# Patient Record
Sex: MALE | Race: WHITE | NOT HISPANIC OR LATINO | Employment: OTHER | URBAN - METROPOLITAN AREA
[De-identification: names, ages, dates, MRNs, and addresses within clinical notes are randomized per-mention and may not be internally consistent; named-entity substitution may affect disease eponyms.]

---

## 2019-10-24 ENCOUNTER — TELEPHONE (OUTPATIENT)
Dept: OPHTHALMOLOGY | Facility: CLINIC | Age: 74
End: 2019-10-24

## 2019-10-24 NOTE — TELEPHONE ENCOUNTER
Note to facilitator to assist in referral request  Gustavo Leiva, RN RN 12:57 PM 10/24/19        [facility]    Phone Message    May a detailed message be left on voicemail: yes    Reason for Call: Other: Pt is [facility] by the [facility] to Dr Charisma Rodriguez for choroidal degeration and nevus within 1 week, per [facility] auth. Referral and recs faxed to clinic for review, thanks!     Action Taken: Message routed to:  [facility] [place]     LM for pt appt on 10-18 at 145 at the [facility]. Left address and my number if they have questions  Sarika Person, BELÉN COT 2:13 PM October 24, 2019

## 2019-10-28 ENCOUNTER — OFFICE VISIT (OUTPATIENT)
Dept: OPHTHALMOLOGY | Facility: CLINIC | Age: 74
End: 2019-10-28
Attending: OPHTHALMOLOGY
Payer: COMMERCIAL

## 2019-10-28 DIAGNOSIS — C69.32 CHOROIDAL MALIGNANT MELANOMA, LEFT (H): ICD-10-CM

## 2019-10-28 DIAGNOSIS — H31.8 CHOROIDAL LESION: Primary | ICD-10-CM

## 2019-10-28 PROCEDURE — 92250 FUNDUS PHOTOGRAPHY W/I&R: CPT | Mod: ZF | Performed by: OPHTHALMOLOGY

## 2019-10-28 PROCEDURE — G0463 HOSPITAL OUTPT CLINIC VISIT: HCPCS | Mod: ZF

## 2019-10-28 PROCEDURE — 92134 CPTRZ OPH DX IMG PST SGM RTA: CPT | Mod: ZF | Performed by: OPHTHALMOLOGY

## 2019-10-28 PROCEDURE — 76513 OPH US DX ANT SGM US UNI/BI: CPT | Mod: ZF | Performed by: OPHTHALMOLOGY

## 2019-10-28 PROCEDURE — 76510 OPH US DX B-SCAN&QUAN A-SCAN: CPT | Mod: ZF | Performed by: OPHTHALMOLOGY

## 2019-10-28 RX ORDER — CHLORTHALIDONE 25 MG/1
25 TABLET ORAL EVERY MORNING
Status: ON HOLD | COMMUNITY
Start: 2019-01-22 | End: 2022-02-22

## 2019-10-28 RX ORDER — CARVEDILOL 12.5 MG/1
12.5 TABLET ORAL 2 TIMES DAILY WITH MEALS
COMMUNITY
Start: 2019-04-09

## 2019-10-28 RX ORDER — AMLODIPINE BESYLATE 10 MG/1
10 TABLET ORAL EVERY MORNING
COMMUNITY
Start: 2019-01-21

## 2019-10-28 RX ORDER — ATORVASTATIN CALCIUM 10 MG/1
10 TABLET, FILM COATED ORAL DAILY
Refills: 3 | COMMUNITY
Start: 2019-10-06 | End: 2021-08-19 | Stop reason: DRUGHIGH

## 2019-10-28 RX ORDER — LISINOPRIL 20 MG/1
20 TABLET ORAL 2 TIMES DAILY
Refills: 3 | COMMUNITY
Start: 2019-10-16

## 2019-10-28 ASSESSMENT — VISUAL ACUITY
OS_CC: J1
OD_CC+: -2
METHOD: SNELLEN - LINEAR
OS_CC: 20/25
CORRECTION_TYPE: GLASSES
OD_CC: 20/25
OD_CC: J1

## 2019-10-28 ASSESSMENT — CONF VISUAL FIELD
METHOD: COUNTING FINGERS
OD_NORMAL: 1
OS_NORMAL: 1

## 2019-10-28 ASSESSMENT — REFRACTION_WEARINGRX
OS_AXIS: 127
OS_ADD: +1.25
OD_CYLINDER: +0.75
OS_SPHERE: +3.00
OS_CYLINDER: +0.75
OD_SPHERE: +3.25
OD_AXIS: 139
OD_ADD: +1.25
SPECS_TYPE: PAL

## 2019-10-28 ASSESSMENT — CUP TO DISC RATIO
OD_RATIO: 0.25
OS_RATIO: 0.25

## 2019-10-28 ASSESSMENT — TONOMETRY
OD_IOP_MMHG: 12
OS_IOP_MMHG: 10
IOP_METHOD: ICARE

## 2019-10-28 ASSESSMENT — EXTERNAL EXAM - RIGHT EYE: OD_EXAM: NORMAL

## 2019-10-28 ASSESSMENT — SLIT LAMP EXAM - LIDS
COMMENTS: NORMAL
COMMENTS: NORMAL

## 2019-10-28 ASSESSMENT — EXTERNAL EXAM - LEFT EYE: OS_EXAM: NORMAL

## 2019-10-28 NOTE — PROGRESS NOTES
CC -   CMM OS    INTERVAL HISTORY - Initial visit    HPI -   Jairo Austin is a  73 year old year-old patient referred by the [facility] for evaluation and treat of a choroidal lesion left eye.  diagnosis 10/2019, been getting annual eye exams no prior notice per patient.  DM II since ~ 2010, good control, + HTn.      PAST OCULAR SURGERY  None    RETINAL IMAGING:  OCT  10-28-19  OD -  Macula - serous PEDs, PHF attached  OS -  Macula - retina normal, PHF attached   Lesion - elevated poor image quality         UBM  OS - far IT lesion in CB size 3.02mm x 10.39T x 12.48L    U/S OS  A-scan - lesion low reflective, size 3.29 mm  B-scan - peripheral lesion elevated        ASSESSMENT & PLAN    1. CMM OS   - most likely diagnosis based on exam and imaging   - advise treatment enucleation vs I-125   - d/w patient options, wishes to proceed with Tx     - will plan I-125 plaque   - d/w patient r/b/a: treatment failure, risk of metastasis, vision loss, resident/fellow      2. Syneresis OU      3. NS OU   - mild          return to clinic: for treatment    ATTESTATION     Attending Attestation:     Complete documentation of historical and exam elements from today's encounter can be found in the full encounter summary report (not reduplicated in this progress note).  I personally obtained the chief complaint(s) and history of present illness.  I confirmed and edited as necessary the review of systems, past medical/surgical history, family history, social history, and examination findings as documented by others; and I examined the patient myself.  I personally reviewed the relevant tests, images, and reports as documented above.  I formulated and edited as necessary the assessment and plan and discussed the findings and management plan with the patient and family    Charisma Rodriguez MD, PhD  , Vitreoretinal Surgery  Department of Ophthalmology  [facility]

## 2019-10-28 NOTE — NURSING NOTE
Chief Complaints and History of Present Illnesses   Patient presents with     Consult For     Choroidal lesion LE     Chief Complaint(s) and History of Present Illness(es)     Consult For     Associated symptoms: headache and floaters.  Negative for eye pain, flashes and photophobia    Comments: Choroidal lesion LE              Comments     Pt states vision is good.  Started getting headaches on left side of head 2-3 months ago.  [facility] noticed a choroidal lesion in LE.  Pt noticed floaters in LE possibly 6 months ago.  Pt has no pain or other concerns at this time.    DM 2.  Pt does not check BS levels.  No results found for: A1C.  Pt reports last A1C was around 6.5 about a month ago.    BELÉN Mendosa October 28, 2019 1:45 PM

## 2019-10-28 NOTE — LETTER
10/28/2019    RE: Jairo Austin  [address]  [facility] [address]     CC -   CMM OS    INTERVAL HISTORY - Initial visit    HPI - Jairo Austin is a  73 year old year-old patient referred by the [facility] for evaluation and treat of a choroidal lesion left eye.  diagnosis 10/2019, been getting annual eye exams no prior notice per patient.  DM II since ~ 2010, good control, + HTn.    PAST OCULAR SURGERY  None    RETINAL IMAGING:  OCT  10-28-19  OD -  Macula - serous PEDs, PHF attached  OS -  Macula - retina normal, PHF attached   Lesion - elevated poor image quality     UBM  OS - far IT lesion in CB size 3.02mm x 10.39T x 12.48L    U/S OS  A-scan - lesion low reflective, size 3.29 mm  B-scan - peripheral lesion elevated    ASSESSMENT & PLAN    1. CMM OS   - most likely diagnosis based on exam and imaging   - advise treatment enucleation vs I-125   - d/w patient options, wishes to proceed with Tx     - will plan I-125 plaque   - d/w patient r/b/a: treatment failure, risk of metastasis, vision loss, resident/fellow    2. Syneresis OU    3. NS OU   - mild    return to clinic: for treatment    ATTESTATION     Attending Attestation:Complete documentation of historical and exam elements from today's encounter can be found in the full encounter summary report (not reduplicated in this progress note).  I personally obtained the chief complaint(s) and history of present illness.  I confirmed and edited as necessary the review of systems, past medical/surgical history, family history, social history, and examination findings as documented by others; and I examined the patient myself.  I personally reviewed the relevant tests, images, and reports as documented above.  I formulated and edited as necessary the assessment and plan and discussed the findings and management plan with the patient and family- Charisma Rodriguez MD, PhD      Charisma Rodriguez MD, PhD  , Vitreoretinal Surgery  Department of  Ophthalmology  [facility]

## 2019-10-30 DIAGNOSIS — C69.30 CHOROIDAL MALIGNANT MELANOMA (H): Primary | ICD-10-CM

## 2019-10-31 NOTE — TELEPHONE ENCOUNTER
ONCOLOGY INTAKE: Records Information      APPT INFORMATION: 11/4/19 - Manoj  Referring provider:  LON Cohen  Referring provider s clinic:  [facility]   Reason for visit/diagnosis:  Choroidal malignant melanoma (H)   Has patient been notified of appointment date and time?: Yes - confirmed with wife    RECORDS INFORMATION:  Were the records received with the referral (via [employer])? Internal referral    Has patient been seen for any external appt for this diagnosis? No    If yes, where? NA    Has patient had any imaging or procedures outside of [place] for this condition? No      If Yes, where? NA    ADDITIONAL INFORMATION:  Scheduled via call to pt (per Aminata GARCIAS request) - confirmed with pt's wife    11/4/19 Appts Confirmed:  12:20pm CT CAP @ [facility]  2:30pm   Labs @ [facility]  3:00pm   Dr Aranda @ [facility]

## 2019-11-04 ENCOUNTER — APPOINTMENT (OUTPATIENT)
Dept: LAB | Facility: CLINIC | Age: 74
End: 2019-11-04
Attending: INTERNAL MEDICINE
Payer: COMMERCIAL

## 2019-11-04 ENCOUNTER — HOSPITAL ENCOUNTER (OUTPATIENT)
Dept: CT IMAGING | Facility: CLINIC | Age: 74
Discharge: HOME | End: 2019-11-04
Attending: INTERNAL MEDICINE | Admitting: INTERNAL MEDICINE
Payer: COMMERCIAL

## 2019-11-04 ENCOUNTER — PRE VISIT (OUTPATIENT)
Dept: ONCOLOGY | Facility: CLINIC | Age: 74
End: 2019-11-04

## 2019-11-04 ENCOUNTER — TELEPHONE (OUTPATIENT)
Dept: RADIATION ONCOLOGY | Facility: CLINIC | Age: 74
End: 2019-11-04

## 2019-11-04 ENCOUNTER — ONCOLOGY VISIT (OUTPATIENT)
Dept: ONCOLOGY | Facility: CLINIC | Age: 74
End: 2019-11-04
Attending: INTERNAL MEDICINE
Payer: COMMERCIAL

## 2019-11-04 VITALS
WEIGHT: 220 LBS | HEART RATE: 75 BPM | DIASTOLIC BLOOD PRESSURE: 93 MMHG | TEMPERATURE: 97.5 F | SYSTOLIC BLOOD PRESSURE: 160 MMHG | OXYGEN SATURATION: 98 % | RESPIRATION RATE: 18 BRPM

## 2019-11-04 DIAGNOSIS — C69.32 CHOROIDAL MALIGNANT MELANOMA, LEFT (H): Primary | ICD-10-CM

## 2019-11-04 DIAGNOSIS — C69.30 CHOROIDAL MALIGNANT MELANOMA (H): Primary | ICD-10-CM

## 2019-11-04 DIAGNOSIS — K76.9 LIVER LESION: ICD-10-CM

## 2019-11-04 DIAGNOSIS — C69.30 CHOROIDAL MALIGNANT MELANOMA (H): ICD-10-CM

## 2019-11-04 LAB
ALBUMIN SERPL-MCNC: 4.1 G/DL (ref 3.4–5)
ALP SERPL-CCNC: 121 U/L (ref 40–150)
ALT SERPL W P-5'-P-CCNC: 31 U/L (ref 0–70)
ANION GAP SERPL CALCULATED.3IONS-SCNC: 5 MMOL/L (ref 3–14)
AST SERPL W P-5'-P-CCNC: 20 U/L (ref 0–45)
BASOPHILS # BLD AUTO: 0 10E9/L (ref 0–0.2)
BASOPHILS NFR BLD AUTO: 0.8 %
BILIRUB SERPL-MCNC: 0.5 MG/DL (ref 0.2–1.3)
BUN SERPL-MCNC: 13 MG/DL (ref 7–30)
CALCIUM SERPL-MCNC: 9.3 MG/DL (ref 8.5–10.1)
CHLORIDE SERPL-SCNC: 104 MMOL/L (ref 94–109)
CO2 SERPL-SCNC: 27 MMOL/L (ref 20–32)
CREAT SERPL-MCNC: 0.81 MG/DL (ref 0.66–1.25)
DIFFERENTIAL METHOD BLD: NORMAL
EOSINOPHIL # BLD AUTO: 0.3 10E9/L (ref 0–0.7)
EOSINOPHIL NFR BLD AUTO: 5.9 %
ERYTHROCYTE [DISTWIDTH] IN BLOOD BY AUTOMATED COUNT: 11.8 % (ref 10–15)
GFR SERPL CREATININE-BSD FRML MDRD: 89 ML/MIN/{1.73_M2}
GLUCOSE SERPL-MCNC: 117 MG/DL (ref 70–99)
HCT VFR BLD AUTO: 44 % (ref 40–53)
HGB BLD-MCNC: 14.9 G/DL (ref 13.3–17.7)
IMM GRANULOCYTES # BLD: 0 10E9/L (ref 0–0.4)
IMM GRANULOCYTES NFR BLD: 0.4 %
LYMPHOCYTES # BLD AUTO: 1.2 10E9/L (ref 0.8–5.3)
LYMPHOCYTES NFR BLD AUTO: 22.5 %
MCH RBC QN AUTO: 32.7 PG (ref 26.5–33)
MCHC RBC AUTO-ENTMCNC: 33.9 G/DL (ref 31.5–36.5)
MCV RBC AUTO: 97 FL (ref 78–100)
MONOCYTES # BLD AUTO: 0.6 10E9/L (ref 0–1.3)
MONOCYTES NFR BLD AUTO: 12.3 %
NEUTROPHILS # BLD AUTO: 3 10E9/L (ref 1.6–8.3)
NEUTROPHILS NFR BLD AUTO: 58.1 %
NRBC # BLD AUTO: 0 10*3/UL
NRBC BLD AUTO-RTO: 0 /100
PLATELET # BLD AUTO: 190 10E9/L (ref 150–450)
POTASSIUM SERPL-SCNC: 3.7 MMOL/L (ref 3.4–5.3)
PROT SERPL-MCNC: 8.1 G/DL (ref 6.8–8.8)
RBC # BLD AUTO: 4.56 10E12/L (ref 4.4–5.9)
SODIUM SERPL-SCNC: 137 MMOL/L (ref 133–144)
WBC # BLD AUTO: 5.1 10E9/L (ref 4–11)

## 2019-11-04 PROCEDURE — 36415 COLL VENOUS BLD VENIPUNCTURE: CPT

## 2019-11-04 PROCEDURE — 40000141 ZZH STATISTIC PERIPHERAL IV START W/O US GUIDANCE

## 2019-11-04 PROCEDURE — 25000128 H RX IP 250 OP 636: Performed by: STUDENT IN AN ORGANIZED HEALTH CARE EDUCATION/TRAINING PROGRAM

## 2019-11-04 PROCEDURE — 85025 COMPLETE CBC W/AUTO DIFF WBC: CPT | Performed by: INTERNAL MEDICINE

## 2019-11-04 PROCEDURE — G0463 HOSPITAL OUTPT CLINIC VISIT: HCPCS | Mod: ZF

## 2019-11-04 PROCEDURE — 71260 CT THORAX DX C+: CPT

## 2019-11-04 PROCEDURE — 74177 CT ABD & PELVIS W/CONTRAST: CPT

## 2019-11-04 PROCEDURE — 80053 COMPREHEN METABOLIC PANEL: CPT | Performed by: INTERNAL MEDICINE

## 2019-11-04 PROCEDURE — G0463 HOSPITAL OUTPT CLINIC VISIT: HCPCS

## 2019-11-04 PROCEDURE — 99204 OFFICE O/P NEW MOD 45 MIN: CPT | Mod: ZP | Performed by: INTERNAL MEDICINE

## 2019-11-04 RX ORDER — IOPAMIDOL 755 MG/ML
132 INJECTION, SOLUTION INTRAVASCULAR ONCE
Status: COMPLETED | OUTPATIENT
Start: 2019-11-04 | End: 2019-11-04

## 2019-11-04 RX ORDER — ASPIRIN 81 MG/1
81 TABLET ORAL EVERY MORNING
COMMUNITY
Start: 2013-10-25

## 2019-11-04 RX ADMIN — IOPAMIDOL 132 ML: 755 INJECTION, SOLUTION INTRAVENOUS at 10:31

## 2019-11-04 ASSESSMENT — PAIN SCALES - GENERAL: PAINLEVEL: NO PAIN (0)

## 2019-11-04 NOTE — NURSING NOTE
Chief Complaint   Patient presents with     Blood Draw     Labs drawn via  by RN in lab. Line flushed with saline and heparin. VS taken.      Staci Gonzalez RN

## 2019-11-04 NOTE — PROGRESS NOTES
[facility]  MEDICAL ONCOLOGY CONSULT  Nov 4, 2019    CHIEF COMPLAINT: ***    HISTORY OF PRESENT ILLNESS  Jairo Austin is a 73 year old male with ***.         Results for orders placed or performed in visit on 11/04/19   Comprehensive metabolic panel     Status: Abnormal   Result Value Ref Range    Sodium 137 133 - 144 mmol/L    Potassium 3.7 3.4 - 5.3 mmol/L    Chloride 104 94 - 109 mmol/L    Carbon Dioxide 27 20 - 32 mmol/L    Anion Gap 5 3 - 14 mmol/L    Glucose 117 (H) 70 - 99 mg/dL    Urea Nitrogen 13 7 - 30 mg/dL    Creatinine 0.81 0.66 - 1.25 mg/dL    GFR Estimate 89 >60 mL/min/[1.73_m2]    GFR Estimate If Black >90 >60 mL/min/[1.73_m2]    Calcium 9.3 8.5 - 10.1 mg/dL    Bilirubin Total 0.5 0.2 - 1.3 mg/dL    Albumin 4.1 3.4 - 5.0 g/dL    Protein Total 8.1 6.8 - 8.8 g/dL    Alkaline Phosphatase 121 40 - 150 U/L    ALT 31 0 - 70 U/L    AST 20 0 - 45 U/L   *CBC with platelets differential     Status: None   Result Value Ref Range    WBC 5.1 4.0 - 11.0 10e9/L    RBC Count 4.56 4.4 - 5.9 10e12/L    Hemoglobin 14.9 13.3 - 17.7 g/dL    Hematocrit 44.0 40.0 - 53.0 %    MCV 97 78 - 100 fl    MCH 32.7 26.5 - 33.0 pg    MCHC 33.9 31.5 - 36.5 g/dL    RDW 11.8 10.0 - 15.0 %    Platelet Count 190 150 - 450 10e9/L    Diff Method Automated Method     % Neutrophils 58.1 %    % Lymphocytes 22.5 %    % Monocytes 12.3 %    % Eosinophils 5.9 %    % Basophils 0.8 %    % Immature Granulocytes 0.4 %    Nucleated RBCs 0 0 /100    Absolute Neutrophil 3.0 1.6 - 8.3 10e9/L    Absolute Lymphocytes 1.2 0.8 - 5.3 10e9/L    Absolute Monocytes 0.6 0.0 - 1.3 10e9/L    Absolute Eosinophils 0.3 0.0 - 0.7 10e9/L    Absolute Basophils 0.0 0.0 - 0.2 10e9/L    Abs Immature Granulocytes 0.0 0 - 0.4 10e9/L    Absolute Nucleated RBC 0.0          REVIEW OF SYSTEMS  A 12-point ROS negative except as in HPI    Current Outpatient Medications   Medication Sig Dispense Refill     amLODIPine (NORVASC) 10 MG tablet Take 10 mg by mouth        aspirin 81 MG EC tablet Take 81 mg by mouth       atorvastatin (LIPITOR) 10 MG tablet Take 10 mg by mouth daily  3     carvedilol (COREG) 12.5 MG tablet Take 12.5 mg by mouth       chlorthalidone (HYGROTON) 25 MG tablet Take 25 mg by mouth       lisinopril (PRINIVIL/ZESTRIL) 20 MG tablet Take 2 tablets BY MOUTH in the morning, and 1 tablet at night.  3       Allergies   Allergen Reactions     No Clinical Screening - See Comments Itching and Rash     Nitroimidazoles       There is no immunization history on file for this patient.    Past Medical History:   Diagnosis Date     Diabetes (H)      Hypertension        PAST SURGICAL HISTORY  1. Bilateral hip replacement, 2011    SOCIAL HISTORY   with 2 children and 4 grandchildren. Retired and now working as a . Quit smoking 20 years ago. Smoked 2 packs per day x 10 years.  History   Smoking Status     Former Smoker     Packs/day: 0.00   Smokeless Tobacco     Never Used    Social History    Substance and Sexual Activity      Alcohol use: Yes        Comment: Weekends/socially     History   Drug Use Not on file       FAMILY HISTORY  Paternal uncle: Throat cancer  Family History   Problem Relation Age of Onset     Glaucoma No family hx of      Macular Degeneration No family hx of        PHYSICAL EXAMINATION  BP (!) 160/93 (BP Location: Right arm, Patient Position: Sitting, Cuff Size: Adult Large)   Pulse 75   Temp 97.5  F (36.4  C) (Oral)   Resp 18   Wt 99.8 kg (220 lb)   SpO2 98%   Wt Readings from Last 2 Encounters:   11/04/19 99.8 kg (220 lb)     Physical Exam    ASSESSMENT AND PLAN    #1 ***      Darrell Aranda M.D.   of Medicine  Hematology, Oncology and Transplantation      Choroidal malignant melanoma (H)  ***  - Comprehensive metabolic panel  - *CBC with platelets differential

## 2019-11-04 NOTE — LETTER
11/4/2019       RE: Jairo Austin  [address]  [facility] [address]     Dear Colleague,    Thank you for referring your patient, Jairo Austin, to the [facility]. Please see a copy of my visit note below.    [facility]  MEDICAL ONCOLOGY CONSULT  Nov 4, 2019    CHIEF COMPLAINT: Choroidal melanoma    HISTORY OF PRESENT ILLNESS  Jairo Austin is a 73 year old male with a new diagnosis of choroidal melanoma of the left eye. He is referred by Dr. Rodriguez.     He was initially seen on 10/28/19, and the left eye lesion measured 10.39T x 12.48L with a height of 3.02mm. The plan is for plaque brachytherapy. Recent CT-scan negative for obvious liver metastasis. There were small enhancing lesion seen in segment 8 and 4a, likely small hemangiomas.    Patient feels well and has hypertension and diet controlled diabetes. He is on multiple antihypertensive agents including carvedilol and aspirin. Prior 20 pack-year smoker, quit 20 years ago.     Results for orders placed or performed in visit on 11/04/19   Comprehensive metabolic panel     Status: Abnormal   Result Value Ref Range    Sodium 137 133 - 144 mmol/L    Potassium 3.7 3.4 - 5.3 mmol/L    Chloride 104 94 - 109 mmol/L    Carbon Dioxide 27 20 - 32 mmol/L    Anion Gap 5 3 - 14 mmol/L    Glucose 117 (H) 70 - 99 mg/dL    Urea Nitrogen 13 7 - 30 mg/dL    Creatinine 0.81 0.66 - 1.25 mg/dL    GFR Estimate 89 >60 mL/min/[1.73_m2]    GFR Estimate If Black >90 >60 mL/min/[1.73_m2]    Calcium 9.3 8.5 - 10.1 mg/dL    Bilirubin Total 0.5 0.2 - 1.3 mg/dL    Albumin 4.1 3.4 - 5.0 g/dL    Protein Total 8.1 6.8 - 8.8 g/dL    Alkaline Phosphatase 121 40 - 150 U/L    ALT 31 0 - 70 U/L    AST 20 0 - 45 U/L   *CBC with platelets differential     Status: None   Result Value Ref Range    WBC 5.1 4.0 - 11.0 10e9/L    RBC Count 4.56 4.4 - 5.9 10e12/L    Hemoglobin 14.9 13.3 - 17.7 g/dL    Hematocrit 44.0 40.0 - 53.0 %    MCV 97  78 - 100 fl    MCH 32.7 26.5 - 33.0 pg    MCHC 33.9 31.5 - 36.5 g/dL    RDW 11.8 10.0 - 15.0 %    Platelet Count 190 150 - 450 10e9/L    Diff Method Automated Method     % Neutrophils 58.1 %    % Lymphocytes 22.5 %    % Monocytes 12.3 %    % Eosinophils 5.9 %    % Basophils 0.8 %    % Immature Granulocytes 0.4 %    Nucleated RBCs 0 0 /100    Absolute Neutrophil 3.0 1.6 - 8.3 10e9/L    Absolute Lymphocytes 1.2 0.8 - 5.3 10e9/L    Absolute Monocytes 0.6 0.0 - 1.3 10e9/L    Absolute Eosinophils 0.3 0.0 - 0.7 10e9/L    Absolute Basophils 0.0 0.0 - 0.2 10e9/L    Abs Immature Granulocytes 0.0 0 - 0.4 10e9/L    Absolute Nucleated RBC 0.0          REVIEW OF SYSTEMS  A 12-point ROS negative except as in HPI    Current Outpatient Medications   Medication Sig Dispense Refill     amLODIPine (NORVASC) 10 MG tablet Take 10 mg by mouth       aspirin 81 MG EC tablet Take 81 mg by mouth       atorvastatin (LIPITOR) 10 MG tablet Take 10 mg by mouth daily  3     carvedilol (COREG) 12.5 MG tablet Take 12.5 mg by mouth       chlorthalidone (HYGROTON) 25 MG tablet Take 25 mg by mouth       lisinopril (PRINIVIL/ZESTRIL) 20 MG tablet Take 2 tablets BY MOUTH in the morning, and 1 tablet at night.  3       Allergies   Allergen Reactions     No Clinical Screening - See Comments Itching and Rash     Nitroimidazoles       There is no immunization history on file for this patient.    Past Medical History:   Diagnosis Date     Diabetes (H)      Hypertension      Melanoma (H)        PAST SURGICAL HISTORY  1. Bilateral hip replacement, 2011    SOCIAL HISTORY   with 2 children and 4 grandchildren. Retired and now working as a . Quit smoking 20 years ago. Smoked 2 packs per day x 10 years.  History   Smoking Status     Former Smoker     Packs/day: 0.00   Smokeless Tobacco     Never Used    Social History    Substance and Sexual Activity      Alcohol use: Yes        Comment: Weekends/socially     History   Drug Use Unknown        FAMILY HISTORY  Paternal uncle: Throat cancer  Family History   Problem Relation Age of Onset     Glaucoma No family hx of      Macular Degeneration No family hx of        PHYSICAL EXAMINATION  BP (!) 160/93 (BP Location: Right arm, Patient Position: Sitting, Cuff Size: Adult Large)   Pulse 75   Temp 97.5  F (36.4  C) (Oral)   Resp 18   Wt 99.8 kg (220 lb)   SpO2 98%   Wt Readings from Last 2 Encounters:   11/07/19 99 kg (218 lb 4.8 oz)   11/04/19 99.8 kg (220 lb)     Physical Exam    ASSESSMENT AND PLAN    #1 Uveal melanoma, 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 Austin. He is a 73 year old man with uveal melanoma. The tumor involves the ciliary body and plan is for plaque brachytherapy. CT-CAP shows small lesions, likely hemangiomas. We will obtain MRI liver to confirm hemangiomas and rule out melanoma    I will plan to see him back in 3 months with repeat MRI on observation.    Darrell Aranda M.D.   of Medicine  Hematology, Oncology and Transplantation      Choroidal malignant melanoma, left (H)  Liver lesion  - MR Abdomen w/o & w Contrast    Choroidal malignant melanoma (H)  - Comprehensive metabolic panel  - *CBC with platelets differential        Again, thank you for allowing me to participate in the care of your patient.      Sincerely,    Darrell De Souza MD

## 2019-11-04 NOTE — LETTER
Date:November 19, 2019      Patient was self referred, no letter generated. Do not send.        [facility] Health Information

## 2019-11-04 NOTE — NURSING NOTE
Oncology Rooming Note    November 4, 2019 12:48 PM   Jairo Austin is a 73 year old male who presents for:    Chief Complaint   Patient presents with     Blood Draw     Labs drawn via  by RN in lab. Line flushed with saline and heparin. VS taken.      New Patient     New patient.  Choroidal malignant melanoma.      Initial Vitals: BP (!) 160/93 (BP Location: Right arm, Patient Position: Sitting, Cuff Size: Adult Large)   Pulse 75   Temp 97.5  F (36.4  C) (Oral)   Resp 18   Wt 99.8 kg (220 lb)   SpO2 98%  There is no height or weight on file to calculate BMI. There is no height or weight on file to calculate BSA.  No Pain (0) Comment: Data Unavailable   No LMP for male patient.  Allergies reviewed: Yes  Medications reviewed: No    Medications: Medication refills not needed today.  Pharmacy name entered into [employer]: [id number] - [place], [place] - [address]    Clinical concerns: No additional concerns.  Dr. De Souza was notified.      Tanisha Pascal, CMA

## 2019-11-07 ENCOUNTER — OFFICE VISIT (OUTPATIENT)
Dept: RADIATION ONCOLOGY | Facility: CLINIC | Age: 74
End: 2019-11-07
Attending: RADIOLOGY
Payer: COMMERCIAL

## 2019-11-07 VITALS — WEIGHT: 218.3 LBS | SYSTOLIC BLOOD PRESSURE: 130 MMHG | DIASTOLIC BLOOD PRESSURE: 77 MMHG | HEART RATE: 59 BPM

## 2019-11-07 DIAGNOSIS — C69.32 CHOROIDAL MALIGNANT MELANOMA, LEFT (H): Primary | ICD-10-CM

## 2019-11-07 PROCEDURE — 77470 SPECIAL RADIATION TREATMENT: CPT | Performed by: RADIOLOGY

## 2019-11-07 PROCEDURE — G0463 HOSPITAL OUTPT CLINIC VISIT: HCPCS | Performed by: RADIOLOGY

## 2019-11-07 ASSESSMENT — ENCOUNTER SYMPTOMS
HEARTBURN: 0
NAUSEA: 0
EYE PAIN: 0
NECK PAIN: 0
VOMITING: 0
WEIGHT LOSS: 0
PHOTOPHOBIA: 0
DIZZINESS: 0
WHEEZING: 0
FREQUENCY: 0
FALLS: 0
DOUBLE VISION: 0
NERVOUS/ANXIOUS: 0
DIARRHEA: 0
FEVER: 0
CHILLS: 0
DEPRESSION: 0
CONSTIPATION: 0
BLURRED VISION: 0
BACK PAIN: 0
COUGH: 0
SORE THROAT: 0
ABDOMINAL PAIN: 0
HEADACHES: 1
SHORTNESS OF BREATH: 0

## 2019-11-07 NOTE — PROGRESS NOTES
HPI    INITIAL PATIENT ASSESSMENT    Diagnosis: melanoma    Prior radiation therapy: None    Prior chemotherapy: None    Prior hormonal therapy:No    Pain Eval:  Denies    Psychosocial  Living arrangements: wife  Fall Risk: independent   referral needs: Not needed    Advanced Directive: Yes - Location: home  Implantable Cardiac Device? No    Onset of menarche:   LMP: No LMP for male patient.  Onset of menopause:   Abnormal vaginal bleeding/discharge:   Are you pregnant?   Reproductive note: grown children    Nurse face-to-face time: Level 3:  10 min face to face time  Review of Systems   Constitutional: Negative for chills, fever, malaise/fatigue and weight loss.   HENT: Positive for hearing loss and tinnitus. Negative for congestion and sore throat.    Eyes: Negative for blurred vision, double vision, photophobia and pain.   Respiratory: Negative for cough, shortness of breath and wheezing.    Cardiovascular: Negative for chest pain and leg swelling.   Gastrointestinal: Negative for abdominal pain, constipation, diarrhea, heartburn, nausea and vomiting.   Genitourinary: Negative for frequency and urgency.   Musculoskeletal: Negative for back pain, falls and neck pain.   Skin: Negative for itching and rash.   Neurological: Positive for headaches. Negative for dizziness.   Endo/Heme/Allergies: Negative for environmental allergies.   Psychiatric/Behavioral: Negative for depression. The patient is not nervous/anxious.

## 2019-11-07 NOTE — LETTER
11/7/2019       RE: Jairo Austin  [address]  [facility] [address]     Dear Colleague,    Thank you for referring your patient, Jairo Austin, to the [facility] ON[facility]. Please see a copy of my visit note below.      HPI    INITIAL PATIENT ASSESSMENT    Diagnosis: melanoma    Prior radiation therapy: None    Prior chemotherapy: None    Prior hormonal therapy:No    Pain Eval:  Denies    Psychosocial  Living arrangements: wife  Fall Risk: independent   referral needs: Not needed    Advanced Directive: Yes - Location: home  Implantable Cardiac Device? No    Onset of menarche:   LMP: No LMP for male patient.  Onset of menopause:   Abnormal vaginal bleeding/discharge:   Are you pregnant?   Reproductive note: grown children    Nurse face-to-face time: Level 3:  10 min face to face time  Review of Systems   Constitutional: Negative for chills, fever, malaise/fatigue and weight loss.   HENT: Positive for hearing loss and tinnitus. Negative for congestion and sore throat.    Eyes: Negative for blurred vision, double vision, photophobia and pain.   Respiratory: Negative for cough, shortness of breath and wheezing.    Cardiovascular: Negative for chest pain and leg swelling.   Gastrointestinal: Negative for abdominal pain, constipation, diarrhea, heartburn, nausea and vomiting.   Genitourinary: Negative for frequency and urgency.   Musculoskeletal: Negative for back pain, falls and neck pain.   Skin: Negative for itching and rash.   Neurological: Positive for headaches. Negative for dizziness.   Endo/Heme/Allergies: Negative for environmental allergies.   Psychiatric/Behavioral: Negative for depression. The patient is not nervous/anxious.                  Radiation Oncology Consultation:  Date on this visit: 11/7/2019    Jairo Austin  is referred by Dr.Dara Renan Rodriguez for a radiation oncology consultation. He requires evaluation for diagnosis of choroidal   melanoma involvig the LEFT eye    History Of Present Illness:  Mr. Austin is a 73 year old male who presents with a diagnosis of choroidal melanoma.  At [facility] and sent him to DR Rodriguez who saw him 10/28/19    Exam at that mike revealed a raised lesion in the left eye measuring  10.39T x 12.48L  with a height of 3.02mm     He is referred now for consideration of  I 125 radioacitve plaque.       His history is significant for diet controlled Diabetes and Hypertension.      He met with Annie in medical oncology on Monday. Note is pending .  CT of the CAP was negative except for a  small enhancing lesions in the right lobe of the liver which were  nonspecific, possibly hemangomas.  Follow up recommended.      He has good vision.  He works as a  now that he is 'retired'   He Quit smoking 20 years ago. Smoked 2 packs per day x 10 years    Past Medical/Surgical History:  Past Medical History:   Diagnosis Date     Diabetes (H)      Hypertension      Melanoma (H)      Past Surgical History:   Procedure Laterality Date     AS PARTIAL HIP REPLACEMENT         Past Radiation History:  Past Chemotherapy History:  Implanted Cardiac device:   none  Advanced directive  :      Review of Systems:  Reviewed.  See details in nursing note    Allergies:  Allergies as of 11/07/2019 - Reviewed 11/07/2019   Allergen Reaction Noted     No clinical screening - see comments Itching and Rash 11/04/2019       Current Medications:   current medication list reviewed    Family History:  Family History   Problem Relation Age of Onset     Glaucoma No family hx of      Macular Degeneration No family hx of        Social History:  Social History     Socioeconomic History     Marital status:      Spouse name: Not on file     Number of children: Not on file     Years of education: Not on file     Highest education level: Not on file   Occupational  History     Not on file   Social Needs     Financial resource strain: Not on file     Food insecurity:     Worry: Not on file     Inability: Not on file     Transportation needs:     Medical: Not on file     Non-medical: Not on file   Tobacco Use     Smoking status: Former Smoker     Packs/day: 0.00     Smokeless tobacco: Never Used   Substance and Sexual Activity     Alcohol use: Yes     Comment: Weekends/socially     Drug use: Never     Sexual activity: Not on file   Lifestyle     Physical activity:     Days per week: Not on file     Minutes per session: Not on file     Stress: Not on file   Relationships     Social connections:     Talks on phone: Not on file     Gets together: Not on file     Attends Nondenominational service: Not on file     Active member of club or organization: Not on file     Attends meetings of clubs or organizations: Not on file     Relationship status: Not on file     Intimate partner violence:     Fear of current or ex partner: Not on file     Emotionally abused: Not on file     Physically abused: Not on file     Forced sexual activity: Not on file   Other Topics Concern     Parent/sibling w/ CABG, MI or angioplasty before 65F 55M? Not Asked   Social History Narrative     Not on file       Physical Exam:  /77   Pulse 59   Wt 99 kg (218 lb 4.8 oz)     GENERAL APPEARANCE: healthy, alert and no distress  HENT: Mouth without ulcers or lesions   PEERLA  EOMI  NECK: no adenopathy, no asymmetry or masses   LYMPHATICS: No cervical, supraclavicular, axillary or inguinal lymphadenopathy    MUSCULOSKELETAL: extremities normal- no gross deformities noted, no evidence of inflammation in joints, FROM in all extremities. No edema b/l LE.  SKIN: no suspicious lesions or rashes   PSYCHIATRIC: mentation appears normal and affect normal    Pathology:na  Labs reviewed    ASSESSMENT:    Choroidal melanoma of the LEFT eye.      RECOMMENDATION:    He is an excellent candidate for consideration of a   I 125 eye  plaque.   I explained that the chance of local control is very good but there is a risk of deterioration of his vision on that eye due to either retinopathy from the radiotherapy or catarct formation.    The risks and benefits of radiation therapy were discussed with the patient.  Time was given to ask and answer questions.  All questions were answered.  The patient wishes to proceed with the proposed course of treatment.  An informed consent form was reviewed with the patient and signed by the patient.       He believes that the procedure will be scheduled in December.      Thank you for allowing me to participate in the care of this patient.  Please do not hesitate to call if you have questions.       Nica Melgoza MD  [phone]   Pager   [phone]  [facility]   [phone] Julita Mathew  [phone] [facility]  Primary Physician: No Ref-Primary, Physician   Patient Care Team:  No Ref-Primary, Physician as PCP - General  PADILLA GUERRERO                Again, thank you for allowing me to participate in the care of your patient.      Sincerely,    Nica Melgoza MD

## 2019-11-07 NOTE — PROGRESS NOTES
Radiation Oncology Consultation:  Date on this visit: 11/7/2019    Jairo Austin  is referred by Dr.Dara Renan Rodriguez for a radiation oncology consultation. He requires evaluation for diagnosis of choroidal  melanoma involvig the LEFT eye    History Of Present Illness:  Mr. Austin is a 73 year old male who presents with a diagnosis of choroidal melanoma.  At [facility] and sent him to DR Rodriguez who saw him 10/28/19    Exam at that mike revealed a raised lesion in the left eye measuring  10.39T x 12.48L  with a height of 3.02mm     He is referred now for consideration of  I 125 radioacitve plaque.       His history is significant for diet controlled Diabetes and Hypertension.      He met with Annie in medical oncology on Monday. Note is pending .  CT of the CAP was negative except for a  small enhancing lesions in the right lobe of the liver which were  nonspecific, possibly hemangomas.  Follow up recommended.      He has good vision.  He works as a  now that he is 'retired'   He Quit smoking 20 years ago. Smoked 2 packs per day x 10 years    Past Medical/Surgical History:  Past Medical History:   Diagnosis Date     Diabetes (H)      Hypertension      Melanoma (H)      Past Surgical History:   Procedure Laterality Date     AS PARTIAL HIP REPLACEMENT         Past Radiation History:  Past Chemotherapy History:  Implanted Cardiac device:   none  Advanced directive  :      Review of Systems:  Reviewed.  See details in nursing note    Allergies:  Allergies as of 11/07/2019 - Reviewed 11/07/2019   Allergen Reaction Noted     No clinical screening - see comments Itching and Rash 11/04/2019       Current Medications:   current medication list reviewed    Family History:  Family History   Problem Relation Age of Onset     Glaucoma No family hx of      Macular Degeneration No family hx of        Social History:  Social  History     Socioeconomic History     Marital status:      Spouse name: Not on file     Number of children: Not on file     Years of education: Not on file     Highest education level: Not on file   Occupational History     Not on file   Social Needs     Financial resource strain: Not on file     Food insecurity:     Worry: Not on file     Inability: Not on file     Transportation needs:     Medical: Not on file     Non-medical: Not on file   Tobacco Use     Smoking status: Former Smoker     Packs/day: 0.00     Smokeless tobacco: Never Used   Substance and Sexual Activity     Alcohol use: Yes     Comment: Weekends/socially     Drug use: Never     Sexual activity: Not on file   Lifestyle     Physical activity:     Days per week: Not on file     Minutes per session: Not on file     Stress: Not on file   Relationships     Social connections:     Talks on phone: Not on file     Gets together: Not on file     Attends Hindu service: Not on file     Active member of club or organization: Not on file     Attends meetings of clubs or organizations: Not on file     Relationship status: Not on file     Intimate partner violence:     Fear of current or ex partner: Not on file     Emotionally abused: Not on file     Physically abused: Not on file     Forced sexual activity: Not on file   Other Topics Concern     Parent/sibling w/ CABG, MI or angioplasty before 65F 55M? Not Asked   Social History Narrative     Not on file       Physical Exam:  /77   Pulse 59   Wt 99 kg (218 lb 4.8 oz)     GENERAL APPEARANCE: healthy, alert and no distress  HENT: Mouth without ulcers or lesions   PEERLA  EOMI  NECK: no adenopathy, no asymmetry or masses   LYMPHATICS: No cervical, supraclavicular, axillary or inguinal lymphadenopathy    MUSCULOSKELETAL: extremities normal- no gross deformities noted, no evidence of inflammation in joints, FROM in all extremities. No edema b/l LE.  SKIN: no suspicious lesions or rashes    PSYCHIATRIC: mentation appears normal and affect normal    Pathology:na  Labs reviewed    ASSESSMENT:    Choroidal melanoma of the LEFT eye.      RECOMMENDATION:    He is an excellent candidate for consideration of a   I 125 eye plaque.   I explained that the chance of local control is very good but there is a risk of deterioration of his vision on that eye due to either retinopathy from the radiotherapy or catarct formation.    The risks and benefits of radiation therapy were discussed with the patient.  Time was given to ask and answer questions.  All questions were answered.  The patient wishes to proceed with the proposed course of treatment.  An informed consent form was reviewed with the patient and signed by the patient.       He believes that the procedure will be scheduled in December.      Thank you for allowing me to participate in the care of this patient.  Please do not hesitate to call if you have questions.       Nica Melgoza MD  [phone]   Pager   [phone]  [facility]   [phone] [place]  [phone] [facility]  Primary Physician: No Ref-Primary, Physician   Patient Care Team:  No Ref-Primary, Physician as PCP - General  PADILLA GUERRERO

## 2019-11-11 ENCOUNTER — ANCILLARY PROCEDURE (OUTPATIENT)
Dept: MRI IMAGING | Facility: CLINIC | Age: 74
End: 2019-11-11
Attending: INTERNAL MEDICINE
Payer: COMMERCIAL

## 2019-11-11 DIAGNOSIS — K76.9 LIVER LESION: ICD-10-CM

## 2019-11-11 DIAGNOSIS — C69.32 CHOROIDAL MALIGNANT MELANOMA, LEFT (H): ICD-10-CM

## 2019-11-12 NOTE — DISCHARGE INSTRUCTIONS
MRI Contrast Discharge Instructions    The IV contrast you received today will pass out of your body in your  urine. This will happen in the next 24 hours. You will not feel this process.  Your urine will not change color.    Drink at least 4 extra glasses of water or juice today (unless your doctor  has restricted your fluids). This reduces the stress on your kidneys.  You may take your regular medicines.    If you are on dialysis: It is best to have dialysis today.    If you have a reaction: Most reactions happen right away. If you have  any new symptoms after leaving the hospital (such as hives or swelling),  call your hospital at the correct number below. Or call your family doctor.  If you have breathing distress or wheezing, call 911.    Special instructions: ***    I have read and understand the above information.    Signature:______________________________________ Date:___________    Staff:__________________________________________ Date:___________     Time:__________    [place] [facility] Departments:    ___[place]: [phone]  ___[facility]: [phone]  ___[place]: [phone] ___[facility]: [phone]  ___[facility]: [phone]  ___[facility]: [phone]  ___[phone]  ___[facility]: [phone]  ___[phone]

## 2019-11-15 NOTE — PROGRESS NOTES
[facility]  MEDICAL ONCOLOGY CONSULT  Nov 4, 2019    CHIEF COMPLAINT: Choroidal melanoma    HISTORY OF PRESENT ILLNESS  Jairo Austin is a 73 year old male with a new diagnosis of choroidal melanoma of the left eye. He is referred by Dr. Rodriguez.     He was initially seen on 10/28/19, and the left eye lesion measured 10.39T x 12.48L with a height of 3.02mm. The plan is for plaque brachytherapy. Recent CT-scan negative for obvious liver metastasis. There were small enhancing lesion seen in segment 8 and 4a, likely small hemangiomas.    Patient feels well and has hypertension and diet controlled diabetes. He is on multiple antihypertensive agents including carvedilol and aspirin. Prior 20 pack-year smoker, quit 20 years ago.     Results for orders placed or performed in visit on 11/04/19   Comprehensive metabolic panel     Status: Abnormal   Result Value Ref Range    Sodium 137 133 - 144 mmol/L    Potassium 3.7 3.4 - 5.3 mmol/L    Chloride 104 94 - 109 mmol/L    Carbon Dioxide 27 20 - 32 mmol/L    Anion Gap 5 3 - 14 mmol/L    Glucose 117 (H) 70 - 99 mg/dL    Urea Nitrogen 13 7 - 30 mg/dL    Creatinine 0.81 0.66 - 1.25 mg/dL    GFR Estimate 89 >60 mL/min/[1.73_m2]    GFR Estimate If Black >90 >60 mL/min/[1.73_m2]    Calcium 9.3 8.5 - 10.1 mg/dL    Bilirubin Total 0.5 0.2 - 1.3 mg/dL    Albumin 4.1 3.4 - 5.0 g/dL    Protein Total 8.1 6.8 - 8.8 g/dL    Alkaline Phosphatase 121 40 - 150 U/L    ALT 31 0 - 70 U/L    AST 20 0 - 45 U/L   *CBC with platelets differential     Status: None   Result Value Ref Range    WBC 5.1 4.0 - 11.0 10e9/L    RBC Count 4.56 4.4 - 5.9 10e12/L    Hemoglobin 14.9 13.3 - 17.7 g/dL    Hematocrit 44.0 40.0 - 53.0 %    MCV 97 78 - 100 fl    MCH 32.7 26.5 - 33.0 pg    MCHC 33.9 31.5 - 36.5 g/dL    RDW 11.8 10.0 - 15.0 %    Platelet Count 190 150 - 450 10e9/L    Diff Method Automated Method     % Neutrophils 58.1 %    % Lymphocytes 22.5 %    % Monocytes 12.3 %    % Eosinophils  5.9 %    % Basophils 0.8 %    % Immature Granulocytes 0.4 %    Nucleated RBCs 0 0 /100    Absolute Neutrophil 3.0 1.6 - 8.3 10e9/L    Absolute Lymphocytes 1.2 0.8 - 5.3 10e9/L    Absolute Monocytes 0.6 0.0 - 1.3 10e9/L    Absolute Eosinophils 0.3 0.0 - 0.7 10e9/L    Absolute Basophils 0.0 0.0 - 0.2 10e9/L    Abs Immature Granulocytes 0.0 0 - 0.4 10e9/L    Absolute Nucleated RBC 0.0          REVIEW OF SYSTEMS  A 12-point ROS negative except as in HPI    Current Outpatient Medications   Medication Sig Dispense Refill     amLODIPine (NORVASC) 10 MG tablet Take 10 mg by mouth       aspirin 81 MG EC tablet Take 81 mg by mouth       atorvastatin (LIPITOR) 10 MG tablet Take 10 mg by mouth daily  3     carvedilol (COREG) 12.5 MG tablet Take 12.5 mg by mouth       chlorthalidone (HYGROTON) 25 MG tablet Take 25 mg by mouth       lisinopril (PRINIVIL/ZESTRIL) 20 MG tablet Take 2 tablets BY MOUTH in the morning, and 1 tablet at night.  3       Allergies   Allergen Reactions     No Clinical Screening - See Comments Itching and Rash     Nitroimidazoles       There is no immunization history on file for this patient.    Past Medical History:   Diagnosis Date     Diabetes (H)      Hypertension      Melanoma (H)        PAST SURGICAL HISTORY  1. Bilateral hip replacement, 2011    SOCIAL HISTORY   with 2 children and 4 grandchildren. Retired and now working as a . Quit smoking 20 years ago. Smoked 2 packs per day x 10 years.  History   Smoking Status     Former Smoker     Packs/day: 0.00   Smokeless Tobacco     Never Used    Social History    Substance and Sexual Activity      Alcohol use: Yes        Comment: Weekends/socially     History   Drug Use Unknown       FAMILY HISTORY  Paternal uncle: Throat cancer  Family History   Problem Relation Age of Onset     Glaucoma No family hx of      Macular Degeneration No family hx of        PHYSICAL EXAMINATION  BP (!) 160/93 (BP Location: Right arm, Patient Position:  Sitting, Cuff Size: Adult Large)   Pulse 75   Temp 97.5  F (36.4  C) (Oral)   Resp 18   Wt 99.8 kg (220 lb)   SpO2 98%   Wt Readings from Last 2 Encounters:   11/07/19 99 kg (218 lb 4.8 oz)   11/04/19 99.8 kg (220 lb)     Physical Exam    ASSESSMENT AND PLAN    #1 Uveal melanoma, 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 Austin. He is a 73 year old man with uveal melanoma. The tumor involves the ciliary body and plan is for plaque brachytherapy. CT-CAP shows small lesions, likely hemangiomas. We will obtain MRI liver to confirm hemangiomas and rule out melanoma    I will plan to see him back in 3 months with repeat MRI on observation.    Darrell Aranda M.D.   of Medicine  Hematology, Oncology and Transplantation      Choroidal malignant melanoma, left (H)  Liver lesion  - MR Abdomen w/o & w Contrast    Choroidal malignant melanoma (H)  - Comprehensive metabolic panel  - *CBC with platelets differential

## 2019-11-19 DIAGNOSIS — C69.32 MALIGNANT MELANOMA OF CHOROID OF LEFT EYE (H): Primary | ICD-10-CM

## 2019-12-15 ENCOUNTER — ANESTHESIA EVENT (OUTPATIENT)
Dept: SURGERY | Facility: CLINIC | Age: 74
End: 2019-12-15
Payer: COMMERCIAL

## 2019-12-15 NOTE — ANESTHESIA PREPROCEDURE EVALUATION
Anesthesia Pre-Procedure Evaluation    Patient: Jairo Austin   MRN:     [id number] Gender:   male   Age:    74 year old :      1945        Preoperative Diagnosis: Malignant melanoma of choroid of left eye (H) [C69.32]   Procedure(s):  left eye, radiation plaque placement     Past Medical History:   Diagnosis Date     Diabetes (H)      Hypertension      Melanoma (H)       Past Surgical History:   Procedure Laterality Date     AS PARTIAL HIP REPLACEMENT       cyst removal back       ORTHOPEDIC SURGERY Bilateral     hip replacements          Anesthesia Evaluation    [facility] AN PHYSICAL EXAM      LABS:  CBC:   Lab Results   Component Value Date    WBC 5.1 2019    HGB 14.9 2019    HCT 44.0 2019     2019     BMP:   Lab Results   Component Value Date     2019    POTASSIUM 3.7 2019    CHLORIDE 104 2019    CO2 27 2019    BUN 13 2019    CR 0.81 2019     (H) 2019     COAGS: No results found for: PTT, INR, FIBR  POC: No results found for: BGM, HCG, HCGS  OTHER:   Lab Results   Component Value Date    PAMELA 9.3 2019    ALBUMIN 4.1 2019    PROTTOTAL 8.1 2019    ALT 31 2019    AST 20 2019    ALKPHOS 121 2019    BILITOTAL 0.5 2019        Preop Vitals    BP Readings from Last 3 Encounters:   19 130/77   19 (!) 160/93    Pulse Readings from Last 3 Encounters:   19 59   19 75      Resp Readings from Last 3 Encounters:   19 18    SpO2 Readings from Last 3 Encounters:   19 98%      Temp Readings from Last 1 Encounters:   19 36.4  C (97.5  F) (Oral)    Ht Readings from Last 1 Encounters:   No data found for Ht      Wt Readings from Last 1 Encounters:   19 99 kg (218 lb 4.8 oz)    There is no height or weight on file to calculate BMI.     LDA:  Peripheral IV 19 Right;Anterior Upper forearm (Active)   Number of days: 41        Assessment:   ASA  SCORE: 2            Plan:   Anes. Type:  General   Pre-Medication: None   Induction:  IV (Standard)   Airway: ETT; Oral   Access/Monitoring: PIV   Maintenance: Balanced     Postop Plan:   Postop Pain: Opioids  Postop Sedation/Airway: Not planned     PONV Management:   Adult Risk Factors:, Postop Opioids           Yesenia Candelario MD

## 2019-12-16 ENCOUNTER — HOSPITAL ENCOUNTER (OUTPATIENT)
Facility: CLINIC | Age: 74
Discharge: HOME | End: 2019-12-16
Attending: OPHTHALMOLOGY | Admitting: OPHTHALMOLOGY
Payer: COMMERCIAL

## 2019-12-16 ENCOUNTER — APPOINTMENT (OUTPATIENT)
Dept: RADIATION ONCOLOGY | Facility: CLINIC | Age: 74
End: 2019-12-16
Attending: RADIOLOGY
Payer: COMMERCIAL

## 2019-12-16 ENCOUNTER — ANESTHESIA (OUTPATIENT)
Dept: SURGERY | Facility: CLINIC | Age: 74
End: 2019-12-16
Payer: COMMERCIAL

## 2019-12-16 VITALS
DIASTOLIC BLOOD PRESSURE: 92 MMHG | HEART RATE: 66 BPM | SYSTOLIC BLOOD PRESSURE: 150 MMHG | HEIGHT: 72 IN | OXYGEN SATURATION: 95 % | BODY MASS INDEX: 29.53 KG/M2 | RESPIRATION RATE: 15 BRPM | WEIGHT: 218.03 LBS | TEMPERATURE: 98.4 F

## 2019-12-16 DIAGNOSIS — C69.32 MALIGNANT MELANOMA OF CHOROID OF LEFT EYE (H): ICD-10-CM

## 2019-12-16 LAB — GLUCOSE BLDC GLUCOMTR-MCNC: 118 MG/DL (ref 70–99)

## 2019-12-16 PROCEDURE — 25000128 H RX IP 250 OP 636: Performed by: NURSE ANESTHETIST, CERTIFIED REGISTERED

## 2019-12-16 PROCEDURE — 40000170 ZZH STATISTIC PRE-PROCEDURE ASSESSMENT II: Performed by: OPHTHALMOLOGY

## 2019-12-16 PROCEDURE — 71000027 ZZH RECOVERY PHASE 2 EACH 15 MINS: Performed by: OPHTHALMOLOGY

## 2019-12-16 PROCEDURE — 27210794 ZZH OR GENERAL SUPPLY STERILE: Performed by: OPHTHALMOLOGY

## 2019-12-16 PROCEDURE — 25000125 ZZHC RX 250: Performed by: NURSE ANESTHETIST, CERTIFIED REGISTERED

## 2019-12-16 PROCEDURE — 37000008 ZZH ANESTHESIA TECHNICAL FEE, 1ST 30 MIN: Performed by: OPHTHALMOLOGY

## 2019-12-16 PROCEDURE — 25000125 ZZHC RX 250: Performed by: OPHTHALMOLOGY

## 2019-12-16 PROCEDURE — 71000014 ZZH RECOVERY PHASE 1 LEVEL 2 FIRST HR: Performed by: OPHTHALMOLOGY

## 2019-12-16 PROCEDURE — C2639 BRACHYTX, NON-STRANDED,I-125: HCPCS | Performed by: RADIOLOGY

## 2019-12-16 PROCEDURE — 36000064 ZZH SURGERY LEVEL 4 EA 15 ADDTL MIN - UMMC: Performed by: OPHTHALMOLOGY

## 2019-12-16 PROCEDURE — 77778 APPLY INTERSTIT RADIAT COMPL: CPT | Performed by: RADIOLOGY

## 2019-12-16 PROCEDURE — 25800030 ZZH RX IP 258 OP 636: Performed by: NURSE ANESTHETIST, CERTIFIED REGISTERED

## 2019-12-16 PROCEDURE — 77336 RADIATION PHYSICS CONSULT: CPT | Mod: 59 | Performed by: RADIOLOGY

## 2019-12-16 PROCEDURE — 36000062 ZZH SURGERY LEVEL 4 1ST 30 MIN - UMMC: Performed by: OPHTHALMOLOGY

## 2019-12-16 PROCEDURE — 25000128 H RX IP 250 OP 636: Performed by: OPHTHALMOLOGY

## 2019-12-16 PROCEDURE — 37000009 ZZH ANESTHESIA TECHNICAL FEE, EACH ADDTL 15 MIN: Performed by: OPHTHALMOLOGY

## 2019-12-16 PROCEDURE — 25000566 ZZH SEVOFLURANE, EA 15 MIN: Performed by: OPHTHALMOLOGY

## 2019-12-16 PROCEDURE — 25000132 ZZH RX MED GY IP 250 OP 250 PS 637: Performed by: ANESTHESIOLOGY

## 2019-12-16 PROCEDURE — 82962 GLUCOSE BLOOD TEST: CPT

## 2019-12-16 RX ORDER — GLYCOPYRROLATE 0.2 MG/ML
INJECTION, SOLUTION INTRAMUSCULAR; INTRAVENOUS PRN
Status: DISCONTINUED | OUTPATIENT
Start: 2019-12-16 | End: 2019-12-16

## 2019-12-16 RX ORDER — FENTANYL CITRATE 50 UG/ML
INJECTION, SOLUTION INTRAMUSCULAR; INTRAVENOUS PRN
Status: DISCONTINUED | OUTPATIENT
Start: 2019-12-16 | End: 2019-12-16

## 2019-12-16 RX ORDER — BALANCED SALT SOLUTION 6.4; .75; .48; .3; 3.9; 1.7 MG/ML; MG/ML; MG/ML; MG/ML; MG/ML; MG/ML
SOLUTION OPHTHALMIC PRN
Status: DISCONTINUED | OUTPATIENT
Start: 2019-12-16 | End: 2019-12-16 | Stop reason: HOSPADM

## 2019-12-16 RX ORDER — ACETAMINOPHEN 325 MG/1
975 TABLET ORAL ONCE
Status: COMPLETED | OUTPATIENT
Start: 2019-12-16 | End: 2019-12-16

## 2019-12-16 RX ORDER — MEPERIDINE HYDROCHLORIDE 25 MG/ML
12.5 INJECTION INTRAMUSCULAR; INTRAVENOUS; SUBCUTANEOUS
Status: DISCONTINUED | OUTPATIENT
Start: 2019-12-16 | End: 2019-12-16 | Stop reason: HOSPADM

## 2019-12-16 RX ORDER — SODIUM CHLORIDE, SODIUM LACTATE, POTASSIUM CHLORIDE, CALCIUM CHLORIDE 600; 310; 30; 20 MG/100ML; MG/100ML; MG/100ML; MG/100ML
INJECTION, SOLUTION INTRAVENOUS CONTINUOUS PRN
Status: DISCONTINUED | OUTPATIENT
Start: 2019-12-16 | End: 2019-12-16

## 2019-12-16 RX ORDER — ONDANSETRON 2 MG/ML
INJECTION INTRAMUSCULAR; INTRAVENOUS PRN
Status: DISCONTINUED | OUTPATIENT
Start: 2019-12-16 | End: 2019-12-16

## 2019-12-16 RX ORDER — TRIAMCINOLONE ACETONIDE 40 MG/ML
INJECTION, SUSPENSION INTRA-ARTICULAR; INTRAMUSCULAR PRN
Status: DISCONTINUED | OUTPATIENT
Start: 2019-12-16 | End: 2019-12-16 | Stop reason: HOSPADM

## 2019-12-16 RX ORDER — FENTANYL CITRATE 50 UG/ML
25-50 INJECTION, SOLUTION INTRAMUSCULAR; INTRAVENOUS
Status: DISCONTINUED | OUTPATIENT
Start: 2019-12-16 | End: 2019-12-16 | Stop reason: HOSPADM

## 2019-12-16 RX ORDER — ONDANSETRON 4 MG/1
4 TABLET, ORALLY DISINTEGRATING ORAL EVERY 30 MIN PRN
Status: DISCONTINUED | OUTPATIENT
Start: 2019-12-16 | End: 2019-12-16 | Stop reason: HOSPADM

## 2019-12-16 RX ORDER — NALOXONE HYDROCHLORIDE 0.4 MG/ML
.1-.4 INJECTION, SOLUTION INTRAMUSCULAR; INTRAVENOUS; SUBCUTANEOUS
Status: DISCONTINUED | OUTPATIENT
Start: 2019-12-16 | End: 2019-12-16 | Stop reason: HOSPADM

## 2019-12-16 RX ORDER — EPHEDRINE SULFATE 50 MG/ML
INJECTION, SOLUTION INTRAMUSCULAR; INTRAVENOUS; SUBCUTANEOUS PRN
Status: DISCONTINUED | OUTPATIENT
Start: 2019-12-16 | End: 2019-12-16

## 2019-12-16 RX ORDER — CYCLOPENTOLAT/TROPIC/PHENYLEPH 1%-1%-2.5%
1 DROPS (EA) OPHTHALMIC (EYE) ONCE
Status: COMPLETED | OUTPATIENT
Start: 2019-12-16 | End: 2019-12-16

## 2019-12-16 RX ORDER — PROPOFOL 10 MG/ML
INJECTION, EMULSION INTRAVENOUS PRN
Status: DISCONTINUED | OUTPATIENT
Start: 2019-12-16 | End: 2019-12-16

## 2019-12-16 RX ORDER — CYCLOPENTOLAT/TROPIC/PHENYLEPH 1%-1%-2.5%
1 DROPS (EA) OPHTHALMIC (EYE)
Status: DISCONTINUED | OUTPATIENT
Start: 2019-12-16 | End: 2019-12-16 | Stop reason: HOSPADM

## 2019-12-16 RX ORDER — OXYCODONE HYDROCHLORIDE 5 MG/1
5 TABLET ORAL EVERY 4 HOURS PRN
Status: DISCONTINUED | OUTPATIENT
Start: 2019-12-16 | End: 2019-12-16 | Stop reason: HOSPADM

## 2019-12-16 RX ORDER — SODIUM CHLORIDE, SODIUM LACTATE, POTASSIUM CHLORIDE, CALCIUM CHLORIDE 600; 310; 30; 20 MG/100ML; MG/100ML; MG/100ML; MG/100ML
INJECTION, SOLUTION INTRAVENOUS CONTINUOUS
Status: DISCONTINUED | OUTPATIENT
Start: 2019-12-16 | End: 2019-12-16 | Stop reason: HOSPADM

## 2019-12-16 RX ORDER — HYDROMORPHONE HYDROCHLORIDE 1 MG/ML
.3-.5 INJECTION, SOLUTION INTRAMUSCULAR; INTRAVENOUS; SUBCUTANEOUS EVERY 10 MIN PRN
Status: DISCONTINUED | OUTPATIENT
Start: 2019-12-16 | End: 2019-12-16 | Stop reason: HOSPADM

## 2019-12-16 RX ORDER — NEOMYCIN POLYMYXIN B SULFATES AND DEXAMETHASONE 3.5; 10000; 1 MG/ML; [USP'U]/ML; MG/ML
1 SUSPENSION/ DROPS OPHTHALMIC 4 TIMES DAILY
Qty: 5 ML | Refills: 0 | Status: ON HOLD | OUTPATIENT
Start: 2019-12-16 | End: 2019-12-20

## 2019-12-16 RX ORDER — LIDOCAINE HYDROCHLORIDE 20 MG/ML
INJECTION, SOLUTION INFILTRATION; PERINEURAL PRN
Status: DISCONTINUED | OUTPATIENT
Start: 2019-12-16 | End: 2019-12-16

## 2019-12-16 RX ORDER — ATROPINE SULFATE 10 MG/ML
SOLUTION/ DROPS OPHTHALMIC PRN
Status: DISCONTINUED | OUTPATIENT
Start: 2019-12-16 | End: 2019-12-16 | Stop reason: HOSPADM

## 2019-12-16 RX ORDER — ONDANSETRON 2 MG/ML
4 INJECTION INTRAMUSCULAR; INTRAVENOUS EVERY 30 MIN PRN
Status: DISCONTINUED | OUTPATIENT
Start: 2019-12-16 | End: 2019-12-16 | Stop reason: HOSPADM

## 2019-12-16 RX ADMIN — SODIUM CHLORIDE, POTASSIUM CHLORIDE, SODIUM LACTATE AND CALCIUM CHLORIDE: 600; 310; 30; 20 INJECTION, SOLUTION INTRAVENOUS at 08:20

## 2019-12-16 RX ADMIN — ROCURONIUM BROMIDE 50 MG: 10 INJECTION INTRAVENOUS at 07:37

## 2019-12-16 RX ADMIN — Medication 10 MG: at 08:33

## 2019-12-16 RX ADMIN — Medication 1 DROP: at 07:19

## 2019-12-16 RX ADMIN — PHENYLEPHRINE HYDROCHLORIDE 50 MCG: 10 INJECTION INTRAVENOUS at 09:08

## 2019-12-16 RX ADMIN — Medication 1 DROP: at 07:28

## 2019-12-16 RX ADMIN — PROPOFOL 50 MG: 10 INJECTION, EMULSION INTRAVENOUS at 07:40

## 2019-12-16 RX ADMIN — ONDANSETRON 4 MG: 2 INJECTION INTRAMUSCULAR; INTRAVENOUS at 10:12

## 2019-12-16 RX ADMIN — SODIUM CHLORIDE, POTASSIUM CHLORIDE, SODIUM LACTATE AND CALCIUM CHLORIDE: 600; 310; 30; 20 INJECTION, SOLUTION INTRAVENOUS at 07:33

## 2019-12-16 RX ADMIN — FENTANYL CITRATE 50 MCG: 50 INJECTION, SOLUTION INTRAMUSCULAR; INTRAVENOUS at 08:11

## 2019-12-16 RX ADMIN — ACETAMINOPHEN 975 MG: 325 TABLET, FILM COATED ORAL at 11:56

## 2019-12-16 RX ADMIN — GLYCOPYRROLATE 0.2 MG: 0.2 INJECTION, SOLUTION INTRAMUSCULAR; INTRAVENOUS at 08:22

## 2019-12-16 RX ADMIN — PHENYLEPHRINE HYDROCHLORIDE 50 MCG: 10 INJECTION INTRAVENOUS at 08:48

## 2019-12-16 RX ADMIN — PHENYLEPHRINE HYDROCHLORIDE 50 MCG: 10 INJECTION INTRAVENOUS at 09:16

## 2019-12-16 RX ADMIN — OXYCODONE HYDROCHLORIDE 5 MG: 5 TABLET ORAL at 11:56

## 2019-12-16 RX ADMIN — SUGAMMADEX 200 MG: 100 INJECTION, SOLUTION INTRAVENOUS at 10:18

## 2019-12-16 RX ADMIN — LIDOCAINE HYDROCHLORIDE 100 MG: 20 INJECTION, SOLUTION INFILTRATION; PERINEURAL at 07:36

## 2019-12-16 RX ADMIN — FENTANYL CITRATE 100 MCG: 50 INJECTION, SOLUTION INTRAMUSCULAR; INTRAVENOUS at 07:36

## 2019-12-16 RX ADMIN — PROPOFOL 150 MG: 10 INJECTION, EMULSION INTRAVENOUS at 07:36

## 2019-12-16 RX ADMIN — HYDROMORPHONE HYDROCHLORIDE 0.25 MG: 1 INJECTION, SOLUTION INTRAMUSCULAR; INTRAVENOUS; SUBCUTANEOUS at 09:35

## 2019-12-16 RX ADMIN — PHENYLEPHRINE HYDROCHLORIDE 50 MCG: 10 INJECTION INTRAVENOUS at 09:01

## 2019-12-16 RX ADMIN — Medication 10 MG: at 08:15

## 2019-12-16 RX ADMIN — Medication 1 DROP: at 07:24

## 2019-12-16 ASSESSMENT — MIFFLIN-ST. JEOR: SCORE: 1769.06

## 2019-12-16 NOTE — ANESTHESIA PREPROCEDURE EVALUATION
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 Pt has had prior anesthetic. Type: General    No history of anesthetic complications          ROS/MED HX    ENT/Pulmonary:     (+)sleep apnea, uses CPAP , . .    Neurologic:  - neg neurologic ROS     Cardiovascular:     (+) hypertension----. : . . . :. . Previous cardiac testing Ech[place]:results:date: results: date: results: date: results:          METS/Exercise Tolerance:  4 - Raking leaves, gardening   Hematologic:         Musculoskeletal:  - neg musculoskeletal ROS       GI/Hepatic:  - neg GI/hepatic ROS       Renal/Genitourinary:  - ROS Renal section negative       Endo:     (+) type II DM .      Psychiatric:  - neg psychiatric ROS       Infectious Disease:  - neg infectious disease ROS       Malignancy:         Other:                         PHYSICAL EXAM:   Mental Status/Neuro:    Airway: Facies: Thick Neck  Mallampati: III  Mouth/Opening: Full  TM distance: > 6 cm  Neck ROM: Full   Respiratory: Auscultation: CTAB      CV: Rhythm: Regular   Comments:                      LABS:  CBC:   Lab Results   Component Value Date    WBC 5.1 2019    HGB 14.9 2019    HCT 44.0 2019     2019     BMP:   Lab Results   Component Value Date     2019    POTASSIUM 3.7 2019    CHLORIDE 104 2019    CO2 27 2019    BUN 13 2019    CR 0.81 2019     (H) 2019     COAGS: No  "results found for: PTT, INR, FIBR  POC:   Lab Results   Component Value Date     (H) 12/16/2019     OTHER:   Lab Results   Component Value Date    PAMELA 9.3 11/04/2019    ALBUMIN 4.1 11/04/2019    PROTTOTAL 8.1 11/04/2019    ALT 31 11/04/2019    AST 20 11/04/2019    ALKPHOS 121 11/04/2019    BILITOTAL 0.5 11/04/2019        Preop Vitals    BP Readings from Last 3 Encounters:   12/16/19 (!) 153/88   11/07/19 130/77   11/04/19 (!) 160/93    Pulse Readings from Last 3 Encounters:   11/07/19 59   11/04/19 75      Resp Readings from Last 3 Encounters:   12/16/19 14   11/04/19 18    SpO2 Readings from Last 3 Encounters:   12/16/19 98%   11/04/19 98%      Temp Readings from Last 1 Encounters:   12/16/19 36.5  C (97.7  F) (Oral)    Ht Readings from Last 1 Encounters:   12/16/19 1.816 m (5' 11.5\")      Wt Readings from Last 1 Encounters:   12/16/19 98.9 kg (218 lb 0.6 oz)    Estimated body mass index is 29.99 kg/m  as calculated from the following:    Height as of this encounter: 1.816 m (5' 11.5\").    Weight as of this encounter: 98.9 kg (218 lb 0.6 oz).     LDA:  Peripheral IV 12/16/19 Anterior;Right Hand (Active)   Site Assessment WDL 12/16/2019  6:42 AM   Line Status Infusing 12/16/2019  6:42 AM   Phlebitis Scale 0-->no symptoms 12/16/2019  6:42 AM   Number of days: 0        Assessment:   ASA SCORE: 2    H&P: History and physical reviewed and following examination; no interval change.   Smoking Status:  Non-Smoker/Unknown   NPO Status: NPO Appropriate     Plan:   Anes. Type:  General   Pre-Medication: None   Induction:  IV (Standard)   Airway: ETT; Oral   Access/Monitoring: PIV   Maintenance: Balanced     Postop Plan:   Postop Pain: Opioids  Postop Sedation/Airway: Not planned     PONV Management:   Adult Risk Factors:, Non-Smoker, Postop Opioids     CONSENT: Direct conversation   Plan and risks discussed with: Patient   Blood Products: Consent Deferred (Minimal Blood Loss)                   Yesenia Candelario MD  "

## 2019-12-16 NOTE — OP NOTE
PRE-OP Dx:    1) CMM left eye    Post-OP Dx:     same    Attending:   JOSE ARMANDO Rodriguez MD   Resident:  AMRIT Silva MD    Procedure:     1) I-125 plaque placement  16 mm LEFT eye    2) intraoperative ultrasound    3) disinsertion of lateral rectus muscle    Anesthesia:  General  EBL:   scant  Complications: None  Specimens:  None    Findings:     1) CMM OS      Procedure Description:    Jairo Austin  is a 74 year old year old year old patient with a history of CMM OS . After informed consent was obtained, he was brought into the OR where general anesthesia  was administered.  The eye was then prepped and draped in the usual fashion for ophthalmic surgery.    A  conjunctival peritomy was created and the quadrants cleared with the carreon scissors.  The muscles were isolated and looped with 2-0 silk sutures.      Next, the trans- was used to visualize the location of the tumor.     Marks were made with the marking pen to denote the tumor boundaries.    The lateral rectus muscle was noted to interfere with plaque placement.  The muscle was imbricated on a 6-0 vicryl double-armed suture.  After the muscle insertion was cauterized, the muscle was disinserted with Anna scissors.  The vicryl suture to reattach the muscle  was placed at the site of the original insertion in a crossed swords fashion, and the muscle left on a long hangback.    The dummy plaque was placed on the eye to cover the marked tumor location.  It was secured using temporary knots with two 5-0 nylon sutures.  The plaque position was confirmed with intraoperative ultrasound.  The sutures were adjusted as needed to obtain the proper plaque position.  Once a good position was achieved, the temporary knots were opened, leaving the sutures in place.      The dummy plaque was switched with the I-125 plaque.  It was secured using the same sutures, with care being taken to ensure the same corresponding eyelets were used for the sutures, sot  that the I-125 plaque position would be identical to the dummy plaque.  The I-125 plaque position was again confirmed with intraoperative ultrasound.    Next, the 2-0 silk sutures were removed.  The conjunctiva was closed with 6-0 plain suture.  A subconjunctival injection of Ancef and Decadron were placed, along with kenalog 15 mg.  A drop of atropine was placed in the eye with erythromycin ointment.  A pad and lindo shield were taped over the eye.    The patient left the OR with no complications.      I was present for the entire surgery.  Charisma Rodriguez MD

## 2019-12-16 NOTE — ANESTHESIA POSTPROCEDURE EVALUATION
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       Anesthesia Type:  Not documented  General    Reportable Event: NO     PAIN: Uncomplicated   Sign Out status: Comfortable, Well controlled pain     PONV: No PONV   Sign Out status:  No Nausea or Vomiting     Neuro/Psych: Uneventful perioperative course   Sign Out Status: Preoperative baseline; Age appropriate mentation     Airway/Resp.: Uneventful perioperative course   Sign Out Status: Non labored breathing, age appropriate RR; Resp. Status within EXPECTED Parameters     CV: Uneventful perioperative course   Sign Out status: Appropriate BP and perfusion indices; Appropriate HR/Rhythm     Disposition:   Sign Out in:  PACU  Disposition:  Phase II; Home  Recovery Course: Uneventful  Follow-Up: Not required     Comments/Narrative:  Pt denies: N/V, recall.            Last Anesthesia Record Vitals:  CRNA VITALS  2019 0955 - 2019 1055      2019             Pulse:  69    SpO2:  98 %    Resp Rate (observed):  16          Last PACU Vitals:  Vitals Value Taken Time   /94 2019 11:15 AM   Temp 37  C (98.6  F) 2019 11:00 AM   Pulse 68 2019 11:15 AM   Resp 15 2019 11:15 AM   SpO2 94 % 2019 11:15 AM   Temp src     NIBP     Pulse     SpO2     Resp     Temp     Ht Rate     Temp 2           Electronically Signed By: Sarah Wachter, MD, 2019, 12:39 PM

## 2019-12-16 NOTE — OP NOTE
Procedure: COMS eye plaque placement  Preop Diagnosis: ocular melanoma   LEFT eye   Postop diagnosis: ocular melanoma  LEFT eye    Please see Dr. Medina 's operative note for the other details of this procedure.    We were present for the application and supervised the handling of the radioactive plaque.    A 16 mm diameter COMS eye  plaque containing 13 Iodine-125 radioactive seeds was placed in the left eye at 9 am, 12/16/19.     We ascertained correct placement wth ultrasound guidance.     We will plan to plaque removal on Friday, 12/20/19.    My attending, Dr. Melgoza, was present for the placement.    Mitch Peoples MD  Resident, PGY-2  Department of Radiation Oncology  [facility]    I was present for the radiation portion of this case .  Nica Melgoza MD

## 2019-12-16 NOTE — ANESTHESIA CARE TRANSFER NOTE
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    Anesthesia Type:   General     Note:  Airway :Face Mask  Patient transferred to:PACU  Handoff Report: Identifed the Patient, Identified the Reponsible Provider, Reviewed the pertinent medical history, Discussed the surgical course, Reviewed Intra-OP anesthesia mangement and issues during anesthesia, Set expectations for post-procedure period and Allowed opportunity for questions and acknowledgement of understanding      Vitals: (Last set prior to Anesthesia Care Transfer)    CRNA VITALS  12/16/2019 0955 - 12/16/2019 1033      12/16/2019             Pulse:  69    SpO2:  98 %    Resp Rate (observed):  16                Electronically Signed By: SASHA Shearer CRNA  December 16, 2019  10:33 AM

## 2019-12-16 NOTE — OR NURSING
16mm Radioactive Plaque with 13 seeds (I-125) Sterilized via Autoclave for 10 minutes and 1 minute dry time.

## 2019-12-16 NOTE — DISCHARGE INSTRUCTIONS
Same-Day Surgery   Adult Discharge Orders & Instructions     For 24 hours after surgery:  1. Get plenty of rest.  A responsible adult must stay with you for at least 24 hours after you leave the hospital.   2. Pain medication can slow your reflexes. Do not drive or use heavy equipment.  If you have weakness or tingling, don't drive or use heavy equipment until this feeling goes away.  3. Mixing alcohol and pain medication can cause dizziness and slow your breathing. It can even be fatal. Do not drink alcohol while taking pain medication.  4. Avoid strenuous or risky activities.  Ask for help when climbing stairs.   5. You may feel lightheaded.  If so, sit for a few minutes before standing.  Have someone help you get up.   6. If you have nausea (feel sick to your stomach), drink only clear liquids such as apple juice, ginger ale, broth or 7-Up.  Rest may also help.  Be sure to drink enough fluids.  Move to a regular diet as you feel able. Take pain medications with a small amount of solid food, such as toast or crackers, to avoid nausea.   7. A slight fever is normal. Call the doctor if your fever is over 100 F (37.7 C) (taken under the tongue) or lasts longer than 24 hours.  8. You may have a dry mouth, muscle aches, trouble sleeping or a sore throat.  These symptoms should go away after 24 hours.  9. Do not make important or legal decisions.   Pain Management:      1. Take pain medication (if prescribed) for pain as directed by your physician.        2. WARNING: If the pain medication you have been prescribed contains Tylenol  (acetaminophen), DO NOT take additional doses of Tylenol (acetaminophen).     Call your doctor for any of the followin.  Signs of infection (fever, growing tenderness at the surgery site, severe pain, a large amount of drainage or bleeding, foul-smelling drainage, redness, swelling).    2.  It has been over 8 to 10 hours since surgery and you are still not able to urinate (pee).    3.   Headache for over 24 hours.    4.  Numbness, tingling or weakness the day after surgery (if you had spinal anesthesia).  To contact a doctor, call _____Dr. Rodriguez_______ or:      [phone] and ask for the Resident On Call for:          ____________Opthamology______________________________ (answered 24 hours a day)      Emergency Department:  [place] [facility]: [phone]  [place] [facility]: [phone]               Rev. 10/2014

## 2019-12-17 ENCOUNTER — TELEPHONE (OUTPATIENT)
Dept: OPHTHALMOLOGY | Facility: CLINIC | Age: 74
End: 2019-12-17

## 2019-12-17 NOTE — TELEPHONE ENCOUNTER
Called patient to see how he is doing after surgery yesterday. States had an achy sensation yesterday afternoon but that has resolved. Denies any eye pain currently. Not bothered by double vision - states he is keeping the left eye covered except when placing eye drops. No questions or concerns at this time. Instructed patient to call with any changes or concerns.    Fredy Silva MD  Ophthalmology Resident, PGY-3

## 2019-12-19 ENCOUNTER — ANESTHESIA EVENT (OUTPATIENT)
Dept: SURGERY | Facility: CLINIC | Age: 74
End: 2019-12-19
Payer: COMMERCIAL

## 2019-12-20 ENCOUNTER — HOSPITAL ENCOUNTER (OUTPATIENT)
Facility: CLINIC | Age: 74
Discharge: HOME | End: 2019-12-20
Attending: OPHTHALMOLOGY | Admitting: OPHTHALMOLOGY
Payer: COMMERCIAL

## 2019-12-20 ENCOUNTER — ANESTHESIA (OUTPATIENT)
Dept: SURGERY | Facility: CLINIC | Age: 74
End: 2019-12-20
Payer: COMMERCIAL

## 2019-12-20 ENCOUNTER — ONCOLOGY VISIT (OUTPATIENT)
Dept: RADIATION ONCOLOGY | Facility: CLINIC | Age: 74
End: 2019-12-20

## 2019-12-20 VITALS
SYSTOLIC BLOOD PRESSURE: 152 MMHG | HEIGHT: 71 IN | TEMPERATURE: 98.1 F | HEART RATE: 53 BPM | OXYGEN SATURATION: 95 % | WEIGHT: 212.3 LBS | BODY MASS INDEX: 29.72 KG/M2 | DIASTOLIC BLOOD PRESSURE: 91 MMHG | RESPIRATION RATE: 16 BRPM

## 2019-12-20 DIAGNOSIS — C69.32 MALIGNANT MELANOMA OF CHOROID OF LEFT EYE (H): ICD-10-CM

## 2019-12-20 DIAGNOSIS — C69.32 MALIGNANT MELANOMA OF CHOROID OF LEFT EYE (H): Primary | ICD-10-CM

## 2019-12-20 LAB
CREAT SERPL-MCNC: 0.81 MG/DL (ref 0.66–1.25)
GFR SERPL CREATININE-BSD FRML MDRD: 89 ML/MIN/{1.73_M2}
GLUCOSE BLDC GLUCOMTR-MCNC: 93 MG/DL (ref 70–99)
GLUCOSE SERPL-MCNC: 116 MG/DL (ref 70–99)
HGB BLD-MCNC: 15.6 G/DL (ref 13.3–17.7)
POTASSIUM SERPL-SCNC: 5 MMOL/L (ref 3.4–5.3)

## 2019-12-20 PROCEDURE — 27210794 ZZH OR GENERAL SUPPLY STERILE: Performed by: OPHTHALMOLOGY

## 2019-12-20 PROCEDURE — 84132 ASSAY OF SERUM POTASSIUM: CPT | Performed by: ANESTHESIOLOGY

## 2019-12-20 PROCEDURE — 93005 ELECTROCARDIOGRAM TRACING: CPT

## 2019-12-20 PROCEDURE — 71000014 ZZH RECOVERY PHASE 1 LEVEL 2 FIRST HR: Performed by: OPHTHALMOLOGY

## 2019-12-20 PROCEDURE — 37000009 ZZH ANESTHESIA TECHNICAL FEE, EACH ADDTL 15 MIN: Performed by: OPHTHALMOLOGY

## 2019-12-20 PROCEDURE — 37000008 ZZH ANESTHESIA TECHNICAL FEE, 1ST 30 MIN: Performed by: OPHTHALMOLOGY

## 2019-12-20 PROCEDURE — 82947 ASSAY GLUCOSE BLOOD QUANT: CPT | Performed by: ANESTHESIOLOGY

## 2019-12-20 PROCEDURE — 82565 ASSAY OF CREATININE: CPT | Performed by: ANESTHESIOLOGY

## 2019-12-20 PROCEDURE — 25000566 ZZH SEVOFLURANE, EA 15 MIN: Performed by: OPHTHALMOLOGY

## 2019-12-20 PROCEDURE — 25000128 H RX IP 250 OP 636: Performed by: NURSE ANESTHETIST, CERTIFIED REGISTERED

## 2019-12-20 PROCEDURE — 25800030 ZZH RX IP 258 OP 636: Performed by: ANESTHESIOLOGY

## 2019-12-20 PROCEDURE — 85018 HEMOGLOBIN: CPT | Performed by: ANESTHESIOLOGY

## 2019-12-20 PROCEDURE — 82962 GLUCOSE BLOOD TEST: CPT | Mod: 91

## 2019-12-20 PROCEDURE — 25000125 ZZHC RX 250: Performed by: OPHTHALMOLOGY

## 2019-12-20 PROCEDURE — 36000057 ZZH SURGERY LEVEL 3 1ST 30 MIN - UMMC: Performed by: OPHTHALMOLOGY

## 2019-12-20 PROCEDURE — 71000027 ZZH RECOVERY PHASE 2 EACH 15 MINS: Performed by: OPHTHALMOLOGY

## 2019-12-20 PROCEDURE — 93010 ELECTROCARDIOGRAM REPORT: CPT | Mod: 59 | Performed by: INTERNAL MEDICINE

## 2019-12-20 PROCEDURE — 40000170 ZZH STATISTIC PRE-PROCEDURE ASSESSMENT II: Performed by: OPHTHALMOLOGY

## 2019-12-20 PROCEDURE — 36415 COLL VENOUS BLD VENIPUNCTURE: CPT | Performed by: ANESTHESIOLOGY

## 2019-12-20 PROCEDURE — 36000059 ZZH SURGERY LEVEL 3 EA 15 ADDTL MIN UMMC: Performed by: OPHTHALMOLOGY

## 2019-12-20 PROCEDURE — 25000128 H RX IP 250 OP 636: Performed by: OPHTHALMOLOGY

## 2019-12-20 PROCEDURE — 25000125 ZZHC RX 250: Performed by: NURSE ANESTHETIST, CERTIFIED REGISTERED

## 2019-12-20 RX ORDER — NEOMYCIN POLYMYXIN B SULFATES AND DEXAMETHASONE 3.5; 10000; 1 MG/ML; [USP'U]/ML; MG/ML
SUSPENSION/ DROPS OPHTHALMIC
Qty: 5 ML | Refills: 0 | Status: SHIPPED | OUTPATIENT
Start: 2019-12-20 | End: 2020-03-23

## 2019-12-20 RX ORDER — PROPOFOL 10 MG/ML
INJECTION, EMULSION INTRAVENOUS PRN
Status: DISCONTINUED | OUTPATIENT
Start: 2019-12-20 | End: 2019-12-20

## 2019-12-20 RX ORDER — ALBUTEROL SULFATE 0.83 MG/ML
2.5 SOLUTION RESPIRATORY (INHALATION) EVERY 4 HOURS PRN
Status: DISCONTINUED | OUTPATIENT
Start: 2019-12-20 | End: 2019-12-20 | Stop reason: HOSPADM

## 2019-12-20 RX ORDER — MEPERIDINE HYDROCHLORIDE 25 MG/ML
12.5 INJECTION INTRAMUSCULAR; INTRAVENOUS; SUBCUTANEOUS
Status: DISCONTINUED | OUTPATIENT
Start: 2019-12-20 | End: 2019-12-20 | Stop reason: HOSPADM

## 2019-12-20 RX ORDER — POLYMYXIN B SULFATE AND TRIMETHOPRIM 1; 10000 MG/ML; [USP'U]/ML
1 SOLUTION OPHTHALMIC 4 TIMES DAILY
Qty: 10 ML | Refills: 0 | Status: CANCELLED | OUTPATIENT
Start: 2019-12-20

## 2019-12-20 RX ORDER — PHENYLEPHRINE HYDROCHLORIDE 25 MG/ML
1 SOLUTION/ DROPS OPHTHALMIC
Status: COMPLETED | OUTPATIENT
Start: 2019-12-20 | End: 2019-12-20

## 2019-12-20 RX ORDER — OXYCODONE HYDROCHLORIDE 5 MG/1
10 TABLET ORAL EVERY 4 HOURS PRN
Status: DISCONTINUED | OUTPATIENT
Start: 2019-12-20 | End: 2019-12-20 | Stop reason: HOSPADM

## 2019-12-20 RX ORDER — FENTANYL CITRATE 50 UG/ML
25-50 INJECTION, SOLUTION INTRAMUSCULAR; INTRAVENOUS
Status: DISCONTINUED | OUTPATIENT
Start: 2019-12-20 | End: 2019-12-20 | Stop reason: HOSPADM

## 2019-12-20 RX ORDER — ONDANSETRON 2 MG/ML
INJECTION INTRAMUSCULAR; INTRAVENOUS PRN
Status: DISCONTINUED | OUTPATIENT
Start: 2019-12-20 | End: 2019-12-20

## 2019-12-20 RX ORDER — PHYSOSTIGMINE SALICYLATE 1 MG/ML
1.2 INJECTION INTRAVENOUS
Status: DISCONTINUED | OUTPATIENT
Start: 2019-12-20 | End: 2019-12-20 | Stop reason: HOSPADM

## 2019-12-20 RX ORDER — HYDRALAZINE HYDROCHLORIDE 20 MG/ML
2.5-5 INJECTION INTRAMUSCULAR; INTRAVENOUS EVERY 10 MIN PRN
Status: DISCONTINUED | OUTPATIENT
Start: 2019-12-20 | End: 2019-12-20 | Stop reason: HOSPADM

## 2019-12-20 RX ORDER — ERYTHROMYCIN 5 MG/G
OINTMENT OPHTHALMIC PRN
Status: DISCONTINUED | OUTPATIENT
Start: 2019-12-20 | End: 2019-12-20 | Stop reason: HOSPADM

## 2019-12-20 RX ORDER — LIDOCAINE 40 MG/G
CREAM TOPICAL
Status: DISCONTINUED | OUTPATIENT
Start: 2019-12-20 | End: 2019-12-20 | Stop reason: HOSPADM

## 2019-12-20 RX ORDER — METOPROLOL TARTRATE 1 MG/ML
1-2 INJECTION, SOLUTION INTRAVENOUS EVERY 5 MIN PRN
Status: DISCONTINUED | OUTPATIENT
Start: 2019-12-20 | End: 2019-12-20 | Stop reason: HOSPADM

## 2019-12-20 RX ORDER — LIDOCAINE HYDROCHLORIDE 20 MG/ML
INJECTION, SOLUTION INFILTRATION; PERINEURAL PRN
Status: DISCONTINUED | OUTPATIENT
Start: 2019-12-20 | End: 2019-12-20

## 2019-12-20 RX ORDER — CYCLOPENTOLATE HYDROCHLORIDE 10 MG/ML
1 SOLUTION/ DROPS OPHTHALMIC
Status: COMPLETED | OUTPATIENT
Start: 2019-12-20 | End: 2019-12-20

## 2019-12-20 RX ORDER — BUPIVACAINE HYDROCHLORIDE 7.5 MG/ML
INJECTION, SOLUTION EPIDURAL; RETROBULBAR PRN
Status: DISCONTINUED | OUTPATIENT
Start: 2019-12-20 | End: 2019-12-20 | Stop reason: HOSPADM

## 2019-12-20 RX ORDER — NEOMYCIN POLYMYXIN B SULFATES AND DEXAMETHASONE 3.5; 10000; 1 MG/ML; [USP'U]/ML; MG/ML
SUSPENSION/ DROPS OPHTHALMIC
Qty: 5 ML | Refills: 0 | Status: SHIPPED | OUTPATIENT
Start: 2019-12-20 | End: 2019-12-20

## 2019-12-20 RX ORDER — SODIUM CHLORIDE, SODIUM LACTATE, POTASSIUM CHLORIDE, CALCIUM CHLORIDE 600; 310; 30; 20 MG/100ML; MG/100ML; MG/100ML; MG/100ML
INJECTION, SOLUTION INTRAVENOUS CONTINUOUS
Status: DISCONTINUED | OUTPATIENT
Start: 2019-12-20 | End: 2019-12-20 | Stop reason: HOSPADM

## 2019-12-20 RX ORDER — BALANCED SALT SOLUTION 6.4; .75; .48; .3; 3.9; 1.7 MG/ML; MG/ML; MG/ML; MG/ML; MG/ML; MG/ML
SOLUTION OPHTHALMIC PRN
Status: DISCONTINUED | OUTPATIENT
Start: 2019-12-20 | End: 2019-12-20 | Stop reason: HOSPADM

## 2019-12-20 RX ORDER — HYDROMORPHONE HYDROCHLORIDE 1 MG/ML
.3-.5 INJECTION, SOLUTION INTRAMUSCULAR; INTRAVENOUS; SUBCUTANEOUS EVERY 10 MIN PRN
Status: DISCONTINUED | OUTPATIENT
Start: 2019-12-20 | End: 2019-12-20 | Stop reason: HOSPADM

## 2019-12-20 RX ORDER — PROPARACAINE HYDROCHLORIDE 5 MG/ML
1 SOLUTION/ DROPS OPHTHALMIC ONCE
Status: COMPLETED | OUTPATIENT
Start: 2019-12-20 | End: 2019-12-20

## 2019-12-20 RX ORDER — NALOXONE HYDROCHLORIDE 0.4 MG/ML
.1-.4 INJECTION, SOLUTION INTRAMUSCULAR; INTRAVENOUS; SUBCUTANEOUS
Status: DISCONTINUED | OUTPATIENT
Start: 2019-12-20 | End: 2019-12-20 | Stop reason: HOSPADM

## 2019-12-20 RX ORDER — PREDNISOLONE ACETATE 10 MG/ML
1 SUSPENSION/ DROPS OPHTHALMIC 4 TIMES DAILY
Qty: 5 ML | Refills: 0 | Status: CANCELLED | OUTPATIENT
Start: 2019-12-20

## 2019-12-20 RX ORDER — ONDANSETRON 2 MG/ML
4 INJECTION INTRAMUSCULAR; INTRAVENOUS EVERY 30 MIN PRN
Status: DISCONTINUED | OUTPATIENT
Start: 2019-12-20 | End: 2019-12-20 | Stop reason: HOSPADM

## 2019-12-20 RX ORDER — FENTANYL CITRATE 50 UG/ML
INJECTION, SOLUTION INTRAMUSCULAR; INTRAVENOUS PRN
Status: DISCONTINUED | OUTPATIENT
Start: 2019-12-20 | End: 2019-12-20

## 2019-12-20 RX ORDER — DEXAMETHASONE SODIUM PHOSPHATE 4 MG/ML
4 INJECTION, SOLUTION INTRA-ARTICULAR; INTRALESIONAL; INTRAMUSCULAR; INTRAVENOUS; SOFT TISSUE EVERY 10 MIN PRN
Status: DISCONTINUED | OUTPATIENT
Start: 2019-12-20 | End: 2019-12-20 | Stop reason: HOSPADM

## 2019-12-20 RX ORDER — ONDANSETRON 4 MG/1
4 TABLET, ORALLY DISINTEGRATING ORAL EVERY 30 MIN PRN
Status: DISCONTINUED | OUTPATIENT
Start: 2019-12-20 | End: 2019-12-20 | Stop reason: HOSPADM

## 2019-12-20 RX ADMIN — CYCLOPENTOLATE HYDROCHLORIDE 1 DROP: 10 SOLUTION OPHTHALMIC at 12:32

## 2019-12-20 RX ADMIN — PHENYLEPHRINE HYDROCHLORIDE 1 DROP: 2.5 SOLUTION/ DROPS OPHTHALMIC at 12:38

## 2019-12-20 RX ADMIN — FENTANYL CITRATE 25 MCG: 50 INJECTION, SOLUTION INTRAMUSCULAR; INTRAVENOUS at 14:36

## 2019-12-20 RX ADMIN — CYCLOPENTOLATE HYDROCHLORIDE 1 DROP: 10 SOLUTION OPHTHALMIC at 12:38

## 2019-12-20 RX ADMIN — CYCLOPENTOLATE HYDROCHLORIDE 1 DROP: 10 SOLUTION OPHTHALMIC at 12:47

## 2019-12-20 RX ADMIN — LIDOCAINE HYDROCHLORIDE 100 MG: 20 INJECTION, SOLUTION INFILTRATION; PERINEURAL at 14:17

## 2019-12-20 RX ADMIN — PROPARACAINE HYDROCHLORIDE 1 DROP: 5 SOLUTION/ DROPS OPHTHALMIC at 12:31

## 2019-12-20 RX ADMIN — FENTANYL CITRATE 25 MCG: 50 INJECTION, SOLUTION INTRAMUSCULAR; INTRAVENOUS at 14:30

## 2019-12-20 RX ADMIN — PROPOFOL 50 MG: 10 INJECTION, EMULSION INTRAVENOUS at 14:19

## 2019-12-20 RX ADMIN — FENTANYL CITRATE 50 MCG: 50 INJECTION, SOLUTION INTRAMUSCULAR; INTRAVENOUS at 14:17

## 2019-12-20 RX ADMIN — FENTANYL CITRATE 25 MCG: 50 INJECTION, SOLUTION INTRAMUSCULAR; INTRAVENOUS at 14:56

## 2019-12-20 RX ADMIN — PHENYLEPHRINE HYDROCHLORIDE 1 DROP: 2.5 SOLUTION/ DROPS OPHTHALMIC at 12:33

## 2019-12-20 RX ADMIN — FENTANYL CITRATE 25 MCG: 50 INJECTION, SOLUTION INTRAMUSCULAR; INTRAVENOUS at 14:26

## 2019-12-20 RX ADMIN — PROPOFOL 150 MG: 10 INJECTION, EMULSION INTRAVENOUS at 14:17

## 2019-12-20 RX ADMIN — ONDANSETRON 4 MG: 2 INJECTION INTRAMUSCULAR; INTRAVENOUS at 14:25

## 2019-12-20 RX ADMIN — SODIUM CHLORIDE, POTASSIUM CHLORIDE, SODIUM LACTATE AND CALCIUM CHLORIDE: 600; 310; 30; 20 INJECTION, SOLUTION INTRAVENOUS at 14:10

## 2019-12-20 RX ADMIN — PHENYLEPHRINE HYDROCHLORIDE 1 DROP: 2.5 SOLUTION/ DROPS OPHTHALMIC at 12:47

## 2019-12-20 ASSESSMENT — MIFFLIN-ST. JEOR: SCORE: 1735.13

## 2019-12-20 ASSESSMENT — LIFESTYLE VARIABLES: TOBACCO_USE: 1

## 2019-12-20 NOTE — ANESTHESIA POSTPROCEDURE EVALUATION
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       Anesthesia Type:  Not documented  General    Reportable Event: NO     PAIN: Uncomplicated   Sign Out status: Comfortable, Well controlled pain     PONV: No PONV   Sign Out status:  No Nausea or Vomiting     Neuro/Psych: Uneventful perioperative course   Sign Out Status: Preoperative baseline; Age appropriate mentation     Airway/Resp.: Uneventful perioperative course   Sign Out Status: Non labored breathing, age appropriate RR; Resp. Status within EXPECTED Parameters     CV: Uneventful perioperative course   Sign Out status: Appropriate BP and perfusion indices; Appropriate HR/Rhythm     Disposition:   Sign Out in:  PACU  Disposition:  Phase II; Home  Recovery Course: Uneventful  Follow-Up: Not required           Last Anesthesia Record Vitals:  CRNA VITALS  2019 1430 - 2019 1530      2019             NIBP:  152/89    Ht Rate:  (!) 48          Last PACU Vitals:  Vitals Value Taken Time   /94 2019  3:45 PM   Temp 36.4  C (97.6  F) 2019  3:30 PM   Pulse 55 2019  3:45 PM   Resp 12 2019  3:54 PM   SpO2 95 % 2019  3:54 PM   Temp src     NIBP     Pulse     SpO2     Resp     Temp     Ht Rate     Temp 2     Vitals shown include unvalidated device data.      Electronically Signed By: Renan James MD, 2019, 3:54 PM

## 2019-12-20 NOTE — ANESTHESIA PREPROCEDURE EVALUATION
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 Pt has had prior anesthetic. Type: General    No history of anesthetic complications          ROS/MED HX    ENT/Pulmonary: Comment: Quit smoking 10 years ago - neg pulmonary ROS   (+)tobacco use, Past use , . .    Neurologic:  - neg neurologic ROS     Cardiovascular:     (+) hypertension----. : . . . :. .       METS/Exercise Tolerance:     Hematologic:  - neg hematologic  ROS       Musculoskeletal:  - neg musculoskeletal ROS       GI/Hepatic:  - neg GI/hepatic ROS       Renal/Genitourinary:  - ROS Renal section negative       Endo:     (+) type II DM Not using insulin .      Psychiatric:  - neg psychiatric ROS       Infectious Disease:  - neg infectious disease ROS       Malignancy:   (+) Malignancy History of Other  Other CA Melanoma of eye Active status post Radiation      - no malignancy   Other:    - neg other ROS                     PHYSICAL EXAM:   Mental Status/Neuro: A/A/O   Airway: Facies: Feasible ([place])  Mallampati: I  Mouth/Opening: Full  TM distance: > 6 cm  Neck ROM: Full   Respiratory: Auscultation: CTAB     Resp. Rate: Normal     Resp. Effort: Normal      CV: Rhythm: Regular  Rate: Age  "appropriate  Heart: Normal Sounds  Edema: None   Comments:      Dental: Normal Dentition                LABS:  CBC:   Lab Results   Component Value Date    WBC 5.1 11/04/2019    HGB 15.6 12/20/2019    HGB 14.9 11/04/2019    HCT 44.0 11/04/2019     11/04/2019     BMP:   Lab Results   Component Value Date     11/04/2019    POTASSIUM 5.0 12/20/2019    POTASSIUM 3.7 11/04/2019    CHLORIDE 104 11/04/2019    CO2 27 11/04/2019    BUN 13 11/04/2019    CR 0.81 12/20/2019    CR 0.81 11/04/2019     (H) 12/20/2019     (H) 11/04/2019     COAGS: No results found for: PTT, INR, FIBR  POC:   Lab Results   Component Value Date     (H) 12/16/2019     OTHER:   Lab Results   Component Value Date    PAMELA 9.3 11/04/2019    ALBUMIN 4.1 11/04/2019    PROTTOTAL 8.1 11/04/2019    ALT 31 11/04/2019    AST 20 11/04/2019    ALKPHOS 121 11/04/2019    BILITOTAL 0.5 11/04/2019        Preop Vitals    BP Readings from Last 3 Encounters:   12/20/19 (!) 144/94   12/16/19 (!) 150/92   11/07/19 130/77    Pulse Readings from Last 3 Encounters:   12/16/19 66   11/07/19 59   11/04/19 75      Resp Readings from Last 3 Encounters:   12/20/19 20   12/16/19 15   11/04/19 18    SpO2 Readings from Last 3 Encounters:   12/20/19 97%   12/16/19 95%   11/04/19 98%      Temp Readings from Last 1 Encounters:   12/20/19 36.6  C (97.8  F) (Oral)    Ht Readings from Last 1 Encounters:   12/20/19 1.803 m (5' 11\")      Wt Readings from Last 1 Encounters:   12/20/19 96.3 kg (212 lb 4.9 oz)    Estimated body mass index is 29.61 kg/m  as calculated from the following:    Height as of this encounter: 1.803 m (5' 11\").    Weight as of this encounter: 96.3 kg (212 lb 4.9 oz).     LDA:        Assessment:   ASA SCORE: 3    H&P: History and physical reviewed and following examination; no interval change.   Smoking Status:  Non-Smoker/Unknown   NPO Status: NPO Appropriate     Plan:   Anes. Type:  General   Pre-Medication: None   Induction:  IV " (Standard)   Airway: LMA   Access/Monitoring: PIV   Maintenance: Balanced     Postop Plan:   Postop Pain: Opioids  Postop Sedation/Airway: Not planned  Disposition: Outpatient     PONV Management:   Adult Risk Factors:, Non-Smoker, Postop Opioids   Prevention: Ondansetron, Dexamethasone     CONSENT: Direct conversation   Plan and risks discussed with: Patient   Blood Products: Consent Deferred (Minimal Blood Loss)                   Renan James MD

## 2019-12-20 NOTE — OP NOTE
PRE-OP Dx:    1) CMM left eye s/p I-125 plaque placement    Post-OP Dx:     1) same    Attending:   JOSE ARMANDO Rodriguez MD  Fellow:  BRYANT Rousseau      Procedure:     1) I-125 plaque removal     2) reinsertion lateral rectus muscle    Anesthesia:  General  Findings:  Plaque in place    EBL:   scant  Complications: None  Specimens:  None      Procedure Description:    Jairo Austin  is a 74 year old year old year old patient with a history of CMM, who recently had I-125 plaque placement, and is here for plaque removal . After informed consent was obtained, he was brought into the OR where general anesthesia  was administered.  The eye was then prepped and draped in the usual fashion for ophthalmic surgery.    The prior conjunctival peritomy was re-opened in the quadrants of the tumor and the plaque exposed.    The muscles were isolated and looped with 2-0 silk sutures.      Next, the 5-0 nylon sutures holding the plaque were located and cut.  The plaque was removed from the eye.  The patient was examined and found to no-longer be radioactive. The previously placed lateral rectus muscle hang back sutures were tightened so the muscle reattached to its anatomic origin.  An additional 6-0 vicryl was used to reinforce the prior hangback as a precaution as the fixation to the superior pole had loosened.      Next, the 2-0 silk sutures were removed.      The conjunctiva was closed with 6-0 plain suture.  Retrobulbar block of bupivacaine was injected. A subconjunctival injection of Ancef and Decadron were placed.  A drop of atropine was placed in the eye with erythromycin ointment.  A pad and lindo shield were taped over the eye.    The patient left the OR with no complications.      I was present for the entire surgery.  Charisma Rodriguez MD

## 2019-12-20 NOTE — OR NURSING
Oral airway removed on arrival to Peds PACU.  Patient responding to questions and waking up.  Will continue to monitor.

## 2019-12-20 NOTE — OR NURSING
Patient noted to be in sinus bradycardia with occasional to frequent pvcs.  Per CRNA, patient did have pvcs in OR.  Patient awake and alert, will continue to monitor.

## 2019-12-20 NOTE — DISCHARGE INSTRUCTIONS
Same-Day Surgery   Adult Discharge Orders & Instructions     For 24 hours after surgery:  1. Get plenty of rest.  A responsible adult must stay with you for at least 24 hours after you leave the hospital.   2. Pain medication can slow your reflexes. Do not drive or use heavy equipment.  If you have weakness or tingling, don't drive or use heavy equipment until this feeling goes away.  3. Mixing alcohol and pain medication can cause dizziness and slow your breathing. It can even be fatal. Do not drink alcohol while taking pain medication.  4. Avoid strenuous or risky activities.  Ask for help when climbing stairs.   5. You may feel lightheaded.  If so, sit for a few minutes before standing.  Have someone help you get up.   6. If you have nausea (feel sick to your stomach), drink only clear liquids such as apple juice, ginger ale, broth or 7-Up.  Rest may also help.  Be sure to drink enough fluids.  Move to a regular diet as you feel able. Take pain medications with a small amount of solid food, such as toast or crackers, to avoid nausea.   7. A slight fever is normal. Call the doctor if your fever is over 100 F (37.7 C) (taken under the tongue) or lasts longer than 24 hours.  8. You may have a dry mouth, muscle aches, trouble sleeping or a sore throat.  These symptoms should go away after 24 hours.  9. Do not make important or legal decisions.   Pain Management:      1. Take pain medication (if prescribed) for pain as directed by your physician.        2. WARNING: If the pain medication you have been prescribed contains Tylenol  (acetaminophen), DO NOT take additional doses of Tylenol (acetaminophen).     Call your doctor for any of the followin.  Signs of infection (fever, growing tenderness at the surgery site, severe pain, a large amount of drainage or bleeding, foul-smelling drainage, redness, swelling).    2.  It has been over 8 to 10 hours since surgery and you are still not able to urinate (pee).    3.   Headache for over 24 hours.    4.  Numbness, tingling or weakness the day after surgery (if you had spinal anesthesia).  To contact a doctor, call _____________________________________ or:      [phone] and ask for the Resident On Call for:          __Ophthalmology___________ (answered 24 hours a day)      Emergency Department:  [place] [facility]: [phone]  [place] [facility]: [phone]               Rev. 10/2014

## 2019-12-22 LAB — INTERPRETATION ECG - MUSE: NORMAL

## 2019-12-24 DIAGNOSIS — C69.32 MALIGNANT MELANOMA OF CHOROID OF LEFT EYE (H): Primary | ICD-10-CM

## 2019-12-24 RX ORDER — PREDNISOLONE ACETATE 10 MG/ML
SUSPENSION/ DROPS OPHTHALMIC
Qty: 1 BOTTLE | Refills: 1 | Status: SHIPPED | OUTPATIENT
Start: 2019-12-24 | End: 2020-03-23

## 2019-12-26 ENCOUNTER — DOCUMENTATION ONLY (OUTPATIENT)
Dept: OTHER | Facility: CLINIC | Age: 74
End: 2019-12-26

## 2020-01-09 NOTE — PROCEDURES
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     Treatment Summary:  Radiation Oncology - Course: 1 Protocol:   Treatment Site  Current Dose  Modality  From  To  Elapsed   Days  Fx.  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  The patient tolerated the treatment well and will be followed by   Dr. Rodriguez.           Staff Physician  Nica Melgoza M.D.     Physicist   Caroline Moreno, PhD     CC:   Charisma Rodriguez MD

## 2020-01-16 DIAGNOSIS — C69.32 MALIGNANT MELANOMA OF CHOROID OF LEFT EYE (H): Primary | ICD-10-CM

## 2020-01-21 ENCOUNTER — OFFICE VISIT (OUTPATIENT)
Dept: OPHTHALMOLOGY | Facility: CLINIC | Age: 75
End: 2020-01-21
Attending: OPHTHALMOLOGY
Payer: COMMERCIAL

## 2020-01-21 DIAGNOSIS — C69.32 MALIGNANT MELANOMA OF CHOROID OF LEFT EYE (H): ICD-10-CM

## 2020-01-21 DIAGNOSIS — H35.711 CSR (CENTRAL SEROUS RETINOPATHY), RIGHT: Primary | ICD-10-CM

## 2020-01-21 PROCEDURE — 92235 FLUORESCEIN ANGRPH MLTIFRAME: CPT | Mod: ZF | Performed by: OPHTHALMOLOGY

## 2020-01-21 PROCEDURE — G0463 HOSPITAL OUTPT CLINIC VISIT: HCPCS | Mod: 25

## 2020-01-21 PROCEDURE — 92240 ICG ANGIOGRAPHY I&R UNI/BI: CPT | Mod: ZF | Performed by: OPHTHALMOLOGY

## 2020-01-21 PROCEDURE — 92134 CPTRZ OPH DX IMG PST SGM RTA: CPT | Mod: ZF | Performed by: OPHTHALMOLOGY

## 2020-01-21 PROCEDURE — 92250 FUNDUS PHOTOGRAPHY W/I&R: CPT | Mod: ZF | Performed by: OPHTHALMOLOGY

## 2020-01-21 ASSESSMENT — VISUAL ACUITY
OS_CC: 20/30
OS_CC+: -2
OD_CC+: -2
OD_CC: 20/25
METHOD: SNELLEN - LINEAR

## 2020-01-21 ASSESSMENT — REFRACTION_WEARINGRX
OS_SPHERE: +3.00
OD_ADD: +1.25
OS_CYLINDER: +0.75
OS_AXIS: 127
SPECS_TYPE: PAL
OS_ADD: +1.25
OD_AXIS: 139
OD_CYLINDER: +0.75
OD_SPHERE: +3.25

## 2020-01-21 ASSESSMENT — CONF VISUAL FIELD
OD_NORMAL: 1
METHOD: COUNTING FINGERS
OS_NORMAL: 1

## 2020-01-21 ASSESSMENT — CUP TO DISC RATIO
OS_RATIO: 0.25
OD_RATIO: 0.25

## 2020-01-21 ASSESSMENT — TONOMETRY
OD_IOP_MMHG: 17
OS_IOP_MMHG: 14
IOP_METHOD: TONOPEN

## 2020-01-21 ASSESSMENT — EXTERNAL EXAM - RIGHT EYE: OD_EXAM: NORMAL

## 2020-01-21 ASSESSMENT — EXTERNAL EXAM - LEFT EYE: OS_EXAM: NORMAL

## 2020-01-21 ASSESSMENT — SLIT LAMP EXAM - LIDS
COMMENTS: NORMAL
COMMENTS: NORMAL

## 2020-01-21 NOTE — NURSING NOTE
Chief Complaints and History of Present Illnesses   Patient presents with     Follow Up     Chief Complaint(s) and History of Present Illness(es)     Follow Up     Associated symptoms: Negative for floaters and flashes              Comments     Follow up for CMM left eye and  125 plaque.  The patient notes intermittent discomfort in the left eye.  He used the last Prednisolone eye drop on Saturday.  Minna Murcia COA, COA 12:42 PM 01/21/2020

## 2020-01-21 NOTE — PROGRESS NOTES
CC -   CMM left eye post radiation plaque placement and removal 12-16-20 and 12-20-20    INTERVAL HISTORY - No diplopia. No pain.  No gtts    HPI -   Jairo Austin is a  74 year old year-old patient referred by the [facility] for evaluation and treat of a choroidal lesion left eye.  diagnosis 10/2019, been getting annual eye exams no prior notice per patient.  DM II since ~ 2010, good control, + HTn.  No steroid use      PAST OCULAR SURGERY  I-125 with LR reposition 12/16/19 & 12/20/19      RETINAL IMAGING:  OCT  1-21-20  OD -  Macula - serous PEDs, new SRF PHF attached, choroid ?thick  OS -  Macula - retina normal, PHF attached, choroid ?thick   Lesion - elevated poor image quality      OCT-A 1-21-19  OD - no CNVM    FA 1-21-20  OD - (transit) mulitple hyperF spots, leakage SN macula, no likely CNVM  OS - multiple hyperF spots    ICG 1-21-20  OD (transit) mulitple hyperF spots no CNVM likely  OS - multiple hyperF spots       UBM (10/28/19)  OS - far IT lesion in CB size 3.02mm x 10.39T x 12.48L    U/S OS (10/28/19)  A-scan - lesion low reflective, size 3.29 mm  B-scan - peripheral lesion elevated            ASSESSMENT & PLAN    0. POM #1 OS   - s/p I-125 & LR repositioning   - no diplopia, EOM intact   - no infection      1. CMM OS   - s/p I-125 12/16/19 & 12/20/19   - U/S planned ~4/2020      2. ? OD   - doubt CNVM   - no likely CNVM on FA/OCT/OCT-A on 1/21/20   - had STS 20mg 12/20/19     - observe closely   - recheck 1 month          3. Syneresis OU      4. NS OU   - mild          return to clinic: 1 month, OCT OU with DREW    ATTESTATION     Attending Attestation:     Complete documentation of historical and exam elements from today's encounter can be found in the full encounter summary report (not reduplicated in this progress note).  I personally obtained the chief complaint(s) and history of present illness.  I confirmed and edited as necessary the review of systems, past medical/surgical history, family  history, social history, and examination findings as documented by others; and I examined the patient myself.  I personally reviewed the relevant tests, images, and reports as documented above.  I formulated and edited as necessary the assessment and plan and discussed the findings and management plan with the patient and family    Charisma Rodriguez MD, PhD  , Vitreoretinal Surgery  Department of Ophthalmology  [facility]

## 2020-01-21 NOTE — LETTER
1/21/2020      RE: Jairo Austin  [address]  [facility] [address]       CC -   CMM left eye post radiation plaque placement and removal 12-16-20 and 12-20-20    INTERVAL HISTORY - No diplopia. No pain.  No gtts    HPI - Jairo Austin is a  74 year old year-old patient referred by the [facility] for evaluation and treat of a choroidal lesion left eye.  diagnosis 10/2019, been getting annual eye exams no prior notice per patient.  DM II since ~ 2010, good control, + HTn.  No steroid use    PAST OCULAR SURGERY  I-125 with LR reposition 12/16/19 & 12/20/19    RETINAL IMAGING:  OCT  1-21-20  OD -  Macula - serous PEDs, new SRF PHF attached, choroid ?thick  OS -  Macula - retina normal, PHF attached, choroid ?thick   Lesion - elevated poor image quality    OCT-A 1-21-19  OD - no CNVM    FA 1-21-20  OD - (transit) mulitple hyperF spots, leakage SN macula, no likely CNVM  OS - multiple hyperF spots    ICG 1-21-20  OD (transit) mulitple hyperF spots no CNVM likely  OS - multiple hyperF spots       UBM (10/28/19)  OS - far IT lesion in CB size 3.02mm x 10.39T x 12.48L    U/S OS (10/28/19)  A-scan - lesion low reflective, size 3.29 mm  B-scan - peripheral lesion elevated    ASSESSMENT & PLAN    0. POM #1 OS   - s/p I-125 & LR repositioning   - no diplopia, EOM intact   - no infection    1. CMM OS   - s/p I-125 12/16/19 & 12/20/19   - U/S planned ~4/2020    2. ? OD   - doubt CNVM   - no likely CNVM on FA/OCT/OCT-A on 1/21/20   - had STS 20mg 12/20/19     - observe closely   - recheck 1 month    3. Syneresis OU    4. NS OU   - mild    return to clinic: 1 month, OCT OU with DREW    ATTESTATION     Attending Attestation: Complete documentation of historical and exam elements from today's encounter can be found in the full encounter summary report (not reduplicated in this progress note).  I personally obtained the chief complaint(s) and history of present illness.  I confirmed and edited as necessary the  review of systems, past medical/surgical history, family history, social history, and examination findings as documented by others; and I examined the patient myself.  I personally reviewed the relevant tests, images, and reports as documented above.  I formulated and edited as necessary the assessment and plan and discussed the findings and management plan with the patient and family- Charisma Rodriguez MD, PhD      Charisma Rodriguez MD, PhD  , Vitreoretinal Surgery  Department of Ophthalmology  [facility]

## 2020-02-06 ENCOUNTER — ONCOLOGY VISIT (OUTPATIENT)
Dept: ONCOLOGY | Facility: CLINIC | Age: 75
End: 2020-02-06
Attending: INTERNAL MEDICINE
Payer: COMMERCIAL

## 2020-02-06 ENCOUNTER — ANCILLARY PROCEDURE (OUTPATIENT)
Dept: CT IMAGING | Facility: CLINIC | Age: 75
End: 2020-02-06
Attending: INTERNAL MEDICINE
Payer: COMMERCIAL

## 2020-02-06 VITALS
SYSTOLIC BLOOD PRESSURE: 132 MMHG | HEART RATE: 68 BPM | BODY MASS INDEX: 29.47 KG/M2 | HEIGHT: 72 IN | TEMPERATURE: 98.5 F | DIASTOLIC BLOOD PRESSURE: 76 MMHG | WEIGHT: 217.6 LBS | OXYGEN SATURATION: 95 %

## 2020-02-06 DIAGNOSIS — C69.32 MALIGNANT MELANOMA OF CHOROID OF LEFT EYE (H): Primary | ICD-10-CM

## 2020-02-06 DIAGNOSIS — C69.30 CHOROIDAL MALIGNANT MELANOMA (H): ICD-10-CM

## 2020-02-06 LAB
CREAT BLD-MCNC: 1.2 MG/DL (ref 0.66–1.25)
GFR SERPL CREATININE-BSD FRML MDRD: 60 ML/MIN/{1.73_M2}

## 2020-02-06 PROCEDURE — G0463 HOSPITAL OUTPT CLINIC VISIT: HCPCS | Mod: ZF

## 2020-02-06 PROCEDURE — 99214 OFFICE O/P EST MOD 30 MIN: CPT | Mod: ZP | Performed by: INTERNAL MEDICINE

## 2020-02-06 RX ORDER — IOPAMIDOL 755 MG/ML
130 INJECTION, SOLUTION INTRAVASCULAR ONCE
Status: COMPLETED | OUTPATIENT
Start: 2020-02-06 | End: 2020-02-06

## 2020-02-06 RX ADMIN — IOPAMIDOL 130 ML: 755 INJECTION, SOLUTION INTRAVASCULAR at 07:36

## 2020-02-06 ASSESSMENT — MIFFLIN-ST. JEOR: SCORE: 1767.09

## 2020-02-06 ASSESSMENT — PAIN SCALES - GENERAL: PAINLEVEL: NO PAIN (0)

## 2020-02-06 NOTE — LETTER
RE: Jairo Austin  [address]  [facility] [address]     Dear Colleague,    Thank you for referring your patient, Jairo Austin, to the [facility]. Please see a copy of my visit note below.    [facility]  MEDICAL ONCOLOGY CONSULT  Feb 6, 2020    CHIEF COMPLAINT: Choroidal melanoma    HISTORY OF PRESENT ILLNESS  Jairo Austin is a 74 year old male with choroidal melanoma of the left eye. He is referred by Dr. Rodriguez.     He was initially seen on 10/28/19, and the left eye lesion measured 10.39T x 12.48L with a height of 3.02mm.  CT-scan on 11/4/19 negative for obvious liver metastasis. MRI liver on 11/11/2019 showed small enhancing lesions in segment 8 and 4a, likely small hemangiomas. He then proceeded with I-125 plaque placement for delivery of 8,500 cGy total dose to the left eye lesion on 12/16/19.    CT-CAP today shows decreased appearance of the arterially enhancing liver lesion. No obvious liver metastasis or suspicious lesions.    Patient feels well and vision is stable. He continues to be treated for hypertension and has diet controlled diabetes. He is on multiple antihypertensive agents including carvedilol and aspirin. Prior 20 pack-year smoker, quit 20 years ago.     Results for orders placed or performed in visit on 02/06/20   Creatinine POCT     Status: Abnormal   Result Value Ref Range    Creatinine 1.2 0.66 - 1.25 mg/dL    GFR Estimate 60 (L) >60 mL/min/[1.73_m2]    GFR Estimate If Black 72 >60 mL/min/[1.73_m2]         REVIEW OF SYSTEMS  A 12-point ROS negative except as in HPI    Current Outpatient Medications   Medication Sig Dispense Refill     amLODIPine (NORVASC) 10 MG tablet Take 10 mg by mouth daily        aspirin 81 MG EC tablet Take 81 mg by mouth       atorvastatin (LIPITOR) 10 MG tablet Take 10 mg by mouth daily  3     carvedilol (COREG) 12.5 MG tablet Take 12.5 mg by mouth 2 times daily (with meals)         "chlorthalidone (HYGROTON) 25 MG tablet Take 25 mg by mouth       lisinopril (PRINIVIL/ZESTRIL) 20 MG tablet Take 2 tablets BY MOUTH in the morning, and 1 tablet at night.  3     neomycin-polymixin-dexamethasone (MAXITROL) 0.1 % ophthalmic suspension 4 times a day for 1 week then 3 times a day for 1 week and then 2 times a day for 1 week and then once a day for one week and then stop. (Patient not taking: Reported on 2/6/2020) 5 mL 0     prednisoLONE acetate (PRED FORTE) 1 % ophthalmic suspension 4 times a day for 1 week Then 3 times a day for 1 week Then 2 times a day for 1 week Then once a day for 1 week and then stop. (Patient not taking: Reported on 2/6/2020) 1 Bottle 1       Allergies   Allergen Reactions     No Clinical Screening - See Comments Itching and Rash     Nitroimidazoles       There is no immunization history on file for this patient.    Past Medical History:   Diagnosis Date     Diabetes (H)      Hypertension      Melanoma (H)        PAST SURGICAL HISTORY  1. Bilateral hip replacement, 2011    SOCIAL HISTORY   with 2 children and 4 grandchildren. Retired and now working as a . Quit smoking 20 years ago. Smoked 2 packs per day x 10 years.  History   Smoking Status     Former Smoker     Packs/day: 0.00     Quit date: 2009   Smokeless Tobacco     Never Used    Social History    Substance and Sexual Activity      Alcohol use: Yes        Comment: Weekends/socially     History   Drug Use Unknown       FAMILY HISTORY  Paternal uncle: Throat cancer  Family History   Problem Relation Age of Onset     Glaucoma No family hx of      Macular Degeneration No family hx of        PHYSICAL EXAMINATION  /76   Pulse 68   Temp 98.5  F (36.9  C) (Oral)   Ht 1.816 m (5' 11.5\")   Wt 98.7 kg (217 lb 9.6 oz)   SpO2 95%   BMI 29.93 kg/m     Wt Readings from Last 2 Encounters:   02/06/20 98.7 kg (217 lb 9.6 oz)   12/20/19 96.3 kg (212 lb 4.9 oz)     General: Well appearing male in no " distress  Head: Normocephalic  Eyes: PERRLA, EOMI  ENT: Oropharynx clear no lesions  Heart: Regular, no murmurs  Lungs: Clear to ausculation  Abdomen: No hepatosplenomegaly, no masses  Extremities: No peripheral edema  Neuro: Non-focal  Skin: No rashes      ASSESSMENT AND PLAN    #1 Uveal melanoma, 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 Austin. He is a 73 year old man s/p black brachytherapy for uveal melanoma involving ciliary body. He is doing well post-procedure. CT-CAP for restaging shows hemangiomas, and no new evidence of liver metastasis.    #3 Droopy eye lid, left eye  Possibly post-procedure. Close to crossing line of pupil at rest. Question of oculplastics referral.    I will plan to see him back in 3 months with repeat CT-CAP.    Darrell Aranda M.D.   of Medicine  Hematology, Oncology and Transplantation

## 2020-02-06 NOTE — PROGRESS NOTES
[facility]  MEDICAL ONCOLOGY CONSULT  Feb 6, 2020    CHIEF COMPLAINT: Choroidal melanoma    HISTORY OF PRESENT ILLNESS  Jairo Austin is a 74 year old male with choroidal melanoma of the left eye. He is referred by Dr. Rodriguez.     He was initially seen on 10/28/19, and the left eye lesion measured 10.39T x 12.48L with a height of 3.02mm.  CT-scan on 11/4/19 negative for obvious liver metastasis. MRI liver on 11/11/2019 showed small enhancing lesions in segment 8 and 4a, likely small hemangiomas. He then proceeded with I-125 plaque placement for delivery of 8,500 cGy total dose to the left eye lesion on 12/16/19.    CT-CAP today shows decreased appearance of the arterially enhancing liver lesion. No obvious liver metastasis or suspicious lesions.    Patient feels well and vision is stable. He continues to be treated for hypertension and has diet controlled diabetes. He is on multiple antihypertensive agents including carvedilol and aspirin. Prior 20 pack-year smoker, quit 20 years ago.     Results for orders placed or performed in visit on 02/06/20   Creatinine POCT     Status: Abnormal   Result Value Ref Range    Creatinine 1.2 0.66 - 1.25 mg/dL    GFR Estimate 60 (L) >60 mL/min/[1.73_m2]    GFR Estimate If Black 72 >60 mL/min/[1.73_m2]         REVIEW OF SYSTEMS  A 12-point ROS negative except as in HPI    Current Outpatient Medications   Medication Sig Dispense Refill     amLODIPine (NORVASC) 10 MG tablet Take 10 mg by mouth daily        aspirin 81 MG EC tablet Take 81 mg by mouth       atorvastatin (LIPITOR) 10 MG tablet Take 10 mg by mouth daily  3     carvedilol (COREG) 12.5 MG tablet Take 12.5 mg by mouth 2 times daily (with meals)        chlorthalidone (HYGROTON) 25 MG tablet Take 25 mg by mouth       lisinopril (PRINIVIL/ZESTRIL) 20 MG tablet Take 2 tablets BY MOUTH in the morning, and 1 tablet at night.  3     neomycin-polymixin-dexamethasone (MAXITROL) 0.1 % ophthalmic suspension 4  "times a day for 1 week then 3 times a day for 1 week and then 2 times a day for 1 week and then once a day for one week and then stop. (Patient not taking: Reported on 2/6/2020) 5 mL 0     prednisoLONE acetate (PRED FORTE) 1 % ophthalmic suspension 4 times a day for 1 week Then 3 times a day for 1 week Then 2 times a day for 1 week Then once a day for 1 week and then stop. (Patient not taking: Reported on 2/6/2020) 1 Bottle 1       Allergies   Allergen Reactions     No Clinical Screening - See Comments Itching and Rash     Nitroimidazoles       There is no immunization history on file for this patient.    Past Medical History:   Diagnosis Date     Diabetes (H)      Hypertension      Melanoma (H)        PAST SURGICAL HISTORY  1. Bilateral hip replacement, 2011    SOCIAL HISTORY   with 2 children and 4 grandchildren. Retired and now working as a . Quit smoking 20 years ago. Smoked 2 packs per day x 10 years.  History   Smoking Status     Former Smoker     Packs/day: 0.00     Quit date: 2009   Smokeless Tobacco     Never Used    Social History    Substance and Sexual Activity      Alcohol use: Yes        Comment: Weekends/socially     History   Drug Use Unknown       FAMILY HISTORY  Paternal uncle: Throat cancer  Family History   Problem Relation Age of Onset     Glaucoma No family hx of      Macular Degeneration No family hx of        PHYSICAL EXAMINATION  /76   Pulse 68   Temp 98.5  F (36.9  C) (Oral)   Ht 1.816 m (5' 11.5\")   Wt 98.7 kg (217 lb 9.6 oz)   SpO2 95%   BMI 29.93 kg/m    Wt Readings from Last 2 Encounters:   02/06/20 98.7 kg (217 lb 9.6 oz)   12/20/19 96.3 kg (212 lb 4.9 oz)     General: Well appearing male in no distress  Head: Normocephalic  Eyes: PERRLA, EOMI  ENT: Oropharynx clear no lesions  Heart: Regular, no murmurs  Lungs: Clear to ausculation  Abdomen: No hepatosplenomegaly, no masses  Extremities: No peripheral edema  Neuro: Non-focal  Skin: No " eloise      ASSESSMENT AND PLAN    #1 Uveal melanoma, 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 Austin. He is a 73 year old man s/p black brachytherapy for uveal melanoma involving ciliary body. He is doing well post-procedure. CT-CAP for restaging shows hemangiomas, and no new evidence of liver metastasis.    #3 Droopy eye lid, left eye  Possibly post-procedure. Close to crossing line of pupil at rest. Question of oculplastics referral.    I will plan to see him back in 3 months with repeat CT-CAP.    Darrell Aranda M.D.   of Medicine  Hematology, Oncology and Transplantation

## 2020-02-06 NOTE — NURSING NOTE
"Oncology Rooming Note    February 6, 2020 10:01 AM   Jairo Austin is a 74 year old male who presents for:    Chief Complaint   Patient presents with     Oncology Clinic Visit     [facility] RETURN; MALIGNANT MELANOMA OF CHODOID OF LEFT EYE     Initial Vitals: /76   Pulse 68   Temp 98.5  F (36.9  C) (Oral)   Ht 1.816 m (5' 11.5\")   Wt 98.7 kg (217 lb 9.6 oz)   SpO2 95%   BMI 29.93 kg/m   Estimated body mass index is 29.93 kg/m  as calculated from the following:    Height as of this encounter: 1.816 m (5' 11.5\").    Weight as of this encounter: 98.7 kg (217 lb 9.6 oz). Body surface area is 2.23 meters squared.  No Pain (0) Comment: Data Unavailable   No LMP for male patient.  Allergies reviewed: Yes  Medications reviewed: Yes    Medications: Medication refills not needed today.  Pharmacy name entered into [facility]: [id number] - [place], [place] - [address]    Clinical concerns: No new concerns.  Dr. De Souza was notified.      Tanisha Pascal, [facility]              "

## 2020-02-10 ENCOUNTER — HEALTH MAINTENANCE LETTER (OUTPATIENT)
Age: 75
End: 2020-02-10

## 2020-02-11 DIAGNOSIS — H35.711 CSR (CENTRAL SEROUS RETINOPATHY), RIGHT: Primary | ICD-10-CM

## 2020-02-14 ENCOUNTER — OFFICE VISIT (OUTPATIENT)
Dept: OPHTHALMOLOGY | Facility: CLINIC | Age: 75
End: 2020-02-14
Attending: OPHTHALMOLOGY
Payer: COMMERCIAL

## 2020-02-14 DIAGNOSIS — C69.32 MALIGNANT MELANOMA OF CHOROID OF LEFT EYE (H): Primary | ICD-10-CM

## 2020-02-14 DIAGNOSIS — H35.711 CSR (CENTRAL SEROUS RETINOPATHY), RIGHT: ICD-10-CM

## 2020-02-14 PROCEDURE — G0463 HOSPITAL OUTPT CLINIC VISIT: HCPCS | Mod: ZF

## 2020-02-14 PROCEDURE — 92134 CPTRZ OPH DX IMG PST SGM RTA: CPT | Mod: ZF | Performed by: OPHTHALMOLOGY

## 2020-02-14 ASSESSMENT — REFRACTION_WEARINGRX
SPECS_TYPE: PAL
OS_CYLINDER: +0.75
OD_AXIS: 139
OD_SPHERE: +3.25
OD_CYLINDER: +0.75
OS_AXIS: 127
OD_ADD: +1.25
OS_SPHERE: +3.00
OS_ADD: +1.25

## 2020-02-14 ASSESSMENT — CONF VISUAL FIELD
OS_NORMAL: 1
OD_NORMAL: 1
METHOD: COUNTING FINGERS

## 2020-02-14 ASSESSMENT — TONOMETRY
OD_IOP_MMHG: 10
OS_IOP_MMHG: 13
IOP_METHOD: APPLANATION

## 2020-02-14 ASSESSMENT — SLIT LAMP EXAM - LIDS
COMMENTS: NORMAL
COMMENTS: NORMAL

## 2020-02-14 ASSESSMENT — VISUAL ACUITY
METHOD: SNELLEN - LINEAR
OD_CC: 20/25
OD_CC+: -1
OS_CC: 20/20
CORRECTION_TYPE: GLASSES

## 2020-02-14 ASSESSMENT — CUP TO DISC RATIO
OS_RATIO: 0.25
OD_RATIO: 0.25

## 2020-02-14 ASSESSMENT — EXTERNAL EXAM - LEFT EYE: OS_EXAM: NORMAL

## 2020-02-14 ASSESSMENT — EXTERNAL EXAM - RIGHT EYE: OD_EXAM: NORMAL

## 2020-02-14 NOTE — PROGRESS NOTES
CC -   CMM left eye post radiation plaque placement and removal 12-16-20 and 12-20-20    INTERVAL HISTORY - No diplopia. No pain.  No gtts.  VA OU stable    HPI -   Jairo Austin is a  74 year old year-old patient referred by the [facility] for evaluation and treat of a choroidal lesion left eye.  diagnosis 10/2019, been getting annual eye exams no prior notice per patient.  DM II since ~ 2010, good control, + HTn.  No steroid use      PAST OCULAR SURGERY  I-125 with LR reposition 12/16/19 & 12/20/19      RETINAL IMAGING:  OCT  1-21-20  OD -  Macula - serous PEDs, SRF superior macula improving, , choroid ?thick  OS -  Macula - retina normal, PHF attached, choroid ?thick   Lesion - elevated poor image quality      OCT-A 1-21-19  OD - no CNVM    FA 1-21-20  OD - (transit) mulitple hyperF spots, leakage SN macula, no likely CNVM  OS - multiple hyperF spots    ICG 1-21-20  OD (transit) mulitple hyperF spots no CNVM likely  OS - multiple hyperF spots       UBM (10/28/19)  OS - far IT lesion in CB size 3.02mm x 10.39T x 12.48L    U/S OS (10/28/19)  A-scan - lesion low reflective, size 3.29 mm  B-scan - peripheral lesion elevated            ASSESSMENT & PLAN    0. POM #2 OS   - s/p I-125 & LR repositioning 12/2019   - no diplopia, EOM intact   - no infection      1. CMM OS   - s/p I-125 12/16/19 & 12/20/19   - U/S planned ~4/2020      2.  OD   - doubt CNVM   - no likely CNVM on FA/OCT/OCT-A on 1/21/20   - had STS 20mg 12/20/19, new activity noted 1/21/20     - OCT today 2/14/20 improving     - observe closely   - steroid avoidance d/w patient          3. Syneresis OU      4. NS OU   - mild          return to clinic: 1 month, OCT OU with DREW, U/S OS, optos OS    ATTESTATION     Attending Attestation:     Complete documentation of historical and exam elements from today's encounter can be found in the full encounter summary report (not reduplicated in this progress note).  I personally obtained the chief complaint(s) and  history of present illness.  I confirmed and edited as necessary the review of systems, past medical/surgical history, family history, social history, and examination findings as documented by others; and I examined the patient myself.  I personally reviewed the relevant tests, images, and reports as documented above.  I formulated and edited as necessary the assessment and plan and discussed the findings and management plan with the patient and family    Charisma Rodriguez MD, PhD  , Vitreoretinal Surgery  Department of Ophthalmology  [facility]

## 2020-02-14 NOTE — NURSING NOTE
Chief Complaints and History of Present Illnesses   Patient presents with     Follow Up      (central serous retinopathy), right      Chief Complaint(s) and History of Present Illness(es)     Follow Up     Associated symptoms: redness (RE.).  Negative for eye pain, dryness, tearing, flashes and floaters    Comments:  (central serous retinopathy), right               Comments     Pt VA LE is better, RE worse.  RE VA is foggier.  Pt has no pain or other concerns at this time.    DM 2.  BS were 142 about 3-4 days ago.  No results found for: A1C  Pt unsure of last A1C score.  Thinks it was under 7.    BELÉN Mendosa February 14, 2020 1:02 PM

## 2020-03-23 ENCOUNTER — OFFICE VISIT (OUTPATIENT)
Dept: OPHTHALMOLOGY | Facility: CLINIC | Age: 75
End: 2020-03-23
Attending: OPHTHALMOLOGY
Payer: COMMERCIAL

## 2020-03-23 DIAGNOSIS — C69.32 MALIGNANT MELANOMA OF CHOROID OF LEFT EYE (H): ICD-10-CM

## 2020-03-23 DIAGNOSIS — H35.711 CSR (CENTRAL SEROUS RETINOPATHY), RIGHT: ICD-10-CM

## 2020-03-23 PROCEDURE — 92250 FUNDUS PHOTOGRAPHY W/I&R: CPT | Mod: 59 | Performed by: OPHTHALMOLOGY

## 2020-03-23 PROCEDURE — 76513 OPH US DX ANT SGM US UNI/BI: CPT | Mod: ZF | Performed by: OPHTHALMOLOGY

## 2020-03-23 PROCEDURE — G0463 HOSPITAL OUTPT CLINIC VISIT: HCPCS | Mod: ZF

## 2020-03-23 PROCEDURE — 76510 OPH US DX B-SCAN&QUAN A-SCAN: CPT | Mod: ZF | Performed by: OPHTHALMOLOGY

## 2020-03-23 PROCEDURE — 92134 CPTRZ OPH DX IMG PST SGM RTA: CPT | Mod: ZF | Performed by: OPHTHALMOLOGY

## 2020-03-23 ASSESSMENT — CONF VISUAL FIELD
OD_NORMAL: 1
METHOD: COUNTING FINGERS
OS_NORMAL: 1

## 2020-03-23 ASSESSMENT — CUP TO DISC RATIO
OS_RATIO: 0.25
OD_RATIO: 0.25

## 2020-03-23 ASSESSMENT — EXTERNAL EXAM - LEFT EYE: OS_EXAM: NORMAL

## 2020-03-23 ASSESSMENT — SLIT LAMP EXAM - LIDS
COMMENTS: NORMAL
COMMENTS: NORMAL

## 2020-03-23 ASSESSMENT — REFRACTION_WEARINGRX
OS_AXIS: 127
SPECS_TYPE: PAL
OD_ADD: +1.25
OS_CYLINDER: +0.75
OD_AXIS: 139
OS_ADD: +1.25
OS_SPHERE: +3.00
OD_SPHERE: +3.25
OD_CYLINDER: +0.75

## 2020-03-23 ASSESSMENT — VISUAL ACUITY
CORRECTION_TYPE: GLASSES
OS_CC: 20/25
METHOD: SNELLEN - LINEAR
OD_CC: 20/25

## 2020-03-23 ASSESSMENT — EXTERNAL EXAM - RIGHT EYE: OD_EXAM: NORMAL

## 2020-03-23 ASSESSMENT — TONOMETRY
OS_IOP_MMHG: 13
IOP_METHOD: ICARE
OD_IOP_MMHG: 10

## 2020-03-23 NOTE — PROGRESS NOTES
CC -   CMM left eye post radiation plaque placement and removal 12-16-20 and 12-20-20    INTERVAL HISTORY - No diplopia. No pain.  No gtts.  VA OU stable    HPI -   Jairo Austin is a  74 year old year-old patient referred by the [facility] for evaluation and treat of a choroidal lesion left eye.  diagnosis 10/2019, been getting annual eye exams no prior notice per patient.  DM II since ~ 2010, good control, + HTn.  No steroid use      PAST OCULAR SURGERY  I-125 with LR reposition 12/16/19 & 12/20/19      RETINAL IMAGING:  OCT  3-23-20  OD -  Macula - serous PEDs superior no fluid, choroid ?thick  OS -  Macula - retina normal, PHF attached, choroid ?thick   Lesion - (prior) elevated poor image quality      OCT-A 1-21-19  OD - no CNVM    FA 1-21-20  OD - (transit) mulitple hyperF spots, leakage SN macula, no likely CNVM  OS - multiple hyperF spots    ICG 1-21-20  OD (transit) mulitple hyperF spots no CNVM likely  OS - multiple hyperF spots       UBM 3-23-20  OS - far IT lesion in CB size 3.02mm x 10.39T x 12.48L (10/2019) ->  2.59mm x 11.47T x 12.01L  (3/2020)    U/S OS 3-23-20  A-scan - lesion low reflective, size 3.29 mm (unreliable today)  B-scan - peripheral lesion elevated            ASSESSMENT & PLAN    1. CMM OS   - s/p I-125 12/16/19 & 12/20/19   - U/S (requires UBM d/t anterior) shows reduction   - plan next U/S in 4 months (~ 8/2020)      2.  OD   - doubt CNVM   - no likely CNVM on FA/OCT/OCT-A on 1/21/20   - had STS 20mg 12/20/19, new activity noted 1/21/20 with SRF   - fluid resolved 3/23/20      - observe   - steroid avoidance d/w patient          3. Syneresis OU      4. NS OU   - mild          return to clinic: 4 month, OCT OU with DREW, U/S OS, optos OS    ATTESTATION     Attending Attestation:     Complete documentation of historical and exam elements from today's encounter can be found in the full encounter summary report (not reduplicated in this progress note).  I personally obtained the chief  complaint(s) and history of present illness.  I confirmed and edited as necessary the review of systems, past medical/surgical history, family history, social history, and examination findings as documented by others; and I examined the patient myself.  I personally reviewed the relevant tests, images, and reports as documented above.  I formulated and edited as necessary the assessment and plan and discussed the findings and management plan with the patient and family    Charisma Rodriguez MD, PhD  , Vitreoretinal Surgery  Department of Ophthalmology  [facility]

## 2020-03-23 NOTE — NURSING NOTE
Chief Complaints and History of Present Illnesses   Patient presents with     Follow Up     1 month follow up CMM OS     Chief Complaint(s) and History of Present Illness(es)     Follow Up     Comments: 1 month follow up CMM OS              Comments     Pt states vision is the same as last visit. No eye pain today. No flashes or floaters.  No redness or dryness.  DM2 BS: Pt doesn't check sugars daily.  A1C: good per pt, taken 11/2019  No results found for: A1C    PETRA Castillo March 23, 2020 9:21 AM

## 2020-03-23 NOTE — LETTER
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 No pain.  No gtts.  VA OU stable    HPI -   Jairo Austin is a  74 year old year-old patient referred by the [facility] for evaluation and treat of a choroidal lesion left eye.  diagnosis 10/2019, been getting annual eye exams no prior notice per patient.  DM II since ~ 2010, good control, + HTn.  No steroid use      PAST OCULAR SURGERY  I-125 with LR reposition 12/16/19 & 12/20/19      RETINAL IMAGING:  OCT  3-23-20  OD -  Macula - serous PEDs superior no fluid, choroid ?thick  OS -  Macula - retina normal, PHF attached, choroid ?thick   Lesion - (prior) elevated poor image quality      OCT-A 1-21-19  OD - no CNVM    FA 1-21-20  OD - (transit) mulitple hyperF spots, leakage SN macula, no likely CNVM  OS - multiple hyperF spots    ICG 1-21-20  OD (transit) mulitple hyperF spots no CNVM likely  OS - multiple hyperF spots       UBM 3-23-20  OS - far IT lesion in CB size 3.02mm x 10.39T x 12.48L (10/2019) ->  2.59mm x 11.47T x 12.01L  (3/2020)    U/S OS 3-23-20  A-scan - lesion low reflective, size 3.29 mm (unreliable today)  B-scan - peripheral lesion elevated            ASSESSMENT & PLAN    1. CMM OS   - s/p I-125 12/16/19 & 12/20/19   - U/S (requires UBM d/t anterior) shows reduction   - plan next U/S in 4 months (~ 8/2020)      2.  OD   - doubt CNVM   - no likely CNVM on FA/OCT/OCT-A on 1/21/20   - had STS 20mg 12/20/19, new activity noted 1/21/20 with SRF   - fluid resolved 3/23/20      - observe   - steroid avoidance d/w patient          3. Syneresis OU      4. NS OU   - mild          return to clinic: 4 month, OCT OU with DREW, U/S OS, optos OS    ATTESTATION     Attending Attestation:     Complete documentation of historical and exam elements from today's encounter can be found in the full encounter  summary report (not reduplicated in this progress note).  I personally obtained the chief complaint(s) and history of present illness.  I confirmed and edited as necessary the review of systems, past medical/surgical history, family history, social history, and examination findings as documented by others; and I examined the patient myself.  I personally reviewed the relevant tests, images, and reports as documented above.  I formulated and edited as necessary the assessment and plan and discussed the findings and management plan with the patient and family    Charisma Rodriguez MD, PhD  , Vitreoretinal Surgery  Department of Ophthalmology  [facility]    Charisma Rodriguez MD

## 2020-06-05 ENCOUNTER — VIRTUAL VISIT (OUTPATIENT)
Dept: ONCOLOGY | Facility: CLINIC | Age: 75
End: 2020-06-05
Attending: INTERNAL MEDICINE
Payer: COMMERCIAL

## 2020-06-05 ENCOUNTER — ANCILLARY PROCEDURE (OUTPATIENT)
Dept: CT IMAGING | Facility: CLINIC | Age: 75
End: 2020-06-05
Attending: INTERNAL MEDICINE
Payer: COMMERCIAL

## 2020-06-05 DIAGNOSIS — C69.32 MALIGNANT MELANOMA OF CHOROID OF LEFT EYE (H): ICD-10-CM

## 2020-06-05 DIAGNOSIS — C69.32 MALIGNANT MELANOMA OF CHOROID OF LEFT EYE (H): Primary | ICD-10-CM

## 2020-06-05 LAB
CREAT BLD-MCNC: 1 MG/DL (ref 0.66–1.25)
GFR SERPL CREATININE-BSD FRML MDRD: 73 ML/MIN/{1.73_M2}

## 2020-06-05 PROCEDURE — 99213 OFFICE O/P EST LOW 20 MIN: CPT | Mod: 95 | Performed by: INTERNAL MEDICINE

## 2020-06-05 PROCEDURE — 40001009 ZZH VIDEO/TELEPHONE VISIT; NO CHARGE

## 2020-06-05 RX ORDER — IOPAMIDOL 755 MG/ML
125 INJECTION, SOLUTION INTRAVASCULAR ONCE
Status: COMPLETED | OUTPATIENT
Start: 2020-06-05 | End: 2020-06-05

## 2020-06-05 RX ADMIN — IOPAMIDOL 125 ML: 755 INJECTION, SOLUTION INTRAVASCULAR at 12:04

## 2020-06-05 NOTE — LETTER
"    6/5/2020         RE: Jairo Austin  [address]  [facility] [address]        Dear Colleague,    Thank you for referring your patient, Jairo Austin, to the [facility]. Please see a copy of my visit note below.    Jario Austin is a 74 year old male who is being evaluated via a billable telephone visit.      The patient has been notified of following:     \"This telephone visit will be conducted via a call between you and your physician/provider. We have found that certain health care needs can be provided without the need for a physical exam.  This service lets us provide the care you need with a short phone conversation.  If a prescription is necessary we can send it directly to your pharmacy.  If lab work is needed we can place an order for that and you can then stop by our lab to have the test done at a later time.    Telephone visits are billed at different rates depending on your insurance coverage. During this emergency period, for some insurers they may be billed the same as an in-person visit.  Please reach out to your insurance provider with any questions.    If during the course of the call the physician/provider feels a telephone visit is not appropriate, you will not be charged for this service.\"    Patient has given verbal consent for Telephone visit?  Yes    What phone number would you like to be contacted at? [phone]    How would you like to obtain your AVS? Mail a copy'    I have reviewed and updated the patient's allergies and medication list.    Concerns: Patient has no new concerns.    Refills: None     SUMEET [place]    Phone call duration: 21 minutes    [facility]  MEDICAL ONCOLOGY PROGRESS NOTE  Jun 5, 2020    CHIEF COMPLAINT: Choroidal melanoma    Melanoma History:  1. 10/28/2019, he was initially seen and the left eye lesion measured 10.39T x 12.48L with a height of 3.02mm.  2. 11/4/2019, CT-scan negative for obvious " liver metastasis  3. 11/11/2019, MRI livershowed small enhancing lesions in segment 8 and 4a, likely small hemangiomas. 4. 12/16/2019, he has I-125 plaque placement and delivery of 8,500 cGy total dose to the left eye lesion.  5. 2/6/2020, CT-CAP shows decreased appearance of the arterially enhancing liver lesion. No obvious liver metastasis or suspicious lesions.      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     Patient follows with Dr. Rodriguez and notes vision is stable. 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 He is looking forward to the new school year.    He continues treatment for hypertension and has diet controlled diabetes. He is on multiple antihypertensive agents including carvedilol and aspirin. Prior 20 pack-year smoker, quit 20 years ago. He is doing well and he has no new complaints.    CT-CAP was obtained for restaging today, and this shows no evidence of metastatic disease.    ECOG performance status 0.    Results for orders placed or performed in visit on 06/05/20   Creatinine POCT     Status: None   Result Value Ref Range    Creatinine 1.0 0.66 - 1.25 mg/dL    GFR Estimate 73 >60 mL/min/[1.73_m2]    GFR Estimate If Black 89 >60 mL/min/[1.73_m2]         REVIEW OF SYSTEMS  A 12-point ROS negative except as in HPI    Current Outpatient Medications   Medication Sig Dispense Refill     amLODIPine (NORVASC) 10 MG tablet Take 10 mg by mouth daily        aspirin 81 MG EC tablet Take 81 mg by mouth       atorvastatin (LIPITOR) 10 MG tablet Take 10 mg by mouth daily  3     carvedilol (COREG) 12.5 MG tablet Take 12.5 mg by mouth 2 times daily (with meals)        chlorthalidone (HYGROTON) 25 MG tablet Take 25 mg by mouth       lisinopril (PRINIVIL/ZESTRIL) 20 MG tablet Take 2 tablets BY MOUTH in the morning, and 1 tablet at night.  3       Allergies   Allergen  Reactions     No Clinical Screening - See Comments Itching and Rash     Nitroimidazoles       There is no immunization history on file for this patient.    Past Medical History:   Diagnosis Date     Diabetes (H)      Hypertension      Melanoma (H)        PAST SURGICAL HISTORY  1. Bilateral hip replacement, 2011    SOCIAL HISTORY   with 2 children and 4 grandchildren. Retired and now working as a . Quit smoking 20 years ago. Smoked 2 packs per day x 10 years.  History   Smoking Status     Former Smoker     Packs/day: 0.00     Quit date: 2009   Smokeless Tobacco     Never Used    Social History    Substance and Sexual Activity      Alcohol use: Yes        Comment: Weekends/socially     History   Drug Use Unknown       FAMILY HISTORY  Paternal uncle: Throat cancer  Family History   Problem Relation Age of Onset     Glaucoma No family hx of      Macular Degeneration No family hx of        PHYSICAL EXAMINATION  There were no vitals taken for this visit.  No exam as this was telephone visit.    IMAGING  6/5/2020, CT-CAP  IMPRESSION:   1.  No evidence of new metastatic disease.  2.  Lesions within the liver consistent with benign hemangiomas, cysts  and vascular shunts on MRI.  3.  Stable prominent mediastinal lymph nodes.       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 Austin. He is a 74 year old man s/p black brachytherapy for uveal melanoma involving ciliary body. He is doing well. CT-CAP for restaging shows no new evidence of liver or other metastasis.    I will plan to see him back in 3 months with repeat CT-CAP.    Darrell Aranda M.D.   of Medicine  Hematology, Oncology and Transplantation

## 2020-06-05 NOTE — PROGRESS NOTES
"Jairo Austin is a 74 year old male who is being evaluated via a billable telephone visit.      The patient has been notified of following:     \"This telephone visit will be conducted via a call between you and your physician/provider. We have found that certain health care needs can be provided without the need for a physical exam.  This service lets us provide the care you need with a short phone conversation.  If a prescription is necessary we can send it directly to your pharmacy.  If lab work is needed we can place an order for that and you can then stop by our lab to have the test done at a later time.    Telephone visits are billed at different rates depending on your insurance coverage. During this emergency period, for some insurers they may be billed the same as an in-person visit.  Please reach out to your insurance provider with any questions.    If during the course of the call the physician/provider feels a telephone visit is not appropriate, you will not be charged for this service.\"    Patient has given verbal consent for Telephone visit?  Yes    What phone number would you like to be contacted at? [phone]    How would you like to obtain your AVS? Mail a copy'    I have reviewed and updated the patient's allergies and medication list.    Concerns: Patient has no new concerns.    Refills: None     SUMEET [place]    Phone call duration: 21 minutes    [facility]  MEDICAL ONCOLOGY PROGRESS NOTE  Jun 5, 2020    CHIEF COMPLAINT: Choroidal melanoma    Melanoma History:  1. 10/28/2019, he was initially seen and the left eye lesion measured 10.39T x 12.48L with a height of 3.02mm.  2. 11/4/2019, CT-scan negative for obvious liver metastasis  3. 11/11/2019, MRI livershowed small enhancing lesions in segment 8 and 4a, likely small hemangiomas. 4. 12/16/2019, he has I-125 plaque placement and delivery of 8,500 cGy total dose to the left eye lesion.  5. 2/6/2020, CT-CAP shows " decreased appearance of the arterially enhancing liver lesion. No obvious liver metastasis or suspicious lesions.      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     Patient follows with Dr. Rodriguez and notes vision is stable. 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 He is looking forward to the new school year.    He continues treatment for hypertension and has diet controlled diabetes. He is on multiple antihypertensive agents including carvedilol and aspirin. Prior 20 pack-year smoker, quit 20 years ago. He is doing well and he has no new complaints.    CT-CAP was obtained for restaging today, and this shows no evidence of metastatic disease.    ECOG performance status 0.    Results for orders placed or performed in visit on 06/05/20   Creatinine POCT     Status: None   Result Value Ref Range    Creatinine 1.0 0.66 - 1.25 mg/dL    GFR Estimate 73 >60 mL/min/[1.73_m2]    GFR Estimate If Black 89 >60 mL/min/[1.73_m2]         REVIEW OF SYSTEMS  A 12-point ROS negative except as in HPI    Current Outpatient Medications   Medication Sig Dispense Refill     amLODIPine (NORVASC) 10 MG tablet Take 10 mg by mouth daily        aspirin 81 MG EC tablet Take 81 mg by mouth       atorvastatin (LIPITOR) 10 MG tablet Take 10 mg by mouth daily  3     carvedilol (COREG) 12.5 MG tablet Take 12.5 mg by mouth 2 times daily (with meals)        chlorthalidone (HYGROTON) 25 MG tablet Take 25 mg by mouth       lisinopril (PRINIVIL/ZESTRIL) 20 MG tablet Take 2 tablets BY MOUTH in the morning, and 1 tablet at night.  3       Allergies   Allergen Reactions     No Clinical Screening - See Comments Itching and Rash     Nitroimidazoles       There is no immunization history on file for this patient.    Past Medical History:   Diagnosis Date     Diabetes (H)      Hypertension      Melanoma (H)         PAST SURGICAL HISTORY  1. Bilateral hip replacement, 2011    SOCIAL HISTORY   with 2 children and 4 grandchildren. Retired and now working as a . Quit smoking 20 years ago. Smoked 2 packs per day x 10 years.  History   Smoking Status     Former Smoker     Packs/day: 0.00     Quit date: 2009   Smokeless Tobacco     Never Used    Social History    Substance and Sexual Activity      Alcohol use: Yes        Comment: Weekends/socially     History   Drug Use Unknown       FAMILY HISTORY  Paternal uncle: Throat cancer  Family History   Problem Relation Age of Onset     Glaucoma No family hx of      Macular Degeneration No family hx of        PHYSICAL EXAMINATION  There were no vitals taken for this visit.  No exam as this was telephone visit.    IMAGING  6/5/2020, CT-CAP  IMPRESSION:   1.  No evidence of new metastatic disease.  2.  Lesions within the liver consistent with benign hemangiomas, cysts  and vascular shunts on MRI.  3.  Stable prominent mediastinal lymph nodes.       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 Austin. He is a 74 year old man s/p black brachytherapy for uveal melanoma involving ciliary body. He is doing well. CT-CAP for restaging shows no new evidence of liver or other metastasis.    I will plan to see him back in 3 months with repeat CT-CAP.    Darrell Aranda M.D.   of Medicine  Hematology, Oncology and Transplantation

## 2020-06-15 ENCOUNTER — TELEPHONE (OUTPATIENT)
Dept: ONCOLOGY | Facility: CLINIC | Age: 75
End: 2020-06-15

## 2020-06-15 NOTE — TELEPHONE ENCOUNTER
Called Brent asking to schedule a 3 month follow up in the beginning of September with Dr. De Souza, with updated labs and a CT scan done prior.    Left patient the [facility] Call Back

## 2020-07-21 ENCOUNTER — OFFICE VISIT (OUTPATIENT)
Dept: OPHTHALMOLOGY | Facility: CLINIC | Age: 75
End: 2020-07-21
Attending: OPHTHALMOLOGY
Payer: COMMERCIAL

## 2020-07-21 DIAGNOSIS — H35.711 CSR (CENTRAL SEROUS RETINOPATHY), RIGHT: Primary | ICD-10-CM

## 2020-07-21 DIAGNOSIS — C69.32 MALIGNANT MELANOMA OF CHOROID OF LEFT EYE (H): ICD-10-CM

## 2020-07-21 PROCEDURE — 76513 OPH US DX ANT SGM US UNI/BI: CPT | Mod: ZF | Performed by: OPHTHALMOLOGY

## 2020-07-21 PROCEDURE — 92134 CPTRZ OPH DX IMG PST SGM RTA: CPT | Mod: ZF | Performed by: OPHTHALMOLOGY

## 2020-07-21 PROCEDURE — G0463 HOSPITAL OUTPT CLINIC VISIT: HCPCS | Mod: ZF

## 2020-07-21 PROCEDURE — 92250 FUNDUS PHOTOGRAPHY W/I&R: CPT | Mod: ZF | Performed by: OPHTHALMOLOGY

## 2020-07-21 PROCEDURE — 76512 OPH US DX B-SCAN: CPT | Mod: ZF | Performed by: OPHTHALMOLOGY

## 2020-07-21 ASSESSMENT — EXTERNAL EXAM - LEFT EYE: OS_EXAM: NORMAL

## 2020-07-21 ASSESSMENT — TONOMETRY
IOP_METHOD: TONOPEN
OD_IOP_MMHG: 13
OS_IOP_MMHG: 11

## 2020-07-21 ASSESSMENT — CONF VISUAL FIELD
OS_NORMAL: 1
METHOD: COUNTING FINGERS
OD_NORMAL: 1

## 2020-07-21 ASSESSMENT — VISUAL ACUITY
METHOD: SNELLEN - LINEAR
OS_CC+: -1
OS_CC: 20/25
OD_CC: 20/25
CORRECTION_TYPE: GLASSES

## 2020-07-21 ASSESSMENT — REFRACTION_WEARINGRX
OD_SPHERE: +3.25
OS_ADD: +1.25
OS_SPHERE: +3.00
OD_AXIS: 139
OS_CYLINDER: +0.75
OD_CYLINDER: +0.75
SPECS_TYPE: PAL
OD_ADD: +1.25
OS_AXIS: 127

## 2020-07-21 ASSESSMENT — SLIT LAMP EXAM - LIDS
COMMENTS: NORMAL
COMMENTS: NORMAL

## 2020-07-21 ASSESSMENT — EXTERNAL EXAM - RIGHT EYE: OD_EXAM: NORMAL

## 2020-07-21 ASSESSMENT — CUP TO DISC RATIO
OS_RATIO: 0.25
OD_RATIO: 0.25

## 2020-07-21 NOTE — NURSING NOTE
Chief Complaints and History of Present Illnesses   Patient presents with     Follow Up     Chief Complaint(s) and History of Present Illness(es)     Follow Up     Laterality: both eyes    Associated symptoms: foreign body sensation.  Negative for floaters and flashes    Pain scale: 0/10              Comments     Follow up for CMM left eye.  The patient notes he has minor irritation in the left eye.  Minna Murcia, COA, COA 7:30 AM 07/21/2020

## 2020-07-21 NOTE — LETTER
7/21/2020      RE: Jairo Austin  [address]  [facility] [address]       CC -   CMM left eye post radiation plaque placement and removal 12-16-20 and 12-20-20    INTERVAL HISTORY - VA stable, no diplopia    HPI - Jairo Austin is a  74 year old year-old patient referred by the [facility] for evaluation and treat of a choroidal lesion left eye.  diagnosis 10/2019, been getting annual eye exams no prior notice per patient.  DM II since ~ 2010, good control, + HTn.  No steroid use    PAST OCULAR SURGERY  I-125 with LR reposition 12/16/19 & 12/20/19    RETINAL IMAGING:  OCT  7-21-20  OD -  Macula - serous PEDs superior tr fluid, choroid ?thick  - ?worse  OS -  Macula - retina normal, PHF attached, choroid ?thick   Lesion - (prior) elevated poor image quality    OCT-A 1-21-19  OD - no CNVM    FA 1-21-20  OD - (transit) mulitple hyperF spots, leakage SN macula, no likely CNVM  OS - multiple hyperF spots    ICG 1-21-20  OD (transit) mulitple hyperF spots no CNVM likely  OS - multiple hyperF spots       UBM 3-23-20  OS - far IT lesion in CB size 3.02mm x 10.39T x 12.48L (10/2019) ->  2.59mm x 11.47T x 12.01L  (3/2020) ->  1.82mm x 11.5T x 10.47L (8/2020)    U/S OS 3-23-20  A-scan - (prior)  lesion low reflective  B-scan - peripheral lesion elevated    ASSESSMENT & PLAN    1. CMM OS   - s/p I-125 12/16/19 & 12/20/19   - U/S (requires UBM d/t anterior) shows reduction   - plan next U/S in 4 months (~12/2020)      2.  OD   - doubt CNVM   - no likely CNVM on FA/OCT/OCT-A on 1/21/20   - had STS 20mg 12/20/19, new activity noted 1/21/20 with SRF   - fluid resolved 3/23/20      - observe   - steroid avoidance d/w patient    3. Syneresis OU      4. NS OU   - mild      return to clinic: 4 month, OCT OU with DREW, U/S OS, optos OS    ATTESTATION   Attending Attestation: Complete documentation of historical and exam elements from today's encounter can be found in the full encounter summary report (not reduplicated  in this progress note).  I personally obtained the chief complaint(s) and history of present illness.  I confirmed and edited as necessary the review of systems, past medical/surgical history, family history, social history, and examination findings as documented by others; and I examined the patient myself.  I personally reviewed the relevant tests, images, and reports as documented above.  I formulated and edited as necessary the assessment and plan and discussed the findings and management plan with the patient and family Charisma Rodriguez MD, PhD    Charisma Rodriguez MD, PhD  , Vitreoretinal Surgery  Department of Ophthalmology & Visual Neurosciences  [facility]

## 2020-07-21 NOTE — PROGRESS NOTES
CC -   CMM left eye post radiation plaque placement and removal 12-16-20 and 12-20-20    INTERVAL HISTORY - VA stable, no diplopia    HPI -   Jairo NAQVI Norman is a  74 year old year-old patient referred by the [facility] for evaluation and treat of a choroidal lesion left eye.  diagnosis 10/2019, been getting annual eye exams no prior notice per patient.  DM II since ~ 2010, good control, + HTn.  No steroid use      PAST OCULAR SURGERY  I-125 with LR reposition 12/16/19 & 12/20/19      RETINAL IMAGING:  OCT  7-21-20  OD -  Macula - serous PEDs superior tr fluid, choroid ?thick  - ?worse  OS -  Macula - retina normal, PHF attached, choroid ?thick   Lesion - (prior) elevated poor image quality      OCT-A 1-21-19  OD - no CNVM        FA 1-21-20  OD - (transit) mulitple hyperF spots, leakage SN macula, no likely CNVM  OS - multiple hyperF spots    ICG 1-21-20  OD (transit) mulitple hyperF spots no CNVM likely  OS - multiple hyperF spots       UBM 3-23-20  OS - far IT lesion in CB size 3.02mm x 10.39T x 12.48L (10/2019) ->  2.59mm x 11.47T x 12.01L  (3/2020) ->  1.82mm x 11.5T x 10.47L (8/2020)    U/S OS 3-23-20  A-scan - (prior)  lesion low reflective  B-scan - peripheral lesion elevated            ASSESSMENT & PLAN    1. CMM OS   - s/p I-125 12/16/19 & 12/20/19   - U/S (requires UBM d/t anterior) shows reduction   - plan next U/S in 4 months (~12/2020)      2.  OD   - doubt CNVM   - no likely CNVM on FA/OCT/OCT-A on 1/21/20   - had STS 20mg 12/20/19, new activity noted 1/21/20 with SRF   - fluid resolved 3/23/20      - observe   - steroid avoidance d/w patient          3. Syneresis OU      4. NS OU   - mild          return to clinic: 4 month, OCT OU with DREW, U/S OS, optos OS    ATTESTATION     Attending Attestation:     Complete documentation of historical and exam elements from today's encounter can be found in the full encounter summary report (not reduplicated in this progress note).  I personally obtained the  chief complaint(s) and history of present illness.  I confirmed and edited as necessary the review of systems, past medical/surgical history, family history, social history, and examination findings as documented by others; and I examined the patient myself.  I personally reviewed the relevant tests, images, and reports as documented above.  I formulated and edited as necessary the assessment and plan and discussed the findings and management plan with the patient and family    Charisma Rodriguez MD, PhD  , Vitreoretinal Surgery  Department of Ophthalmology  [facility]

## 2020-10-02 ENCOUNTER — VIRTUAL VISIT (OUTPATIENT)
Dept: ONCOLOGY | Facility: CLINIC | Age: 75
End: 2020-10-02
Attending: INTERNAL MEDICINE
Payer: COMMERCIAL

## 2020-10-02 ENCOUNTER — ANCILLARY PROCEDURE (OUTPATIENT)
Dept: CT IMAGING | Facility: CLINIC | Age: 75
End: 2020-10-02
Attending: INTERNAL MEDICINE
Payer: COMMERCIAL

## 2020-10-02 DIAGNOSIS — C69.30 MALIGNANT MELANOMA OF CHOROID, UNSPECIFIED LATERALITY (H): Primary | ICD-10-CM

## 2020-10-02 DIAGNOSIS — C69.32 MALIGNANT MELANOMA OF CHOROID OF LEFT EYE (H): ICD-10-CM

## 2020-10-02 LAB
ALBUMIN SERPL-MCNC: 4 G/DL (ref 3.4–5)
ALP SERPL-CCNC: 90 U/L (ref 40–150)
ALT SERPL W P-5'-P-CCNC: 34 U/L (ref 0–70)
ANION GAP SERPL CALCULATED.3IONS-SCNC: 6 MMOL/L (ref 3–14)
AST SERPL W P-5'-P-CCNC: 18 U/L (ref 0–45)
BASOPHILS # BLD AUTO: 0 10E9/L (ref 0–0.2)
BASOPHILS NFR BLD AUTO: 0.6 %
BILIRUB SERPL-MCNC: 0.8 MG/DL (ref 0.2–1.3)
BUN SERPL-MCNC: 18 MG/DL (ref 7–30)
CALCIUM SERPL-MCNC: 9.1 MG/DL (ref 8.5–10.1)
CHLORIDE SERPL-SCNC: 98 MMOL/L (ref 94–109)
CO2 SERPL-SCNC: 28 MMOL/L (ref 20–32)
CREAT BLD-MCNC: 0.9 MG/DL (ref 0.66–1.25)
CREAT SERPL-MCNC: 0.82 MG/DL (ref 0.66–1.25)
DIFFERENTIAL METHOD BLD: NORMAL
EOSINOPHIL # BLD AUTO: 0.2 10E9/L (ref 0–0.7)
EOSINOPHIL NFR BLD AUTO: 3 %
ERYTHROCYTE [DISTWIDTH] IN BLOOD BY AUTOMATED COUNT: 11.5 % (ref 10–15)
GFR SERPL CREATININE-BSD FRML MDRD: 83 ML/MIN/{1.73_M2}
GFR SERPL CREATININE-BSD FRML MDRD: 88 ML/MIN/{1.73_M2}
GLUCOSE SERPL-MCNC: 116 MG/DL (ref 70–99)
HCT VFR BLD AUTO: 48 % (ref 40–53)
HGB BLD-MCNC: 15.9 G/DL (ref 13.3–17.7)
IMM GRANULOCYTES # BLD: 0 10E9/L (ref 0–0.4)
IMM GRANULOCYTES NFR BLD: 0 %
LYMPHOCYTES # BLD AUTO: 1.3 10E9/L (ref 0.8–5.3)
LYMPHOCYTES NFR BLD AUTO: 24.3 %
MCH RBC QN AUTO: 32.9 PG (ref 26.5–33)
MCHC RBC AUTO-ENTMCNC: 33.1 G/DL (ref 31.5–36.5)
MCV RBC AUTO: 99 FL (ref 78–100)
MONOCYTES # BLD AUTO: 0.6 10E9/L (ref 0–1.3)
MONOCYTES NFR BLD AUTO: 11 %
NEUTROPHILS # BLD AUTO: 3.3 10E9/L (ref 1.6–8.3)
NEUTROPHILS NFR BLD AUTO: 61.1 %
NRBC # BLD AUTO: 0 10*3/UL
NRBC BLD AUTO-RTO: 0 /100
PLATELET # BLD AUTO: 202 10E9/L (ref 150–450)
POTASSIUM SERPL-SCNC: 3.5 MMOL/L (ref 3.4–5.3)
PROT SERPL-MCNC: 8.1 G/DL (ref 6.8–8.8)
RBC # BLD AUTO: 4.84 10E12/L (ref 4.4–5.9)
SODIUM SERPL-SCNC: 132 MMOL/L (ref 133–144)
WBC # BLD AUTO: 5.4 10E9/L (ref 4–11)

## 2020-10-02 PROCEDURE — 36415 COLL VENOUS BLD VENIPUNCTURE: CPT | Performed by: PATHOLOGY

## 2020-10-02 PROCEDURE — 71260 CT THORAX DX C+: CPT | Mod: 26 | Performed by: RADIOLOGY

## 2020-10-02 PROCEDURE — 999N001193 HC VIDEO/TELEPHONE VISIT; NO CHARGE

## 2020-10-02 PROCEDURE — 85025 COMPLETE CBC W/AUTO DIFF WBC: CPT | Performed by: PATHOLOGY

## 2020-10-02 PROCEDURE — 82565 ASSAY OF CREATININE: CPT | Mod: 26 | Performed by: RADIOLOGY

## 2020-10-02 PROCEDURE — 74177 CT ABD & PELVIS W/CONTRAST: CPT | Mod: 26 | Performed by: RADIOLOGY

## 2020-10-02 PROCEDURE — 99442 PR PHYSICIAN TELEPHONE EVALUATION 11-20 MIN: CPT | Mod: GC | Performed by: INTERNAL MEDICINE

## 2020-10-02 PROCEDURE — 80053 COMPREHEN METABOLIC PANEL: CPT | Performed by: PATHOLOGY

## 2020-10-02 RX ORDER — IOPAMIDOL 755 MG/ML
132 INJECTION, SOLUTION INTRAVASCULAR ONCE
Status: COMPLETED | OUTPATIENT
Start: 2020-10-02 | End: 2020-10-02

## 2020-10-02 RX ADMIN — IOPAMIDOL 132 ML: 755 INJECTION, SOLUTION INTRAVASCULAR at 10:25

## 2020-10-02 NOTE — PROGRESS NOTES
"Jairo Austin is a 74 year old male who is being evaluated via a billable telephone visit.      The patient has been notified of following:     \"This telephone visit will be conducted via a call between you and your physician/provider. We have found that certain health care needs can be provided without the need for a physical exam.  This service lets us provide the care you need with a short phone conversation.  If a prescription is necessary we can send it directly to your pharmacy.  If lab work is needed we can place an order for that and you can then stop by our lab to have the test done at a later time.    Telephone visits are billed at different rates depending on your insurance coverage. During this emergency period, for some insurers they may be billed the same as an in-person visit.  Please reach out to your insurance provider with any questions.    If during the course of the call the physician/provider feels a telephone visit is not appropriate, you will not be charged for this service.\"    Patient has given verbal consent for Telephone visit?  Yes    What phone number would you like to be contacted at? [phone]    How would you like to obtain your AVS? MyChart    I have reviewed and updated the patient's allergies and medication list.    Concerns: No new concerns.   Refills: None needed.     Vitals - Patient Reported  Weight (Patient Reported): 99.8 kg (220 lb)  Height (Patient Reported): 182.9 cm (6')  BMI (Based on Pt Reported Ht/Wt): 29.84  Pain Score: No Pain (0)    Tanisha Escobedo CMA    Phone call duration: 18 minutes   2:30PM - 2:48PM    [facility]  MEDICAL ONCOLOGY PROGRESS NOTE  Oct 2, 2020    CHIEF COMPLAINT: Choroidal melanoma    Melanoma History:  1. 10/28/2019, he was initially seen and the left eye lesion measured 10.39T x 12.48L with a height of 3.02mm.  2. 11/4/2019, CT-scan negative for obvious liver metastasis  3. 11/11/2019, MRI livershowed small enhancing " lesions in segment 8 and 4a, likely small hemangiomas. 4. 12/16/2019, he has I-125 plaque placement and delivery of 8,500 cGy total dose to the left eye lesion.  5. 2/6/2020, CT-CAP shows decreased appearance of the arterially enhancing liver lesion. No obvious liver metastasis or suspicious lesions.  6. 10/2/2020, CT-CAP with no evidence of metastatic disease in the chest, abdomen, or pelvis. The hepatic enhancing foci previously characterized as benign on abdominal MRI 11/11/2019 are redemonstrated and are stable.     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     Patient follows with Dr. Rodriguez of Ophthalmology and notes vision is stable. He reports that he is doing well and has no new symptoms or concerns. 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     He continues treatment for hypertension and has diet-controlled diabetes. He is on multiple antihypertensive agents; current medication list on file reviewed and verified with patient. Prior 20 pack-year smoker, quit 20 years ago. He is doing well and he has no new complaints.     CT-CAP was obtained for restaging today, and this shows no evidence of metastatic disease.    ECOG performance status 0.    Results for orders placed or performed in visit on 10/02/20   CT Chest/Abdomen/Pelvis w Contrast     Status: None    Narrative    EXAMINATION: CT CHEST/ABDOMEN/PELVIS W CONTRAST, 10/2/2020 10:35 AM    TECHNIQUE:  Helical CT images from the thoracic inlet through the  symphysis Pubis were obtained  with contrast. Images acquired in the  arterial and portal venous phases. Contrast dose: Isovue 370 132ml    COMPARISON: CT 6/5/2020, 2/6/2020, MRI 11/11/2019    HISTORY: uveal melanoma; Malignant melanoma of choroid of left eye  (H)    FINDINGS:    Chest: The thyroid is normal. Scattered nonenlarged mediastinal and  right hilar lymph nodes, similar to prior appearing calcified right  hilar lymph nodes compatible with prior granulomatous disease. Heart  size within normal limits without pericardial effusion. No central  pulmonary embolism. The esophagus is unremarkable.    The central tracheobronchial tree is patent. No pleural effusion.  Calcified granuloma in the right lower lobe. No concerning pulmonary  nodule.    Abdomen and pelvis: No new concerning hepatic enhancement. Lobular  enhancing focus hepatic segment 8 previously categorized as a  hemangioma, unchanged. Stable enhancing focus in hepatic segments 6 on  series 6 image 37.    The adrenals and pancreas are unremarkable calcified granulomas in the  spleen. Stable left renal cysts. No abnormally dilated loops of large  or small bowel. No free fluid in the pelvis. Pelvic evaluation is  compromised by beam hardening artifact from bilateral hip  arthroplasties. Colonic diverticula. Mild atherosclerotic  calcification of the abdominal aorta without abnormal dilatation. No  abnormal retroperitoneal, pelvic, or mesenteric lymphadenopathy.    Bones and soft tissues: Lumbar spondylosis. Total bilateral hip  arthroplasties. Right-sided Cséar grade 3 heterotopic ossification.  No aggressive appearing bony lesion. Bilateral glenohumeral joint  degenerative change.      Impression    IMPRESSION: No evidence of metastatic disease in the chest, abdomen,  or pelvis. Stable enhancing hepatic foci previously characterized as  benign on abdominal MRI 11/11/2019          MATHEW (Geovanna ESCOBEDO MD   Creatinine POCT     Status: None   Result Value Ref Range    Creatinine 0.9 0.66 - 1.25 mg/dL    GFR Estimate 83 >60 mL/min/[1.73_m2]    GFR Estimate If Black >90 >60 mL/min/[1.73_m2]   Results for orders placed or performed in visit on 10/02/20   Comprehensive metabolic panel     Status: Abnormal    Result Value Ref Range    Sodium 132 (L) 133 - 144 mmol/L    Potassium 3.5 3.4 - 5.3 mmol/L    Chloride 98 94 - 109 mmol/L    Carbon Dioxide 28 20 - 32 mmol/L    Anion Gap 6 3 - 14 mmol/L    Glucose 116 (H) 70 - 99 mg/dL    Urea Nitrogen 18 7 - 30 mg/dL    Creatinine 0.82 0.66 - 1.25 mg/dL    GFR Estimate 88 >60 mL/min/[1.73_m2]    GFR Estimate If Black >90 >60 mL/min/[1.73_m2]    Calcium 9.1 8.5 - 10.1 mg/dL    Bilirubin Total 0.8 0.2 - 1.3 mg/dL    Albumin 4.0 3.4 - 5.0 g/dL    Protein Total 8.1 6.8 - 8.8 g/dL    Alkaline Phosphatase 90 40 - 150 U/L    ALT 34 0 - 70 U/L    AST 18 0 - 45 U/L   CBC with platelets differential     Status: None   Result Value Ref Range    WBC 5.4 4.0 - 11.0 10e9/L    RBC Count 4.84 4.4 - 5.9 10e12/L    Hemoglobin 15.9 13.3 - 17.7 g/dL    Hematocrit 48.0 40.0 - 53.0 %    MCV 99 78 - 100 fl    MCH 32.9 26.5 - 33.0 pg    MCHC 33.1 31.5 - 36.5 g/dL    RDW 11.5 10.0 - 15.0 %    Platelet Count 202 150 - 450 10e9/L    Diff Method Automated Method     % Neutrophils 61.1 %    % Lymphocytes 24.3 %    % Monocytes 11.0 %    % Eosinophils 3.0 %    % Basophils 0.6 %    % Immature Granulocytes 0.0 %    Nucleated RBCs 0 0 /100    Absolute Neutrophil 3.3 1.6 - 8.3 10e9/L    Absolute Lymphocytes 1.3 0.8 - 5.3 10e9/L    Absolute Monocytes 0.6 0.0 - 1.3 10e9/L    Absolute Eosinophils 0.2 0.0 - 0.7 10e9/L    Absolute Basophils 0.0 0.0 - 0.2 10e9/L    Abs Immature Granulocytes 0.0 0 - 0.4 10e9/L    Absolute Nucleated RBC 0.0      REVIEW OF SYSTEMS  A 12-point ROS negative except as in HPI.     Current Outpatient Medications   Medication Sig Dispense Refill     amLODIPine (NORVASC) 10 MG tablet Take 10 mg by mouth daily        aspirin 81 MG EC tablet Take 81 mg by mouth       atorvastatin (LIPITOR) 10 MG tablet Take 10 mg by mouth daily  3     carvedilol (COREG) 12.5 MG tablet Take 12.5 mg by mouth 2 times daily (with meals)        chlorthalidone (HYGROTON) 25 MG tablet Take 25 mg by mouth        lisinopril (PRINIVIL/ZESTRIL) 20 MG tablet Take 2 tablets BY MOUTH in the morning, and 1 tablet at night.  3     Allergies   Allergen Reactions     Metronidazole      No Clinical Screening - See Comments Itching and Rash     Nitroimidazoles       There is no immunization history on file for this patient.    Past Medical History:   Diagnosis Date     Diabetes (H)      Hypertension      Melanoma (H)      PAST SURGICAL HISTORY  1. Bilateral hip replacement, 2011    SOCIAL HISTORY   with 2 children and 4 grandchildren. Retired and now working as a . Quit smoking 20 years ago. Smoked 2 packs per day x 10 years.  History   Smoking Status     Former Smoker     Packs/day: 0.00     Quit date: 2009   Smokeless Tobacco     Never Used    Social History    Substance and Sexual Activity      Alcohol use: Yes        Comment: Weekends/socially     History   Drug Use Unknown     FAMILY HISTORY  Paternal uncle: Throat cancer  Family History   Problem Relation Age of Onset     Glaucoma No family hx of      Macular Degeneration No family hx of        PHYSICAL EXAMINATION  There were no vitals taken for this visit.  No exam as this was telephone visit.    IMAGING  10/2/2020, CT-CAP  IMPRESSION: No evidence of metastatic disease in the chest, abdomen,  or pelvis. 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 Austin. He is a 74 year old man s/p black brachytherapy for uveal melanoma involving ciliary body. He is doing well and in his usual state of health, with no new concerns or complaints. CT-CAP for restaging shows no new evidence of liver or other metastasis.    Brent continues to follow with Ophthalmology, next appointment is scheduled for 11/6/20.     Plan to see him back in 3 months with  repeat CT-CAP.    Telephone interview and plan as per Dr. Aranda.    Fatou Ferguson MD MPH  Internal medicine resident       ---  I evaluated the patient and reviewed the plan of care with the patient and the Resident. I agree with the assessment and plan documented in the clinical note.    Mr. Austin is doing well with no evidence of melanoma on his recent CT-CAP. We will continue with observation imaging. I will see him back in about 3 months.    Darrell Aranda M.D.   of Medicine  Hematology, Oncology and Transplantation

## 2020-10-02 NOTE — LETTER
"    10/2/2020         RE: Jairo Austin  [address]  [facility] [address]        Dear Colleague,    Thank you for referring your patient, Jairo Austin, to the [facility]. Please see a copy of my visit note below.    Jairo Austin is a 74 year old male who is being evaluated via a billable telephone visit.      The patient has been notified of following:     \"This telephone visit will be conducted via a call between you and your physician/provider. We have found that certain health care needs can be provided without the need for a physical exam.  This service lets us provide the care you need with a short phone conversation.  If a prescription is necessary we can send it directly to your pharmacy.  If lab work is needed we can place an order for that and you can then stop by our lab to have the test done at a later time.    Telephone visits are billed at different rates depending on your insurance coverage. During this emergency period, for some insurers they may be billed the same as an in-person visit.  Please reach out to your insurance provider with any questions.    If during the course of the call the physician/provider feels a telephone visit is not appropriate, you will not be charged for this service.\"    Patient has given verbal consent for Telephone visit?  Yes    What phone number would you like to be contacted at? [phone]    How would you like to obtain your AVS? Raghavendra    I have reviewed and updated the patient's allergies and medication list.    Concerns: No new concerns.   Refills: None needed.     Vitals - Patient Reported  Weight (Patient Reported): 99.8 kg (220 lb)  Height (Patient Reported): 182.9 cm (6')  BMI (Based on Pt Reported Ht/Wt): 29.84  Pain Score: No Pain (0)    Tanisha Escobedo CMA    Phone call duration: 18 minutes   2:30PM - 2:48PM    [facility]  MEDICAL ONCOLOGY PROGRESS NOTE  Oct 2, 2020    CHIEF " COMPLAINT: Choroidal melanoma    Melanoma History:  1. 10/28/2019, he was initially seen and the left eye lesion measured 10.39T x 12.48L with a height of 3.02mm.  2. 11/4/2019, CT-scan negative for obvious liver metastasis  3. 11/11/2019, MRI livershowed small enhancing lesions in segment 8 and 4a, likely small hemangiomas. 4. 12/16/2019, he has I-125 plaque placement and delivery of 8,500 cGy total dose to the left eye lesion.  5. 2/6/2020, CT-CAP shows decreased appearance of the arterially enhancing liver lesion. No obvious liver metastasis or suspicious lesions.  6. 10/2/2020, CT-CAP with no evidence of metastatic disease in the chest, abdomen, or pelvis. The hepatic enhancing foci previously characterized as benign on abdominal MRI 11/11/2019 are redemonstrated and are stable.     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     Patient follows with Dr. Rodriguez of Ophthalmology and notes vision is stable. He reports that he is doing well and has no new symptoms or concerns. 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     He continues treatment for hypertension and has diet-controlled diabetes. He is on multiple antihypertensive agents; current medication list on file reviewed and verified with patient. Prior 20 pack-year smoker, quit 20 years ago. He is doing well and he has no new complaints.     CT-CAP was obtained for restaging today, and this shows no evidence of metastatic disease.    ECOG performance status 0.    Results for orders placed or performed in visit on 10/02/20   CT Chest/Abdomen/Pelvis w Contrast     Status: None    Narrative    EXAMINATION: CT CHEST/ABDOMEN/PELVIS W CONTRAST, 10/2/2020 10:35 AM    TECHNIQUE:  Helical CT images from the thoracic inlet through  the  symphysis Pubis were obtained  with contrast. Images acquired in the  arterial and portal venous phases. Contrast dose: Isovue 370 132ml    COMPARISON: CT 6/5/2020, 2/6/2020, MRI 11/11/2019    HISTORY: uveal melanoma; Malignant melanoma of choroid of left eye (H)    FINDINGS:    Chest: The thyroid is normal. Scattered nonenlarged mediastinal and  right hilar lymph nodes, similar to prior appearing calcified right  hilar lymph nodes compatible with prior granulomatous disease. Heart  size within normal limits without pericardial effusion. No central  pulmonary embolism. The esophagus is unremarkable.    The central tracheobronchial tree is patent. No pleural effusion.  Calcified granuloma in the right lower lobe. No concerning pulmonary  nodule.    Abdomen and pelvis: No new concerning hepatic enhancement. Lobular  enhancing focus hepatic segment 8 previously categorized as a  hemangioma, unchanged. Stable enhancing focus in hepatic segments 6 on  series 6 image 37.    The adrenals and pancreas are unremarkable calcified granulomas in the  spleen. Stable left renal cysts. No abnormally dilated loops of large  or small bowel. No free fluid in the pelvis. Pelvic evaluation is  compromised by beam hardening artifact from bilateral hip  arthroplasties. Colonic diverticula. Mild atherosclerotic  calcification of the abdominal aorta without abnormal dilatation. No  abnormal retroperitoneal, pelvic, or mesenteric lymphadenopathy.    Bones and soft tissues: Lumbar spondylosis. Total bilateral hip  arthroplasties. Right-sided César grade 3 heterotopic ossification.  No aggressive appearing bony lesion. Bilateral glenohumeral joint  degenerative change.      Impression    IMPRESSION: No evidence of metastatic disease in the chest, abdomen,  or pelvis. Stable enhancing hepatic foci previously characterized as  benign on abdominal MRI 11/11/2019          MATHEW ESCOBEDO MD (Joe)   Creatinine POCT     Status: None    Result Value Ref Range    Creatinine 0.9 0.66 - 1.25 mg/dL    GFR Estimate 83 >60 mL/min/[1.73_m2]    GFR Estimate If Black >90 >60 mL/min/[1.73_m2]   Results for orders placed or performed in visit on 10/02/20   Comprehensive metabolic panel     Status: Abnormal   Result Value Ref Range    Sodium 132 (L) 133 - 144 mmol/L    Potassium 3.5 3.4 - 5.3 mmol/L    Chloride 98 94 - 109 mmol/L    Carbon Dioxide 28 20 - 32 mmol/L    Anion Gap 6 3 - 14 mmol/L    Glucose 116 (H) 70 - 99 mg/dL    Urea Nitrogen 18 7 - 30 mg/dL    Creatinine 0.82 0.66 - 1.25 mg/dL    GFR Estimate 88 >60 mL/min/[1.73_m2]    GFR Estimate If Black >90 >60 mL/min/[1.73_m2]    Calcium 9.1 8.5 - 10.1 mg/dL    Bilirubin Total 0.8 0.2 - 1.3 mg/dL    Albumin 4.0 3.4 - 5.0 g/dL    Protein Total 8.1 6.8 - 8.8 g/dL    Alkaline Phosphatase 90 40 - 150 U/L    ALT 34 0 - 70 U/L    AST 18 0 - 45 U/L   CBC with platelets differential     Status: None   Result Value Ref Range    WBC 5.4 4.0 - 11.0 10e9/L    RBC Count 4.84 4.4 - 5.9 10e12/L    Hemoglobin 15.9 13.3 - 17.7 g/dL    Hematocrit 48.0 40.0 - 53.0 %    MCV 99 78 - 100 fl    MCH 32.9 26.5 - 33.0 pg    MCHC 33.1 31.5 - 36.5 g/dL    RDW 11.5 10.0 - 15.0 %    Platelet Count 202 150 - 450 10e9/L    Diff Method Automated Method     % Neutrophils 61.1 %    % Lymphocytes 24.3 %    % Monocytes 11.0 %    % Eosinophils 3.0 %    % Basophils 0.6 %    % Immature Granulocytes 0.0 %    Nucleated RBCs 0 0 /100    Absolute Neutrophil 3.3 1.6 - 8.3 10e9/L    Absolute Lymphocytes 1.3 0.8 - 5.3 10e9/L    Absolute Monocytes 0.6 0.0 - 1.3 10e9/L    Absolute Eosinophils 0.2 0.0 - 0.7 10e9/L    Absolute Basophils 0.0 0.0 - 0.2 10e9/L    Abs Immature Granulocytes 0.0 0 - 0.4 10e9/L    Absolute Nucleated RBC 0.0      REVIEW OF SYSTEMS  A 12-point ROS negative except as in HPI.     Current Outpatient Medications   Medication Sig Dispense Refill     amLODIPine (NORVASC) 10 MG tablet Take 10 mg by mouth daily        aspirin 81 MG EC  tablet Take 81 mg by mouth       atorvastatin (LIPITOR) 10 MG tablet Take 10 mg by mouth daily  3     carvedilol (COREG) 12.5 MG tablet Take 12.5 mg by mouth 2 times daily (with meals)        chlorthalidone (HYGROTON) 25 MG tablet Take 25 mg by mouth       lisinopril (PRINIVIL/ZESTRIL) 20 MG tablet Take 2 tablets BY MOUTH in the morning, and 1 tablet at night.  3     Allergies   Allergen Reactions     Metronidazole      No Clinical Screening - See Comments Itching and Rash     Nitroimidazoles       There is no immunization history on file for this patient.    Past Medical History:   Diagnosis Date     Diabetes (H)      Hypertension      Melanoma (H)      PAST SURGICAL HISTORY  1. Bilateral hip replacement, 2011    SOCIAL HISTORY   with 2 children and 4 grandchildren. Retired and now working as a . Quit smoking 20 years ago. Smoked 2 packs per day x 10 years.  History   Smoking Status     Former Smoker     Packs/day: 0.00     Quit date: 2009   Smokeless Tobacco     Never Used    Social History    Substance and Sexual Activity      Alcohol use: Yes        Comment: Weekends/socially     History   Drug Use Unknown     FAMILY HISTORY  Paternal uncle: Throat cancer  Family History   Problem Relation Age of Onset     Glaucoma No family hx of      Macular Degeneration No family hx of        PHYSICAL EXAMINATION  There were no vitals taken for this visit.  No exam as this was telephone visit.    IMAGING  10/2/2020, CT-CAP  IMPRESSION: No evidence of metastatic disease in the chest, abdomen,  or pelvis. 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 Austin. He is a 74 year old man s/p black brachytherapy for uveal melanoma involving ciliary body. He is doing well and in his usual  state of health, with no new concerns or complaints. CT-CAP for restaging shows no new evidence of liver or other metastasis.    Brent continues to follow with Ophthalmology, next appointment is scheduled for 11/6/20.     Plan to see him back in 3 months with repeat CT-CAP.    Telephone interview and plan as per Dr. Aranda.    Fatou Ferguson MD MPH  Internal medicine resident       ---  I evaluated the patient and reviewed the plan of care with the patient and the Resident. I agree with the assessment and plan documented in the clinical note.    Mr. Austin is doing well with no evidence of melanoma on his recent CT-CAP. We will continue with observation imaging. I will see him back in about 3 months.    Darrell Aranda M.D.   of Medicine  Hematology, Oncology and Transplantation

## 2020-10-30 DIAGNOSIS — C69.32 MALIGNANT MELANOMA OF CHOROID OF LEFT EYE (H): Primary | ICD-10-CM

## 2020-11-06 ENCOUNTER — OFFICE VISIT (OUTPATIENT)
Dept: OPHTHALMOLOGY | Facility: CLINIC | Age: 75
End: 2020-11-06
Attending: OPHTHALMOLOGY
Payer: COMMERCIAL

## 2020-11-06 DIAGNOSIS — C69.32 MALIGNANT MELANOMA OF CHOROID OF LEFT EYE (H): ICD-10-CM

## 2020-11-06 PROCEDURE — G0463 HOSPITAL OUTPT CLINIC VISIT: HCPCS

## 2020-11-06 PROCEDURE — 99213 OFFICE O/P EST LOW 20 MIN: CPT | Performed by: OPHTHALMOLOGY

## 2020-11-06 PROCEDURE — 76513 OPH US DX ANT SGM US UNI/BI: CPT | Performed by: OPHTHALMOLOGY

## 2020-11-06 PROCEDURE — 92134 CPTRZ OPH DX IMG PST SGM RTA: CPT | Performed by: OPHTHALMOLOGY

## 2020-11-06 PROCEDURE — 99207 FUNDUS PHOTOS OS (LEFT EYE): CPT | Performed by: OPHTHALMOLOGY

## 2020-11-06 PROCEDURE — 92250 FUNDUS PHOTOGRAPHY W/I&R: CPT | Performed by: OPHTHALMOLOGY

## 2020-11-06 PROCEDURE — 76510 OPH US DX B-SCAN&QUAN A-SCAN: CPT | Performed by: OPHTHALMOLOGY

## 2020-11-06 ASSESSMENT — TONOMETRY
IOP_METHOD: TONOPEN
OD_IOP_MMHG: 13
OS_IOP_MMHG: 12

## 2020-11-06 ASSESSMENT — CONF VISUAL FIELD
OD_NORMAL: 1
OS_NORMAL: 1
METHOD: COUNTING FINGERS

## 2020-11-06 ASSESSMENT — EXTERNAL EXAM - RIGHT EYE: OD_EXAM: NORMAL

## 2020-11-06 ASSESSMENT — VISUAL ACUITY
OS_CC+: -2
OS_CC: 20/25
OD_CC: 20/25
METHOD: SNELLEN - LINEAR

## 2020-11-06 ASSESSMENT — SLIT LAMP EXAM - LIDS
COMMENTS: NORMAL
COMMENTS: NORMAL

## 2020-11-06 ASSESSMENT — CUP TO DISC RATIO
OS_RATIO: 0.25
OD_RATIO: 0.25

## 2020-11-06 ASSESSMENT — EXTERNAL EXAM - LEFT EYE: OS_EXAM: NORMAL

## 2020-11-06 NOTE — NURSING NOTE
Chief Complaints and History of Present Illnesses   Patient presents with     Malignant Melanoma (Choroid) Follow Up     Chief Complaint(s) and History of Present Illness(es)     Malignant Melanoma (Choroid) Follow Up     Laterality: left eye              Comments     Brent is here to follow up on a Malignant melanoma of choroid of left eye (H). He feels there is no change in vision LE or how vision is.     Tremayne Horn COT 11:57 AM November 6, 2020

## 2020-11-06 NOTE — PROGRESS NOTES
CC -   CMM left s/p I-125    INTERVAL HISTORY - VA stable, no diplopia, no changes    HPI -   Jairo DRISS Austin is a  74 year old year-old patient referred by the [facility] for evaluation and treat of a choroidal lesion left eye.  diagnosis 10/2019, been getting annual eye exams no prior notice per patient.  DM II since ~ 2010, good control, + HTn.  No steroid use      PAST OCULAR SURGERY  I-125 with LR reposition 12/16/19 & 12/20/19      RETINAL IMAGING:  OCT  7-21-20  OD -  Macula - serous PEDs superior tr fluid, choroid  OS -  Macula - retina normal, PHF attached, choroid ?thick   Lesion - (prior) elevated poor image quality      OCT-A 1-21-19  OD - no CNVM        FA 1-21-20  OD - (transit) mulitple hyperF spots, leakage SN macula, no likely CNVM  OS - multiple hyperF spots    ICG 1-21-20  OD (transit) mulitple hyperF spots no CNVM likely  OS - multiple hyperF spots       UBM 3-23-20  OS - far IT lesion in CB size 3.02mm x 10.39T x 12.48L (10/2019) ->  2.59mm x 11.47T x 12.01L  (3/2020) ->  1.82mm x 11.5T x 10.47L (8/2020) -> 1.91 x 13.31T(12.44T) x 10.37L (11/2020)    U/S OS 3-23-20  A-scan - (prior)  lesion low reflective  B-scan - peripheral lesion elevated            ASSESSMENT & PLAN    1. CMM OS   - s/p I-125 12/16/19 & 12/20/19   - U/S (requires UBM d/t anterior) shows reduction   - most likely stable   - mild change to linear basal  dimension recheck 4 months        2.  OD   - doubt CNVM   - no likely CNVM on FA/OCT/OCT-A on 1/21/20   - had STS 20mg 12/20/19, new activity noted 1/21/20 with SRF   - fluid resolved 3/23/20      - observe   - steroid avoidance d/w patient          3. Syneresis OU      4. NS OU   - mild          return to clinic: 4 month, OCT OU with DREW, U/S OS, optos OS    ATTESTATION     Attending Attestation:     Complete documentation of historical and exam elements from today's encounter can be found in the full encounter summary report (not reduplicated in this progress note).  I  personally obtained the chief complaint(s) and history of present illness.  I confirmed and edited as necessary the review of systems, past medical/surgical history, family history, social history, and examination findings as documented by others; and I examined the patient myself.  I personally reviewed the relevant tests, images, and reports as documented above.  I formulated and edited as necessary the assessment and plan and discussed the findings and management plan with the patient and family    Charisma Rodriguez MD, PhD  , Vitreoretinal Surgery  Department of Ophthalmology  [facility]

## 2021-01-29 ENCOUNTER — VIRTUAL VISIT (OUTPATIENT)
Dept: ONCOLOGY | Facility: CLINIC | Age: 76
End: 2021-01-29
Attending: INTERNAL MEDICINE
Payer: COMMERCIAL

## 2021-01-29 ENCOUNTER — ANCILLARY PROCEDURE (OUTPATIENT)
Dept: MRI IMAGING | Facility: CLINIC | Age: 76
End: 2021-01-29
Attending: INTERNAL MEDICINE
Payer: COMMERCIAL

## 2021-01-29 ENCOUNTER — ANCILLARY PROCEDURE (OUTPATIENT)
Dept: CT IMAGING | Facility: CLINIC | Age: 76
End: 2021-01-29
Attending: INTERNAL MEDICINE
Payer: COMMERCIAL

## 2021-01-29 DIAGNOSIS — C69.42 MALIGNANT MELANOMA OF UVEA OF LEFT EYE (H): Primary | ICD-10-CM

## 2021-01-29 DIAGNOSIS — C69.32 MALIGNANT MELANOMA OF CHOROID OF LEFT EYE (H): ICD-10-CM

## 2021-01-29 DIAGNOSIS — C69.30 MALIGNANT MELANOMA OF CHOROID, UNSPECIFIED LATERALITY (H): ICD-10-CM

## 2021-01-29 PROBLEM — R80.9 MICROALBUMINURIA: Status: ACTIVE | Noted: 2018-10-25

## 2021-01-29 PROBLEM — I77.810 ASCENDING AORTA DILATION (H): Status: ACTIVE | Noted: 2021-01-21

## 2021-01-29 PROBLEM — H90.3 SENSORINEURAL HEARING LOSS, BILATERAL: Status: ACTIVE | Noted: 2021-01-29

## 2021-01-29 PROBLEM — Z87.891 PERSONAL HISTORY OF TOBACCO USE, PRESENTING HAZARDS TO HEALTH: Status: ACTIVE | Noted: 2021-01-29

## 2021-01-29 PROBLEM — E78.5 HYPERLIPIDEMIA: Status: ACTIVE | Noted: 2021-01-29

## 2021-01-29 PROBLEM — E66.9 OBESITY: Status: ACTIVE | Noted: 2021-01-29

## 2021-01-29 PROBLEM — N52.9 IMPOTENCE OF ORGANIC ORIGIN: Status: ACTIVE | Noted: 2021-01-29

## 2021-01-29 LAB
ALBUMIN SERPL-MCNC: 4.5 G/DL (ref 3.4–5)
ALP SERPL-CCNC: 90 U/L (ref 40–150)
ALT SERPL W P-5'-P-CCNC: 37 U/L (ref 0–70)
ANION GAP SERPL CALCULATED.3IONS-SCNC: 6 MMOL/L (ref 3–14)
AST SERPL W P-5'-P-CCNC: 19 U/L (ref 0–45)
BASOPHILS # BLD AUTO: 0 10E9/L (ref 0–0.2)
BASOPHILS NFR BLD AUTO: 0.5 %
BILIRUB SERPL-MCNC: 0.7 MG/DL (ref 0.2–1.3)
BUN SERPL-MCNC: 20 MG/DL (ref 7–30)
CALCIUM SERPL-MCNC: 9.7 MG/DL (ref 8.5–10.1)
CHLORIDE SERPL-SCNC: 103 MMOL/L (ref 94–109)
CO2 SERPL-SCNC: 30 MMOL/L (ref 20–32)
CREAT SERPL-MCNC: 0.84 MG/DL (ref 0.66–1.25)
DIFFERENTIAL METHOD BLD: ABNORMAL
EOSINOPHIL # BLD AUTO: 0.2 10E9/L (ref 0–0.7)
EOSINOPHIL NFR BLD AUTO: 3.5 %
ERYTHROCYTE [DISTWIDTH] IN BLOOD BY AUTOMATED COUNT: 11.6 % (ref 10–15)
GFR SERPL CREATININE-BSD FRML MDRD: 87 ML/MIN/{1.73_M2}
GLUCOSE SERPL-MCNC: 120 MG/DL (ref 70–99)
HCT VFR BLD AUTO: 47.3 % (ref 40–53)
HGB BLD-MCNC: 16.1 G/DL (ref 13.3–17.7)
IMM GRANULOCYTES # BLD: 0 10E9/L (ref 0–0.4)
IMM GRANULOCYTES NFR BLD: 0.3 %
LYMPHOCYTES # BLD AUTO: 1.2 10E9/L (ref 0.8–5.3)
LYMPHOCYTES NFR BLD AUTO: 21 %
MCH RBC QN AUTO: 33.1 PG (ref 26.5–33)
MCHC RBC AUTO-ENTMCNC: 34 G/DL (ref 31.5–36.5)
MCV RBC AUTO: 97 FL (ref 78–100)
MONOCYTES # BLD AUTO: 0.6 10E9/L (ref 0–1.3)
MONOCYTES NFR BLD AUTO: 10.6 %
NEUTROPHILS # BLD AUTO: 3.7 10E9/L (ref 1.6–8.3)
NEUTROPHILS NFR BLD AUTO: 64.1 %
NRBC # BLD AUTO: 0 10*3/UL
NRBC BLD AUTO-RTO: 0 /100
PLATELET # BLD AUTO: 217 10E9/L (ref 150–450)
POTASSIUM SERPL-SCNC: 3.6 MMOL/L (ref 3.4–5.3)
PROT SERPL-MCNC: 8.6 G/DL (ref 6.8–8.8)
RBC # BLD AUTO: 4.86 10E12/L (ref 4.4–5.9)
SODIUM SERPL-SCNC: 139 MMOL/L (ref 133–144)
WBC # BLD AUTO: 5.8 10E9/L (ref 4–11)

## 2021-01-29 PROCEDURE — A9581 GADOXETATE DISODIUM INJ: HCPCS | Performed by: RADIOLOGY

## 2021-01-29 PROCEDURE — 36415 COLL VENOUS BLD VENIPUNCTURE: CPT | Performed by: PATHOLOGY

## 2021-01-29 PROCEDURE — 999N001193 HC VIDEO/TELEPHONE VISIT; NO CHARGE

## 2021-01-29 PROCEDURE — 71250 CT THORAX DX C-: CPT | Mod: GC | Performed by: RADIOLOGY

## 2021-01-29 PROCEDURE — 80053 COMPREHEN METABOLIC PANEL: CPT | Performed by: PATHOLOGY

## 2021-01-29 PROCEDURE — 74183 MRI ABD W/O CNTR FLWD CNTR: CPT | Mod: GC | Performed by: RADIOLOGY

## 2021-01-29 PROCEDURE — 99443 PR PHYSICIAN TELEPHONE EVALUATION 21-30 MIN: CPT | Performed by: INTERNAL MEDICINE

## 2021-01-29 PROCEDURE — 85025 COMPLETE CBC W/AUTO DIFF WBC: CPT | Performed by: PATHOLOGY

## 2021-01-29 NOTE — PROGRESS NOTES
"Brent is a 75 year old who is being evaluated via a billable telephone visit.      What phone number would you like to be contacted at? [phone]  How would you like to obtain your AVS? Kaelaharshanell     Vitals - Patient Reported  Weight (Patient Reported): 99.8 kg (220 lb)  Height (Patient Reported): 181.6 cm (5' 11.5\")  BMI (Based on Pt Reported Ht/Wt): 30.26  Pain Score: No Pain (0)    Josie Strickland CMA January 29, 2021  1:56 PM     Phone call duration: 21 minutes      MEDICAL ONCOLOGY PROGRESS NOTE  Melanoma Clinic  Jan 29, 2021    CHIEF COMPLAINT: Choroidal melanoma, left eye    Melanoma History:  1. 10/28/2019, he was initially seen and the left eye lesion measured 10.39T x 12.48L with a height of 3.02mm.  2. 11/4/2019, CT-scan negative for obvious liver metastasis  3. 11/11/2019, MRI liver showed small enhancing lesions in segment 8 and 4a, likely small hemangiomas.   4. 12/16/2019, he has I-125 plaque brachytherapy placement and delivery of 8,500 cGy total dose to the left eye lesion.  5. 2/6/2020, CT-CAP shows decreased appearance of the arterially enhancing liver lesion. No obvious liver metastasis or suspicious lesions.  6. 10/2/2020, CT-CAP with no evidence of metastatic disease in the chest, abdomen, or pelvis. The hepatic enhancing foci previously characterized as benign on abdominal MRI 11/11/2019 are redemonstrated and are stable.     HISTORY OF PRESENT ILLNESS  Jairo Austin is a 75 year old male with choroidal melanoma of the left eye. He presents via telephone visit today.     Patient follows with Dr. Rodriguez and notes his vision is stable. He reports that he is doing well. Restaging MRI-liver obtained, and shows small areas of restricted diffusion not readily appreciated on prior imaging. However, these areas all appear stable on arterial phase imaging and subtraction images consistent with cysts and hemangiomas. CT-chest does not show evidence of metastasis.    ECOG performance " status 0.      REVIEW OF SYSTEMS  A 12-point ROS negative except as in HPI.     Current Outpatient Medications   Medication Sig Dispense Refill     amLODIPine (NORVASC) 10 MG tablet Take 10 mg by mouth daily        aspirin 81 MG EC tablet Take 81 mg by mouth       atorvastatin (LIPITOR) 10 MG tablet Take 10 mg by mouth daily  3     carvedilol (COREG) 12.5 MG tablet Take 12.5 mg by mouth 2 times daily (with meals)        chlorthalidone (HYGROTON) 25 MG tablet Take 25 mg by mouth       lisinopril (PRINIVIL/ZESTRIL) 20 MG tablet Take 2 tablets BY MOUTH in the morning, and 1 tablet at night.  3     Allergies   Allergen Reactions     Metronidazole      No Clinical Screening - See Comments Itching and Rash     Nitroimidazoles       There is no immunization history on file for this patient.    Past Medical History:   Diagnosis Date     Asbestos exposure 1/19/2007    chest x ray Jan 2007, November 2009. 11/30/11, 4/24/2013, 12/31/2014     Diabetes (H)      Hearing loss 1/19/2007    hearing aid on left. 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[employer])     Hypertension      Melanoma (H)      Osteoarthritis of hip 11/30/2011    Right hip replacement 12/5/11 Dr Jj Reyes, will have left hip replacement 1/2/13 Dr Reyes     Sleep apnea 1/19/2007    wears CPAP device at night     PAST SURGICAL HISTORY  1. Bilateral hip replacement, 2011    SOCIAL HISTORY   with 2 children and 4 grandchildren. Retired and now working as a . Quit smoking 20 years ago. Smoked 2 packs per day x 10 years.  History   Smoking Status     Former Smoker     Packs/day: 0.00     Quit date: 2009   Smokeless Tobacco     Never Used    Social History    Substance and Sexual Activity      Alcohol use: Yes        Comment: Weekends/socially     History   Drug Use Unknown     FAMILY HISTORY  Paternal uncle: Throat cancer  Family History   Problem Relation Age of Onset     Glaucoma No family hx of      Macular Degeneration No family hx  of        PHYSICAL EXAMINATION  There were no vitals taken for this visit.    No exam as this was telephone visit.      LABORATORY STUDIES  Orders Only on 01/29/2021   Component Date Value Ref Range Status     Sodium 01/29/2021 139  133 - 144 mmol/L Final     Potassium 01/29/2021 3.6  3.4 - 5.3 mmol/L Final     Chloride 01/29/2021 103  94 - 109 mmol/L Final     Carbon Dioxide 01/29/2021 30  20 - 32 mmol/L Final     Anion Gap 01/29/2021 6  3 - 14 mmol/L Final     Glucose 01/29/2021 120* 70 - 99 mg/dL Final     Urea Nitrogen 01/29/2021 20  7 - 30 mg/dL Final     Creatinine 01/29/2021 0.84  0.66 - 1.25 mg/dL Final     GFR Estimate 01/29/2021 87  >60 mL/min/[1.73_m2] Final     GFR Estimate If Black 01/29/2021 >90  >60 mL/min/[1.73_m2] Final     Calcium 01/29/2021 9.7  8.5 - 10.1 mg/dL Final     Bilirubin Total 01/29/2021 0.7  0.2 - 1.3 mg/dL Final     Albumin 01/29/2021 4.5  3.4 - 5.0 g/dL Final     Protein Total 01/29/2021 8.6  6.8 - 8.8 g/dL Final     Alkaline Phosphatase 01/29/2021 90  40 - 150 U/L Final     ALT 01/29/2021 37  0 - 70 U/L Final     AST 01/29/2021 19  0 - 45 U/L Final     WBC 01/29/2021 5.8  4.0 - 11.0 10e9/L Final     RBC Count 01/29/2021 4.86  4.4 - 5.9 10e12/L Final     Hemoglobin 01/29/2021 16.1  13.3 - 17.7 g/dL Final     Hematocrit 01/29/2021 47.3  40.0 - 53.0 % Final     MCV 01/29/2021 97  78 - 100 fl Final     MCH 01/29/2021 33.1* 26.5 - 33.0 pg Final     MCHC 01/29/2021 34.0  31.5 - 36.5 g/dL Final     RDW 01/29/2021 11.6  10.0 - 15.0 % Final     Platelet Count 01/29/2021 217  150 - 450 10e9/L Final     Diff Method 01/29/2021 Automated Method   Final     % Neutrophils 01/29/2021 64.1  % Final     % Lymphocytes 01/29/2021 21.0  % Final     % Monocytes 01/29/2021 10.6  % Final     % Eosinophils 01/29/2021 3.5  % Final     % Basophils 01/29/2021 0.5  % Final     % Immature Granulocytes 01/29/2021 0.3  % Final     Nucleated RBCs 01/29/2021 0  0 /100 Final     Absolute Neutrophil 01/29/2021 3.7   1.6 - 8.3 10e9/L Final     Absolute Lymphocytes 01/29/2021 1.2  0.8 - 5.3 10e9/L Final     Absolute Monocytes 01/29/2021 0.6  0.0 - 1.3 10e9/L Final     Absolute Eosinophils 01/29/2021 0.2  0.0 - 0.7 10e9/L Final     Absolute Basophils 01/29/2021 0.0  0.0 - 0.2 10e9/L Final     Abs Immature Granulocytes 01/29/2021 0.0  0 - 0.4 10e9/L Final     Absolute Nucleated RBC 01/29/2021 0.0   Final       IMAGING STUDIES  CT-Chest, 1/29/2021  IMPRESSION:   1. No evidence of metastatic disease in the chest.  2. Sequela of granulomatous disease.    MRI-Abdomen, 1/29/2021  FINDINGS:     Liver: The liver surface is smooth. Several tiny scattered hepatic  cysts. Decreased in size and prominence of the vascular shunt about  the segment 8 (series 13 image 24). Unchanged hemangioma in the  segment 7 (series 13 image 32). No new lesions which have suspicious  features for metastasis. No hepatic steatosis or iron deposition.     Gallbladder: No gallstone or wall thickening. No pericholecystic  inflammatory changes. No biliary ductal dilatation.     Spleen: Normal, not enlarged. Small splenule.     Kidneys: Benign left cortical renal cysts. No kidney stone, or masses.  No pelvocaliectasis.       Adrenal glands: Normal.     Pancreas:  Normal appearance and signal characteristics of the  pancreatic parenchyma. No focal masses. Pancreatic duct normal in  caliber. No side branch ductal dilatation.      Bowel: Unremarkable, with no evidence of bowel dilatation or abnormal  wall enhancement. Colonic diverticulosis without evidence of  diverticulitis.      Lymph nodes: No abdominal lymphadenopathy by size criteria.     Blood vessels: Major blood vessels are patent with no evidence of clot  or obstruction.         Lung bases: No abnormal T2 hyperintensity in the lung bases.     Bones and soft tissues: No evidence of acute bony abnormality or  abnormal soft tissue enhancement.        Mesentery and abdominal wall: Unremarkable.     Ascites: No  free fluid is detected.                                                                        IMPRESSION:  In this patient with a history of melanoma;  No evidence of metastatic disease in the upper abdomen.           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 Austin. He is a 75 year old man s/p plaque brachytherapy for uveal melanoma involving the ciliary body. He is doing well and he is in his usual state of health, with no new concerns or complaints. CT-Chest and MRI-liver show no new evidence of metastases.    -Continue follow-up in Ophthalmology.  -Plan to see him back in 3 months with CT-CAP, and yearly MR-abdomen.    Darrell Aranda M.D.   of Medicine  Hematology, Oncology and Transplantation

## 2021-01-29 NOTE — DISCHARGE INSTRUCTIONS
MRI Contrast Discharge Instructions    The IV contrast you received today will pass out of your body in your  urine. This will happen in the next 24 hours. You will not feel this process.  Your urine will not change color.    Drink at least 4 extra glasses of water or juice today (unless your doctor  has restricted your fluids). This reduces the stress on your kidneys.  You may take your regular medicines.    If you are on dialysis: It is best to have dialysis today.    If you have a reaction: Most reactions happen right away. If you have  any new symptoms after leaving the hospital (such as hives or swelling),  call your hospital at the correct number below. Or call your family doctor.  If you have breathing distress or wheezing, call 911.    Special instructions: ***    I have read and understand the above information.    Signature:______________________________________ Date:___________    Staff:__________________________________________ Date:___________     Time:__________    [place] [facility] Departments:    ___[place]: [phone]  ___[facility]: [phone]  ___[place]: [phone] ___[facility]: [phone]  ___[facility]: [phone]  ___[facility]: [phone]  ___[phone]  ___[facility]: [phone]  ___[phone]

## 2021-01-29 NOTE — LETTER
"    1/29/2021         RE: Jairo Austin  [address]  [place] [address]        Dear Colleague,    Thank you for referring your patient, Jairo Austin, to the [facility]. Please see a copy of my visit note below.    Brent is a 75 year old who is being evaluated via a billable telephone visit.      What phone number would you like to be contacted at? [phone]  How would you like to obtain your AVS? MyChart     Vitals - Patient Reported  Weight (Patient Reported): 99.8 kg (220 lb)  Height (Patient Reported): 181.6 cm (5' 11.5\")  BMI (Based on Pt Reported Ht/Wt): 30.26  Pain Score: No Pain (0)    Josie Strickland CMA January 29, 2021  1:56 PM     Phone call duration: 21 minutes      MEDICAL ONCOLOGY PROGRESS NOTE  Melanoma Clinic  Jan 29, 2021    CHIEF COMPLAINT: Choroidal melanoma, left eye    Melanoma History:  1. 10/28/2019, he was initially seen and the left eye lesion measured 10.39T x 12.48L with a height of 3.02mm.  2. 11/4/2019, CT-scan negative for obvious liver metastasis  3. 11/11/2019, MRI liver showed small enhancing lesions in segment 8 and 4a, likely small hemangiomas.   4. 12/16/2019, he has I-125 plaque brachytherapy placement and delivery of 8,500 cGy total dose to the left eye lesion.  5. 2/6/2020, CT-CAP shows decreased appearance of the arterially enhancing liver lesion. No obvious liver metastasis or suspicious lesions.  6. 10/2/2020, CT-CAP with no evidence of metastatic disease in the chest, abdomen, or pelvis. The hepatic enhancing foci previously characterized as benign on abdominal MRI 11/11/2019 are redemonstrated and are stable.     HISTORY OF PRESENT ILLNESS  Jairo Austin is a 75 year old male with choroidal melanoma of the left eye. He presents via telephone visit today.     Patient follows with Dr. Rodriguez and notes his vision is stable. He reports that he is doing well. Restaging MRI-liver obtained, and shows small " areas of restricted diffusion not readily appreciated on prior imaging. However, these areas all appear stable on arterial phase imaging and subtraction images consistent with cysts and hemangiomas. CT-chest does not show evidence of metastasis.    ECOG performance status 0.      REVIEW OF SYSTEMS  A 12-point ROS negative except as in HPI.     Current Outpatient Medications   Medication Sig Dispense Refill     amLODIPine (NORVASC) 10 MG tablet Take 10 mg by mouth daily        aspirin 81 MG EC tablet Take 81 mg by mouth       atorvastatin (LIPITOR) 10 MG tablet Take 10 mg by mouth daily  3     carvedilol (COREG) 12.5 MG tablet Take 12.5 mg by mouth 2 times daily (with meals)        chlorthalidone (HYGROTON) 25 MG tablet Take 25 mg by mouth       lisinopril (PRINIVIL/ZESTRIL) 20 MG tablet Take 2 tablets BY MOUTH in the morning, and 1 tablet at night.  3     Allergies   Allergen Reactions     Metronidazole      No Clinical Screening - See Comments Itching and Rash     Nitroimidazoles       There is no immunization history on file for this patient.    Past Medical History:   Diagnosis Date     Asbestos exposure 1/19/2007    chest x ray Jan 2007, November 2009. 11/30/11, 4/24/2013, 12/31/2014     Diabetes (H)      Hearing loss 1/19/2007    hearing aid on left. 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[employer])     Hypertension      Melanoma (H)      Osteoarthritis of hip 11/30/2011    Right hip replacement 12/5/11 Dr Jj Reyes, will have left hip replacement 1/2/13 Dr Reyes     Sleep apnea 1/19/2007    wears CPAP device at night     PAST SURGICAL HISTORY  1. Bilateral hip replacement, 2011    SOCIAL HISTORY   with 2 children and 4 grandchildren. Retired and now working as a . Quit smoking 20 years ago. Smoked 2 packs per day x 10 years.  History   Smoking Status     Former Smoker     Packs/day: 0.00     Quit date: 2009   Smokeless Tobacco     Never Used    Social History    Substance and  Sexual Activity      Alcohol use: Yes        Comment: Weekends/socially     History   Drug Use Unknown     FAMILY HISTORY  Paternal uncle: Throat cancer  Family History   Problem Relation Age of Onset     Glaucoma No family hx of      Macular Degeneration No family hx of        PHYSICAL EXAMINATION  There were no vitals taken for this visit.    No exam as this was telephone visit.      LABORATORY STUDIES  Orders Only on 01/29/2021   Component Date Value Ref Range Status     Sodium 01/29/2021 139  133 - 144 mmol/L Final     Potassium 01/29/2021 3.6  3.4 - 5.3 mmol/L Final     Chloride 01/29/2021 103  94 - 109 mmol/L Final     Carbon Dioxide 01/29/2021 30  20 - 32 mmol/L Final     Anion Gap 01/29/2021 6  3 - 14 mmol/L Final     Glucose 01/29/2021 120* 70 - 99 mg/dL Final     Urea Nitrogen 01/29/2021 20  7 - 30 mg/dL Final     Creatinine 01/29/2021 0.84  0.66 - 1.25 mg/dL Final     GFR Estimate 01/29/2021 87  >60 mL/min/[1.73_m2] Final     GFR Estimate If Black 01/29/2021 >90  >60 mL/min/[1.73_m2] Final     Calcium 01/29/2021 9.7  8.5 - 10.1 mg/dL Final     Bilirubin Total 01/29/2021 0.7  0.2 - 1.3 mg/dL Final     Albumin 01/29/2021 4.5  3.4 - 5.0 g/dL Final     Protein Total 01/29/2021 8.6  6.8 - 8.8 g/dL Final     Alkaline Phosphatase 01/29/2021 90  40 - 150 U/L Final     ALT 01/29/2021 37  0 - 70 U/L Final     AST 01/29/2021 19  0 - 45 U/L Final     WBC 01/29/2021 5.8  4.0 - 11.0 10e9/L Final     RBC Count 01/29/2021 4.86  4.4 - 5.9 10e12/L Final     Hemoglobin 01/29/2021 16.1  13.3 - 17.7 g/dL Final     Hematocrit 01/29/2021 47.3  40.0 - 53.0 % Final     MCV 01/29/2021 97  78 - 100 fl Final     MCH 01/29/2021 33.1* 26.5 - 33.0 pg Final     MCHC 01/29/2021 34.0  31.5 - 36.5 g/dL Final     RDW 01/29/2021 11.6  10.0 - 15.0 % Final     Platelet Count 01/29/2021 217  150 - 450 10e9/L Final     Diff Method 01/29/2021 Automated Method   Final     % Neutrophils 01/29/2021 64.1  % Final     % Lymphocytes 01/29/2021 21.0  %  Final     % Monocytes 01/29/2021 10.6  % Final     % Eosinophils 01/29/2021 3.5  % Final     % Basophils 01/29/2021 0.5  % Final     % Immature Granulocytes 01/29/2021 0.3  % Final     Nucleated RBCs 01/29/2021 0  0 /100 Final     Absolute Neutrophil 01/29/2021 3.7  1.6 - 8.3 10e9/L Final     Absolute Lymphocytes 01/29/2021 1.2  0.8 - 5.3 10e9/L Final     Absolute Monocytes 01/29/2021 0.6  0.0 - 1.3 10e9/L Final     Absolute Eosinophils 01/29/2021 0.2  0.0 - 0.7 10e9/L Final     Absolute Basophils 01/29/2021 0.0  0.0 - 0.2 10e9/L Final     Abs Immature Granulocytes 01/29/2021 0.0  0 - 0.4 10e9/L Final     Absolute Nucleated RBC 01/29/2021 0.0   Final       IMAGING STUDIES  CT-Chest, 1/29/2021  IMPRESSION:   1. No evidence of metastatic disease in the chest.  2. Sequela of granulomatous disease.    MRI-Abdomen, 1/29/2021  FINDINGS:     Liver: The liver surface is smooth. Several tiny scattered hepatic  cysts. Decreased in size and prominence of the vascular shunt about  the segment 8 (series 13 image 24). Unchanged hemangioma in the  segment 7 (series 13 image 32). No new lesions which have suspicious  features for metastasis. No hepatic steatosis or iron deposition.     Gallbladder: No gallstone or wall thickening. No pericholecystic  inflammatory changes. No biliary ductal dilatation.     Spleen: Normal, not enlarged. Small splenule.     Kidneys: Benign left cortical renal cysts. No kidney stone, or masses.  No pelvocaliectasis.       Adrenal glands: Normal.     Pancreas:  Normal appearance and signal characteristics of the  pancreatic parenchyma. No focal masses. Pancreatic duct normal in  caliber. No side branch ductal dilatation.      Bowel: Unremarkable, with no evidence of bowel dilatation or abnormal  wall enhancement. Colonic diverticulosis without evidence of  diverticulitis.      Lymph nodes: No abdominal lymphadenopathy by size criteria.     Blood vessels: Major blood vessels are patent with no evidence  of clot  or obstruction.         Lung bases: No abnormal T2 hyperintensity in the lung bases.     Bones and soft tissues: No evidence of acute bony abnormality or  abnormal soft tissue enhancement.        Mesentery and abdominal wall: Unremarkable.     Ascites: No free fluid is detected.                                                                        IMPRESSION:  In this patient with a history of melanoma;  No evidence of metastatic disease in the upper abdomen.           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 Austin. He is a 75 year old man s/p plaque brachytherapy for uveal melanoma involving the ciliary body. He is doing well and he is in his usual state of health, with no new concerns or complaints. CT-Chest and MRI-liver show no new evidence of metastases.    -Continue follow-up in Ophthalmology.  -Plan to see him back in 3 months with CT-CAP, and yearly MR-abdomen.    Darrell Aranda M.D.   of Medicine  Hematology, Oncology and Transplantation            Again, thank you for allowing me to participate in the care of your patient.        Sincerely,        Darrell De Souza MD

## 2021-01-29 NOTE — LETTER
"1/29/2021      RE: Jairo Austin  [address]  [facility] [place] [facility] [place] [address]       Brent is a 75 year old who is being evaluated via a billable telephone visit.      What phone number would you like to be contacted at? [phone]  How would you like to obtain your AVS? MyChart     Vitals - Patient Reported  Weight (Patient Reported): 99.8 kg (220 lb)  Height (Patient Reported): 181.6 cm (5' 11.5\")  BMI (Based on Pt Reported Ht/Wt): 30.26  Pain Score: No Pain (0)    Josie Strickland CMA January 29, 2021  1:56 PM     Phone call duration: 21 minutes      MEDICAL ONCOLOGY PROGRESS NOTE  Melanoma Clinic  Jan 29, 2021    CHIEF COMPLAINT: Choroidal melanoma, left eye    Melanoma History:  1. 10/28/2019, he was initially seen and the left eye lesion measured 10.39T x 12.48L with a height of 3.02mm.  2. 11/4/2019, CT-scan negative for obvious liver metastasis  3. 11/11/2019, MRI liver showed small enhancing lesions in segment 8 and 4a, likely small hemangiomas.   4. 12/16/2019, he has I-125 plaque brachytherapy placement and delivery of 8,500 cGy total dose to the left eye lesion.  5. 2/6/2020, CT-CAP shows decreased appearance of the arterially enhancing liver lesion. No obvious liver metastasis or suspicious lesions.  6. 10/2/2020, CT-CAP with no evidence of metastatic disease in the chest, abdomen, or pelvis. The hepatic enhancing foci previously characterized as benign on abdominal MRI 11/11/2019 are redemonstrated and are stable.     HISTORY OF PRESENT ILLNESS  Jairo Austin is a 75 year old male with choroidal melanoma of the left eye. He presents via telephone visit today.     Patient follows with Dr. Rodriguez and notes his vision is stable. He reports that he is doing well. Restaging MRI-liver obtained, and shows small areas of restricted diffusion not readily appreciated on prior imaging. However, these areas all appear stable on arterial phase imaging and subtraction images consistent with " cysts and hemangiomas. CT-chest does not show evidence of metastasis.    ECOG performance status 0.      REVIEW OF SYSTEMS  A 12-point ROS negative except as in HPI.     Current Outpatient Medications   Medication Sig Dispense Refill     amLODIPine (NORVASC) 10 MG tablet Take 10 mg by mouth daily        aspirin 81 MG EC tablet Take 81 mg by mouth       atorvastatin (LIPITOR) 10 MG tablet Take 10 mg by mouth daily  3     carvedilol (COREG) 12.5 MG tablet Take 12.5 mg by mouth 2 times daily (with meals)        chlorthalidone (HYGROTON) 25 MG tablet Take 25 mg by mouth       lisinopril (PRINIVIL/ZESTRIL) 20 MG tablet Take 2 tablets BY MOUTH in the morning, and 1 tablet at night.  3     Allergies   Allergen Reactions     Metronidazole      No Clinical Screening - See Comments Itching and Rash     Nitroimidazoles       There is no immunization history on file for this patient.    Past Medical History:   Diagnosis Date     Asbestos exposure 1/19/2007    chest x ray Jan 2007, November 2009. 11/30/11, 4/24/2013, 12/31/2014     Diabetes (H)      Hearing loss 1/19/2007    hearing aid on left. 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[employer])     Hypertension      Melanoma (H)      Osteoarthritis of hip 11/30/2011    Right hip replacement 12/5/11 Dr Jj Reyes, will have left hip replacement 1/2/13 Dr Reyes     Sleep apnea 1/19/2007    wears CPAP device at night     PAST SURGICAL HISTORY  1. Bilateral hip replacement, 2011    SOCIAL HISTORY   with 2 children and 4 grandchildren. Retired and now working as a . Quit smoking 20 years ago. Smoked 2 packs per day x 10 years.  History   Smoking Status     Former Smoker     Packs/day: 0.00     Quit date: 2009   Smokeless Tobacco     Never Used    Social History    Substance and Sexual Activity      Alcohol use: Yes        Comment: Weekends/socially     History   Drug Use Unknown     FAMILY HISTORY  Paternal uncle: Throat cancer  Family History   Problem  Relation Age of Onset     Glaucoma No family hx of      Macular Degeneration No family hx of        PHYSICAL EXAMINATION  There were no vitals taken for this visit.    No exam as this was telephone visit.      LABORATORY STUDIES  Orders Only on 01/29/2021   Component Date Value Ref Range Status     Sodium 01/29/2021 139  133 - 144 mmol/L Final     Potassium 01/29/2021 3.6  3.4 - 5.3 mmol/L Final     Chloride 01/29/2021 103  94 - 109 mmol/L Final     Carbon Dioxide 01/29/2021 30  20 - 32 mmol/L Final     Anion Gap 01/29/2021 6  3 - 14 mmol/L Final     Glucose 01/29/2021 120* 70 - 99 mg/dL Final     Urea Nitrogen 01/29/2021 20  7 - 30 mg/dL Final     Creatinine 01/29/2021 0.84  0.66 - 1.25 mg/dL Final     GFR Estimate 01/29/2021 87  >60 mL/min/[1.73_m2] Final     GFR Estimate If Black 01/29/2021 >90  >60 mL/min/[1.73_m2] Final     Calcium 01/29/2021 9.7  8.5 - 10.1 mg/dL Final     Bilirubin Total 01/29/2021 0.7  0.2 - 1.3 mg/dL Final     Albumin 01/29/2021 4.5  3.4 - 5.0 g/dL Final     Protein Total 01/29/2021 8.6  6.8 - 8.8 g/dL Final     Alkaline Phosphatase 01/29/2021 90  40 - 150 U/L Final     ALT 01/29/2021 37  0 - 70 U/L Final     AST 01/29/2021 19  0 - 45 U/L Final     WBC 01/29/2021 5.8  4.0 - 11.0 10e9/L Final     RBC Count 01/29/2021 4.86  4.4 - 5.9 10e12/L Final     Hemoglobin 01/29/2021 16.1  13.3 - 17.7 g/dL Final     Hematocrit 01/29/2021 47.3  40.0 - 53.0 % Final     MCV 01/29/2021 97  78 - 100 fl Final     MCH 01/29/2021 33.1* 26.5 - 33.0 pg Final     MCHC 01/29/2021 34.0  31.5 - 36.5 g/dL Final     RDW 01/29/2021 11.6  10.0 - 15.0 % Final     Platelet Count 01/29/2021 217  150 - 450 10e9/L Final     Diff Method 01/29/2021 Automated Method   Final     % Neutrophils 01/29/2021 64.1  % Final     % Lymphocytes 01/29/2021 21.0  % Final     % Monocytes 01/29/2021 10.6  % Final     % Eosinophils 01/29/2021 3.5  % Final     % Basophils 01/29/2021 0.5  % Final     % Immature Granulocytes 01/29/2021 0.3  %  Final     Nucleated RBCs 01/29/2021 0  0 /100 Final     Absolute Neutrophil 01/29/2021 3.7  1.6 - 8.3 10e9/L Final     Absolute Lymphocytes 01/29/2021 1.2  0.8 - 5.3 10e9/L Final     Absolute Monocytes 01/29/2021 0.6  0.0 - 1.3 10e9/L Final     Absolute Eosinophils 01/29/2021 0.2  0.0 - 0.7 10e9/L Final     Absolute Basophils 01/29/2021 0.0  0.0 - 0.2 10e9/L Final     Abs Immature Granulocytes 01/29/2021 0.0  0 - 0.4 10e9/L Final     Absolute Nucleated RBC 01/29/2021 0.0   Final       IMAGING STUDIES  CT-Chest, 1/29/2021  IMPRESSION:   1. No evidence of metastatic disease in the chest.  2. Sequela of granulomatous disease.    MRI-Abdomen, 1/29/2021  FINDINGS:     Liver: The liver surface is smooth. Several tiny scattered hepatic  cysts. Decreased in size and prominence of the vascular shunt about  the segment 8 (series 13 image 24). Unchanged hemangioma in the  segment 7 (series 13 image 32). No new lesions which have suspicious  features for metastasis. No hepatic steatosis or iron deposition.     Gallbladder: No gallstone or wall thickening. No pericholecystic  inflammatory changes. No biliary ductal dilatation.     Spleen: Normal, not enlarged. Small splenule.     Kidneys: Benign left cortical renal cysts. No kidney stone, or masses.  No pelvocaliectasis.       Adrenal glands: Normal.     Pancreas:  Normal appearance and signal characteristics of the  pancreatic parenchyma. No focal masses. Pancreatic duct normal in  caliber. No side branch ductal dilatation.      Bowel: Unremarkable, with no evidence of bowel dilatation or abnormal  wall enhancement. Colonic diverticulosis without evidence of  diverticulitis.      Lymph nodes: No abdominal lymphadenopathy by size criteria.     Blood vessels: Major blood vessels are patent with no evidence of clot  or obstruction.         Lung bases: No abnormal T2 hyperintensity in the lung bases.     Bones and soft tissues: No evidence of acute bony abnormality or  abnormal  soft tissue enhancement.        Mesentery and abdominal wall: Unremarkable.     Ascites: No free fluid is detected.                                                                        IMPRESSION:  In this patient with a history of melanoma;  No evidence of metastatic disease in the upper abdomen.           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 Austin. He is a 75 year old man s/p plaque brachytherapy for uveal melanoma involving the ciliary body. He is doing well and he is in his usual state of health, with no new concerns or complaints. CT-Chest and MRI-liver show no new evidence of metastases.    -Continue follow-up in Ophthalmology.  -Plan to see him back in 3 months with CT-CAP, and yearly MR-abdomen.    Darrell Aranda M.D.   of Medicine  Hematology, Oncology and Transplantation

## 2021-02-06 ENCOUNTER — HEALTH MAINTENANCE LETTER (OUTPATIENT)
Age: 76
End: 2021-02-06

## 2021-03-04 DIAGNOSIS — C69.32 MALIGNANT MELANOMA OF CHOROID OF LEFT EYE (H): Primary | ICD-10-CM

## 2021-03-12 ENCOUNTER — OFFICE VISIT (OUTPATIENT)
Dept: OPHTHALMOLOGY | Facility: CLINIC | Age: 76
End: 2021-03-12
Attending: OPHTHALMOLOGY
Payer: COMMERCIAL

## 2021-03-12 DIAGNOSIS — C69.32 MALIGNANT MELANOMA OF CHOROID OF LEFT EYE (H): ICD-10-CM

## 2021-03-12 DIAGNOSIS — H35.711 CSR (CENTRAL SEROUS RETINOPATHY), RIGHT: ICD-10-CM

## 2021-03-12 PROCEDURE — 99213 OFFICE O/P EST LOW 20 MIN: CPT | Performed by: OPHTHALMOLOGY

## 2021-03-12 PROCEDURE — G0463 HOSPITAL OUTPT CLINIC VISIT: HCPCS

## 2021-03-12 PROCEDURE — 99207 FUNDUS PHOTOS OU (BOTH EYES): CPT | Performed by: OPHTHALMOLOGY

## 2021-03-12 PROCEDURE — 92250 FUNDUS PHOTOGRAPHY W/I&R: CPT | Performed by: OPHTHALMOLOGY

## 2021-03-12 PROCEDURE — 76513 OPH US DX ANT SGM US UNI/BI: CPT | Performed by: OPHTHALMOLOGY

## 2021-03-12 PROCEDURE — 92134 CPTRZ OPH DX IMG PST SGM RTA: CPT | Performed by: OPHTHALMOLOGY

## 2021-03-12 ASSESSMENT — SLIT LAMP EXAM - LIDS
COMMENTS: NORMAL
COMMENTS: NORMAL

## 2021-03-12 ASSESSMENT — TONOMETRY
OD_IOP_MMHG: 14
OS_IOP_MMHG: 11
IOP_METHOD: TONOPEN

## 2021-03-12 ASSESSMENT — CUP TO DISC RATIO
OD_RATIO: 0.25
OS_RATIO: 0.25

## 2021-03-12 ASSESSMENT — REFRACTION_WEARINGRX
OD_SPHERE: +3.25
OS_CYLINDER: +0.75
OD_AXIS: 139
OD_ADD: +1.25
OS_SPHERE: +3.00
OS_ADD: +1.25
OD_CYLINDER: +0.75
SPECS_TYPE: PAL
OS_AXIS: 127

## 2021-03-12 ASSESSMENT — CONF VISUAL FIELD
METHOD: COUNTING FINGERS
OD_NORMAL: 1
OS_NORMAL: 1

## 2021-03-12 ASSESSMENT — VISUAL ACUITY
OD_CC: 20/25
CORRECTION_TYPE: GLASSES
METHOD: SNELLEN - LINEAR
OD_CC+: -2
OS_CC: 20/25

## 2021-03-12 ASSESSMENT — EXTERNAL EXAM - RIGHT EYE: OD_EXAM: NORMAL

## 2021-03-12 ASSESSMENT — EXTERNAL EXAM - LEFT EYE: OS_EXAM: NORMAL

## 2021-03-12 NOTE — PROGRESS NOTES
CC -   CMM left s/p I-125    INTERVAL HISTORY - VA stable, mild FBS    PMH -   Jairo Austin is a  74 year old year-old patient referred by the [facility] for evaluation and treat of a choroidal lesion left eye.  diagnosis 10/2019, been getting annual eye exams no prior notice per patient.  DM II since ~ 2010, good control, + HTn.  No steroid use      PAST OCULAR SURGERY  I-125 with LR reposition 12/16/19 & 12/20/19      RETINAL IMAGING:  OCT 3-12-21  OD -  Macula - serous PEDs superior tr fluid, choroid  OS -  Macula - retina normal, PHF attached, choroid ?thick   Lesion - (prior) elevated poor image quality      OCT-A 1-21-19  OD - no CNVM        FA 1-21-20  OD - (transit) mulitple hyperF spots, leakage SN macula, no likely CNVM  OS - multiple hyperF spots    ICG 1-21-20  OD (transit) mulitple hyperF spots no CNVM likely  OS - multiple hyperF spots       UBM 3-23-20  OS - far IT lesion in CB size 3.02mm x 10.39T x 12.48L (10/2019) ->  2.59mm x 11.47T x 12.01L  (3/2020) ->  1.82mm x 11.5T x 10.47L (8/2020) -> 1.91 x 13.31T(12.44T) x 10.37L (11/2020) -> 1.88 x 13.31T x 10.38L (3/2021)    U/S OS 3-23-20  A-scan - (prior)  lesion low reflective  B-scan - peripheral lesion elevated            ASSESSMENT & PLAN    #  CMM OS   - s/p I-125 12/16/19 & 12/20/19   - U/S (requires UBM d/t anterior) stable now   -  recheck 6 months        #.  OD   - doubt CNVM   - no likely CNVM on FA/OCT/OCT-A on 1/21/20   - had STS 20mg 12/20/19, new activity noted 1/21/20 with SRF   - fluid resolved 3/23/20      - observe   - steroid avoidance d/w patient          # Syneresis OU      #. NS OU   - mild          return to clinic: 6  month, OCT OU with DREW, U/S OS, optos OS    ATTESTATION     Attending Attestation:     Complete documentation of historical and exam elements from today's encounter can be found in the full encounter summary report (not reduplicated in this progress note).  I personally obtained the chief complaint(s) and  history of present illness.  I confirmed and edited as necessary the review of systems, past medical/surgical history, family history, social history, and examination findings as documented by others; and I examined the patient myself.  I personally reviewed the relevant tests, images, and reports as documented above.  I formulated and edited as necessary the assessment and plan and discussed the findings and management plan with the patient and family    Charisma Rodriguez MD, PhD  , Vitreoretinal Surgery  Department of Ophthalmology  [facility]

## 2021-03-12 NOTE — NURSING NOTE
Chief Complaints and History of Present Illnesses   Patient presents with     Follow Up     Malignant melanoma of choroid of left eye     Chief Complaint(s) and History of Present Illness(es)     Follow Up     Laterality: left eye    Course: stable    Associated symptoms: foreign body sensation.  Negative for dryness, eye pain, redness and tearing    Treatments tried: no treatments    Pain scale: 0/10    Comments: Malignant melanoma of choroid of left eye              Comments     He states that his vision has seemed stable in both eyes, since his last eye exam.  For the past 2 months he has had a foreign body senstation in his left eye.  Rubbing the eye sometimes resolve the irritant, he has not tried using any drops.    Enedina Gutierrez, COT 11:00 AM  March 12, 2021

## 2021-04-03 ENCOUNTER — HEALTH MAINTENANCE LETTER (OUTPATIENT)
Age: 76
End: 2021-04-03

## 2021-04-30 ENCOUNTER — ANCILLARY PROCEDURE (OUTPATIENT)
Dept: CT IMAGING | Facility: CLINIC | Age: 76
End: 2021-04-30
Attending: INTERNAL MEDICINE
Payer: COMMERCIAL

## 2021-04-30 ENCOUNTER — VIRTUAL VISIT (OUTPATIENT)
Dept: ONCOLOGY | Facility: CLINIC | Age: 76
End: 2021-04-30
Attending: INTERNAL MEDICINE
Payer: COMMERCIAL

## 2021-04-30 DIAGNOSIS — C69.32 MALIGNANT MELANOMA OF CHOROID OF LEFT EYE (H): ICD-10-CM

## 2021-04-30 DIAGNOSIS — C69.42 MALIGNANT MELANOMA OF UVEA OF LEFT EYE (H): ICD-10-CM

## 2021-04-30 DIAGNOSIS — C69.42 MALIGNANT MELANOMA OF UVEA OF LEFT EYE (H): Primary | ICD-10-CM

## 2021-04-30 LAB
ALBUMIN SERPL-MCNC: 4 G/DL (ref 3.4–5)
ALP SERPL-CCNC: 106 U/L (ref 40–150)
ALT SERPL W P-5'-P-CCNC: 34 U/L (ref 0–70)
ANION GAP SERPL CALCULATED.3IONS-SCNC: 6 MMOL/L (ref 3–14)
AST SERPL W P-5'-P-CCNC: 16 U/L (ref 0–45)
BASOPHILS # BLD AUTO: 0 10E9/L (ref 0–0.2)
BASOPHILS NFR BLD AUTO: 0.6 %
BILIRUB SERPL-MCNC: 0.6 MG/DL (ref 0.2–1.3)
BUN SERPL-MCNC: 19 MG/DL (ref 7–30)
CALCIUM SERPL-MCNC: 9.3 MG/DL (ref 8.5–10.1)
CHLORIDE SERPL-SCNC: 108 MMOL/L (ref 94–109)
CO2 SERPL-SCNC: 28 MMOL/L (ref 20–32)
CREAT BLD-MCNC: 0.9 MG/DL (ref 0.66–1.25)
CREAT SERPL-MCNC: 0.86 MG/DL (ref 0.66–1.25)
DIFFERENTIAL METHOD BLD: NORMAL
EOSINOPHIL # BLD AUTO: 0.3 10E9/L (ref 0–0.7)
EOSINOPHIL NFR BLD AUTO: 4.2 %
ERYTHROCYTE [DISTWIDTH] IN BLOOD BY AUTOMATED COUNT: 11.9 % (ref 10–15)
GFR SERPL CREATININE-BSD FRML MDRD: 83 ML/MIN/{1.73_M2}
GFR SERPL CREATININE-BSD FRML MDRD: 86 ML/MIN/{1.73_M2}
GLUCOSE SERPL-MCNC: 108 MG/DL (ref 70–99)
HCT VFR BLD AUTO: 43.2 % (ref 40–53)
HGB BLD-MCNC: 14.5 G/DL (ref 13.3–17.7)
IMM GRANULOCYTES # BLD: 0 10E9/L (ref 0–0.4)
IMM GRANULOCYTES NFR BLD: 0.2 %
LYMPHOCYTES # BLD AUTO: 1.4 10E9/L (ref 0.8–5.3)
LYMPHOCYTES NFR BLD AUTO: 23 %
MCH RBC QN AUTO: 32.9 PG (ref 26.5–33)
MCHC RBC AUTO-ENTMCNC: 33.6 G/DL (ref 31.5–36.5)
MCV RBC AUTO: 98 FL (ref 78–100)
MONOCYTES # BLD AUTO: 0.8 10E9/L (ref 0–1.3)
MONOCYTES NFR BLD AUTO: 12.9 %
NEUTROPHILS # BLD AUTO: 3.7 10E9/L (ref 1.6–8.3)
NEUTROPHILS NFR BLD AUTO: 59.1 %
NRBC # BLD AUTO: 0 10*3/UL
NRBC BLD AUTO-RTO: 0 /100
PLATELET # BLD AUTO: 234 10E9/L (ref 150–450)
POTASSIUM SERPL-SCNC: 3.8 MMOL/L (ref 3.4–5.3)
PROT SERPL-MCNC: 7.8 G/DL (ref 6.8–8.8)
RBC # BLD AUTO: 4.41 10E12/L (ref 4.4–5.9)
SODIUM SERPL-SCNC: 142 MMOL/L (ref 133–144)
WBC # BLD AUTO: 6.2 10E9/L (ref 4–11)

## 2021-04-30 PROCEDURE — 74177 CT ABD & PELVIS W/CONTRAST: CPT | Performed by: RADIOLOGY

## 2021-04-30 PROCEDURE — 999N001193 HC VIDEO/TELEPHONE VISIT; NO CHARGE

## 2021-04-30 PROCEDURE — 82565 ASSAY OF CREATININE: CPT | Performed by: PATHOLOGY

## 2021-04-30 PROCEDURE — 99214 OFFICE O/P EST MOD 30 MIN: CPT | Mod: TEL | Performed by: INTERNAL MEDICINE

## 2021-04-30 PROCEDURE — 36415 COLL VENOUS BLD VENIPUNCTURE: CPT | Performed by: PATHOLOGY

## 2021-04-30 PROCEDURE — 80053 COMPREHEN METABOLIC PANEL: CPT | Performed by: PATHOLOGY

## 2021-04-30 PROCEDURE — 71260 CT THORAX DX C+: CPT | Performed by: RADIOLOGY

## 2021-04-30 PROCEDURE — 85025 COMPLETE CBC W/AUTO DIFF WBC: CPT | Performed by: PATHOLOGY

## 2021-04-30 RX ORDER — IOPAMIDOL 755 MG/ML
132 INJECTION, SOLUTION INTRAVASCULAR ONCE
Status: COMPLETED | OUTPATIENT
Start: 2021-04-30 | End: 2021-04-30

## 2021-04-30 RX ADMIN — IOPAMIDOL 132 ML: 755 INJECTION, SOLUTION INTRAVASCULAR at 10:46

## 2021-04-30 NOTE — LETTER
"    4/30/2021         RE: Jairo Austin  [address]  [facility] [address]        Dear Colleague,    Thank you for referring your patient, Jairo Austin, to the [facility]. Please see a copy of my visit note below.    Brent is a 75 year old who is being evaluated via a billable telephone visit.      What phone number would you like to be contacted at? [phone]  How would you like to obtain your AVS? MyChart     Vitals - Patient Reported  Weight (Patient Reported): 93 kg (205 lb)  Height (Patient Reported): 181.6 cm (5' 11.5\")  BMI (Based on Pt Reported Ht/Wt): 28.19  Pain Score: No Pain (0)    Nanette LUTZ    Phone call duration: 21 minutes      MEDICAL ONCOLOGY PROGRESS NOTE  Melanoma Clinic  Apr 30, 2021    CHIEF COMPLAINT: Choroidal melanoma, left eye    Melanoma History:  1. 10/28/2019, he was initially seen and the left eye lesion measured 10.39T x 12.48L with a height of 3.02mm.  2. 11/4/2019, CT-scan negative for obvious liver metastasis  3. 11/11/2019, MRI liver showed small enhancing lesions in segment 8 and 4a, likely small hemangiomas.   4. 12/16/2019, he has I-125 plaque brachytherapy placement and delivery of 8,500 cGy total dose to the left eye lesion.  5. 2/6/2020, CT-CAP shows decreased appearance of the arterially enhancing liver lesion. No obvious liver metastasis or suspicious lesions.  6. 10/2/2020, CT-CAP with no evidence of metastatic disease in the chest, abdomen, or pelvis. The hepatic enhancing foci previously characterized as benign on abdominal MRI 11/11/2019 are redemonstrated and are stable.     HISTORY OF PRESENT ILLNESS  Jairo Austin is a 75 year old male with choroidal melanoma of the left eye. He presents via telephone visit today.     Patient follows with Dr. Rodriguez and notes his vision is stable. He reports that he is doing well. Restaging CT-CAP is negative for metastatic disease. Continue sot show stable " liver hemangiomas and benign vascular shunt.    ECOG performance status 0.      REVIEW OF SYSTEMS  A 12-point ROS negative except as in HPI.     Current Outpatient Medications   Medication Sig Dispense Refill     amLODIPine (NORVASC) 10 MG tablet Take 10 mg by mouth daily        aspirin 81 MG EC tablet Take 81 mg by mouth       atorvastatin (LIPITOR) 10 MG tablet Take 10 mg by mouth daily  3     carvedilol (COREG) 12.5 MG tablet Take 12.5 mg by mouth 2 times daily (with meals)        chlorthalidone (HYGROTON) 25 MG tablet Take 25 mg by mouth       lisinopril (PRINIVIL/ZESTRIL) 20 MG tablet Take 2 tablets BY MOUTH in the morning, and 1 tablet at night.  3       Past Medical History:   Diagnosis Date     Asbestos exposure 1/19/2007    chest x ray Jan 2007, November 2009. 11/30/11, 4/24/2013, 12/31/2014     Diabetes (H)      Hearing loss 1/19/2007    hearing aid on left. 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[employer])     Hypertension      Melanoma (H)      Osteoarthritis of hip 11/30/2011    Right hip replacement 12/5/11 Dr Jj Reyes, will have left hip replacement 1/2/13 Dr eRyes     Sleep apnea 1/19/2007    wears CPAP device at night     PAST SURGICAL HISTORY  1. Bilateral hip replacement, 2011    SOCIAL HISTORY   with 2 children and 4 grandchildren. Retired and now working as a . Quit smoking 20 years ago. Smoked 2 packs per day x 10 years.  History   Smoking Status     Former Smoker     Packs/day: 0.00     Quit date: 2009   Smokeless Tobacco     Never Used    Social History    Substance and Sexual Activity      Alcohol use: Yes        Comment: Weekends/socially     History   Drug Use Unknown     FAMILY HISTORY  Paternal uncle: Throat cancer  Family History   Problem Relation Age of Onset     Glaucoma No family hx of      Macular Degeneration No family hx of        PHYSICAL EXAMINATION  There were no vitals taken for this visit.    No exam as this was telephone visit.      LABORATORY  STUDIES  Ancillary Procedure on 04/30/2021   Component Date Value Ref Range Status     Creatinine 04/30/2021 0.9  0.66 - 1.25 mg/dL Final     GFR Estimate 04/30/2021 83  >60 mL/min/[1.73_m2] Final     GFR Estimate If Black 04/30/2021 >90  >60 mL/min/[1.73_m2] Final   Orders Only on 04/30/2021   Component Date Value Ref Range Status     Sodium 04/30/2021 142  133 - 144 mmol/L Final     Potassium 04/30/2021 3.8  3.4 - 5.3 mmol/L Final     Chloride 04/30/2021 108  94 - 109 mmol/L Final     Carbon Dioxide 04/30/2021 28  20 - 32 mmol/L Final     Anion Gap 04/30/2021 6  3 - 14 mmol/L Final     Glucose 04/30/2021 108* 70 - 99 mg/dL Final     Urea Nitrogen 04/30/2021 19  7 - 30 mg/dL Final     Creatinine 04/30/2021 0.86  0.66 - 1.25 mg/dL Final     GFR Estimate 04/30/2021 86  >60 mL/min/[1.73_m2] Final     GFR Estimate If Black 04/30/2021 >90  >60 mL/min/[1.73_m2] Final     Calcium 04/30/2021 9.3  8.5 - 10.1 mg/dL Final     Bilirubin Total 04/30/2021 0.6  0.2 - 1.3 mg/dL Final     Albumin 04/30/2021 4.0  3.4 - 5.0 g/dL Final     Protein Total 04/30/2021 7.8  6.8 - 8.8 g/dL Final     Alkaline Phosphatase 04/30/2021 106  40 - 150 U/L Final     ALT 04/30/2021 34  0 - 70 U/L Final     AST 04/30/2021 16  0 - 45 U/L Final     WBC 04/30/2021 6.2  4.0 - 11.0 10e9/L Final     RBC Count 04/30/2021 4.41  4.4 - 5.9 10e12/L Final     Hemoglobin 04/30/2021 14.5  13.3 - 17.7 g/dL Final     Hematocrit 04/30/2021 43.2  40.0 - 53.0 % Final     MCV 04/30/2021 98  78 - 100 fl Final     MCH 04/30/2021 32.9  26.5 - 33.0 pg Final     MCHC 04/30/2021 33.6  31.5 - 36.5 g/dL Final     RDW 04/30/2021 11.9  10.0 - 15.0 % Final     Platelet Count 04/30/2021 234  150 - 450 10e9/L Final     Diff Method 04/30/2021 Automated Method   Final     % Neutrophils 04/30/2021 59.1  % Final     % Lymphocytes 04/30/2021 23.0  % Final     % Monocytes 04/30/2021 12.9  % Final     % Eosinophils 04/30/2021 4.2  % Final     % Basophils 04/30/2021 0.6  % Final     %  Immature Granulocytes 04/30/2021 0.2  % Final     Nucleated RBCs 04/30/2021 0  0 /100 Final     Absolute Neutrophil 04/30/2021 3.7  1.6 - 8.3 10e9/L Final     Absolute Lymphocytes 04/30/2021 1.4  0.8 - 5.3 10e9/L Final     Absolute Monocytes 04/30/2021 0.8  0.0 - 1.3 10e9/L Final     Absolute Eosinophils 04/30/2021 0.3  0.0 - 0.7 10e9/L Final     Absolute Basophils 04/30/2021 0.0  0.0 - 0.2 10e9/L Final     Abs Immature Granulocytes 04/30/2021 0.0  0 - 0.4 10e9/L Final     Absolute Nucleated RBC 04/30/2021 0.0   Final         IMAGING STUDIES  CT Chest/Abdomen/Pelvis w Contrast  Narrative: EXAMINATION: CT CHEST/ABDOMEN/PELVIS W CONTRAST, 4/30/2021 11:00 AM    TECHNIQUE:  Helical CT images from the lung apices through the  symphysis pubis were obtained with contrast.  Coronal and sagittal  reformatted images were generated at a workstation for further  assessment.    CONTRAST:  132 ml Isovue 370.    COMPARISON: CT chest abdomen pelvis 10/2/2020, MRI abdomen 1/29/2021  and CT chest 1/29/2020    HISTORY: uveal melanoma restaging; Malignant melanoma of uvea of left  eye (H)    FINDINGS:    Lines and tubes: None.    Mediastinum/Neck Base: No thyroid nodules.  9 mm short axis precarinal lymph node, not significantly changed  compared to prior CT 1/29/2021 (series 8, image 117). Subcarinal lymph  node measuring 10 mm in short axis compared to 9 mm on prior CT  1/29/2021 (series 8, image 137). Right hilar lymph node measuring 11  mm (series 8, image 139), previously measuring 11 mm on CT 10/2/2020.  Right hilar marquez calcification. Thoracic esophagus is within normal  limits. Increase in size of left axillary lymph node, round in shape,  measuring 11 mm compared to 8 mm (series 8, image 44). Central  tracheobronchial tree is patent. Heart size is normal. No pericardial  effusion.  Normal thoracic vasculature.     Lungs: Calcified nodules in the right lower lobe measures 12 x 12 mm  (series 9, image 149). No new or enlarging  pulmonary nodule. No  consolidation. No pleural effusion or pneumothorax.    Abdomen and pelvis:    Liver: No suspicious liver lesions. Focal arterially enhancing lesion  in segment 8, corresponding to vascular shunt (series 5, image 42), on  MRI 1/29/2021 and hemangioma in segment 7 (series 5, image 73) has  seen on prior MRI. Focal subcentimeter hypodensity in the segment 2/3  (series 8, image 278), corresponding to simple cyst as seen on MRI  1/29/2021. Portal veins appear patent. Parenchymal hypodensity  adjacent to the fissure for ligamentum teres without mass effect,  likely focal fatty infiltration.  Gallbladder: No gallstones. No evidence of acute cholecystitis.  Spleen: Normal size. Multiple punctate calcifications. Accessory  spleen (series 8, image 20).  Pancreas: No suspicious pancreatic lesions. The pancreatic duct is not  dilated.  Adrenal glands: No adrenal nodules.   Kidneys: No hydronephrosis or obstructing renal stones. A few  subcentimeter hypodense lesions are present, too small to characterize  by, favored to represent cyst . Partly exophytic subcentimeter lesion  along the midpole of left kidney, hypodense, corresponds to a cyst  with proteinaceous/hemorrhagic content on MRI 1/29/2021 (series 8,  image 325).  Bladder / Pelvic organs: Limited evaluation of the pelvis secondary to  streak artifact from bilateral total hip arthroplasty.  Bowel: Normal caliber of the small and large bowel. Diverticulosis  without radiographic evidence of diverticulitis.  Lymph nodes: No retroperitoneal, mesenteric, or pelvic  lymphadenopathy. Subcentimeter right paracaval lymph node (series 8,  image 313), not significantly changed compared to prior CT and  2/20/2020  Peritoneum / Retroperitoneum: No free fluid or air within the abdomen.  Vessels: No infrarenal aortic aneurysm. Scattered atherosclerotic  calcification of the abdominal aorta.    Bones and soft tissues: Degenerative changes of the spine.  No  aggressive osseous lesion. Bilateral total hip arthroplasty, with  heterotopic ossification along the right hip joint.  Impression: IMPRESSION: In this patient with history of malignant melanoma the  current scan shows:  1. Increase in size of left axillary lymph node, recommend attention  on follow-up.  2. No additional suspicious lesion in the chest, abdomen or pelvis.  3. Persistent enhancing lesions in the liver, as described, consistent  with benign vascular shunt and hemangioma on MRI.  4. Granulomatous sequelae as evidenced by splenic, right hilar and  right lower lobe calcifications.  5. Bilateral total hip arthroplasty.    I have personally reviewed the examination and initial interpretation  and I agree with the findings.    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 Austin. He is a 75 year old man s/p plaque brachytherapy for uveal melanoma involving the ciliary body. He is doing well and he is in his usual state of health, with no new concerns or complaints. CT-Chest and MRI-liver show no new evidence of metastases.    -Continue follow-up in Ophthalmology.  -Plan to see him back in 3 months with CT-CAP, and yearly MR-abdomen.    Darrell Aranda M.D.   of Medicine  Hematology, Oncology and Transplantation            Again, thank you for allowing me to participate in the care of your patient.        Sincerely,        Darrell De Souza MD

## 2021-04-30 NOTE — PROGRESS NOTES
"Brent is a 75 year old who is being evaluated via a billable telephone visit.      What phone number would you like to be contacted at? [phone]  How would you like to obtain your AVS? Raghavendra     Vitals - Patient Reported  Weight (Patient Reported): 93 kg (205 lb)  Height (Patient Reported): 181.6 cm (5' 11.5\")  BMI (Based on Pt Reported Ht/Wt): 28.19  Pain Score: No Pain (0)    Nanette LUTZ    Phone call duration: 21 minutes      MEDICAL ONCOLOGY PROGRESS NOTE  [facility]  Apr 30, 2021    CHIEF COMPLAINT: Choroidal melanoma, left eye    Melanoma History:  1. 10/28/2019, he was initially seen and the left eye lesion measured 10.39T x 12.48L with a height of 3.02mm.  2. 11/4/2019, CT-scan negative for obvious liver metastasis  3. 11/11/2019, MRI liver showed small enhancing lesions in segment 8 and 4a, likely small hemangiomas.   4. 12/16/2019, he has I-125 plaque brachytherapy placement and delivery of 8,500 cGy total dose to the left eye lesion.  5. 2/6/2020, CT-CAP shows decreased appearance of the arterially enhancing liver lesion. No obvious liver metastasis or suspicious lesions.  6. 10/2/2020, CT-CAP with no evidence of metastatic disease in the chest, abdomen, or pelvis. The hepatic enhancing foci previously characterized as benign on abdominal MRI 11/11/2019 are redemonstrated and are stable.     HISTORY OF PRESENT ILLNESS  Jairo Austin is a 75 year old male with choroidal melanoma of the left eye. He presents via telephone visit today.     Patient follows with Dr. Rodriguez and notes his vision is stable. He reports that he is doing well. Restaging CT-CAP is negative for metastatic disease. Continue sot show stable liver hemangiomas and benign vascular shunt.    ECOG performance status 0.      REVIEW OF SYSTEMS  A 12-point ROS negative except as in HPI.     Current Outpatient Medications   Medication Sig Dispense Refill     amLODIPine (NORVASC) 10 MG tablet Take 10 mg by mouth daily  "       aspirin 81 MG EC tablet Take 81 mg by mouth       atorvastatin (LIPITOR) 10 MG tablet Take 10 mg by mouth daily  3     carvedilol (COREG) 12.5 MG tablet Take 12.5 mg by mouth 2 times daily (with meals)        chlorthalidone (HYGROTON) 25 MG tablet Take 25 mg by mouth       lisinopril (PRINIVIL/ZESTRIL) 20 MG tablet Take 2 tablets BY MOUTH in the morning, and 1 tablet at night.  3       Past Medical History:   Diagnosis Date     Asbestos exposure 1/19/2007    chest x ray Jan 2007, November 2009. 11/30/11, 4/24/2013, 12/31/2014     Diabetes (H)      Hearing loss 1/19/2007    hearing aid on left. 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[employer])     Hypertension      Melanoma (H)      Osteoarthritis of hip 11/30/2011    Right hip replacement 12/5/11 Dr Jj Reyes, will have left hip replacement 1/2/13 Dr Reyes     Sleep apnea 1/19/2007    wears CPAP device at night     PAST SURGICAL HISTORY  1. Bilateral hip replacement, 2011    SOCIAL HISTORY   with 2 children and 4 grandchildren. Retired and now working as a . Quit smoking 20 years ago. Smoked 2 packs per day x 10 years.  History   Smoking Status     Former Smoker     Packs/day: 0.00     Quit date: 2009   Smokeless Tobacco     Never Used    Social History    Substance and Sexual Activity      Alcohol use: Yes        Comment: Weekends/socially     History   Drug Use Unknown     FAMILY HISTORY  Paternal uncle: Throat cancer  Family History   Problem Relation Age of Onset     Glaucoma No family hx of      Macular Degeneration No family hx of        PHYSICAL EXAMINATION  There were no vitals taken for this visit.    No exam as this was telephone visit.      LABORATORY STUDIES  Ancillary Procedure on 04/30/2021   Component Date Value Ref Range Status     Creatinine 04/30/2021 0.9  0.66 - 1.25 mg/dL Final     GFR Estimate 04/30/2021 83  >60 mL/min/[1.73_m2] Final     GFR Estimate If Black 04/30/2021 >90  >60 mL/min/[1.73_m2] Final    Orders Only on 04/30/2021   Component Date Value Ref Range Status     Sodium 04/30/2021 142  133 - 144 mmol/L Final     Potassium 04/30/2021 3.8  3.4 - 5.3 mmol/L Final     Chloride 04/30/2021 108  94 - 109 mmol/L Final     Carbon Dioxide 04/30/2021 28  20 - 32 mmol/L Final     Anion Gap 04/30/2021 6  3 - 14 mmol/L Final     Glucose 04/30/2021 108* 70 - 99 mg/dL Final     Urea Nitrogen 04/30/2021 19  7 - 30 mg/dL Final     Creatinine 04/30/2021 0.86  0.66 - 1.25 mg/dL Final     GFR Estimate 04/30/2021 86  >60 mL/min/[1.73_m2] Final     GFR Estimate If Black 04/30/2021 >90  >60 mL/min/[1.73_m2] Final     Calcium 04/30/2021 9.3  8.5 - 10.1 mg/dL Final     Bilirubin Total 04/30/2021 0.6  0.2 - 1.3 mg/dL Final     Albumin 04/30/2021 4.0  3.4 - 5.0 g/dL Final     Protein Total 04/30/2021 7.8  6.8 - 8.8 g/dL Final     Alkaline Phosphatase 04/30/2021 106  40 - 150 U/L Final     ALT 04/30/2021 34  0 - 70 U/L Final     AST 04/30/2021 16  0 - 45 U/L Final     WBC 04/30/2021 6.2  4.0 - 11.0 10e9/L Final     RBC Count 04/30/2021 4.41  4.4 - 5.9 10e12/L Final     Hemoglobin 04/30/2021 14.5  13.3 - 17.7 g/dL Final     Hematocrit 04/30/2021 43.2  40.0 - 53.0 % Final     MCV 04/30/2021 98  78 - 100 fl Final     MCH 04/30/2021 32.9  26.5 - 33.0 pg Final     MCHC 04/30/2021 33.6  31.5 - 36.5 g/dL Final     RDW 04/30/2021 11.9  10.0 - 15.0 % Final     Platelet Count 04/30/2021 234  150 - 450 10e9/L Final     Diff Method 04/30/2021 Automated Method   Final     % Neutrophils 04/30/2021 59.1  % Final     % Lymphocytes 04/30/2021 23.0  % Final     % Monocytes 04/30/2021 12.9  % Final     % Eosinophils 04/30/2021 4.2  % Final     % Basophils 04/30/2021 0.6  % Final     % Immature Granulocytes 04/30/2021 0.2  % Final     Nucleated RBCs 04/30/2021 0  0 /100 Final     Absolute Neutrophil 04/30/2021 3.7  1.6 - 8.3 10e9/L Final     Absolute Lymphocytes 04/30/2021 1.4  0.8 - 5.3 10e9/L Final     Absolute Monocytes 04/30/2021 0.8  0.0 - 1.3  10e9/L Final     Absolute Eosinophils 04/30/2021 0.3  0.0 - 0.7 10e9/L Final     Absolute Basophils 04/30/2021 0.0  0.0 - 0.2 10e9/L Final     Abs Immature Granulocytes 04/30/2021 0.0  0 - 0.4 10e9/L Final     Absolute Nucleated RBC 04/30/2021 0.0   Final         IMAGING STUDIES  CT Chest/Abdomen/Pelvis w Contrast  Narrative: EXAMINATION: CT CHEST/ABDOMEN/PELVIS W CONTRAST, 4/30/2021 11:00 AM    TECHNIQUE:  Helical CT images from the lung apices through the  symphysis pubis were obtained with contrast.  Coronal and sagittal  reformatted images were generated at a workstation for further  assessment.    CONTRAST:  132 ml Isovue 370.    COMPARISON: CT chest abdomen pelvis 10/2/2020, MRI abdomen 1/29/2021  and CT chest 1/29/2020    HISTORY: uveal melanoma restaging; Malignant melanoma of uvea of left  eye (H)    FINDINGS:    Lines and tubes: None.    Mediastinum/Neck Base: No thyroid nodules.  9 mm short axis precarinal lymph node, not significantly changed  compared to prior CT 1/29/2021 (series 8, image 117). Subcarinal lymph  node measuring 10 mm in short axis compared to 9 mm on prior CT  1/29/2021 (series 8, image 137). Right hilar lymph node measuring 11  mm (series 8, image 139), previously measuring 11 mm on CT 10/2/2020.  Right hilar marquez calcification. Thoracic esophagus is within normal  limits. Increase in size of left axillary lymph node, round in shape,  measuring 11 mm compared to 8 mm (series 8, image 44). Central  tracheobronchial tree is patent. Heart size is normal. No pericardial  effusion.  Normal thoracic vasculature.     Lungs: Calcified nodules in the right lower lobe measures 12 x 12 mm  (series 9, image 149). No new or enlarging pulmonary nodule. No  consolidation. No pleural effusion or pneumothorax.    Abdomen and pelvis:    Liver: No suspicious liver lesions. Focal arterially enhancing lesion  in segment 8, corresponding to vascular shunt (series 5, image 42), on  MRI 1/29/2021 and  hemangioma in segment 7 (series 5, image 73) has  seen on prior MRI. Focal subcentimeter hypodensity in the segment 2/3  (series 8, image 278), corresponding to simple cyst as seen on MRI  1/29/2021. Portal veins appear patent. Parenchymal hypodensity  adjacent to the fissure for ligamentum teres without mass effect,  likely focal fatty infiltration.  Gallbladder: No gallstones. No evidence of acute cholecystitis.  Spleen: Normal size. Multiple punctate calcifications. Accessory  spleen (series 8, image 20).  Pancreas: No suspicious pancreatic lesions. The pancreatic duct is not  dilated.  Adrenal glands: No adrenal nodules.   Kidneys: No hydronephrosis or obstructing renal stones. A few  subcentimeter hypodense lesions are present, too small to characterize  by, favored to represent cyst . Partly exophytic subcentimeter lesion  along the midpole of left kidney, hypodense, corresponds to a cyst  with proteinaceous/hemorrhagic content on MRI 1/29/2021 (series 8,  image 325).  Bladder / Pelvic organs: Limited evaluation of the pelvis secondary to  streak artifact from bilateral total hip arthroplasty.  Bowel: Normal caliber of the small and large bowel. Diverticulosis  without radiographic evidence of diverticulitis.  Lymph nodes: No retroperitoneal, mesenteric, or pelvic  lymphadenopathy. Subcentimeter right paracaval lymph node (series 8,  image 313), not significantly changed compared to prior CT and  2/20/2020  Peritoneum / Retroperitoneum: No free fluid or air within the abdomen.  Vessels: No infrarenal aortic aneurysm. Scattered atherosclerotic  calcification of the abdominal aorta.    Bones and soft tissues: Degenerative changes of the spine. No  aggressive osseous lesion. Bilateral total hip arthroplasty, with  heterotopic ossification along the right hip joint.  Impression: IMPRESSION: In this patient with history of malignant melanoma the  current scan shows:  1. Increase in size of left axillary lymph node,  recommend attention  on follow-up.  2. No additional suspicious lesion in the chest, abdomen or pelvis.  3. Persistent enhancing lesions in the liver, as described, consistent  with benign vascular shunt and hemangioma on MRI.  4. Granulomatous sequelae as evidenced by splenic, right hilar and  right lower lobe calcifications.  5. Bilateral total hip arthroplasty.    I have personally reviewed the examination and initial interpretation  and I agree with the findings.    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 Austin. He is a 75 year old man s/p plaque brachytherapy for uveal melanoma involving the ciliary body. He is doing well and he is in his usual state of health, with no new concerns or complaints. CT-Chest and MRI-liver show no new evidence of metastases.    -Continue follow-up in Ophthalmology.  -Plan to see him back in 3 months with CT-CAP, and yearly MR-abdomen.    Darrell Aranda M.D.   of Medicine  Hematology, Oncology and Transplantation

## 2021-05-23 ENCOUNTER — HEALTH MAINTENANCE LETTER (OUTPATIENT)
Age: 76
End: 2021-05-23

## 2021-08-19 ENCOUNTER — LAB (OUTPATIENT)
Dept: LAB | Facility: CLINIC | Age: 76
End: 2021-08-19
Payer: COMMERCIAL

## 2021-08-19 ENCOUNTER — ANCILLARY PROCEDURE (OUTPATIENT)
Dept: CT IMAGING | Facility: CLINIC | Age: 76
End: 2021-08-19
Attending: INTERNAL MEDICINE
Payer: COMMERCIAL

## 2021-08-19 ENCOUNTER — VIRTUAL VISIT (OUTPATIENT)
Dept: ONCOLOGY | Facility: CLINIC | Age: 76
End: 2021-08-19
Attending: INTERNAL MEDICINE
Payer: COMMERCIAL

## 2021-08-19 DIAGNOSIS — C69.42 MALIGNANT MELANOMA OF UVEA OF LEFT EYE (H): ICD-10-CM

## 2021-08-19 DIAGNOSIS — C69.32 MALIGNANT MELANOMA OF CHOROID OF LEFT EYE (H): ICD-10-CM

## 2021-08-19 DIAGNOSIS — C69.42 MALIGNANT MELANOMA OF UVEA OF LEFT EYE (H): Primary | ICD-10-CM

## 2021-08-19 LAB
ALBUMIN SERPL-MCNC: 4 G/DL (ref 3.4–5)
ALP SERPL-CCNC: 115 U/L (ref 40–150)
ALT SERPL W P-5'-P-CCNC: 34 U/L (ref 0–70)
ANION GAP SERPL CALCULATED.3IONS-SCNC: 7 MMOL/L (ref 3–14)
AST SERPL W P-5'-P-CCNC: 22 U/L (ref 0–45)
BASOPHILS # BLD AUTO: 0 10E3/UL (ref 0–0.2)
BASOPHILS NFR BLD AUTO: 1 %
BILIRUB SERPL-MCNC: 0.9 MG/DL (ref 0.2–1.3)
BUN SERPL-MCNC: 12 MG/DL (ref 7–30)
CALCIUM SERPL-MCNC: 9.4 MG/DL (ref 8.5–10.1)
CHLORIDE BLD-SCNC: 104 MMOL/L (ref 94–109)
CO2 SERPL-SCNC: 29 MMOL/L (ref 20–32)
CREAT BLD-MCNC: 0.9 MG/DL (ref 0.7–1.3)
CREAT SERPL-MCNC: 0.93 MG/DL (ref 0.66–1.25)
EOSINOPHIL # BLD AUTO: 0.3 10E3/UL (ref 0–0.7)
EOSINOPHIL NFR BLD AUTO: 4 %
ERYTHROCYTE [DISTWIDTH] IN BLOOD BY AUTOMATED COUNT: 12.4 % (ref 10–15)
GFR SERPL CREATININE-BSD FRML MDRD: 81 ML/MIN/1.73M2
GFR SERPL CREATININE-BSD FRML MDRD: >60 ML/MIN/1.73M2
GLUCOSE BLD-MCNC: 124 MG/DL (ref 70–99)
HCT VFR BLD AUTO: 43.8 % (ref 40–53)
HGB BLD-MCNC: 14.8 G/DL (ref 13.3–17.7)
IMM GRANULOCYTES # BLD: 0 10E3/UL
IMM GRANULOCYTES NFR BLD: 0 %
LYMPHOCYTES # BLD AUTO: 1 10E3/UL (ref 0.8–5.3)
LYMPHOCYTES NFR BLD AUTO: 16 %
MCH RBC QN AUTO: 32.8 PG (ref 26.5–33)
MCHC RBC AUTO-ENTMCNC: 33.8 G/DL (ref 31.5–36.5)
MCV RBC AUTO: 97 FL (ref 78–100)
MONOCYTES # BLD AUTO: 0.7 10E3/UL (ref 0–1.3)
MONOCYTES NFR BLD AUTO: 12 %
NEUTROPHILS # BLD AUTO: 4.3 10E3/UL (ref 1.6–8.3)
NEUTROPHILS NFR BLD AUTO: 67 %
NRBC # BLD AUTO: 0 10E3/UL
NRBC BLD AUTO-RTO: 0 /100
PLATELET # BLD AUTO: 224 10E3/UL (ref 150–450)
POTASSIUM BLD-SCNC: 3.9 MMOL/L (ref 3.4–5.3)
PROT SERPL-MCNC: 7.5 G/DL (ref 6.8–8.8)
RBC # BLD AUTO: 4.51 10E6/UL (ref 4.4–5.9)
SODIUM SERPL-SCNC: 140 MMOL/L (ref 133–144)
WBC # BLD AUTO: 6.3 10E3/UL (ref 4–11)

## 2021-08-19 PROCEDURE — 74177 CT ABD & PELVIS W/CONTRAST: CPT | Mod: GC | Performed by: RADIOLOGY

## 2021-08-19 PROCEDURE — 71260 CT THORAX DX C+: CPT | Mod: GC | Performed by: RADIOLOGY

## 2021-08-19 PROCEDURE — 80053 COMPREHEN METABOLIC PANEL: CPT | Performed by: PATHOLOGY

## 2021-08-19 PROCEDURE — 36415 COLL VENOUS BLD VENIPUNCTURE: CPT | Performed by: PATHOLOGY

## 2021-08-19 PROCEDURE — 85025 COMPLETE CBC W/AUTO DIFF WBC: CPT | Performed by: PATHOLOGY

## 2021-08-19 PROCEDURE — 99214 OFFICE O/P EST MOD 30 MIN: CPT | Mod: 95 | Performed by: INTERNAL MEDICINE

## 2021-08-19 RX ORDER — IOPAMIDOL 755 MG/ML
132 INJECTION, SOLUTION INTRAVASCULAR ONCE
Status: COMPLETED | OUTPATIENT
Start: 2021-08-19 | End: 2021-08-19

## 2021-08-19 RX ORDER — ATORVASTATIN CALCIUM 20 MG/1
20 TABLET, FILM COATED ORAL EVERY MORNING
COMMUNITY
Start: 2021-07-12

## 2021-08-19 RX ADMIN — IOPAMIDOL 132 ML: 755 INJECTION, SOLUTION INTRAVASCULAR at 09:56

## 2021-08-19 NOTE — PROGRESS NOTES
Brent is a 75 year old who is being evaluated via a billable telephone visit.      What phone number would you like to be contacted at? [phone]  How would you like to obtain your AVS? Raghavendra     I have reviewed and updated the patient's allergies and medication list.    Concerns: [facility] notes [facility] and was told he has a spot on his right eye. He has not heard from anyone if this is a concern or not.   Refills: None needed.      Vitals - Patient Reported  Weight (Patient Reported): 96.6 kg (213 lb)  Height (Patient Reported): 182.9 cm (6')  BMI (Based on Pt Reported Ht/Wt): 28.89  Pain Score: No Pain (0)    Tanisha Escobedo CMA      Phone call duration: 21 minutes        MEDICAL ONCOLOGY PROGRESS NOTE  [facility]  Aug 19, 2021    CHIEF COMPLAINT: Choroidal melanoma, left eye    Melanoma History:  1. 10/28/2019, he was initially seen and the left eye lesion measured 10.39T x 12.48L with a height of 3.02mm.  2. 11/4/2019, CT-scan negative for obvious liver metastasis  3. 11/11/2019, MRI liver showed small enhancing lesions in segment 8 and 4a, likely small hemangiomas.   4. 12/16/2019, he has I-125 plaque brachytherapy placement and delivery of 8,500 cGy total dose to the left eye lesion.  5. 2/6/2020, CT-CAP shows decreased appearance of the arterially enhancing liver lesion. No obvious liver metastasis or suspicious lesions.  6. 10/2/2020, CT-CAP with no evidence of metastatic disease in the chest, abdomen, or pelvis. The hepatic enhancing foci previously characterized as benign on abdominal MRI 11/11/2019 are redemonstrated and are stable.     HISTORY OF PRESENT ILLNESS  Jairo Austin is a 75 year old male with choroidal melanoma of the left eye. He presents via telephone visit today.     Patient follows with Dr. Rodriguez and notes his vision is stable. He reports that he is doing well. 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 His restaging CT-CAP is negative for metastatic disease to liver or lung.    [facility] and was told he has a new spot on his right eye. He has not heard from anyone if this is a concern or not. However, he will follow with ophthalmology in September.    No abdominal pain, weight loss, loss of appetite, nausea or vomiting.      ECOG performance status 0.      REVIEW OF SYSTEMS  A 12-point ROS negative except as in HPI.     Current Outpatient Medications   Medication Sig Dispense Refill     amLODIPine (NORVASC) 10 MG tablet Take 10 mg by mouth daily        aspirin 81 MG EC tablet Take 81 mg by mouth       atorvastatin (LIPITOR) 20 MG tablet Take 1 Tablet by mouth 1 time daily.       carvedilol (COREG) 12.5 MG tablet Take 12.5 mg by mouth 2 times daily (with meals)        chlorthalidone (HYGROTON) 25 MG tablet Take 25 mg by mouth       lisinopril (PRINIVIL/ZESTRIL) 20 MG tablet Take 2 tablets BY MOUTH in the morning, and 1 tablet at night.  3     omeprazole (PRILOSEC) 20 MG DR capsule Take 1 capsule by mouth once daily.         Past Medical History:   Diagnosis Date     Asbestos exposure 1/19/2007    chest x ray Jan 2007, November 2009. 11/30/11, 4/24/2013, 12/31/2014     Diabetes (H)      Hearing loss 1/19/2007    hearing aid on left. 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[employer])     Hypertension      Melanoma (H)      Osteoarthritis of hip 11/30/2011    Right hip replacement 12/5/11 Dr Jj Reyes, will have left hip replacement 1/2/13 Dr Reyes     Sleep apnea 1/19/2007    wears CPAP device at night     PAST SURGICAL HISTORY  1. Bilateral hip replacement, 2011    SOCIAL HISTORY   with 2 children and 4 grandchildren. Retired and now working as a . Quit smoking 20 years ago. Smoked 2 packs per day x 10 years.  History   Smoking Status     Former Smoker     Packs/day: 0.00     Quit date: 2009   Smokeless Tobacco     Never Used     Social History    Substance and Sexual Activity      Alcohol use: Yes        Comment: Weekends/socially     History   Drug Use Unknown     FAMILY HISTORY  Paternal uncle: Throat cancer  Family History   Problem Relation Age of Onset     Glaucoma No family hx of      Macular Degeneration No family hx of        PHYSICAL EXAMINATION  There were no vitals taken for this visit.    No exam as this was telephone visit.      LABORATORY STUDIES  Ancillary Procedure on 08/19/2021   Component Date Value Ref Range Status     Creatinine POCT 08/19/2021 0.9  0.7 - 1.3 mg/dL Final     GFR, ESTIMATED POCT 08/19/2021 >60  >60 mL/min/1.73m2 Final   Lab on 08/19/2021   Component Date Value Ref Range Status     Sodium 08/19/2021 140  133 - 144 mmol/L Final     Potassium 08/19/2021 3.9  3.4 - 5.3 mmol/L Final     Chloride 08/19/2021 104  94 - 109 mmol/L Final     Carbon Dioxide (CO2) 08/19/2021 29  20 - 32 mmol/L Final     Anion Gap 08/19/2021 7  3 - 14 mmol/L Final     Urea Nitrogen 08/19/2021 12  7 - 30 mg/dL Final     Creatinine 08/19/2021 0.93  0.66 - 1.25 mg/dL Final     Calcium 08/19/2021 9.4  8.5 - 10.1 mg/dL Final     Glucose 08/19/2021 124* 70 - 99 mg/dL Final     Alkaline Phosphatase 08/19/2021 115  40 - 150 U/L Final     AST 08/19/2021 22  0 - 45 U/L Final     ALT 08/19/2021 34  0 - 70 U/L Final     Protein Total 08/19/2021 7.5  6.8 - 8.8 g/dL Final     Albumin 08/19/2021 4.0  3.4 - 5.0 g/dL Final     Bilirubin Total 08/19/2021 0.9  0.2 - 1.3 mg/dL Final     GFR Estimate 08/19/2021 81  >60 mL/min/1.73m2 Final     WBC Count 08/19/2021 6.3  4.0 - 11.0 10e3/uL Final     RBC Count 08/19/2021 4.51  4.40 - 5.90 10e6/uL Final     Hemoglobin 08/19/2021 14.8  13.3 - 17.7 g/dL Final     Hematocrit 08/19/2021 43.8  40.0 - 53.0 % Final     MCV 08/19/2021 97  78 - 100 fL Final     MCH 08/19/2021 32.8  26.5 - 33.0 pg Final     MCHC 08/19/2021 33.8  31.5 - 36.5 g/dL Final     RDW 08/19/2021 12.4  10.0 - 15.0 % Final     Platelet Count  08/19/2021 224  150 - 450 10e3/uL Final     % Neutrophils 08/19/2021 67  % Final     % Lymphocytes 08/19/2021 16  % Final     % Monocytes 08/19/2021 12  % Final     % Eosinophils 08/19/2021 4  % Final     % Basophils 08/19/2021 1  % Final     % Immature Granulocytes 08/19/2021 0  % Final     NRBCs per 100 WBC 08/19/2021 0  <1 /100 Final     Absolute Neutrophils 08/19/2021 4.3  1.6 - 8.3 10e3/uL Final     Absolute Lymphocytes 08/19/2021 1.0  0.8 - 5.3 10e3/uL Final     Absolute Monocytes 08/19/2021 0.7  0.0 - 1.3 10e3/uL Final     Absolute Eosinophils 08/19/2021 0.3  0.0 - 0.7 10e3/uL Final     Absolute Basophils 08/19/2021 0.0  0.0 - 0.2 10e3/uL Final     Absolute Immature Granulocytes 08/19/2021 0.0  <=0.0 10e3/uL Final     Absolute NRBCs 08/19/2021 0.0  10e3/uL Final           IMAGING STUDIES  CT Chest/Abdomen/Pelvis w Contrast  Narrative: EXAMINATION: CT CHEST/ABDOMEN/PELVIS W CONTRAST  8/19/2021 10:10 AM      CLINICAL HISTORY: Malignant melanoma of uvea of left eye (H)    COMPARISON: 4/30/2021, 1/29/2021        PROCEDURE COMMENTS: CT of the chest, abdomen, and pelvis was performed  with Isovue 370  132 mls intravenous contrast. Axial MIP  images of  the chest, and coronal and sagittal reformatted images of the chest,  abdomen, and pelvis obtained.    FINDINGS:      Chest:  The trachea and central airways are clear. Unchanged calcified  pulmonary nodules measuring up to 12 mm in the right lower lobe. No  new or enlarging pulmonary nodules. No pleural effusion or  pneumothorax. Heart size is normal. Normal caliber aorta and pulmonary  artery. Normal branching of the great vessels..     9 mm short axis precarinal lymph node, not significantly changed  compared to previous studies dating back to 1/29/2021 (series 8 image  129). 9 mm subcarinal lymph node, previously measuring 10 mm, now with  focal calcification (series 8 image 152). Right hilar marquez  calcification, unchanged. Decreased size of left axillary lymph  node  measuring 18 mm, previously 11 mm (series 8 image 58).    No suspicious thyroid nodules. The esophagus appears normal.    Abdomen/pelvis: Limited evaluation of the deep pelvis due to beam  attenuation artifact from bilateral hip prostheses.  Unchanged focal arterial enhancing lesion corresponding to vascular  shunt (series 5 image 26) and hemangioma in segment 7 (series 5 image  41). Unchanged 8 mm simple hepatic cyst in segment 2/3 (series 8 image  293). The hepatic vasculature is patent. Peripheral parenchymal  hypodensity adjacent to the fissure for ligamentum teres appears  similar to previous, likely fatty infiltration.    The gallbladder, pancreas, and spleen appear normal. Scattered  granulomatous calcifications throughout the spleen and small accessory  splenule.    Bilateral adrenal glands appear normal. Symmetric nephrogenic arterial  enhancement in the bilateral kidneys. Multiple well-circumscribed  hypodense lesions in the bilateral kidneys similar to previous, likely  simple cysts. Exophytic hypodense lesion in the left midpole  corresponds to proteinaceous/hemorrhagic cyst on MRI 1/29/2021 (series  5 image 77). Limited evaluation of the bladder, unremarkable.    There is a hyperdense masslike lesion in the central abdomen along  small bowel loops (series 5 image 124 and series 8 image 43). This may  be enhancing given its density however some of the high attenuating  areas may be calcification. This is difficult to differentiate without  noncontrast imaging. In retrospect, this is identified on some of the  prior CTs although difficult to separate from adjacent bowel loops on  those exams and not easily discerned/visualized. This measures up to  2.4 cm on the arterial phase. The majority of this lesion appears to  be outside of the lumen of bowel, possibly submucosal. No evidence of  obstruction. Colonic diverticulosis without evidence of  diverticulitis. Suspected area of possible prior epiploic  appendagitis  adjacent proximal sigmoid colon, series 5 image 125.    There is no free air or free fluid. There are no abnormally sized  lymph nodes. Normal caliber abdominal aorta.    Bones:   No suspicious osseous masses or lesions. Bilateral breast implants to  suggest recurrent artifact. Heterotopic ossification on the right.  Impression: IMPRESSION: In this patient with history of malignant melanoma    1. Masslike hyperdense lesion within the central small bowel loops  demonstrated, possibly enhancing versus calcified. This is nonspecific  but in retrospect is present on prior CTs although difficult to  marginate separate from adjacent small bowel loops and appears  relatively stable. Differential considerations would include primarily  an indolent lesion such as small bowel GIST or leiomyoma. This is  favored to not be related to patient's melanoma given relative  stability and lack of other foci of disease. This does not have the  typical appearance for an aggressive lesion or calcified carcinoid  tumor but could be correlated with patient's symptoms.  2. Unchanged enhancing liver lesions as above consistent with benign  vascular shunt and hemangioma.  3. Unchanged granulomatous sequelae with splenic, right hilar, and  right lower lobe calcifications.    [Consider Follow Up: Small bowel lesion identified as above.]    This report will be copied to the [facility] to ensure a  provider acknowledges the finding.     I have personally reviewed the examination and initial interpretation  and I agree with the findings.    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 Austin. He is a 75 year old man s/p plaque brachytherapy for uveal melanoma involving the ciliary body. He is doing well  and he is in his usual state of health, with no new concerns or complaints. CT-CAP shows no new evidence of metastases.     There is a relatively stable small bowel lesion that was noted on this scan for the first time. Favored to represent a GIST or leiomyoma. This would require surgery to characterize further. I will contact the patient and plan to refer to surgical oncology for further evaluation.    -Continue follow-up in Ophthalmology.  -Surgical oncology referral  -Plan to see him back in 4 months with CT-CAP, and yearly MR-abdomen (April 2022).    Darrell Aranda M.D.   of Medicine  Hematology, Oncology and Transplantation

## 2021-08-19 NOTE — Clinical Note
8/19/2021         RE: Jairo Austin  [address]  [place] [address]        Dear Colleague,    Thank you for referring your patient, Jairo Austin, to the [facility]. Please see a copy of my visit note below.    Brent is a 75 year old who is being evaluated via a billable telephone visit.      What phone number would you like to be contacted at? [phone]  How would you like to obtain your AVS? Raghavendra     I have reviewed and updated the patient's allergies and medication list.    Concerns: [facility] and was told he has a spot on his right eye. He has not heard from anyone if this is a concern or not.   Refills: None needed.        Vitals - Patient Reported  Weight (Patient Reported): 96.6 kg (213 lb)  Height (Patient Reported): 182.9 cm (6')  BMI (Based on Pt Reported Ht/Wt): 28.89  Pain Score: No Pain (0)    Tanisha Escobedo CMA      Phone call duration: *** minutes      Again, thank you for allowing me to participate in the care of your patient.        Sincerely,        Darrell De Souza MD

## 2021-08-20 ENCOUNTER — TELEPHONE (OUTPATIENT)
Dept: ONCOLOGY | Facility: CLINIC | Age: 76
End: 2021-08-20

## 2021-08-20 LAB — RADIOLOGIST FLAGS: NORMAL

## 2021-08-20 NOTE — TELEPHONE ENCOUNTER
DATE:  8/20/21  TIME OF RECEIPT FROM LAB:  11:08  TEST:  CT Chest abdomen and pelvis  VALUE:  Noted that small bowel lesion identified.  RESULTS PAGED TO (PROVIDER):   Dr Crane at 11:23   TIME LAB VALUE REPORTED TO PROVIDER: 11:28 please route to DR De Souza.

## 2021-08-31 DIAGNOSIS — C69.32 MALIGNANT MELANOMA OF CHOROID OF LEFT EYE (H): Primary | ICD-10-CM

## 2021-09-12 ENCOUNTER — HEALTH MAINTENANCE LETTER (OUTPATIENT)
Age: 76
End: 2021-09-12

## 2021-09-13 DIAGNOSIS — C69.32 MALIGNANT MELANOMA OF CHOROID OF LEFT EYE (H): Primary | ICD-10-CM

## 2021-09-17 ENCOUNTER — OFFICE VISIT (OUTPATIENT)
Dept: OPHTHALMOLOGY | Facility: CLINIC | Age: 76
End: 2021-09-17
Attending: OPHTHALMOLOGY
Payer: COMMERCIAL

## 2021-09-17 DIAGNOSIS — C69.32 MALIGNANT MELANOMA OF CHOROID OF LEFT EYE (H): ICD-10-CM

## 2021-09-17 PROCEDURE — 99207 FUNDUS PHOTOS OS (LEFT EYE): CPT | Performed by: OPHTHALMOLOGY

## 2021-09-17 PROCEDURE — 92250 FUNDUS PHOTOGRAPHY W/I&R: CPT | Performed by: OPHTHALMOLOGY

## 2021-09-17 PROCEDURE — G0463 HOSPITAL OUTPT CLINIC VISIT: HCPCS

## 2021-09-17 PROCEDURE — 99207 ULTRASOUND B-SCAN OS (LEFT EYE): CPT | Performed by: OPHTHALMOLOGY

## 2021-09-17 PROCEDURE — 76512 OPH US DX B-SCAN: CPT | Performed by: OPHTHALMOLOGY

## 2021-09-17 PROCEDURE — 76513 OPH US DX ANT SGM US UNI/BI: CPT | Performed by: OPHTHALMOLOGY

## 2021-09-17 PROCEDURE — 99214 OFFICE O/P EST MOD 30 MIN: CPT | Mod: GC | Performed by: OPHTHALMOLOGY

## 2021-09-17 PROCEDURE — 92134 CPTRZ OPH DX IMG PST SGM RTA: CPT | Performed by: OPHTHALMOLOGY

## 2021-09-17 ASSESSMENT — TONOMETRY
OS_IOP_MMHG: 12
IOP_METHOD: TONOPEN
OD_IOP_MMHG: 14

## 2021-09-17 ASSESSMENT — CUP TO DISC RATIO
OD_RATIO: 0.25
OS_RATIO: 0.25

## 2021-09-17 ASSESSMENT — VISUAL ACUITY
OD_CC: 20/25
OS_CC: 20/25
METHOD: SNELLEN - LINEAR
OS_CC+: +3
OD_CC+: -1
CORRECTION_TYPE: GLASSES

## 2021-09-17 ASSESSMENT — SLIT LAMP EXAM - LIDS
COMMENTS: NORMAL
COMMENTS: NORMAL

## 2021-09-17 ASSESSMENT — CONF VISUAL FIELD
OS_NORMAL: 1
OD_NORMAL: 1
METHOD: COUNTING FINGERS

## 2021-09-17 ASSESSMENT — EXTERNAL EXAM - LEFT EYE: OS_EXAM: NORMAL

## 2021-09-17 ASSESSMENT — EXTERNAL EXAM - RIGHT EYE: OD_EXAM: NORMAL

## 2021-09-17 NOTE — PROGRESS NOTES
CC -   CMM left s/p I-125    INTERVAL HISTORY - [facility] stable, mild FBS    PMH -   Jairo Austin is a  74 year old year-old patient referred by the [facility] for evaluation and treat of a choroidal lesion left eye.  diagnosis 10/2019, been getting annual eye exams no prior notice per patient.  DM II since ~ 2010, good control, + HTn.  No steroid use      PAST OCULAR SURGERY  I-125 with LR reposition 12/16/19 & 12/20/19      RETINAL IMAGING:  OCT 9-17-21  OD -  Macula - serous PEDs superior tr fluid, choroid, new SRF at level of superior arcade  OS -  Macula - retina normal, PHF attached, choroid ?thick   Lesion - (prior) elevated poor image quality      OCT-A 1-21-19  OD - no CNVM        FA 1-21-20  OD - (transit) mulitple hyperF spots, leakage SN macula, no likely CNVM  OS - multiple hyperF spots    ICG 1-21-20  OD (transit) mulitple hyperF spots no CNVM likely  OS - multiple hyperF spots       UBM   OS - far IT lesion in CB size 3.02mm x 10.39T x 12.48L (10/2019) ->  2.59mm x 11.47T x 12.01L  (3/2020) ->  1.82mm x 11.5T x 10.47L (8/2020) -> 1.91 x 13.31T(12.44T) x 10.37L (11/2020) -> 1.88 x 13.31T x 10.38L (3/2021) -> 1.56mm x 12.00T x 10.47L (9/2021)    U/S OS 3-23-20  A-scan - (prior)  lesion low reflective  B-scan - peripheral lesion elevated            ASSESSMENT & PLAN    #  CMM OS   - s/p I-125 12/16/19 & 12/20/19   - U/S (requires UBM d/t anterior) stable now   -  recheck 6 months        #.  OD   - doubt CNVM   - no likely CNVM on FA/OCT/OCT-A on 1/21/20   - had STS 20mg 12/20/19, new activity noted 1/21/20 with SRF   - fluid resolved 3/23/20      - new SRF superiorly      - observe   - steroid avoidance d/w patient          # Syneresis OU      #. NS OU   - mild          return to clinic: 6  month, OCT OU with DREW, U/S OS, optos left eye    Joey Boyer MD  Vitreoretinal Surgery Fellow  [facility]     ATTESTATION     Attending Attestation:     Complete documentation of historical and exam  elements from today's encounter can be found in the full encounter summary report (not reduplicated in this progress note).  I personally obtained the chief complaint(s) and history of present illness.  I confirmed and edited as necessary the review of systems, past medical/surgical history, family history, social history, and examination findings as documented by others; and I examined the patient myself.  I personally reviewed the relevant tests, images, and reports as documented above.  I personally reviewed the ophthalmic test(s) associated with this encounter, agree with the interpretation(s) as documented by the resident/fellow, and have edited the corresponding report(s) as necessary.   I formulated and edited as necessary the assessment and plan and discussed the findings and management plan with the patient and family    Charisma Rodriguez MD, PhD  , Vitreoretinal Surgery  Department of Ophthalmology  [facility]

## 2021-09-17 NOTE — NURSING NOTE
Chief Complaints and History of Present Illnesses   Patient presents with     Malignant Melanoma (Choroid) Follow Up     CMM of LE follow up     Chief Complaint(s) and History of Present Illness(es)     Malignant Melanoma (Choroid) Follow Up     Laterality: left eye    Associated symptoms: Negative for vomiting, nausea, flashes and shade    Pain scale: 0/10    Comments: CMM of LE follow up              Comments     Pt states 6 weeks ago, [facility]. Spot in RE noted and imaged and supposed to be sent to Dr. Rodriguez. Pt has not heard back from [facility].  Pt states that he notes no impaired VA in RE  Pt states VA in LE seems unchanged.   Pt states no use of eye meds.  Keesha Austin, BELÉN COT 10:14 AM 09/17/2021

## 2021-12-17 ENCOUNTER — ANCILLARY PROCEDURE (OUTPATIENT)
Dept: CT IMAGING | Facility: CLINIC | Age: 76
End: 2021-12-17
Attending: INTERNAL MEDICINE
Payer: COMMERCIAL

## 2021-12-17 ENCOUNTER — LAB (OUTPATIENT)
Dept: LAB | Facility: CLINIC | Age: 76
End: 2021-12-17
Attending: INTERNAL MEDICINE
Payer: COMMERCIAL

## 2021-12-17 DIAGNOSIS — C69.42 MALIGNANT MELANOMA OF UVEA OF LEFT EYE (H): ICD-10-CM

## 2021-12-17 DIAGNOSIS — C69.32 MALIGNANT MELANOMA OF CHOROID OF LEFT EYE (H): ICD-10-CM

## 2021-12-17 LAB
ALBUMIN SERPL-MCNC: 3.6 G/DL (ref 3.4–5)
ALP SERPL-CCNC: 106 U/L (ref 40–150)
ALT SERPL W P-5'-P-CCNC: 38 U/L (ref 0–70)
ANION GAP SERPL CALCULATED.3IONS-SCNC: 10 MMOL/L (ref 3–14)
AST SERPL W P-5'-P-CCNC: 18 U/L (ref 0–45)
BASOPHILS # BLD AUTO: 0 10E3/UL (ref 0–0.2)
BASOPHILS NFR BLD AUTO: 1 %
BILIRUB SERPL-MCNC: 0.3 MG/DL (ref 0.2–1.3)
BUN SERPL-MCNC: 23 MG/DL (ref 7–30)
CALCIUM SERPL-MCNC: 8.8 MG/DL (ref 8.5–10.1)
CHLORIDE BLD-SCNC: 100 MMOL/L (ref 94–109)
CO2 SERPL-SCNC: 26 MMOL/L (ref 20–32)
CREAT BLD-MCNC: 1.4 MG/DL (ref 0.7–1.3)
CREAT SERPL-MCNC: 1.24 MG/DL (ref 0.66–1.25)
EOSINOPHIL # BLD AUTO: 0.3 10E3/UL (ref 0–0.7)
EOSINOPHIL NFR BLD AUTO: 6 %
ERYTHROCYTE [DISTWIDTH] IN BLOOD BY AUTOMATED COUNT: 12.1 % (ref 10–15)
GFR SERPL CREATININE-BSD FRML MDRD: 49 ML/MIN/1.73M2
GFR SERPL CREATININE-BSD FRML MDRD: 57 ML/MIN/1.73M2
GLUCOSE BLD-MCNC: 117 MG/DL (ref 70–99)
HCT VFR BLD AUTO: 42.1 % (ref 40–53)
HGB BLD-MCNC: 14.1 G/DL (ref 13.3–17.7)
IMM GRANULOCYTES # BLD: 0 10E3/UL
IMM GRANULOCYTES NFR BLD: 0 %
LYMPHOCYTES # BLD AUTO: 1.2 10E3/UL (ref 0.8–5.3)
LYMPHOCYTES NFR BLD AUTO: 22 %
MCH RBC QN AUTO: 32.9 PG (ref 26.5–33)
MCHC RBC AUTO-ENTMCNC: 33.5 G/DL (ref 31.5–36.5)
MCV RBC AUTO: 98 FL (ref 78–100)
MONOCYTES # BLD AUTO: 0.7 10E3/UL (ref 0–1.3)
MONOCYTES NFR BLD AUTO: 13 %
NEUTROPHILS # BLD AUTO: 3.2 10E3/UL (ref 1.6–8.3)
NEUTROPHILS NFR BLD AUTO: 58 %
NRBC # BLD AUTO: 0 10E3/UL
NRBC BLD AUTO-RTO: 0 /100
PLATELET # BLD AUTO: 198 10E3/UL (ref 150–450)
POTASSIUM BLD-SCNC: 3.7 MMOL/L (ref 3.4–5.3)
PROT SERPL-MCNC: 7 G/DL (ref 6.8–8.8)
RBC # BLD AUTO: 4.29 10E6/UL (ref 4.4–5.9)
SODIUM SERPL-SCNC: 136 MMOL/L (ref 133–144)
WBC # BLD AUTO: 5.5 10E3/UL (ref 4–11)

## 2021-12-17 PROCEDURE — 80053 COMPREHEN METABOLIC PANEL: CPT | Performed by: PATHOLOGY

## 2021-12-17 PROCEDURE — 85025 COMPLETE CBC W/AUTO DIFF WBC: CPT | Performed by: PATHOLOGY

## 2021-12-17 PROCEDURE — 36415 COLL VENOUS BLD VENIPUNCTURE: CPT | Performed by: PATHOLOGY

## 2021-12-17 PROCEDURE — 74177 CT ABD & PELVIS W/CONTRAST: CPT | Mod: GC | Performed by: STUDENT IN AN ORGANIZED HEALTH CARE EDUCATION/TRAINING PROGRAM

## 2021-12-17 PROCEDURE — 71260 CT THORAX DX C+: CPT | Mod: GC | Performed by: STUDENT IN AN ORGANIZED HEALTH CARE EDUCATION/TRAINING PROGRAM

## 2021-12-17 RX ORDER — IOPAMIDOL 755 MG/ML
132 INJECTION, SOLUTION INTRAVASCULAR ONCE
Status: COMPLETED | OUTPATIENT
Start: 2021-12-17 | End: 2021-12-17

## 2021-12-17 RX ADMIN — IOPAMIDOL 132 ML: 755 INJECTION, SOLUTION INTRAVASCULAR at 08:18

## 2022-01-02 ENCOUNTER — HEALTH MAINTENANCE LETTER (OUTPATIENT)
Age: 77
End: 2022-01-02

## 2022-01-06 ENCOUNTER — VIRTUAL VISIT (OUTPATIENT)
Dept: ONCOLOGY | Facility: CLINIC | Age: 77
End: 2022-01-06
Attending: INTERNAL MEDICINE
Payer: COMMERCIAL

## 2022-01-06 DIAGNOSIS — C69.42 MALIGNANT MELANOMA OF UVEA OF LEFT EYE (H): Primary | ICD-10-CM

## 2022-01-06 DIAGNOSIS — D21.4 LEIOMYOMA OF SMALL INTESTINE: ICD-10-CM

## 2022-01-06 PROCEDURE — 99443 PR PHYSICIAN TELEPHONE EVALUATION 21-30 MIN: CPT | Performed by: INTERNAL MEDICINE

## 2022-01-06 PROCEDURE — 999N001193 HC VIDEO/TELEPHONE VISIT; NO CHARGE

## 2022-01-06 NOTE — PROGRESS NOTES
Brent is a 76 year old who is being evaluated via a billable telephone visit.      What phone number would you like to be contacted at? [phone]  How would you like to obtain your AVS? Annmarieshanell  Phone call duration: 21 minutes    Olya Mims       MEDICAL ONCOLOGY TELEPHONE VISIT  Melanoma Clinic  Jan 6, 2022    CHIEF COMPLAINT: Choroidal melanoma, left eye    Melanoma History:  1. 10/28/2019, he was initially seen and the left eye lesion measured 10.39T x 12.48L with a height of 3.02mm.  2. 11/4/2019, CT-scan negative for obvious liver metastasis  3. 11/11/2019, MRI liver showed small enhancing lesions in segment 8 and 4a, likely small hemangiomas.   4. 12/16/2019, he has I-125 plaque brachytherapy placement and delivery of 8,500 cGy total dose to the left eye lesion.  5. 2/6/2020, CT-CAP shows decreased appearance of the arterially enhancing liver lesion. No obvious liver metastasis or suspicious lesions.  6. 10/2/2020, CT-CAP with no evidence of metastatic disease in the chest, abdomen, or pelvis. The hepatic enhancing foci previously characterized as benign on abdominal MRI 11/11/2019 are redemonstrated and are stable.     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 He presents via telephone visit today.     He states he is doing well. 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 His restaging CT-CAP is negative for metastatic disease to liver or lung. 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    He denies any nausea, vomiting. No abdominal pain. He has irritable bowels, but denies obstructive symptoms. No fevers or chills.    ECOG performance status 0.      REVIEW OF SYSTEMS  A 12-point ROS negative except as in HPI.     Current Outpatient Medications   Medication Sig Dispense Refill     amLODIPine (NORVASC) 10 MG tablet Take 10 mg by mouth daily         aspirin 81 MG EC tablet Take 81 mg by mouth       atorvastatin (LIPITOR) 20 MG tablet Take 1 Tablet by mouth 1 time daily.       carvedilol (COREG) 12.5 MG tablet Take 12.5 mg by mouth 2 times daily (with meals)        chlorthalidone (HYGROTON) 25 MG tablet Take 25 mg by mouth       lisinopril (PRINIVIL/ZESTRIL) 20 MG tablet Take 2 tablets BY MOUTH in the morning, and 1 tablet at night.  3     omeprazole (PRILOSEC) 20 MG DR capsule Take 1 capsule by mouth once daily.         Past Medical History:   Diagnosis Date     Asbestos exposure 1/19/2007    chest x ray Jan 2007, November 2009. 11/30/11, 4/24/2013, 12/31/2014     Diabetes (H)      Hearing loss 1/19/2007    hearing aid on left. 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[employer])     Hypertension      Melanoma (H)      Osteoarthritis of hip 11/30/2011    Right hip replacement 12/5/11 Dr Jj Reyes, will have left hip replacement 1/2/13 Dr Reyes     Sleep apnea 1/19/2007    wears CPAP device at night     PAST SURGICAL HISTORY  1. Bilateral hip replacement, 2011    SOCIAL HISTORY   with 2 children and 4 grandchildren. Retired and now working as a . Quit smoking 20 years ago. Smoked 2 packs per day x 10 years.  History   Smoking Status     Former Smoker     Packs/day: 0.00     Quit date: 2009   Smokeless Tobacco     Never Used    Social History    Substance and Sexual Activity      Alcohol use: Yes        Comment: Weekends/socially     History   Drug Use Unknown     FAMILY HISTORY  Paternal uncle: Throat cancer  Family History   Problem Relation Age of Onset     Glaucoma No family hx of      Macular Degeneration No family hx of        PHYSICAL EXAMINATION  There were no vitals taken for this visit.  No exam as this was telephone visit.      LABORATORY STUDIES  No visits with results within 1 Week(s) from this visit.   Latest known visit with results is:   Ancillary Procedure on 12/17/2021   Component Date Value Ref Range Status      Creatinine POCT 12/17/2021 1.4* 0.7 - 1.3 mg/dL Final     GFR, ESTIMATED POCT 12/17/2021 49* >60 mL/min/1.73m2 Final       IMAGING STUDIES  CT Chest/Abdomen/Pelvis w Contrast  Narrative: EXAMINATION: CT CHEST/ABDOMEN/PELVIS W CONTRAST  12/17/2021 8:33 AM      HISTORY: Malignant melanoma of uvea of left eye (H)    COMPARISON: CT chest abdomen and pelvis 8/19/2021  and 4/30/2021 and  MRI 1/29/2021    PROCEDURE COMMENTS: CT of the chest, abdomen, and pelvis was performed  with Isovue 370 132cc intravenous contrast. Axial MIP images of the  chest, and coronal and sagittal reformatted images of the chest,  abdomen, and pelvis obtained.    FINDINGS:      Chest:    The central tracheobronchial tree is patent. No pneumothorax, pleural  effusion, focal airspace consolidation. Mild bibasilar dependent  atelectasis. Stable calcified right lower lobe pulmonary nodule  measuring up to 12 mm (series 11, image 147). No new or enlarging  pulmonary nodules. No thyroid nodules. The heart size is normal. No  pericardial effusion. Unchanged pleural-based thickening along the  left upper lobe (series 11, image 104) and pleural plaque-like  thickening with calcification along the posterior left lower lobe  (series 11, image 155). Unchanged mild density along the diaphragmatic  subpleural right lower lobe (series 9, image 76). Normal caliber main  pulmonary artery. No significant coronary artery calcifications. No  central pulmonary embolism. Conventional 3 vessel branching aortic  arch. Thoracic aortic ectasia measuring up to 4.3 cm, unchanged from  prior exam. Stable prominent 10 mm left axillary lymph node (series 8,  image 39).. No pneumothorax or lymphadenopathy. Stable prominent but  nonenlarged mediastinal lymph nodes, including precarinal lymph node  measuring 6 mm (series 8, image 112). Calcified right hilar lymph  nodes. Unchanged right hilar lymph node measuring 1 cm (series 8,  image 134). Normal caliber esophagus. Mild  bilateral gynecomastia.    Abdomen and pelvis:    Stable subcentimeter arterially enhancing focus in the segment 8  (series 5 image 44), likely representing shunting as demonstrated on  prior MRI from 1/29/2021. Additional unchanged small enhancing focus  within segment 7 (series 5 image 81), corresponding to hemangioma seen  on prior MRI. Unchanged subcentimeter cystic hypodensity within the  left hepatic lobe, likely representing simple hepatic cyst (series 5,  image 92) No new focal hepatic mass. Scattered calcified hepatic  granulomas. Borderline gallbladder wall thickening, likely related to  underdistention. No intra or extrahepatic biliary dilatation. No  pancreatic ductal dilatation. Scattered calcified splenic granulomas.  Small splenule in the left upper quadrant. Unchanged thickening of the  left adrenal gland without focal nodule. The right adrenal gland is  unremarkable. Symmetric renal cortical enhancement. No hydronephrosis  or nephrolithiasis. Multiple unchanged fluid attenuating cysts seen  throughout the kidneys and unchanged 10 mm hyperattenuating cyst in  the midpole of the left kidney (series 5 image 162), likely  representing a hemorrhagic/proteinaceous cyst. Limited evaluation of  the pelvis due to streak artifact from bilateral total hip  arthroplasties. Limited evaluation of the urinary bladder which  appears within normal limits. No abnormally dilated loops of large or  small bowel. Colonic diverticulosis without evidence of acute  diverticulitis. Mild to moderate colonic stool burden. Unchanged 2.4  cm hyperenhancing mass like region in the central abdomen along loops  of small bowel containing calcification (series 8 image 453). Small  fat-containing left inguinal hernia. No inguinal lymphadenopathy. No  abdominal or pelvic lymphadenopathy by size criteria. The major  intra-abdominal vasculature is widely patent. The infrarenal aorta is  of normal caliber. Mild aortoiliac atherosclerotic  calcifications.    Bones:  Multilevel degenerative changes of the spine. Postoperative changes of  bilateral total hip arthroplasties. No suspicious or aggressive  appearing bone lesions.  Impression: IMPRESSION:    In this patient with a history of malignant melanoma, the current scan  compared to CT chest abdomen pelvis 8/19/2021 shows:  1. No new metastatic disease within the chest, abdomen, or pelvis.  2. Stable indeterminate hyperdense masslike lesion within the central  abdomen small bowel loops. Differentials  include small bowel GIST,  leiomyoma versus less likely carcinoid or aggressive neoplastic  lesion.  3. Ascending thoracic aortic ectasia measuring up to 4.3 cm on this  non gated scan.  4. Sequela of prior granulomatous infection.  5. Unchanged bilateral renal cysts and left midpole exophytic  hyperdensity, likely hemorrhagic/proteinaceous cyst.   6.  Stable enhancing foci within the liver, consistent with vascular  shunts and benign hemangioma, as demonstrated on MRI from 1/29/2021.  No focal hepatic lesion.    I have personally reviewed the examination and initial interpretation  and I agree with the findings.    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 Austin. He is a 76 year old man s/p plaque brachytherapy for uveal melanoma involving the ciliary body. He is doing well.    CT-CAP shows no new evidence of metastases, but shows a relatively stable small bowel lesion favored to represent a GIST or leiomyoma. This would require surgery to characterize further.    -Continue follow-up in Ophthalmology.  -Surgical oncology referral  -Plan to see him back in 4 months CT-CAP or yearly MR-abdomen (April 2022).    Darrell Aranda M.D.   of Medicine  Hematology,  Oncology and Transplantation

## 2022-01-06 NOTE — LETTER
1/6/2022         RE: Jairo Austin  [address]  [facility] [address]        Dear Colleague,    Thank you for referring your patient, Jairo Austin, to the [facility]. Please see a copy of my visit note below.    MEDICAL ONCOLOGY TELEPHONE VISIT  Melanoma Clinic  Jan 6, 2022    CHIEF COMPLAINT: Choroidal melanoma, left eye    Melanoma History:  1. 10/28/2019, he was initially seen and the left eye lesion measured 10.39T x 12.48L with a height of 3.02mm.  2. 11/4/2019, CT-scan negative for obvious liver metastasis  3. 11/11/2019, MRI liver showed small enhancing lesions in segment 8 and 4a, likely small hemangiomas.   4. 12/16/2019, he has I-125 plaque brachytherapy placement and delivery of 8,500 cGy total dose to the left eye lesion.  5. 2/6/2020, CT-CAP shows decreased appearance of the arterially enhancing liver lesion. No obvious liver metastasis or suspicious lesions.  6. 10/2/2020, CT-CAP with no evidence of metastatic disease in the chest, abdomen, or pelvis. The hepatic enhancing foci previously characterized as benign on abdominal MRI 11/11/2019 are redemonstrated and are stable.     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 He presents via telephone visit today.     He states he is doing well. 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 His restaging CT-CAP is negative for metastatic disease to liver or lung. 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    He denies any nausea, vomiting. No abdominal pain. He has irritable bowels, but denies obstructive symptoms. No fevers or chills.    ECOG performance status 0.      REVIEW OF SYSTEMS  A 12-point ROS negative except as in HPI.     Current Outpatient Medications   Medication Sig Dispense Refill     amLODIPine (NORVASC) 10 MG tablet Take 10 mg by  mouth daily        aspirin 81 MG EC tablet Take 81 mg by mouth       atorvastatin (LIPITOR) 20 MG tablet Take 1 Tablet by mouth 1 time daily.       carvedilol (COREG) 12.5 MG tablet Take 12.5 mg by mouth 2 times daily (with meals)        chlorthalidone (HYGROTON) 25 MG tablet Take 25 mg by mouth       lisinopril (PRINIVIL/ZESTRIL) 20 MG tablet Take 2 tablets BY MOUTH in the morning, and 1 tablet at night.  3     omeprazole (PRILOSEC) 20 MG DR capsule Take 1 capsule by mouth once daily.         Past Medical History:   Diagnosis Date     Asbestos exposure 1/19/2007    chest x ray Jan 2007, November 2009. 11/30/11, 4/24/2013, 12/31/2014     Diabetes (H)      Hearing loss 1/19/2007    hearing aid on left. 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[employer])     Hypertension      Melanoma (H)      Osteoarthritis of hip 11/30/2011    Right hip replacement 12/5/11 Dr Jj Reyes, will have left hip replacement 1/2/13 Dr Reyes     Sleep apnea 1/19/2007    wears CPAP device at night     PAST SURGICAL HISTORY  1. Bilateral hip replacement, 2011    SOCIAL HISTORY   with 2 children and 4 grandchildren. Retired and now working as a . Quit smoking 20 years ago. Smoked 2 packs per day x 10 years.  History   Smoking Status     Former Smoker     Packs/day: 0.00     Quit date: 2009   Smokeless Tobacco     Never Used    Social History    Substance and Sexual Activity      Alcohol use: Yes        Comment: Weekends/socially     History   Drug Use Unknown     FAMILY HISTORY  Paternal uncle: Throat cancer  Family History   Problem Relation Age of Onset     Glaucoma No family hx of      Macular Degeneration No family hx of        PHYSICAL EXAMINATION  There were no vitals taken for this visit.  No exam as this was telephone visit.      LABORATORY STUDIES  No visits with results within 1 Week(s) from this visit.   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 Axial MIP images of the  chest, and coronal and sagittal reformatted images of the chest,  abdomen, and pelvis obtained.    FINDINGS:      Chest:    The central tracheobronchial tree is patent. No pneumothorax, pleural  effusion, focal airspace consolidation. Mild bibasilar dependent  atelectasis. Stable calcified right lower lobe pulmonary nodule  measuring up to 12 mm (series 11, image 147). No new or enlarging  pulmonary nodules. No thyroid nodules. The heart size is normal. No  pericardial effusion. Unchanged pleural-based thickening along the  left upper lobe (series 11, image 104) and pleural plaque-like  thickening with calcification along the posterior left lower lobe  (series 11, image 155). Unchanged mild density along the diaphragmatic  subpleural right lower lobe (series 9, image 76). Normal caliber main  pulmonary artery. No significant coronary artery calcifications. No  central pulmonary embolism. Conventional 3 vessel branching aortic  arch. Thoracic aortic ectasia measuring up to 4.3 cm, unchanged from  prior exam. Stable prominent 10 mm left axillary lymph node (series 8,  image 39).. No pneumothorax or lymphadenopathy. Stable prominent but  nonenlarged mediastinal lymph nodes, including precarinal lymph node  measuring 6 mm (series 8, image 112). Calcified right hilar lymph  nodes. Unchanged right hilar lymph node measuring 1 cm (series 8,  image 134). Normal caliber  esophagus. Mild bilateral gynecomastia.    Abdomen and pelvis:    Stable subcentimeter arterially enhancing focus in the segment 8  (series 5 image 44), likely representing shunting as demonstrated on  prior MRI from 1/29/2021. Additional unchanged small enhancing focus  within segment 7 (series 5 image 81), corresponding to hemangioma seen  on prior MRI. Unchanged subcentimeter cystic hypodensity within the  left hepatic lobe, likely representing simple hepatic cyst (series 5,  image 92) No new focal hepatic mass. Scattered calcified hepatic  granulomas. Borderline gallbladder wall thickening, likely related to  underdistention. No intra or extrahepatic biliary dilatation. No  pancreatic ductal dilatation. Scattered calcified splenic granulomas.  Small splenule in the left upper quadrant. Unchanged thickening of the  left adrenal gland without focal nodule. The right adrenal gland is  unremarkable. Symmetric renal cortical enhancement. No hydronephrosis  or nephrolithiasis. Multiple unchanged fluid attenuating cysts seen  throughout the kidneys and unchanged 10 mm hyperattenuating cyst in  the midpole of the left kidney (series 5 image 162), likely  representing a hemorrhagic/proteinaceous cyst. Limited evaluation of  the pelvis due to streak artifact from bilateral total hip  arthroplasties. Limited evaluation of the urinary bladder which  appears within normal limits. No abnormally dilated loops of large or  small bowel. Colonic diverticulosis without evidence of acute  diverticulitis. Mild to moderate colonic stool burden. Unchanged 2.4  cm hyperenhancing mass like region in the central abdomen along loops  of small bowel containing calcification (series 8 image 453). Small  fat-containing left inguinal hernia. No inguinal lymphadenopathy. No  abdominal or pelvic lymphadenopathy by size criteria. The major  intra-abdominal vasculature is widely patent. The infrarenal aorta is  of normal caliber. Mild aortoiliac  atherosclerotic calcifications.    Bones:  Multilevel degenerative changes of the spine. Postoperative changes of  bilateral total hip arthroplasties. No suspicious or aggressive  appearing bone lesions.  Impression: IMPRESSION:    In this patient with a history of malignant melanoma, the current scan  compared to CT chest abdomen pelvis 8/19/2021 shows:  1. No new metastatic disease within the chest, abdomen, or pelvis.  2. Stable indeterminate hyperdense masslike lesion within the central  abdomen small bowel loops. Differentials  include small bowel GIST,  leiomyoma versus less likely carcinoid or aggressive neoplastic  lesion.  3. Ascending thoracic aortic ectasia measuring up to 4.3 cm on this  non gated scan.  4. Sequela of prior granulomatous infection.  5. Unchanged bilateral renal cysts and left midpole exophytic  hyperdensity, likely hemorrhagic/proteinaceous cyst.   6.  Stable enhancing foci within the liver, consistent with vascular  shunts and benign hemangioma, as demonstrated on MRI from 1/29/2021.  No focal hepatic lesion.    I have personally reviewed the examination and initial interpretation  and I agree with the findings.    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 Austin. He is a 76 year old man s/p plaque brachytherapy for uveal melanoma involving the ciliary body. He is doing well.    CT-CAP shows no new evidence of metastases, but shows a relatively stable small bowel lesion favored to represent a GIST or leiomyoma. This would require surgery to characterize further.    -Continue follow-up in Ophthalmology.  -Surgical oncology referral  -Plan to see him back in 4 months CT-CAP or yearly MR-abdomen (April 2022).    Darrell Aranda M.D.   of  Medicine  Hematology, Oncology and Transplantation

## 2022-01-07 ENCOUNTER — PATIENT OUTREACH (OUTPATIENT)
Dept: SURGERY | Facility: CLINIC | Age: 77
End: 2022-01-07
Payer: COMMERCIAL

## 2022-01-07 NOTE — PROGRESS NOTES
New Patient Oncology Nurse Navigator Note     Referring provider: Dr. De Souza     Referring Clinic/Organization: [facility]     Referred to: Surgical Oncology      Requested provider (if applicable): First available provider    Referral Received: 01/07/22       Evaluation for : Small bowel lesion      Clinical History (per Nurse review of records provided):      See book marked documents:       Referring MD office note    Imaging reports     Lab Results   Component Value Date/Time    ALBUMIN 3.6 12/17/2021 08:40 AM    ALBUMIN 4.0 04/30/2021 10:17 AM    AST 18 12/17/2021 08:40 AM    AST 16 04/30/2021 10:17 AM    ALT 38 12/17/2021 08:40 AM    ALT 34 04/30/2021 10:17 AM    ALKPHOS 106 12/17/2021 08:40 AM    ALKPHOS 106 04/30/2021 10:17 AM    BILITOTAL 0.3 12/17/2021 08:40 AM    BILITOTAL 0.6 04/30/2021 10:17 AM    WBC 5.5 12/17/2021 08:40 AM    WBC 6.2 04/30/2021 10:17 AM          Past Medical History:   Diagnosis Date     Asbestos exposure 1/19/2007    chest x ray Jan 2007, November 2009. 11/30/11, 4/24/2013, 12/31/2014     Diabetes (H)      Hearing loss 1/19/2007    hearing aid on left. 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[employer])     Hypertension      Melanoma (H)      Osteoarthritis of hip 11/30/2011    Right hip replacement 12/5/11 Dr Jj Reyes, will have left hip replacement 1/2/13 Dr Reyes     Sleep apnea 1/19/2007    wears CPAP device at night       Past Surgical History:   Procedure Laterality Date     AS PARTIAL HIP REPLACEMENT       cyst removal back       INSERT PLAQUE RADIOACTIVE Left 12/16/2019    Procedure: 1) I-125 plaque placement  16 mm LEFT eye 2) intraoperative ultrasound 3) disinsertion of lateral rectus muscle;  Surgeon: Charisma Rodriguez MD;  Location: [facility] OR     ORTHOPEDIC SURGERY Bilateral     hip replacements     REMOVE PLAQUE RADIOACTIVE Left 12/20/2019    Procedure: 1) I-125 plaque removal, left eye 2) reinsertion lateral rectus muscle, left eye;  Surgeon:  Charisma Rodriguez MD;  Location: [facility] OR       Current Outpatient Medications   Medication Sig Dispense Refill     amLODIPine (NORVASC) 10 MG tablet Take 10 mg by mouth daily        aspirin 81 MG EC tablet Take 81 mg by mouth       atorvastatin (LIPITOR) 20 MG tablet Take 1 Tablet by mouth 1 time daily.       carvedilol (COREG) 12.5 MG tablet Take 12.5 mg by mouth 2 times daily (with meals)        chlorthalidone (HYGROTON) 25 MG tablet Take 25 mg by mouth       lisinopril (PRINIVIL/ZESTRIL) 20 MG tablet Take 2 tablets BY MOUTH in the morning, and 1 tablet at night.  3     omeprazole (PRILOSEC) 20 MG DR capsule Take 1 capsule by mouth once daily.             Allergies   Allergen Reactions     Metronidazole      No Clinical Screening - See Comments Itching and Rash     Nitroimidazoles          Patient Active Problem List   Diagnosis     Malignant melanoma of choroid of left eye (H)     Vitamin D deficiency     Sleep apnea     Obesity     Osteoarthritis of hip     Personal history of tobacco use, presenting hazards to health     Microalbuminuria     Memory disorder     Impotence of organic origin     Hyperlipidemia     History of hip joint replacement by other means     Hearing loss     Hypertension     Controlled diabetes mellitus type II without complication (H)     Asbestos exposure     H/O asbestos exposure     Special screening for malignant neoplasms, colon     Special screening for malignant neoplasm of prostate     Sensorineural hearing loss, bilateral     Ascending aorta dilation (H)         Clinical Assessment / Barriers to Care (Per Nurse):    None at this time.     Records Location:     [facility]     Records Needed:     NONE AT THIS TIME      Additional testing needed prior to consult:     NONE AT THIS TIME    Referral updates and Plan:       Consult with Surgical Oncology     Kimmy Quintanilla RN, BSN   Surgical Oncology New Patient Nurse Navigator  [facility]  [phone]

## 2022-01-10 ENCOUNTER — TELEPHONE (OUTPATIENT)
Dept: ONCOLOGY | Facility: CLINIC | Age: 77
End: 2022-01-10
Payer: COMMERCIAL

## 2022-01-12 ENCOUNTER — PATIENT OUTREACH (OUTPATIENT)
Dept: ONCOLOGY | Facility: CLINIC | Age: 77
End: 2022-01-12
Payer: COMMERCIAL

## 2022-01-12 NOTE — PROGRESS NOTES
Oncology Distress Screening Follow-up  Clinical Social Work  [facility]    Identified Concern and Score From Distress Screenin. How concerned are you about your ability to eat?  0       2. How concerned are you about unintended weight loss or your current weight?  1       3. How concerned are you about feeling depressed or very sad?  0       4. How concerned are you about feeling anxious or very scared?  0       5. Do you struggle with the loss of meaning and sol in your life?  Not at all       6. How concerned are you about work and home life issues that may be affected by your cancer?  6 Abnormal        7. How concerned are you about knowing what resources are available to help you?  3       8. Do you currently have what you would describe as Episcopal or spiritual struggles?             Not at all         Date of Distress Screenin22    Intervention:   Brent is a 76-year-old gentleman with a diagnosis of choroidal melanoma of the left eye who is followed by Dr. Kalpesh De Souza at [facility]. At time of last visit with his oncologist Brent scored positive on oncology distress screen. This clinician called Brent today with intention of following up on elevated distress screen and orienting to role of oncology social worker.     This clinician called and left detailed voicemail with social work contact information and availability.     Education Provided:   Onc SW contact information    Follow-up Required:   Will await return call. Oncology social work will continue to be available as needed for ongoing psychosocial support.     LUAN Hunt, [facility]  Phone: [phone]  [facility]: M, Thu  *every other Tue, 8am-4:30pm  [facility]: W, F, *every other Tue, 8am-4:30pm

## 2022-01-17 ENCOUNTER — PATIENT OUTREACH (OUTPATIENT)
Dept: CARE COORDINATION | Facility: CLINIC | Age: 77
End: 2022-01-17
Payer: COMMERCIAL

## 2022-01-17 NOTE — PROGRESS NOTES
Oncology Distress Screening Follow-up  Clinical Social Work  [facility]    Identified Concern and Score From Distress Screenin. How concerned are you about your ability to eat?  0       2. How concerned are you about unintended weight loss or your current weight?  1       3. How concerned are you about feeling depressed or very sad?  0       4. How concerned are you about feeling anxious or very scared?  0       5. Do you struggle with the loss of meaning and sol in your life?  Not at all       6. How concerned are you about work and home life issues that may be affected by your cancer?  6 Abnormal        7. How concerned are you about knowing what resources are available to help you?  3       8. Do you currently have what you would describe as Holiness or spiritual struggles?             Not at all         Date of Distress Screenin22    Intervention:   Brent is a 76-year-old gentleman with a diagnosis of choroidal melanoma of the left eye who is followed by Dr. Kalpesh De Souza at [facility]. This clinician received return call from Brent today.     Brent endorsed that he is doing well from a mood perspective and denied concerns about impact of cancer diagnosis on work/home life issues. Oriented Brent to role of oncology social worker and support available. Brent appreciative of outreach, but denied concern or need at present time.     Education Provided:   Onc SW contact information    Follow-up Required:   Oncology social work will continue to be available as needed for ongoing psychosocial support. No planned outreach at present time.    LUAN Hunt, [facility]  Phone: [phone]  [facility]: M, Thu  *every other Tue, 8am-4:30pm  [facility]: W, F, *every other Tue, 8am-4:30pm

## 2022-01-26 NOTE — PROGRESS NOTES
Surgical Oncology - New Patient  1/28/2022    76 M w/ history of treated ocular melanoma and no evidence of recurrence.  Has ~2.4 cm enhancing mass of the small bowel that has been relatively stable since at least 2019.  Thought to be most likely GIST vs. Leiomyoma.  He is asymptomatic from this with no evidence of obstructive symptoms or lower GI bleeding.  No hormonal symptoms to suggest NET.  He is relatively healthy with possible remote history of minor MI on daily baby ASA.  HTN and DM.  No prior abdominal surgeries.  He is active and independent.    BP (!) 144/79 (BP Location: Left arm, Patient Position: Sitting, Cuff Size: Adult Regular)   Pulse 60   Temp 97.9  F (36.6  C) (Oral)   Resp 18   Wt 97.9 kg (215 lb 12.8 oz)   SpO2 99%   BMI 29.68 kg/m      CT CAP (12/17/2021): IMPRESSION: In this patient with a history of malignant melanoma, the current scan compared to CT chest abdomen pelvis 8/19/2021 shows:  1. No new metastatic disease within the chest, abdomen, or pelvis.  2. Stable indeterminate hyperdense masslike lesion within the central abdomen small bowel loops. Differentials  include small bowel GIST, leiomyoma versus less likely carcinoid or aggressive neoplastic lesion.  3. Ascending thoracic aortic ectasia measuring up to 4.3 cm on this non-gated scan.  4. Sequela of prior granulomatous infection.  5. Unchanged bilateral renal cysts and left midpole exophytic hyperdensity, likely hemorrhagic/proteinaceous cyst.   6. Stable enhancing foci within the liver, consistent with vascular shunts and benign hemangioma, as demonstrated on MRI from 1/29/2021. No focal hepatic lesion.    Labs (12/17/2021): CBC normal.  CMP w/ Cr 1.24 o/w normal.    Assessment/Plan:  76 M w/ essentially stable, enhancing small bowel mass of ~2.4 cm.  He is asymptomatic from a GI standpoint.  He has history of ocular melanoma with no evidence of recurrence.  He does have some medical co-morbidities but he is in good physical  shape and I believe is a very reasonable candidate for surgery.  We discussed his situation and the main differential diagnosis of leiomyoma, GIST, carcinoid, and melanoma metastasis.  Regardless of etiology, the relative stability on imaging likely suggests an indolent biology.  We discussed the main two options of surveillance vs. surgical resection.  The advantage of surveillance is that we would possibly avoid surgery altogether given the seemingly indolent behavior of the lesion.  The disadvantage is that we don't have a clear diagnosis and thus have some uncertainty regarding his risk moving forward.  It is possible to have progression or to miss a window of treatment opportunity with surveillance, even though the risk with this particular lesion would be low.  The advantage to surgery is definitive diagnosis and management of a potentially malignant condition.  This could improve his outcome and/or provide further information that could effect treatment and/or further surveillance.  The disadvantage is the small risks of surgery in the face of a seemingly indolent process.  Thus, surveillance carries risk of under-treatment and surgery the risk of over-treatment.  Given how healthy the patient is, I favor a recommendation of surgical resection, which would be laparoscopic, possible open small bowel resection.  After this discussion, the patient was also in favor of proceeding with surgical resection.  We discussed risks including but not limited to bleeding/need for transfusion, infection/abscess, pneumonia, CO2 embolus, DVT/PE, stroke, cardiac events, renal failure, COVID-19, UTI, damage to surrounding structures, anastomotic leak, ileus, obstruction, hernia, and long term issues related to anastomotic stricture, hernias, and adhesive bowel obstructions.  We will go ahead and start looking for a surgery date now.  New CT CAP will be obtained prior to his surgery date.  Questions were answered and the patient  was in agreement with and understanding of the plan.    A total of 50 minutes were spent on this encounter including more than 50% in face to face time and the remainder in imaging review, chart review, documentation, and coordination of care.

## 2022-01-28 ENCOUNTER — OFFICE VISIT (OUTPATIENT)
Dept: SURGERY | Facility: CLINIC | Age: 77
End: 2022-01-28
Attending: INTERNAL MEDICINE
Payer: COMMERCIAL

## 2022-01-28 VITALS
SYSTOLIC BLOOD PRESSURE: 144 MMHG | BODY MASS INDEX: 29.68 KG/M2 | HEART RATE: 60 BPM | RESPIRATION RATE: 18 BRPM | WEIGHT: 215.8 LBS | TEMPERATURE: 97.9 F | DIASTOLIC BLOOD PRESSURE: 79 MMHG | OXYGEN SATURATION: 99 %

## 2022-01-28 DIAGNOSIS — K63.89 SMALL BOWEL MASS: Primary | ICD-10-CM

## 2022-01-28 DIAGNOSIS — D21.4 LEIOMYOMA OF SMALL INTESTINE: ICD-10-CM

## 2022-01-28 DIAGNOSIS — G47.30 SLEEP APNEA: ICD-10-CM

## 2022-01-28 PROCEDURE — 99204 OFFICE O/P NEW MOD 45 MIN: CPT | Performed by: SURGERY

## 2022-01-28 PROCEDURE — G0463 HOSPITAL OUTPT CLINIC VISIT: HCPCS

## 2022-01-28 ASSESSMENT — PAIN SCALES - GENERAL: PAINLEVEL: NO PAIN (0)

## 2022-01-28 NOTE — LETTER
1/28/2022         RE: Jairo Austin  [address]  [facility] [address]        Dear Colleague,    Thank you for referring your patient, Jairo Austin, to the [facility]. Please see a copy of my visit note below.    Surgical Oncology - New Patient  1/28/2022    76 M w/ history of treated ocular melanoma and no evidence of recurrence.  Has ~2.4 cm enhancing mass of the small bowel that has been relatively stable since at least 2019.  Thought to be most likely GIST vs. Leiomyoma.  He is asymptomatic from this with no evidence of obstructive symptoms or lower GI bleeding.  No hormonal symptoms to suggest NET.  He is relatively healthy with possible remote history of minor MI on daily baby ASA.  HTN and DM.  No prior abdominal surgeries.  He is active and independent.    BP (!) 144/79 (BP Location: Left arm, Patient Position: Sitting, Cuff Size: Adult Regular)   Pulse 60   Temp 97.9  F (36.6  C) (Oral)   Resp 18   Wt 97.9 kg (215 lb 12.8 oz)   SpO2 99%   BMI 29.68 kg/m      CT CAP (12/17/2021): IMPRESSION: In this patient with a history of malignant melanoma, the current scan compared to CT chest abdomen pelvis 8/19/2021 shows:  1. No new metastatic disease within the chest, abdomen, or pelvis.  2. Stable indeterminate hyperdense masslike lesion within the central abdomen small bowel loops. Differentials  include small bowel GIST, leiomyoma versus less likely carcinoid or aggressive neoplastic lesion.  3. Ascending thoracic aortic ectasia measuring up to 4.3 cm on this non-gated scan.  4. Sequela of prior granulomatous infection.  5. Unchanged bilateral renal cysts and left midpole exophytic hyperdensity, likely hemorrhagic/proteinaceous cyst.   6. Stable enhancing foci within the liver, consistent with vascular shunts and benign hemangioma, as demonstrated on MRI from 1/29/2021. No focal hepatic lesion.    Labs (12/17/2021): CBC normal.  CMP w/ Cr 1.24 o/w  normal.    Assessment/Plan:  76 M w/ essentially stable, enhancing small bowel mass of ~2.4 cm.  He is asymptomatic from a GI standpoint.  He has history of ocular melanoma with no evidence of recurrence.  He does have some medical co-morbidities but he is in good physical shape and I believe is a very reasonable candidate for surgery.  We discussed his situation and the main differential diagnosis of leiomyoma, GIST, carcinoid, and melanoma metastasis.  Regardless of etiology, the relative stability on imaging likely suggests an indolent biology.  We discussed the main two options of surveillance vs. surgical resection.  The advantage of surveillance is that we would possibly avoid surgery altogether given the seemingly indolent behavior of the lesion.  The disadvantage is that we don't have a clear diagnosis and thus have some uncertainty regarding his risk moving forward.  It is possible to have progression or to miss a window of treatment opportunity with surveillance, even though the risk with this particular lesion would be low.  The advantage to surgery is definitive diagnosis and management of a potentially malignant condition.  This could improve his outcome and/or provide further information that could effect treatment and/or further surveillance.  The disadvantage is the small risks of surgery in the face of a seemingly indolent process.  Thus, surveillance carries risk of under-treatment and surgery the risk of over-treatment.  Given how healthy the patient is, I favor a recommendation of surgical resection, which would be laparoscopic, possible open small bowel resection.  After this discussion, the patient was also in favor of proceeding with surgical resection.  We discussed risks including but not limited to bleeding/need for transfusion, infection/abscess, pneumonia, CO2 embolus, DVT/PE, stroke, cardiac events, renal failure, COVID-19, UTI, damage to surrounding structures, anastomotic leak, ileus,  obstruction, hernia, and long term issues related to anastomotic stricture, hernias, and adhesive bowel obstructions.  We will go ahead and start looking for a surgery date now.  New CT CAP will be obtained prior to his surgery date.  Questions were answered and the patient was in agreement with and understanding of the plan.    A total of 50 minutes were spent on this encounter including more than 50% in face to face time and the remainder in imaging review, chart review, documentation, and coordination of care.          Again, thank you for allowing me to participate in the care of your patient.      Sincerely,    Sachin Laura MD

## 2022-01-28 NOTE — NURSING NOTE
"Oncology Rooming Note    January 28, 2022 8:49 AM   Jairo Austin is a 76 year old male who presents for:    Chief Complaint   Patient presents with     Oncology Clinic Visit     Leiomyoma of small intestine     Initial Vitals: BP (!) 144/79 (BP Location: Left arm, Patient Position: Sitting, Cuff Size: Adult Regular)   Pulse 60   Temp 97.9  F (36.6  C) (Oral)   Resp 18   Wt 97.9 kg (215 lb 12.8 oz)   SpO2 99%   BMI 29.68 kg/m   Estimated body mass index is 29.68 kg/m  as calculated from the following:    Height as of 2/6/20: 1.816 m (5' 11.5\").    Weight as of this encounter: 97.9 kg (215 lb 12.8 oz). Body surface area is 2.22 meters squared.  No Pain (0) Comment: Data Unavailable   No LMP for male patient.  Allergies reviewed: Yes  Medications reviewed: Yes    Medications: Medication refills not needed today.  Pharmacy name entered into [employer]: [id number] - [place], [place] - [address]    Clinical concerns: New patient: no changes reported.        Trisha Francisco LPN January 28, 2022 8:49 AM                "

## 2022-02-02 ENCOUNTER — PATIENT OUTREACH (OUTPATIENT)
Dept: SURGERY | Facility: CLINIC | Age: 77
End: 2022-02-02
Payer: COMMERCIAL

## 2022-02-02 ENCOUNTER — DOCUMENTATION ONLY (OUTPATIENT)
Dept: SURGERY | Facility: CLINIC | Age: 77
End: 2022-02-02
Payer: COMMERCIAL

## 2022-02-02 NOTE — PROGRESS NOTES
Surgical Oncology RN Care Coordination Note:     Returned call to patients daughter and answered her questions relating to the upcoming planned surgery and options of surveillance. Informed her that the discussion was had regarding options of surgery vs surveillance and we are happy to discuss further with patient and her if they would like another appointment. Discussed the anticipated recovery for surgery and related questions. Informed her that per Dr. Laura there is no urgency to us getting this done quickly and if they decide on wanting to have a follow up visit to discuss further.     Kimmy Quintanilla, RN, BSN  Care Coordinator

## 2022-02-02 NOTE — PROGRESS NOTES
RNCC: Kimmy Quintanilla;  [phone]    Surgery is scheduled with Dr. Laura on 2/22 at [place].  Scheduled per availability.    H&P to be completed by PAC in person on 2/11 with CT scan to follow     COVID-19 test: 2/19 at [facility]    Post-op: 3/18, in person visit    The RN completed the education regarding the surgery.     Patient will receive surgery arrival and start time from PAC.    Patient will complete COVID-19 test that was scheduled by surgical coordinator 2-4 days prior to surgery.     I sent a message on [employer] to confirm the scheduled information. Offered to call patient if they would like. Will watch for response via [employer], if no response/message not read - I will call.

## 2022-02-03 NOTE — TELEPHONE ENCOUNTER
FUTURE VISIT INFORMATION      SURGERY INFORMATION:    Date: 22    Location: [facility] or    Surgeon:  Sachin Laura MD    Anesthesia Type:  general    Procedure: Laparoscopic bowel resection possible EXCISION, SMALL INTESTINE, WITH OSTOMY CREATION    Consult: ov     RECORDS REQUESTED FROM:       Primary Care Provider: Trent Zelaya MD - [facility]    Pertinent Medical History: hypertension, Ascending aorta dilation     Most recent EKG+ Tracin19    Most recent ECHO: 19- [facility]

## 2022-02-04 DIAGNOSIS — Z11.59 ENCOUNTER FOR SCREENING FOR OTHER VIRAL DISEASES: Primary | ICD-10-CM

## 2022-02-08 ENCOUNTER — ANESTHESIA EVENT (OUTPATIENT)
Dept: SURGERY | Facility: CLINIC | Age: 77
DRG: 331 | End: 2022-02-08
Payer: COMMERCIAL

## 2022-02-11 ENCOUNTER — LAB (OUTPATIENT)
Dept: LAB | Facility: CLINIC | Age: 77
End: 2022-02-11
Payer: COMMERCIAL

## 2022-02-11 ENCOUNTER — PRE VISIT (OUTPATIENT)
Dept: SURGERY | Facility: CLINIC | Age: 77
End: 2022-02-11

## 2022-02-11 ENCOUNTER — OFFICE VISIT (OUTPATIENT)
Dept: SURGERY | Facility: CLINIC | Age: 77
End: 2022-02-11
Payer: COMMERCIAL

## 2022-02-11 ENCOUNTER — ANCILLARY PROCEDURE (OUTPATIENT)
Dept: CT IMAGING | Facility: CLINIC | Age: 77
End: 2022-02-11
Attending: SURGERY
Payer: COMMERCIAL

## 2022-02-11 VITALS
HEART RATE: 58 BPM | WEIGHT: 214.1 LBS | SYSTOLIC BLOOD PRESSURE: 128 MMHG | OXYGEN SATURATION: 96 % | TEMPERATURE: 97.8 F | RESPIRATION RATE: 16 BRPM | DIASTOLIC BLOOD PRESSURE: 77 MMHG | BODY MASS INDEX: 29 KG/M2 | HEIGHT: 72 IN

## 2022-02-11 DIAGNOSIS — Z01.818 PRE-OP EVALUATION: Primary | ICD-10-CM

## 2022-02-11 DIAGNOSIS — K63.89 SMALL BOWEL MASS: ICD-10-CM

## 2022-02-11 DIAGNOSIS — E11.9 CONTROLLED TYPE 2 DIABETES MELLITUS WITHOUT COMPLICATION, WITHOUT LONG-TERM CURRENT USE OF INSULIN (H): ICD-10-CM

## 2022-02-11 DIAGNOSIS — Z01.818 PRE-OP EVALUATION: ICD-10-CM

## 2022-02-11 DIAGNOSIS — I77.810 ASCENDING AORTA DILATION (H): ICD-10-CM

## 2022-02-11 LAB
ABO/RH(D): NORMAL
ANTIBODY SCREEN: NEGATIVE
CREAT BLD-MCNC: 1.1 MG/DL (ref 0.7–1.3)
ERYTHROCYTE [DISTWIDTH] IN BLOOD BY AUTOMATED COUNT: 11.9 % (ref 10–15)
GFR SERPL CREATININE-BSD FRML MDRD: >60 ML/MIN/1.73M2
HCT VFR BLD AUTO: 47.1 % (ref 40–53)
HGB BLD-MCNC: 15.5 G/DL (ref 13.3–17.7)
MCH RBC QN AUTO: 32.3 PG (ref 26.5–33)
MCHC RBC AUTO-ENTMCNC: 32.9 G/DL (ref 31.5–36.5)
MCV RBC AUTO: 98 FL (ref 78–100)
PLATELET # BLD AUTO: 222 10E3/UL (ref 150–450)
RBC # BLD AUTO: 4.8 10E6/UL (ref 4.4–5.9)
SPECIMEN EXPIRATION DATE: NORMAL
WBC # BLD AUTO: 6.3 10E3/UL (ref 4–11)

## 2022-02-11 PROCEDURE — 71260 CT THORAX DX C+: CPT | Mod: GC | Performed by: RADIOLOGY

## 2022-02-11 PROCEDURE — 36415 COLL VENOUS BLD VENIPUNCTURE: CPT | Performed by: PATHOLOGY

## 2022-02-11 PROCEDURE — 74177 CT ABD & PELVIS W/CONTRAST: CPT | Mod: GC | Performed by: RADIOLOGY

## 2022-02-11 PROCEDURE — 85027 COMPLETE CBC AUTOMATED: CPT | Performed by: PATHOLOGY

## 2022-02-11 PROCEDURE — 86850 RBC ANTIBODY SCREEN: CPT | Performed by: NURSE PRACTITIONER

## 2022-02-11 PROCEDURE — 86901 BLOOD TYPING SEROLOGIC RH(D): CPT | Performed by: NURSE PRACTITIONER

## 2022-02-11 PROCEDURE — 99204 OFFICE O/P NEW MOD 45 MIN: CPT | Performed by: NURSE PRACTITIONER

## 2022-02-11 RX ORDER — MULTIPLE VITAMINS W/ MINERALS TAB 9MG-400MCG
1 TAB ORAL EVERY MORNING
COMMUNITY

## 2022-02-11 RX ORDER — IOPAMIDOL 755 MG/ML
131 INJECTION, SOLUTION INTRAVASCULAR ONCE
Status: COMPLETED | OUTPATIENT
Start: 2022-02-11 | End: 2022-02-11

## 2022-02-11 RX ADMIN — IOPAMIDOL 131 ML: 755 INJECTION, SOLUTION INTRAVASCULAR at 10:48

## 2022-02-11 ASSESSMENT — PAIN SCALES - GENERAL: PAINLEVEL: NO PAIN (0)

## 2022-02-11 ASSESSMENT — ENCOUNTER SYMPTOMS: ORTHOPNEA: 0

## 2022-02-11 ASSESSMENT — LIFESTYLE VARIABLES: TOBACCO_USE: 1

## 2022-02-11 ASSESSMENT — MIFFLIN-ST. JEOR: SCORE: 1749.15

## 2022-02-11 NOTE — H&P (VIEW-ONLY)
Pre-Operative H & P     CC:  Preoperative exam to assess for increased cardiopulmonary risk while undergoing surgery and anesthesia.    Date of Encounter: 2/11/2022  Primary Care Physician:  Trent Zelaya     Reason for visit:   Encounter Diagnosis   Name Primary?     Pre-op evaluation Yes       HPI  Jairo Austin is a 76 year old male who presents for pre-operative H & P in preparation for  Procedure Information     Case: [id number] Date/Time: 02/22/22 0800    Procedures:       Laparoscopic bowel resection (N/A Abdomen)      possible EXCISION, SMALL INTESTINE, WITH OSTOMY CREATION (N/A Abdomen)    Anesthesia type: General    Diagnosis:       Small bowel mass [K63.89]      Leiomyoma of small intestine [D21.4]    Pre-op diagnosis:       Small bowel mass [K63.89]      Leiomyoma of small intestine [D21.4]    Location: [facility] OR 28 / [facility] OR    Providers: Sachin Laura MD          History is obtained from the patient and chart review    76 M w/ essentially stable, enhancing small bowel mass of ~2.4 cm since 2019. The patient has discussed options of surveillance vs. surgical intervention. Patient has opted for surgical intervention allowing for definitive diagnosis and manage of a potentially malignant condition. Therefore, the above procedure has been scheduled.    PMH also includes, HTN, AAA, OA, HL, CARMITA DM2, and GERD    Hx of abnormal bleeding or anti-platelet use: asa on hold 7 days prior to procedure      Past Medical History  Past Medical History:   Diagnosis Date     Asbestos exposure 1/19/2007    chest x ray Jan 2007, November 2009. 11/30/11, 4/24/2013, 12/31/2014     Diabetes (H)      Hearing loss 1/19/2007    hearing aid on left. 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[employer])     Hypertension      Melanoma (H)      Osteoarthritis of hip 11/30/2011    Right hip replacement 12/5/11 Dr Jj Reyes, will have left hip replacement 1/2/13 Dr Reyes     Sleep apnea 1/19/2007    wears CPAP device at  night       Past Surgical History  Past Surgical History:   Procedure Laterality Date     AS PARTIAL HIP REPLACEMENT       cyst removal back       INSERT PLAQUE RADIOACTIVE Left 2019    Procedure: 1) I-125 plaque placement  16 mm LEFT eye 2) intraoperative ultrasound 3) disinsertion of lateral rectus muscle;  Surgeon: Charisma Rodriguez MD;  Location: [facility] OR     ORTHOPEDIC SURGERY Bilateral     hip replacements     REMOVE PLAQUE RADIOACTIVE Left 2019    Procedure: 1) I-125 plaque removal, left eye 2) reinsertion lateral rectus muscle, left eye;  Surgeon: Charisma Rodriguez MD;  Location: [facility] OR       Prior to Admission Medications  Current Outpatient Medications   Medication Sig Dispense Refill     amLODIPine (NORVASC) 10 MG tablet Take 10 mg by mouth every morning        aspirin 81 MG EC tablet Take 81 mg by mouth every morning        atorvastatin (LIPITOR) 20 MG tablet every morning        carvedilol (COREG) 12.5 MG tablet Take 12.5 mg by mouth 2 times daily (with meals)        chlorthalidone (HYGROTON) 25 MG tablet Take 25 mg by mouth every morning        lisinopril (PRINIVIL/ZESTRIL) 20 MG tablet 2 times daily   3     multivitamin w/minerals (MULTI-VITAMIN) tablet Take 1 tablet by mouth every morning       omeprazole (PRILOSEC) 20 MG DR capsule every morning          Allergies  Allergies   Allergen Reactions     Metronidazole      No Clinical Screening - See Comments Itching and Rash     Nitroimidazoles       Social History  Social History     Socioeconomic History     Marital status:      Spouse name: Not on file     Number of children: Not on file     Years of education: Not on file     Highest education level: Not on file   Occupational History     Not on file   Tobacco Use     Smoking status: Former Smoker     Packs/day: 0.00     Quit date:      Years since quittin.1     Smokeless tobacco: Never Used   Substance and Sexual Activity     Alcohol use: Yes     Comment:  Weekends/socially     Drug use: Never     Sexual activity: Not on file   Other Topics Concern     Parent/sibling w/ CABG, MI or angioplasty before 65F 55M? Not Asked   Social History Narrative     Not on file     Social Determinants of Health     Financial Resource Strain: Not on file   Food Insecurity: Not on file   Transportation Needs: Not on file   Physical Activity: Not on file   Stress: Not on file   Social Connections: Not on file   Intimate Partner Violence: Not At Risk     Fear of Current or Ex-Partner: No     Emotionally Abused: No     Physically Abused: No     Sexually Abused: No   Housing Stability: Not on file       Family History  Family History   Problem Relation Age of Onset     Glaucoma No family hx of      Macular Degeneration No family hx of        Review of Systems  The complete review of systems is negative other than noted in the HPI or here.      /77 (BP Location: Right arm, Patient Position: Chair, Cuff Size: Adult Large)   Pulse 58   Temp 97.8  F (36.6  C) (Oral)   Resp 16   Ht 1.829 m (6')   Wt 97.1 kg (214 lb 1.6 oz)   SpO2 96%   BMI 29.04 kg/m      Physical Exam   Constitutional: Awake, alert, cooperative, no apparent distress, and appears stated age.  Eyes: Pupils equal, round and reactive to light, extra ocular muscles intact, sclera clear, conjunctiva normal.  HENT: Normocephalic, oral pharynx with moist mucus membranes, good dentition. No goiter appreciated.   Respiratory: Clear to auscultation bilaterally, no crackles or wheezing.  Cardiovascular: Regular rate and rhythm, normal S1 and S2, and no murmur noted.  Carotids +2, no bruits. No edema. Palpable pulses to radial  DP and PT arteries.   GI: Normal bowel sounds, soft, non-distended, non-tender, no masses palpated, no hepatosplenomegaly.  Surgical scars: healed  Lymph/Hematologic: No cervical lymphadenopathy and no supraclavicular lymphadenopathy.  Genitourinary:  deferred  Skin: Warm and dry.  No rashes at  anticipated surgical site.   Musculoskeletal: Full ROM of neck. There is no redness, warmth, or swelling of the joints. Gross motor strength is normal.    Neurologic: Awake, alert, oriented to name, place and time. Cranial nerves II-XII are grossly intact. Gait is normal.   Neuropsychiatric: Calm, cooperative. Normal affect.     Prior Labs/Diagnostic Studies   All labs and imaging personally reviewed     CT coronary1/2021    1. Normal epicardial coronary arteries without atherosclerosis, stenosis, or anomaly.  2. Total calcium score 0.  2. Dilated aortic root (42mm max cusp-commissure) and ascending aorta (41x42 mm, area 13.6cm2, area:height ratio 7.5; lower risk given index <10).  -1. No acute or suspicious extracardiac abnormality.  2. Calcified plaque anterior to the left upper lobe. Correlate with any history of exposure to asbestos.     MR cardio- 3/2021   Compared with previous non-contrast CMR study dated 03/04/20   1. Findings demonstrate unrecognized subendocardial myocardial infarction involving the basal lateral segment (OM/Ramus distribution, likely embolic) with discrete hypokinesis. Prior CMR study did not use Gadolinium.  - Interval reverse remodeling with decreased LVEDVi from 100 to 89 ml/m2. Normal LV systolic function with regional wall motion abnormality, and unchanged LVEF=56%.  2. Unchanged mild aortic root dilation (cusp-commissure diameter of 46 x 44 x 43 mm, area/height ratio of 9.1 cm2/m) and ascending aorta (42 x 42 mm, area/height ratio of 8.0 cm2/m).  3. Right ventricle exhibits normal cavity size and normal systolic function. Calculated EF is 60%.   4. Moderate left atrial enlargement      The patient's records and results personally reviewed by this provider.     Outside records reviewed from: Care Everywhere    LAB/DIAGNOSTIC STUDIES TODAY:    Lab Results   Component Value Date    WBC 6.3 02/11/2022    WBC 6.2 04/30/2021     Lab Results   Component Value Date    RBC 4.80 02/11/2022     RBC 4.41 04/30/2021     Lab Results   Component Value Date    HGB 15.5 02/11/2022    HGB 14.5 04/30/2021     Lab Results   Component Value Date    HCT 47.1 02/11/2022    HCT 43.2 04/30/2021     No components found for: MCT  Lab Results   Component Value Date    MCV 98 02/11/2022    MCV 98 04/30/2021     Lab Results   Component Value Date    MCH 32.3 02/11/2022    MCH 32.9 04/30/2021     Lab Results   Component Value Date    MCHC 32.9 02/11/2022    MCHC 33.6 04/30/2021     Lab Results   Component Value Date    RDW 11.9 02/11/2022    RDW 11.9 04/30/2021     Lab Results   Component Value Date     02/11/2022     04/30/2021       Assessment      Jairo Austin is a 76 year old male was seen as a PAC referral for risk assessment and optimization for anesthesia.    Plan/Recommendations  Pt will be optimized for the proposed procedure.  See below for details on the assessment, risk, and preoperative recommendations    NEUROLOGY  - No history of TIA, CVA or seizure    -Post Op delirium risk factors:  Age    ENT  - No current airway concerns.  Will need to be reassessed day of surgery.    CARDIAC  - No history of CAD CT angion 4/2021 Total calcium score 0.   No anginal symptoms, Denies palpitations, PND, dizziness or syncope.   HTN controlled on amlodipine, carvedilol and lisinopril   AAA- recent CT measures at 4.3cm  - METS (Metabolic Equivalents)  Patient performs 4 or more METS exercise without symptoms  Total Score: 0    exercises 5x weekly  RCRI-Very low risk: Class 1 0.4% complication rate  Total Score: 0        PULMONARY  - Obstructive Sleep Apnea  CARMITA with home CPAP.  Patient will be instructed to bring their home CPAP device to the hospital with them.  CARMITA Low Risk  Total Score: 2           CARMITA: Over 50 ys old    CARMITA: Male      - Denies asthma or inhaler use  - Tobacco History      History   Smoking Status     Former Smoker     Packs/day: 0.00     Quit date: 2009   Smokeless Tobacco     Never Used        GI  - GERD  Controlled on medications: Protein Pump Inhibitor  PONV Low Risk  Total Score: 1           1 AN PONV: Patient is not a current smoker        /RENAL  - Baseline Creatinine  1.24    ENDOCRINE  - BMI: Body mass index is 29.04 kg/m .  Overweight (BMI 25.0-29.9)  - Diabetes - diet controlled  Last A1c 5.8    HEME  VTE Low Risk 0.5%  Total Score: 3           VTE: Greater than 59 yrs old    VTE: Male      - No history of abnormal bleeding or antiplatelet use.      MSK  Patient is NOT Frail  Total Score: 0      OA- s/p bilateral hip replacements    The patient is optimized for their procedure. AVS with information on surgery time/arrival time, meds and NPO status given by nursing staff. No further diagnostic testing indicated.    CBC, T & S ordered.       On the day of service:     Prep time: 20 minutes  Visit time: 20 minutes  Documentation time: 15 minutes  ------------------------------------------  Total time: 55 minutes      SASHA Dailey CNP  Preoperative Assessment Center  [facility]  Clinic and Surgery Center  Phone: [phone]  Fax: [phone]

## 2022-02-11 NOTE — PATIENT INSTRUCTIONS
Preparing for Your Surgery      Name:  Jairo Austin   MRN:  [id number]   :  1945   Today's Date:  2022       Arriving for surgery:  Surgery date:  2022  Arrival time:  5:30 am    Restrictions due to COVID 19       Effective 22 [facility] is implementing the following visitor policy:     1 person may accompany the patient through the Pre-Op process.      Then that person will be asked to leave the building.        There will be no visitors in the Surgery Waiting Room.        Inpatients are allowed 1 consistent visitor for the duration of their stay.      Visitors must wear a mask.      Visitors must not be ill.      Visiting hours are 8 am to 8 pm.    [employer] parking is available for anyone with mobility limitations or disabilities.  ([facility]  24 hours/ 7 days a week; [facility]  7 am- 3:30 pm, Mon- Fri)    Please come to:     [facility] [address]  [address] [place] [address]    - ?[employer] parking is available in front of the hospital      -    Please proceed to [address] on the 3rd floor. [phone]?     - ?If you are in need of directions, wheelchair or escort please stop at the Information Desk in the lobby.     What can I eat or drink?  -  Follow diet restrictions as directed by surgeon bowel prep   -  You may have clear liquids until 2 hours before surgery. (Until 5:30 am arrival time)    Examples of clear liquids:  Water  Clear broth  Juices (apple, white grape, white cranberry  and cider) without pulp  Noncarbonated, powder based beverages  (lemonade and Ousmane-Aid)  Sodas (Sprite, 7-Up, ginger ale and seltzer)  Coffee or tea (without milk or cream)  Gatorade    -  No Alcohol for at least 24 hours before surgery     Which medicines can I take?    Hold Aspirin for 7 days before surgery.     Hold Multivitamins for 7 days before surgery.  Hold Supplements for 7 days before surgery.  Hold Ibuprofen (Advil,  Motrin) for 1 day before surgery  Hold Naproxen (Aleve) for 4 days before surgery.    -  DO NOT take these medications the day of surgery:  Chlorthalidone  Lisinopril       -  PLEASE TAKE these medications the day of surgery:  Amlodipine   Atorvastatin  Carvedilol  Omeprazole       How do I prepare myself?  - Please take 2 showers before surgery using Scrubcare or Hibiclens soap.    Use this soap only from the neck to your toes.     Leave the soap on your skin for one minute--then rinse thoroughly.      You may use your own shampoo and conditioner; no other hair products.   - Please remove all jewelry and body piercings.  - No lotions, deodorants or fragrance.  - No makeup or fingernail polish.   - Bring your ID and insurance card.    -If you have a Deep Brain Stimulator, Spinal Cord Stimulator or any neuro stimulator device---you must bring the remote control to the hospital     - All patients are required to have a Covid-19 test within 4 days of surgery/procedure.      -Patients will be contacted by the [facility] scheduling team within 1 week of surgery to make an appointment.      - Patients may call the Scheduling team at [phone] if they have not been scheduled within 4 days of  surgery.            Questions or Concerns:    - For any questions regarding the day of surgery or your hospital stay, please contact the Pre Admission Nursing Office at [phone].       - If you have health changes between today and your surgery please call your surgeon.       For questions after surgery please call your surgeons office.       OPTIMAL RECOVERY AFTER SURGERY        Begin hydrating yourself by drinking at least 8-10 glasses of clear liquids for 24 hours before surgery:      Suggested clear liquids:   Water    Clear Juices   Clear Broth   Non- carbonated beverages    (Crystal Light or Ousmane Aid)   Sodas    (Sprite, 7 up, ginger ale, seltzer)   Gatorade              Drink clear liquids up until 2 hours before  your surgery.       We would like you to purchase a drink such as Gatorade or Ensure Clear (not the milkshake type).  Drink this before bedtime and on the way into the hospital, drink between 8-10 ounces or until you feel hydrated.        Keeping well hydrated leads to your veins being plump, you wake up faster, and you are less likely to be nauseated. Start drinking water as soon as you can after surgery and advance to clear liquids and food as tolerated.  IV fluids contain salt, drinking fluids will minimize the amount of IV fluids you need and decrease the amount of salt you get.                 The most common reason for the patient to be readmitted is dehydration. Staying hydrated after you go home from the hospital is very important.  Ensure or Ensure Clear are good options to keep you hydrated.

## 2022-02-11 NOTE — H&P
Pre-Operative H & P     CC:  Preoperative exam to assess for increased cardiopulmonary risk while undergoing surgery and anesthesia.    Date of Encounter: 2/11/2022  Primary Care Physician:  Trent Zelaya     Reason for visit:   Encounter Diagnosis   Name Primary?     Pre-op evaluation Yes       HPI  Jairo Austin is a 76 year old male who presents for pre-operative H & P in preparation for  Procedure Information     Case: [id number] Date/Time: 02/22/22 0800    Procedures:       Laparoscopic bowel resection (N/A Abdomen)      possible EXCISION, SMALL INTESTINE, WITH OSTOMY CREATION (N/A Abdomen)    Anesthesia type: General    Diagnosis:       Small bowel mass [K63.89]      Leiomyoma of small intestine [D21.4]    Pre-op diagnosis:       Small bowel mass [K63.89]      Leiomyoma of small intestine [D21.4]    [facility]    Providers: Sachin Laura MD          History is obtained from the patient and chart review    76 M w/ essentially stable, enhancing small bowel mass of ~2.4 cm since 2019. The patient has discussed options of surveillance vs. surgical intervention. Patient has opted for surgical intervention allowing for definitive diagnosis and manage of a potentially malignant condition. Therefore, the above procedure has been scheduled.    PMH also includes, HTN, AAA, OA, HL, CARMITA DM2, and GERD    Hx of abnormal bleeding or anti-platelet use: asa on hold 7 days prior to procedure      Past Medical History  Past Medical History:   Diagnosis Date     Asbestos exposure 1/19/2007    chest x ray Jan 2007, November 2009. 11/30/11, 4/24/2013, 12/31/2014     Diabetes (H)      Hearing loss 1/19/2007    hearing aid on left. 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[employer])     Hypertension      Melanoma (H)      Osteoarthritis of hip 11/30/2011    Right hip replacement 12/5/11 Dr Jj Reyes, will have left hip replacement 1/2/13 Dr Reyes     Sleep apnea 1/19/2007    wears CPAP device at  night       Past Surgical History  Past Surgical History:   Procedure Laterality Date     AS PARTIAL HIP REPLACEMENT       cyst removal back       INSERT PLAQUE RADIOACTIVE Left 2019    Procedure: 1) I-125 plaque placement  16 mm LEFT eye 2) intraoperative ultrasound 3) disinsertion of lateral rectus muscle;  Surgeon: Charisma Rodriguez MD;  Location: [facility]     ORTHOPEDIC SURGERY Bilateral     hip replacements     REMOVE PLAQUE RADIOACTIVE Left 2019    Procedure: 1) I-125 plaque removal, left eye 2) reinsertion lateral rectus muscle, left eye;  Surgeon: Charisma Rodriguez MD;  Location: [facility]       Prior to Admission Medications  Current Outpatient Medications   Medication Sig Dispense Refill     amLODIPine (NORVASC) 10 MG tablet Take 10 mg by mouth every morning        aspirin 81 MG EC tablet Take 81 mg by mouth every morning        atorvastatin (LIPITOR) 20 MG tablet every morning        carvedilol (COREG) 12.5 MG tablet Take 12.5 mg by mouth 2 times daily (with meals)        chlorthalidone (HYGROTON) 25 MG tablet Take 25 mg by mouth every morning        lisinopril (PRINIVIL/ZESTRIL) 20 MG tablet 2 times daily   3     multivitamin w/minerals (MULTI-VITAMIN) tablet Take 1 tablet by mouth every morning       omeprazole (PRILOSEC) 20 MG DR capsule every morning          Allergies  Allergies   Allergen Reactions     Metronidazole      No Clinical Screening - See Comments Itching and Rash     Nitroimidazoles       Social History  Social History     Socioeconomic History     Marital status:      Spouse name: Not on file     Number of children: Not on file     Years of education: Not on file     Highest education level: Not on file   Occupational History     Not on file   Tobacco Use     Smoking status: Former Smoker     Packs/day: 0.00     Quit date:      Years since quittin.1     Smokeless tobacco: Never Used   Substance and Sexual Activity     Alcohol use: Yes     Comment:  Weekends/socially     Drug use: Never     Sexual activity: Not on file   Other Topics Concern     Parent/sibling w/ CABG, MI or angioplasty before 65F 55M? Not Asked   Social History Narrative     Not on file     Social Determinants of Health     Financial Resource Strain: Not on file   Food Insecurity: Not on file   Transportation Needs: Not on file   Physical Activity: Not on file   Stress: Not on file   Social Connections: Not on file   Intimate Partner Violence: Not At Risk     Fear of Current or Ex-Partner: No     Emotionally Abused: No     Physically Abused: No     Sexually Abused: No   Housing Stability: Not on file       Family History  Family History   Problem Relation Age of Onset     Glaucoma No family hx of      Macular Degeneration No family hx of        Review of Systems  The complete review of systems is negative other than noted in the HPI or here.      /77 (BP Location: Right arm, Patient Position: Chair, Cuff Size: Adult Large)   Pulse 58   Temp 97.8  F (36.6  C) (Oral)   Resp 16   Ht 1.829 m (6')   Wt 97.1 kg (214 lb 1.6 oz)   SpO2 96%   BMI 29.04 kg/m      Physical Exam   Constitutional: Awake, alert, cooperative, no apparent distress, and appears stated age.  Eyes: Pupils equal, round and reactive to light, extra ocular muscles intact, sclera clear, conjunctiva normal.  HENT: Normocephalic, oral pharynx with moist mucus membranes, good dentition. No goiter appreciated.   Respiratory: Clear to auscultation bilaterally, no crackles or wheezing.  Cardiovascular: Regular rate and rhythm, normal S1 and S2, and no murmur noted.  Carotids +2, no bruits. No edema. Palpable pulses to radial  DP and PT arteries.   GI: Normal bowel sounds, soft, non-distended, non-tender, no masses palpated, no hepatosplenomegaly.  Surgical scars: healed  Lymph/Hematologic: No cervical lymphadenopathy and no supraclavicular lymphadenopathy.  Genitourinary:  deferred  Skin: Warm and dry.  No rashes at  anticipated surgical site.   Musculoskeletal: Full ROM of neck. There is no redness, warmth, or swelling of the joints. Gross motor strength is normal.    Neurologic: Awake, alert, oriented to name, place and time. Cranial nerves II-XII are grossly intact. Gait is normal.   Neuropsychiatric: Calm, cooperative. Normal affect.     Prior Labs/Diagnostic Studies   All labs and imaging personally reviewed     CT coronary1/2021    1. Normal epicardial coronary arteries without atherosclerosis, stenosis, or anomaly.  2. Total calcium score 0.  2. Dilated aortic root (42mm max cusp-commissure) and ascending aorta (41x42 mm, area 13.6cm2, area:height ratio 7.5; lower risk given index <10).  -1. No acute or suspicious extracardiac abnormality.  2. Calcified plaque anterior to the left upper lobe. Correlate with any history of exposure to asbestos.     MR cardio- 3/2021   Compared with previous non-contrast CMR study dated 03/04/20   1. Findings demonstrate unrecognized subendocardial myocardial infarction involving the basal lateral segment (OM/Ramus distribution, likely embolic) with discrete hypokinesis. Prior CMR study did not use Gadolinium.  - Interval reverse remodeling with decreased LVEDVi from 100 to 89 ml/m2. Normal LV systolic function with regional wall motion abnormality, and unchanged LVEF=56%.  2. Unchanged mild aortic root dilation (cusp-commissure diameter of 46 x 44 x 43 mm, area/height ratio of 9.1 cm2/m) and ascending aorta (42 x 42 mm, area/height ratio of 8.0 cm2/m).  3. Right ventricle exhibits normal cavity size and normal systolic function. Calculated EF is 60%.   4. Moderate left atrial enlargement      The patient's records and results personally reviewed by this provider.     Outside records reviewed from: Care Everywhere    LAB/DIAGNOSTIC STUDIES TODAY:    Lab Results   Component Value Date    WBC 6.3 02/11/2022    WBC 6.2 04/30/2021     Lab Results   Component Value Date    RBC 4.80 02/11/2022     RBC 4.41 04/30/2021     Lab Results   Component Value Date    HGB 15.5 02/11/2022    HGB 14.5 04/30/2021     Lab Results   Component Value Date    HCT 47.1 02/11/2022    HCT 43.2 04/30/2021     No components found for: MCT  Lab Results   Component Value Date    MCV 98 02/11/2022    MCV 98 04/30/2021     Lab Results   Component Value Date    MCH 32.3 02/11/2022    MCH 32.9 04/30/2021     Lab Results   Component Value Date    MCHC 32.9 02/11/2022    MCHC 33.6 04/30/2021     Lab Results   Component Value Date    RDW 11.9 02/11/2022    RDW 11.9 04/30/2021     Lab Results   Component Value Date     02/11/2022     04/30/2021       Assessment      Jairo Austin is a 76 year old male was seen as a PAC referral for risk assessment and optimization for anesthesia.    Plan/Recommendations  Pt will be optimized for the proposed procedure.  See below for details on the assessment, risk, and preoperative recommendations    NEUROLOGY  - No history of TIA, CVA or seizure    -Post Op delirium risk factors:  Age    ENT  - No current airway concerns.  Will need to be reassessed day of surgery.    CARDIAC  - No history of CAD CT angion 4/2021 Total calcium score 0.   No anginal symptoms, Denies palpitations, PND, dizziness or syncope.   HTN controlled on amlodipine, carvedilol and lisinopril   AAA- recent CT measures at 4.3cm  - METS (Metabolic Equivalents)  Patient performs 4 or more METS exercise without symptoms  Total Score: 0    exercises 5x weekly  RCRI-Very low risk: Class 1 0.4% complication rate  Total Score: 0        PULMONARY  - Obstructive Sleep Apnea  CARMITA with home CPAP.  Patient will be instructed to bring their home CPAP device to the hospital with them.  CARMITA Low Risk  Total Score: 2           CARMITA: Over 50 ys old    CARMITA: Male      - Denies asthma or inhaler use  - Tobacco History      History   Smoking Status     Former Smoker     Packs/day: 0.00     Quit date: 2009   Smokeless Tobacco     Never Used        GI  - GERD  Controlled on medications: Protein Pump Inhibitor  PONV Low Risk  Total Score: 1           1 AN PONV: Patient is not a current smoker        /RENAL  - Baseline Creatinine  1.24    ENDOCRINE  - BMI: Body mass index is 29.04 kg/m .  Overweight (BMI 25.0-29.9)  - Diabetes - diet controlled  Last A1c 5.8    HEME  VTE Low Risk 0.5%  Total Score: 3           VTE: Greater than 59 yrs old    VTE: Male      - No history of abnormal bleeding or antiplatelet use.      MSK  Patient is NOT Frail  Total Score: 0      OA- s/p bilateral hip replacements    The patient is optimized for their procedure. AVS with information on surgery time/arrival time, meds and NPO status given by nursing staff. No further diagnostic testing indicated.    CBC, T & S ordered.       On the day of service:     Prep time: 20 minutes  Visit time: 20 minutes  Documentation time: 15 minutes  ------------------------------------------  Total time: 55 minutes      SASHA Dailey CNP  Preoperative Assessment Center  [facility]  Phone: [phone]  Fax: [phone]

## 2022-02-11 NOTE — ANESTHESIA PREPROCEDURE EVALUATION
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 11, 2013, 2014     Diabetes (H)      Hearing loss 2007    hearing aid on left. 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[employer])     Hypertension      Melanoma (H)      Osteoarthritis of hip 2011    Right hip replacement 11 Dr Jj Reyes, will have left hip replacement 13 Dr Reyes     Sleep apnea 2007    wears CPAP device at night      Past Surgical History:   Procedure Laterality Date     AS PARTIAL HIP REPLACEMENT       cyst removal back       INSERT PLAQUE RADIOACTIVE Left 2019    Procedure: 1) I-125 plaque placement  16 mm LEFT eye 2) intraoperative ultrasound 3) disinsertion of lateral rectus muscle;  Surgeon: Charisma Rodriguez MD;  [facility]     ORTHOPEDIC SURGERY Bilateral     hip replacements     REMOVE PLAQUE RADIOACTIVE Left 2019    Procedure: 1) I-125 plaque removal, left eye 2) reinsertion lateral rectus muscle, left eye;  Surgeon: Charisma Rodriguez MD;  [facility]      Allergies   Allergen Reactions     Metronidazole      No Clinical Screening - See Comments Itching and Rash     Nitroimidazoles      Social History     Tobacco Use     Smoking status: Former Smoker     Packs/day: 0.00     Quit date:      Years since quittin.1     Smokeless tobacco: Never Used   Substance Use Topics     Alcohol use: Yes     Comment: Weekends/socially      Wt Readings from Last 1 Encounters:   22 97.1 kg (214 lb 1.6 oz)        Anesthesia Evaluation   Pt has had prior anesthetic. Type:  General.    No history of anesthetic complications       ROS/MED HX  ENT/Pulmonary: Comment: Smokes cigars when golfing    (+) sleep apnea, uses CPAP, tobacco use, Past use,  (-) asthma   Neurologic:  - neg neurologic ROS     Cardiovascular:     (+) hypertension----- (-) VAZQUEZ, orthopnea/PND, syncope and irregular heartbeat/palpitations   METS/Exercise Tolerance: >4 METS Comment: Exercising 5x weekly.   Treadmill, elliptical and lifts weights   Hematologic:  - neg hematologic  ROS     Musculoskeletal: Comment: Bilateral hip replacements  (+) arthritis,     GI/Hepatic:     (+) GERD, Asymptomatic on medication,     Renal/Genitourinary:  - neg Renal ROS     Endo:     (+) type I DM, Last HgA1c: 5.8, date: 6/2021, Not using insulin, not previously admitted for DM/DKA.     Psychiatric/Substance Use:  - neg psychiatric ROS     Infectious Disease: Comment: Fully vaccinated for COVID - neg infectious disease ROS     Malignancy:  - neg malignancy ROS     Other:            Physical Exam    Airway  airway exam normal      Mallampati: III   TM distance: < 3 FB   Neck ROM: full   Mouth opening: > 3 cm    Respiratory Devices and Support         Dental  no notable dental history     (+) missing      Cardiovascular   cardiovascular exam normal       Rhythm and rate: regular and normal     Pulmonary   pulmonary exam normal        breath sounds clear to auscultation           OUTSIDE LABS:  CBC:   Lab Results   Component Value Date    WBC 5.5 12/17/2021    WBC 6.3 08/19/2021    HGB 14.1 12/17/2021    HGB 14.8 08/19/2021    HCT 42.1 12/17/2021    HCT 43.8 08/19/2021     12/17/2021     08/19/2021     BMP:   Lab Results   Component Value Date     12/17/2021     08/19/2021    POTASSIUM 3.7 12/17/2021    POTASSIUM 3.9 08/19/2021    CHLORIDE 100 12/17/2021    CHLORIDE 104 08/19/2021    CO2 26 12/17/2021    CO2 29 08/19/2021    BUN 23 12/17/2021    BUN 12 08/19/2021    CR 1.24 12/17/2021    CR 1.4 (H) 12/17/2021      (H) 12/17/2021     (H) 08/19/2021     COAGS: No results found for: PTT, INR, FIBR  POC:   Lab Results   Component Value Date    BGM 93 12/20/2019     HEPATIC:   Lab Results   Component Value Date    ALBUMIN 3.6 12/17/2021    PROTTOTAL 7.0 12/17/2021    ALT 38 12/17/2021    AST 18 12/17/2021    ALKPHOS 106 12/17/2021    BILITOTAL 0.3 12/17/2021     OTHER:   Lab Results   Component Value Date    PAMELA 8.8 12/17/2021       Anesthesia Plan    ASA Status:  3   NPO Status:  NPO Appropriate    Anesthesia Type: General.     - Airway: ETT   Induction: Intravenous.   Maintenance: Balanced.   Techniques and Equipment:     - Lines/Monitors: 2nd IV     Consents    Anesthesia Plan(s) and associated risks, benefits, and realistic alternatives discussed. Questions answered and patient/representative(s) expressed understanding.     - Discussed: Risks, Benefits and Alternatives for BOTH SEDATION and the PROCEDURE were discussed     - Discussed with:  Patient      - Extended Intubation/Ventilatory Support Discussed: No.      - Patient is DNR/DNI Status: No    Use of blood products discussed: Yes.     - Discussed with: Patient.     - Consented: consented to blood products            Reason for refusal: other.     Postoperative Care    Pain management: IV analgesics, Peripheral nerve block (Single Shot).     - Plan for long acting post-op opioid use   PONV prophylaxis: Ondansetron (or other 5HT-3)     Comments:                SASHA Dailey CNP     I have seen and evaluated the patient myself and agree with the above assessment and plan.  I have explained the risks/benefits of anesthesia to the patient who understands and agrees to proceed.    Jessica Stern MD  Staff Anesthesiologist  Pager [phone]

## 2022-02-19 ENCOUNTER — LAB (OUTPATIENT)
Dept: URGENT CARE | Facility: URGENT CARE | Age: 77
End: 2022-02-19
Payer: COMMERCIAL

## 2022-02-19 DIAGNOSIS — Z11.59 ENCOUNTER FOR SCREENING FOR OTHER VIRAL DISEASES: ICD-10-CM

## 2022-02-19 PROCEDURE — U0005 INFEC AGEN DETEC AMPLI PROBE: HCPCS

## 2022-02-19 PROCEDURE — U0003 INFECTIOUS AGENT DETECTION BY NUCLEIC ACID (DNA OR RNA); SEVERE ACUTE RESPIRATORY SYNDROME CORONAVIRUS 2 (SARS-COV-2) (CORONAVIRUS DISEASE [COVID-19]), AMPLIFIED PROBE TECHNIQUE, MAKING USE OF HIGH THROUGHPUT TECHNOLOGIES AS DESCRIBED BY CMS-2020-01-R: HCPCS

## 2022-02-20 LAB — SARS-COV-2 RNA RESP QL NAA+PROBE: NEGATIVE

## 2022-02-22 ENCOUNTER — HOSPITAL ENCOUNTER (INPATIENT)
Facility: CLINIC | Age: 77
LOS: 3 days | Discharge: HOME | DRG: 331 | End: 2022-02-25
Attending: SURGERY | Admitting: SURGERY
Payer: COMMERCIAL

## 2022-02-22 ENCOUNTER — ANESTHESIA (OUTPATIENT)
Dept: SURGERY | Facility: CLINIC | Age: 77
DRG: 331 | End: 2022-02-22
Payer: COMMERCIAL

## 2022-02-22 DIAGNOSIS — G89.18 POSTOPERATIVE PAIN: Primary | ICD-10-CM

## 2022-02-22 PROBLEM — K63.89 SMALL BOWEL MASS: Status: ACTIVE | Noted: 2022-02-22

## 2022-02-22 LAB
ANION GAP SERPL CALCULATED.3IONS-SCNC: 7 MMOL/L (ref 3–14)
BUN SERPL-MCNC: 17 MG/DL (ref 7–30)
CALCIUM SERPL-MCNC: 8.6 MG/DL (ref 8.5–10.1)
CHLORIDE BLD-SCNC: 105 MMOL/L (ref 94–109)
CO2 SERPL-SCNC: 27 MMOL/L (ref 20–32)
CREAT SERPL-MCNC: 0.97 MG/DL (ref 0.66–1.25)
ERYTHROCYTE [DISTWIDTH] IN BLOOD BY AUTOMATED COUNT: 12 % (ref 10–15)
GFR SERPL CREATININE-BSD FRML MDRD: 82 ML/MIN/1.73M2
GLUCOSE BLD-MCNC: 160 MG/DL (ref 70–99)
GLUCOSE BLDC GLUCOMTR-MCNC: 128 MG/DL (ref 70–99)
GLUCOSE BLDC GLUCOMTR-MCNC: 169 MG/DL (ref 70–99)
HCT VFR BLD AUTO: 46.3 % (ref 40–53)
HGB BLD-MCNC: 15.4 G/DL (ref 13.3–17.7)
MCH RBC QN AUTO: 32.6 PG (ref 26.5–33)
MCHC RBC AUTO-ENTMCNC: 33.3 G/DL (ref 31.5–36.5)
MCV RBC AUTO: 98 FL (ref 78–100)
PLATELET # BLD AUTO: 198 10E3/UL (ref 150–450)
POTASSIUM BLD-SCNC: 3.4 MMOL/L (ref 3.4–5.3)
RBC # BLD AUTO: 4.73 10E6/UL (ref 4.4–5.9)
SODIUM SERPL-SCNC: 139 MMOL/L (ref 133–144)
WBC # BLD AUTO: 13.2 10E3/UL (ref 4–11)

## 2022-02-22 PROCEDURE — 250N000009 HC RX 250: Performed by: ANESTHESIOLOGY

## 2022-02-22 PROCEDURE — 250N000011 HC RX IP 250 OP 636

## 2022-02-22 PROCEDURE — 0DBA0ZX EXCISION OF JEJUNUM, OPEN APPROACH, DIAGNOSTIC: ICD-10-PCS | Performed by: SURGERY

## 2022-02-22 PROCEDURE — 710N000010 HC RECOVERY PHASE 1, LEVEL 2, PER MIN: Performed by: SURGERY

## 2022-02-22 PROCEDURE — 82310 ASSAY OF CALCIUM: CPT | Performed by: STUDENT IN AN ORGANIZED HEALTH CARE EDUCATION/TRAINING PROGRAM

## 2022-02-22 PROCEDURE — 250N000013 HC RX MED GY IP 250 OP 250 PS 637: Performed by: STUDENT IN AN ORGANIZED HEALTH CARE EDUCATION/TRAINING PROGRAM

## 2022-02-22 PROCEDURE — 360N000077 HC SURGERY LEVEL 4, PER MIN: Performed by: SURGERY

## 2022-02-22 PROCEDURE — 250N000009 HC RX 250

## 2022-02-22 PROCEDURE — 999N000141 HC STATISTIC PRE-PROCEDURE NURSING ASSESSMENT: Performed by: SURGERY

## 2022-02-22 PROCEDURE — 85027 COMPLETE CBC AUTOMATED: CPT | Performed by: STUDENT IN AN ORGANIZED HEALTH CARE EDUCATION/TRAINING PROGRAM

## 2022-02-22 PROCEDURE — 250N000025 HC SEVOFLURANE, PER MIN: Performed by: SURGERY

## 2022-02-22 PROCEDURE — 44120 REMOVAL OF SMALL INTESTINE: CPT | Mod: GC | Performed by: SURGERY

## 2022-02-22 PROCEDURE — 88331 PATH CONSLTJ SURG 1 BLK 1SPC: CPT | Mod: TC | Performed by: SURGERY

## 2022-02-22 PROCEDURE — 250N000011 HC RX IP 250 OP 636: Performed by: ANESTHESIOLOGY

## 2022-02-22 PROCEDURE — 250N000011 HC RX IP 250 OP 636: Performed by: STUDENT IN AN ORGANIZED HEALTH CARE EDUCATION/TRAINING PROGRAM

## 2022-02-22 PROCEDURE — 258N000003 HC RX IP 258 OP 636: Performed by: STUDENT IN AN ORGANIZED HEALTH CARE EDUCATION/TRAINING PROGRAM

## 2022-02-22 PROCEDURE — 258N000003 HC RX IP 258 OP 636

## 2022-02-22 PROCEDURE — 5A09357 ASSISTANCE WITH RESPIRATORY VENTILATION, LESS THAN 24 CONSECUTIVE HOURS, CONTINUOUS POSITIVE AIRWAY PRESSURE: ICD-10-PCS | Performed by: NURSE PRACTITIONER

## 2022-02-22 PROCEDURE — 258N000003 HC RX IP 258 OP 636: Performed by: ANESTHESIOLOGY

## 2022-02-22 PROCEDURE — 272N000001 HC OR GENERAL SUPPLY STERILE: Performed by: SURGERY

## 2022-02-22 PROCEDURE — 36415 COLL VENOUS BLD VENIPUNCTURE: CPT | Performed by: STUDENT IN AN ORGANIZED HEALTH CARE EDUCATION/TRAINING PROGRAM

## 2022-02-22 PROCEDURE — 120N000002 HC R&B MED SURG/OB UMMC

## 2022-02-22 PROCEDURE — 370N000017 HC ANESTHESIA TECHNICAL FEE, PER MIN: Performed by: SURGERY

## 2022-02-22 PROCEDURE — 0DBA0ZZ EXCISION OF JEJUNUM, OPEN APPROACH: ICD-10-PCS | Performed by: SURGERY

## 2022-02-22 RX ORDER — PROPOFOL 10 MG/ML
INJECTION, EMULSION INTRAVENOUS PRN
Status: DISCONTINUED | OUTPATIENT
Start: 2022-02-22 | End: 2022-02-22

## 2022-02-22 RX ORDER — CEFAZOLIN SODIUM 1 G/50ML
2 SOLUTION INTRAVENOUS
Status: COMPLETED | OUTPATIENT
Start: 2022-02-22 | End: 2022-02-22

## 2022-02-22 RX ORDER — BUPIVACAINE HYDROCHLORIDE 2.5 MG/ML
INJECTION, SOLUTION EPIDURAL; INFILTRATION; INTRACAUDAL
Status: COMPLETED | OUTPATIENT
Start: 2022-02-22 | End: 2022-02-22

## 2022-02-22 RX ORDER — NALOXONE HYDROCHLORIDE 0.4 MG/ML
0.2 INJECTION, SOLUTION INTRAMUSCULAR; INTRAVENOUS; SUBCUTANEOUS
Status: DISCONTINUED | OUTPATIENT
Start: 2022-02-22 | End: 2022-02-25 | Stop reason: HOSPADM

## 2022-02-22 RX ORDER — LABETALOL HYDROCHLORIDE 5 MG/ML
20 INJECTION, SOLUTION INTRAVENOUS EVERY 4 HOURS PRN
Status: DISCONTINUED | OUTPATIENT
Start: 2022-02-22 | End: 2022-02-25 | Stop reason: HOSPADM

## 2022-02-22 RX ORDER — HYDROMORPHONE HCL IN WATER/PF 6 MG/30 ML
.2-.3 PATIENT CONTROLLED ANALGESIA SYRINGE INTRAVENOUS EVERY 4 HOURS PRN
Status: DISCONTINUED | OUTPATIENT
Start: 2022-02-22 | End: 2022-02-24

## 2022-02-22 RX ORDER — OXYCODONE HYDROCHLORIDE 5 MG/1
5 TABLET ORAL EVERY 4 HOURS PRN
Status: DISCONTINUED | OUTPATIENT
Start: 2022-02-22 | End: 2022-02-22 | Stop reason: HOSPADM

## 2022-02-22 RX ORDER — SODIUM CHLORIDE, SODIUM LACTATE, POTASSIUM CHLORIDE, CALCIUM CHLORIDE 600; 310; 30; 20 MG/100ML; MG/100ML; MG/100ML; MG/100ML
INJECTION, SOLUTION INTRAVENOUS CONTINUOUS
Status: DISCONTINUED | OUTPATIENT
Start: 2022-02-22 | End: 2022-02-24

## 2022-02-22 RX ORDER — DEXAMETHASONE SODIUM PHOSPHATE 10 MG/ML
INJECTION, SOLUTION INTRAMUSCULAR; INTRAVENOUS
Status: COMPLETED | OUTPATIENT
Start: 2022-02-22 | End: 2022-02-22

## 2022-02-22 RX ORDER — PANTOPRAZOLE SODIUM 40 MG/1
40 TABLET, DELAYED RELEASE ORAL
Status: DISCONTINUED | OUTPATIENT
Start: 2022-02-23 | End: 2022-02-25 | Stop reason: HOSPADM

## 2022-02-22 RX ORDER — SODIUM CHLORIDE, SODIUM LACTATE, POTASSIUM CHLORIDE, CALCIUM CHLORIDE 600; 310; 30; 20 MG/100ML; MG/100ML; MG/100ML; MG/100ML
INJECTION, SOLUTION INTRAVENOUS CONTINUOUS PRN
Status: DISCONTINUED | OUTPATIENT
Start: 2022-02-22 | End: 2022-02-22

## 2022-02-22 RX ORDER — ONDANSETRON 2 MG/ML
4 INJECTION INTRAMUSCULAR; INTRAVENOUS EVERY 30 MIN PRN
Status: DISCONTINUED | OUTPATIENT
Start: 2022-02-22 | End: 2022-02-22 | Stop reason: HOSPADM

## 2022-02-22 RX ORDER — ONDANSETRON 4 MG/1
4 TABLET, ORALLY DISINTEGRATING ORAL EVERY 30 MIN PRN
Status: DISCONTINUED | OUTPATIENT
Start: 2022-02-22 | End: 2022-02-22 | Stop reason: HOSPADM

## 2022-02-22 RX ORDER — LIDOCAINE 40 MG/G
CREAM TOPICAL
Status: DISCONTINUED | OUTPATIENT
Start: 2022-02-22 | End: 2022-02-25 | Stop reason: HOSPADM

## 2022-02-22 RX ORDER — FENTANYL CITRATE 50 UG/ML
INJECTION, SOLUTION INTRAMUSCULAR; INTRAVENOUS PRN
Status: DISCONTINUED | OUTPATIENT
Start: 2022-02-22 | End: 2022-02-22

## 2022-02-22 RX ORDER — CEFAZOLIN SODIUM 1 G/50ML
2 SOLUTION INTRAVENOUS SEE ADMIN INSTRUCTIONS
Status: DISCONTINUED | OUTPATIENT
Start: 2022-02-22 | End: 2022-02-22 | Stop reason: HOSPADM

## 2022-02-22 RX ORDER — EPHEDRINE SULFATE 50 MG/ML
INJECTION, SOLUTION INTRAMUSCULAR; INTRAVENOUS; SUBCUTANEOUS PRN
Status: DISCONTINUED | OUTPATIENT
Start: 2022-02-22 | End: 2022-02-22

## 2022-02-22 RX ORDER — CARVEDILOL 12.5 MG/1
12.5 TABLET ORAL 2 TIMES DAILY WITH MEALS
Status: DISCONTINUED | OUTPATIENT
Start: 2022-02-22 | End: 2022-02-25 | Stop reason: HOSPADM

## 2022-02-22 RX ORDER — NALOXONE HYDROCHLORIDE 0.4 MG/ML
0.4 INJECTION, SOLUTION INTRAMUSCULAR; INTRAVENOUS; SUBCUTANEOUS
Status: DISCONTINUED | OUTPATIENT
Start: 2022-02-22 | End: 2022-02-25 | Stop reason: HOSPADM

## 2022-02-22 RX ORDER — FENTANYL CITRATE 50 UG/ML
25 INJECTION, SOLUTION INTRAMUSCULAR; INTRAVENOUS EVERY 5 MIN PRN
Status: DISCONTINUED | OUTPATIENT
Start: 2022-02-22 | End: 2022-02-22 | Stop reason: HOSPADM

## 2022-02-22 RX ORDER — ONDANSETRON 2 MG/ML
INJECTION INTRAMUSCULAR; INTRAVENOUS PRN
Status: DISCONTINUED | OUTPATIENT
Start: 2022-02-22 | End: 2022-02-22

## 2022-02-22 RX ORDER — FENTANYL CITRATE 50 UG/ML
25-50 INJECTION, SOLUTION INTRAMUSCULAR; INTRAVENOUS
Status: DISCONTINUED | OUTPATIENT
Start: 2022-02-22 | End: 2022-02-22 | Stop reason: HOSPADM

## 2022-02-22 RX ORDER — LIDOCAINE 40 MG/G
CREAM TOPICAL
Status: DISCONTINUED | OUTPATIENT
Start: 2022-02-22 | End: 2022-02-22 | Stop reason: HOSPADM

## 2022-02-22 RX ORDER — SODIUM CHLORIDE, SODIUM LACTATE, POTASSIUM CHLORIDE, CALCIUM CHLORIDE 600; 310; 30; 20 MG/100ML; MG/100ML; MG/100ML; MG/100ML
INJECTION, SOLUTION INTRAVENOUS CONTINUOUS
Status: DISCONTINUED | OUTPATIENT
Start: 2022-02-22 | End: 2022-02-22 | Stop reason: HOSPADM

## 2022-02-22 RX ORDER — OXYCODONE HYDROCHLORIDE 5 MG/1
5-10 TABLET ORAL EVERY 4 HOURS PRN
Status: DISCONTINUED | OUTPATIENT
Start: 2022-02-22 | End: 2022-02-25 | Stop reason: HOSPADM

## 2022-02-22 RX ORDER — ACETAMINOPHEN 500 MG
1000 TABLET ORAL 4 TIMES DAILY
Status: DISCONTINUED | OUTPATIENT
Start: 2022-02-22 | End: 2022-02-25 | Stop reason: HOSPADM

## 2022-02-22 RX ORDER — DEXMEDETOMIDINE HYDROCHLORIDE 4 UG/ML
INJECTION, SOLUTION INTRAVENOUS
Status: COMPLETED | OUTPATIENT
Start: 2022-02-22 | End: 2022-02-22

## 2022-02-22 RX ORDER — HYDROMORPHONE HCL IN WATER/PF 6 MG/30 ML
0.2 PATIENT CONTROLLED ANALGESIA SYRINGE INTRAVENOUS EVERY 5 MIN PRN
Status: DISCONTINUED | OUTPATIENT
Start: 2022-02-22 | End: 2022-02-22 | Stop reason: HOSPADM

## 2022-02-22 RX ORDER — LABETALOL HYDROCHLORIDE 5 MG/ML
10 INJECTION, SOLUTION INTRAVENOUS
Status: DISCONTINUED | OUTPATIENT
Start: 2022-02-22 | End: 2022-02-22 | Stop reason: HOSPADM

## 2022-02-22 RX ORDER — DEXAMETHASONE SODIUM PHOSPHATE 4 MG/ML
INJECTION, SOLUTION INTRA-ARTICULAR; INTRALESIONAL; INTRAMUSCULAR; INTRAVENOUS; SOFT TISSUE PRN
Status: DISCONTINUED | OUTPATIENT
Start: 2022-02-22 | End: 2022-02-22

## 2022-02-22 RX ORDER — FLUMAZENIL 0.1 MG/ML
0.2 INJECTION, SOLUTION INTRAVENOUS
Status: DISCONTINUED | OUTPATIENT
Start: 2022-02-22 | End: 2022-02-22 | Stop reason: HOSPADM

## 2022-02-22 RX ORDER — LIDOCAINE HYDROCHLORIDE 20 MG/ML
INJECTION, SOLUTION INFILTRATION; PERINEURAL PRN
Status: DISCONTINUED | OUTPATIENT
Start: 2022-02-22 | End: 2022-02-22

## 2022-02-22 RX ADMIN — OXYCODONE HYDROCHLORIDE 10 MG: 5 TABLET ORAL at 18:46

## 2022-02-22 RX ADMIN — HYDROMORPHONE HYDROCHLORIDE 0.2 MG: 0.2 INJECTION, SOLUTION INTRAMUSCULAR; INTRAVENOUS; SUBCUTANEOUS at 12:20

## 2022-02-22 RX ADMIN — ROCURONIUM BROMIDE 30 MG: 50 INJECTION, SOLUTION INTRAVENOUS at 09:15

## 2022-02-22 RX ADMIN — FENTANYL CITRATE 50 MCG: 50 INJECTION, SOLUTION INTRAMUSCULAR; INTRAVENOUS at 08:53

## 2022-02-22 RX ADMIN — ROCURONIUM BROMIDE 20 MG: 50 INJECTION, SOLUTION INTRAVENOUS at 10:00

## 2022-02-22 RX ADMIN — ROCURONIUM BROMIDE 70 MG: 50 INJECTION, SOLUTION INTRAVENOUS at 08:12

## 2022-02-22 RX ADMIN — LIDOCAINE HYDROCHLORIDE 80 MG: 20 INJECTION, SOLUTION INFILTRATION; PERINEURAL at 08:10

## 2022-02-22 RX ADMIN — Medication 5 MG: at 09:43

## 2022-02-22 RX ADMIN — HYDROMORPHONE HYDROCHLORIDE 0.2 MG: 0.2 INJECTION, SOLUTION INTRAMUSCULAR; INTRAVENOUS; SUBCUTANEOUS at 22:05

## 2022-02-22 RX ADMIN — HYDROMORPHONE HYDROCHLORIDE 0.2 MG: 0.2 INJECTION, SOLUTION INTRAMUSCULAR; INTRAVENOUS; SUBCUTANEOUS at 12:37

## 2022-02-22 RX ADMIN — HYDROMORPHONE HYDROCHLORIDE 0.5 MG: 1 INJECTION, SOLUTION INTRAMUSCULAR; INTRAVENOUS; SUBCUTANEOUS at 10:34

## 2022-02-22 RX ADMIN — FENTANYL CITRATE 50 MCG: 50 INJECTION, SOLUTION INTRAMUSCULAR; INTRAVENOUS at 11:13

## 2022-02-22 RX ADMIN — HYDROMORPHONE HYDROCHLORIDE 0.2 MG: 0.2 INJECTION, SOLUTION INTRAMUSCULAR; INTRAVENOUS; SUBCUTANEOUS at 12:30

## 2022-02-22 RX ADMIN — SODIUM CHLORIDE, POTASSIUM CHLORIDE, SODIUM LACTATE AND CALCIUM CHLORIDE: 600; 310; 30; 20 INJECTION, SOLUTION INTRAVENOUS at 22:09

## 2022-02-22 RX ADMIN — FENTANYL CITRATE 50 MCG: 50 INJECTION, SOLUTION INTRAMUSCULAR; INTRAVENOUS at 08:57

## 2022-02-22 RX ADMIN — ACETAMINOPHEN 1000 MG: 500 TABLET, FILM COATED ORAL at 21:33

## 2022-02-22 RX ADMIN — ACETAMINOPHEN 1000 MG: 500 TABLET, FILM COATED ORAL at 13:00

## 2022-02-22 RX ADMIN — Medication 5 MG: at 09:27

## 2022-02-22 RX ADMIN — PROPOFOL 100 MG: 10 INJECTION, EMULSION INTRAVENOUS at 08:10

## 2022-02-22 RX ADMIN — FENTANYL CITRATE 50 MCG: 50 INJECTION, SOLUTION INTRAMUSCULAR; INTRAVENOUS at 08:10

## 2022-02-22 RX ADMIN — SODIUM CHLORIDE, POTASSIUM CHLORIDE, SODIUM LACTATE AND CALCIUM CHLORIDE: 600; 310; 30; 20 INJECTION, SOLUTION INTRAVENOUS at 07:58

## 2022-02-22 RX ADMIN — Medication 2 G: at 08:30

## 2022-02-22 RX ADMIN — OXYCODONE HYDROCHLORIDE 5 MG: 5 TABLET ORAL at 14:43

## 2022-02-22 RX ADMIN — PROPOFOL 30 MG: 10 INJECTION, EMULSION INTRAVENOUS at 08:12

## 2022-02-22 RX ADMIN — ONDANSETRON 4 MG: 2 INJECTION INTRAMUSCULAR; INTRAVENOUS at 10:34

## 2022-02-22 RX ADMIN — DEXAMETHASONE SODIUM PHOSPHATE 4 MG: 10 INJECTION, SOLUTION INTRAMUSCULAR; INTRAVENOUS at 06:55

## 2022-02-22 RX ADMIN — BUPIVACAINE HYDROCHLORIDE 60 ML: 2.5 INJECTION, SOLUTION EPIDURAL; INFILTRATION; INTRACAUDAL; PERINEURAL at 06:55

## 2022-02-22 RX ADMIN — FENTANYL CITRATE 50 MCG: 50 INJECTION, SOLUTION INTRAMUSCULAR; INTRAVENOUS at 07:01

## 2022-02-22 RX ADMIN — HYDROMORPHONE HYDROCHLORIDE 0.2 MG: 0.2 INJECTION, SOLUTION INTRAMUSCULAR; INTRAVENOUS; SUBCUTANEOUS at 13:36

## 2022-02-22 RX ADMIN — ACETAMINOPHEN 1000 MG: 500 TABLET, FILM COATED ORAL at 16:02

## 2022-02-22 RX ADMIN — SUGAMMADEX 200 MG: 100 INJECTION, SOLUTION INTRAVENOUS at 11:15

## 2022-02-22 RX ADMIN — SODIUM CHLORIDE, POTASSIUM CHLORIDE, SODIUM LACTATE AND CALCIUM CHLORIDE: 600; 310; 30; 20 INJECTION, SOLUTION INTRAVENOUS at 13:53

## 2022-02-22 RX ADMIN — ENOXAPARIN SODIUM 40 MG: 40 INJECTION SUBCUTANEOUS at 07:08

## 2022-02-22 RX ADMIN — Medication 40 MCG: at 06:55

## 2022-02-22 RX ADMIN — Medication 5 MG: at 09:25

## 2022-02-22 RX ADMIN — PHENYLEPHRINE HYDROCHLORIDE 100 MCG: 10 INJECTION INTRAVENOUS at 09:32

## 2022-02-22 RX ADMIN — CARVEDILOL 12.5 MG: 12.5 TABLET, FILM COATED ORAL at 21:33

## 2022-02-22 RX ADMIN — PHENYLEPHRINE HYDROCHLORIDE 100 MCG: 10 INJECTION INTRAVENOUS at 09:43

## 2022-02-22 RX ADMIN — SODIUM CHLORIDE, SODIUM LACTATE, POTASSIUM CHLORIDE, CALCIUM CHLORIDE: 600; 310; 30; 20 INJECTION, SOLUTION INTRAVENOUS at 08:20

## 2022-02-22 RX ADMIN — DEXAMETHASONE SODIUM PHOSPHATE 4 MG: 4 INJECTION, SOLUTION INTRA-ARTICULAR; INTRALESIONAL; INTRAMUSCULAR; INTRAVENOUS; SOFT TISSUE at 08:36

## 2022-02-22 RX ADMIN — FENTANYL CITRATE 50 MCG: 50 INJECTION, SOLUTION INTRAMUSCULAR; INTRAVENOUS at 10:11

## 2022-02-22 ASSESSMENT — ACTIVITIES OF DAILY LIVING (ADL)
ADLS_ACUITY_SCORE: 9
ADLS_ACUITY_SCORE: 9
ADLS_ACUITY_SCORE: 5
ADLS_ACUITY_SCORE: 4
ADLS_ACUITY_SCORE: 9
ADLS_ACUITY_SCORE: 5
ADLS_ACUITY_SCORE: 9
ADLS_ACUITY_SCORE: 11
ADLS_ACUITY_SCORE: 5
ADLS_ACUITY_SCORE: 11
ADLS_ACUITY_SCORE: 2
ADLS_ACUITY_SCORE: 9
ADLS_ACUITY_SCORE: 9
ADLS_ACUITY_SCORE: 5

## 2022-02-22 NOTE — ANESTHESIA CARE TRANSFER NOTE
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    Anesthesia Type:   General     Note:    Oropharynx: oropharynx clear of all foreign objects and spontaneously breathing  Level of Consciousness: awake  Oxygen Supplementation: nasal cannula  Level of Supplemental Oxygen (L/min / FiO2): 2  Independent Airway: airway patency satisfactory and stable  Dentition: dentition unchanged  Vital Signs Stable: post-procedure vital signs reviewed and stable  Report to RN Given: handoff report given  Patient transferred to: PACU    Handoff Report: Identifed the Patient, Identified the Reponsible Provider, Reviewed the pertinent medical history, Discussed the surgical course, Reviewed Intra-OP anesthesia mangement and issues during anesthesia, Set expectations for post-procedure period and Allowed opportunity for questions and acknowledgement of understanding      Vitals:  Vitals Value Taken Time   /88 02/22/22 1140   Temp     Pulse 67 02/22/22 1143   Resp     SpO2 95 % 02/22/22 1143   Vitals shown include unvalidated device data.    Electronically Signed By: SASHA Murillo CRNA  February 22, 2022  11:44 AM

## 2022-02-22 NOTE — PLAN OF CARE
Admitted/transferred from: PACU  2 RN full   skin assessment completed by Evelyne Casey, DIONNA and Rosana POOLE RN.  Skin assessment finding: skin intact, no problems   Interventions/actions: other : None     Will continue to monitor.

## 2022-02-22 NOTE — ANESTHESIA PROCEDURE NOTES
Airway       Patient location during procedure: OR       Procedure Start/Stop Times: 2/22/2022 8:14 AM  Staff -        CRNA: Chuy Gray APRN CRNA       Performed By: CRNA  Consent for Airway        Urgency: elective  Indications and Patient Condition       Indications for airway management: liang-procedural       Induction type:intravenous       Mask difficulty assessment: 2 - vent by mask + OA or adjuvant +/- NMBA    Final Airway Details       Final airway type: endotracheal airway       Successful airway: ETT - single  Endotracheal Airway Details        ETT size (mm): 7.5       Cuffed: yes       Cuff volume (mL): 7       Successful intubation technique: direct laryngoscopy       DL Blade Type: Silva 2       Grade View of Cords: 1       Adjucts: stylet       Position: Right       Measured from: gums/teeth       Secured at (cm): 24       Bite block used: None    Post intubation assessment        Placement verified by: capnometry, equal breath sounds and chest rise        Number of attempts at approach: 1       Secured with: pink tape       Ease of procedure: easy       Dentition: Intact and Unchanged

## 2022-02-22 NOTE — PLAN OF CARE
From PACU at 1330. A&Ox4. Hypertensive, provider paged - waiting for response. AVOSS on 2L O2. Capno in place. Pain managed with scheduled Tylenol and PRN Oxycodone/IV Dilaudid. No nausea reported. NPO except meds/ice chips. No gas/BM. Kelly in place with adequate urine output. Midline incision and lap sites JOE. Pt stood and marched at bedside, tolerated well. Continue to monitor and follow POC.

## 2022-02-22 NOTE — BRIEF OP NOTE
[facility]    Brief Operative Note    Pre-operative diagnosis: Small bowel mass [K63.89]  Leiomyoma of small intestine [D21.4]  Post-operative diagnosis Same as pre-operative diagnosis    Procedure: Procedure(s):  Diagnostic laparoscopy, laparoscopic lysis of adhesions, open small bowel resection  Surgeon: Surgeon(s) and Role:     * Sachin Laura MD - Primary  Anesthesia: Combined General with Block   Estimated Blood Loss: Less than 10 ml    Drains: None  Specimens:   ID Type Source Tests Collected by Time Destination   1 : Small bowel resection Tissue Other SURGICAL PATHOLOGY EXAM Sachin Laura MD 2/22/2022  9:45 AM      Findings:   see op note.  Complications: None.  Implants: * No implants in log *    7C, surg onc  Tylenol, oxy, dilaudid  Ice chips/meds for night  AROBF  MIVF  DVT ppx in AM    Emerson Anthony MD  PGY-5 Surgery

## 2022-02-22 NOTE — ANESTHESIA PROCEDURE NOTES
TAP Procedure Note    Pre-Procedure   Staff -        Anesthesiologist:  Agustín Parada MD       Resident/Fellow: Cynthia Gomez MD       Performed By: resident       Location: pre-op       Procedure Start/Stop Times: 2/22/2022 6:45 AM and 2/22/2022 6:55 AM       Pre-Anesthestic Checklist: patient identified, IV checked, site marked, risks and benefits discussed, informed consent, monitors and equipment checked, pre-op evaluation, at physician/surgeon's request and post-op pain management  Timeout:       Correct Patient: Yes        Correct Procedure: Yes        Correct Site: Yes        Correct Position: Yes        Correct Laterality: Yes        Site Marked: Yes  Procedure Documentation  Procedure: TAP       Diagnosis: POST OPERATIVE PAIN       Laterality: bilateral       Patient Position: supine       Patient Prep/Sterile Barriers: sterile gloves, mask       Skin prep: Chloraprep       Needle Type: short bevel       Needle Gauge: 21.        Needle Length (millimeters): 110        Ultrasound guided       1. Ultrasound was used to identify targeted nerve, plexus, vascular marker, or fascial plane and place a needle adjacent to it in real-time.       2. Ultrasound was used to visualize the spread of anesthetic in close proximity to the above referenced structure.       3. A permanent image is entered into the patient's record.    Assessment/Narrative         The placement was negative for: blood aspirated, painful injection and site bleeding       Paresthesias: No.     Bolus given via needle..        Secured via.        Insertion/Infusion Method: Single Shot       Complications: none       Injection made incrementally with aspirations every 5 mL.    Medication(s) Administered   Bupivacaine 0.25% PF (Infiltration), 60 mL  Dexmedetomidine 4 mcg/mL (Perineural), 40 mcg  Dexamethasone 10 mg/mL PF (Perineural), 4 mg  Medication Administration Time: 2/22/2022 6:55 AM     Comments:  B/L TAP Block

## 2022-02-22 NOTE — OP NOTE
Patient: Jairo Austin  MRN: [id number]  Date of Surgery: 2/22/2022     Pre-Op Diagnosis: Small Bowel Mass     Post-Op Diagnosis: Small Bowel Mass     Title of Operation:   1. Diagnostic Laparoscopy  2. Laparoscopic Lysis of Adhesions  3. Exploratory Laparotomy  4. Open Jejunal Resection     Anesthesia Type: General Endotracheal  Attending Physician: Sachin Laura MD  Assistants: Zev Faye MD     Indications: 76 M in relative health with ocular melanoma in remission since 2019.  Was found to have a small bowel mass on surveillance imaging.  Given his relative health, melanoma in remission, and imaging characteristics concerning for possible GIST or other neoplasm, I offered resection for definitive diagnosis and management.  He presented for this surgery today.  For details, please see my prior clinic note.     Findings: Laparoscopy showed significant omental adhesions to the upper right portion of the abdominal wall, with extension across the midline at the upper most portion to the left upper abdominal wall.  No bowel was involved.  These were taken down with LigaSure.  The remainder of the exploration of the abdomen showed a relatively normal appearing liver and gallbladder.  The visible portions of the colon and small bowel appeared grossly normal.  There was no intra-abdominal free fluid and no evidence of a metastatic process.  An exophytic, calcified lesion that appeared to be a tumor was located on the mesenteric side of the jejunum distal to the ligament of Treitz, but relatively close to it.  This corresponded to the lesion seen on CT.  Given this location and difficulty exposing the bowel in this area, we made a midline laparotomy to finish the operation.  We ran the small bowel from the ligament of Treitz to the ileocecal valve and did not identify further tumors.  We did identify what appeared to be 2-3 small out-pouchings of the small bowel on the mesenteric side that may have  been consistent with diverticula.  One of these was just distal to the ligament of Treitz, but proximal to the tumor.  These appeared relatively normal and did not appear to have any evidence of inflammation, so they were left alone.  We performed a small bowel resection with negative margins on gross inspection.  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  We then formed a stapled side to side, functional end to end small bowel anastomosis that was not bleeding on inspection, communicated with both small bowel lumens, and was palpably patent following stapled closure of the common enterotomy.  This anastomosis was 20-30 cm distal to the ligament of Treitz.  Inspection of the abdomen revealed excellent hemostasis and normal orientation of the small bowel.  The midline laparotomy was closed and the abdomen re-examined under laparoscopy.  Again, the intestine appeared in good orientation, there was no evidence of inadvertent injury, and hemostasis was excellent.  Pneumoperitoneum was evacuated, the ports removed, and closure of the incisions completed. The patient tolerated the procedure well.  Instrument, sponge, and needle counts were correct at the end of the case.     Description of Procedure: After appropriate informed consent was obtained, the patient was brought to the operating room where a timeout was performed to identify the correct patient, procedure, and site.  Antibiotics were administered for perioperative prophylaxis.  SCDs were placed for DVT prophylaxis.  The patient was also given chemical DVT prophylaxis in the pre-operative holding area.  He was then induced under general endotracheal anesthesia.  An OG tube was placed, and a Kelly catheter was placed.  The patient was placed in supine position with a foot board and arms out to the sides.  The patient was prepped and draped in a sterile fashion.  Insufflation was established in the left upper quadrant at Curran's point with a Veres  needle.  This step was done without difficulty (appropriate clicks corresponding to abdominal layers, no contents aspirated, saline easily dropped through, and opening pressure was 2 mmHg).  A 5 mm OptiView port was placed under direct visualization on the midline above the umbilicus.  Laparoscopy showed significant omental adhesions to the upper right portion of the abdominal wall, with extension across the midline at the upper most portion to the left upper abdominal wall.  No bowel was involved.  The Veres needle tip was within these adhesions, but there was no pre-peritoneal or omental insufflation noted.  Also no succus, stool, or hematomas.  These omental adhesions were taken down with LigaSure.  The tip of the Veres needle was within some thin adhesions.  There was no evidence of injury from entry or from insertion of the Veres needle.  The remainder of the exploration of the abdomen showed a relatively normal appearing liver and gallbladder.  The visible portions of the colon and small bowel appeared grossly normal.  There was no intra-abdominal free fluid and no evidence of a metastatic process.  An exophytic, calcified lesion that appeared to be a tumor was located on the mesenteric side of the jejunum distal to the ligament of Treitz, but relatively close to it.  This corresponded to the lesion seen on CT.  Given this location and difficulty exposing the bowel in this area, we made a midline laparotomy above and below the umbilicus to finish the operation.  An Delroy wound protector was placed.  We ran the small bowel from the ligament of Treitz to the ileocecal valve and did not identify further tumors.  We did identify what appeared to be 2-3 small out-pouchings of the small bowel on the mesenteric side that may have been consistent with diverticula.  One of these was just distal to the ligament of Treitz, but proximal to the tumor.  These appeared relatively normal and did not appear to have any  evidence of inflammation, so they were left alone.  A window was made in the small bowel mesentery proximal and distal to the tumor.  A KATLYN 75 mm blue load was used to resect this segment of small bowel.  The LigaSure was used to divide the mesentery of the resected portion.  Gross examination in pathology showed a small bowel tumor on the mesenteric side that was clear of the stapled bowel margins by more than 2 cm on each side.  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  We then removed the corners of the staple line on each piece of bowel and formed a stapled side to side, functional end to end small bowel anastomosis using a KATLYN 75 mm blue load.  Inspection showed no evidence of intraluminal staple line bleeding, and there was clear communication between both small bowel lumens.  The staple lines were then offset and the ends of the small bowel were removed and the common enterotomy closed with a KATLYN 100 mm blue load.  The very end of that staple seam had a serosal tear with bulging mucosa.  Although there was not a clear defect, I stapled across this corner with an Endo KATLYN 45 mm blue load.  The staple line now looked great and was completely intact.  The anastomosis was palpably patent following stapled closure of the common enterotomy.  A crotch stitch was placed.  There was no obvious gross spillage of bowel contents during the anastomosis.  Hemostasis at the external staple line was excellent and the line was covered with Snow hemostatic agent.  This anastomosis was 20-30 cm distal to the ligament of Treitz.  Open inspection of the abdomen revealed excellent hemostasis and normal orientation of the small bowel.  The midline fascia was then closed with 0 looped PDS.  We then re-examined under laparoscopy.  Again, the intestine appeared in good orientation, there was no evidence of inadvertent injury, and hemostasis was excellent.  Pneumoperitoneum was evacuated, the ports removed, and closure of the  incisions completed with 4-0 Monacryl and skin glue on the laparoscopic ports, and 3-0 Vicryl/4-0 Monacryl and skin glue on the laparotomy incision following extensive irrigation above the fascia.  All instrument, sponge, and needle counts were correct x 2.  The patient was then extubated and taken to the PACU in stable condition.  He tolerated the procedure well and I discussed the case with the spouse over the phone.      Estimated Blood Loss: 50 mL  Urine Output: See anesthesia record  Fluids: See anesthesia record  Drains: None  Specimens: Small Bowel Resection (Jejunum) for Gross, Frozen, and Permanent Section     Disposition: PACU --> 7C

## 2022-02-22 NOTE — ANESTHESIA POSTPROCEDURE EVALUATION
Patient: Jairo Austin    Procedure: Procedure(s):  Diagnostic laparoscopy, laparoscopic lysis of adhesions, open small bowel resection       Anesthesia Type:  General    Note:  Disposition: Inpatient   Postop Pain Control: Uneventful            Sign Out: Well controlled pain   PONV: No   Neuro/Psych: Uneventful            Sign Out: Acceptable/Baseline neuro status   Airway/Respiratory: Uneventful            Sign Out: Acceptable/Baseline resp. status   CV/Hemodynamics: Uneventful            Sign Out: Acceptable CV status; No obvious hypovolemia; No obvious fluid overload   Other NRE: NONE   DID A NON-ROUTINE EVENT OCCUR? No           Last vitals:  Vitals Value Taken Time   /98 02/22/22 1240   Temp 37  C (98.6  F) 02/22/22 1240   Pulse 73 02/22/22 1248   Resp 14 02/22/22 1240   SpO2 94 % 02/22/22 1248   Vitals shown include unvalidated device data.    Electronically Signed By: Cynthia Gomez MD  February 22, 2022  12:49 PM

## 2022-02-23 LAB
ANION GAP SERPL CALCULATED.3IONS-SCNC: 4 MMOL/L (ref 3–14)
BUN SERPL-MCNC: 19 MG/DL (ref 7–30)
CALCIUM SERPL-MCNC: 9.3 MG/DL (ref 8.5–10.1)
CHLORIDE BLD-SCNC: 104 MMOL/L (ref 94–109)
CO2 SERPL-SCNC: 30 MMOL/L (ref 20–32)
CREAT SERPL-MCNC: 0.92 MG/DL (ref 0.66–1.25)
ERYTHROCYTE [DISTWIDTH] IN BLOOD BY AUTOMATED COUNT: 12.1 % (ref 10–15)
GFR SERPL CREATININE-BSD FRML MDRD: 87 ML/MIN/1.73M2
GLUCOSE BLD-MCNC: 125 MG/DL (ref 70–99)
GLUCOSE BLDC GLUCOMTR-MCNC: 132 MG/DL (ref 70–99)
HCT VFR BLD AUTO: 42.9 % (ref 40–53)
HGB BLD-MCNC: 14.4 G/DL (ref 13.3–17.7)
MCH RBC QN AUTO: 33.1 PG (ref 26.5–33)
MCHC RBC AUTO-ENTMCNC: 33.6 G/DL (ref 31.5–36.5)
MCV RBC AUTO: 99 FL (ref 78–100)
PLATELET # BLD AUTO: 184 10E3/UL (ref 150–450)
POTASSIUM BLD-SCNC: 4.4 MMOL/L (ref 3.4–5.3)
RBC # BLD AUTO: 4.35 10E6/UL (ref 4.4–5.9)
SODIUM SERPL-SCNC: 138 MMOL/L (ref 133–144)
WBC # BLD AUTO: 11.9 10E3/UL (ref 4–11)

## 2022-02-23 PROCEDURE — 258N000003 HC RX IP 258 OP 636: Performed by: STUDENT IN AN ORGANIZED HEALTH CARE EDUCATION/TRAINING PROGRAM

## 2022-02-23 PROCEDURE — 250N000013 HC RX MED GY IP 250 OP 250 PS 637: Performed by: STUDENT IN AN ORGANIZED HEALTH CARE EDUCATION/TRAINING PROGRAM

## 2022-02-23 PROCEDURE — 82310 ASSAY OF CALCIUM: CPT | Performed by: STUDENT IN AN ORGANIZED HEALTH CARE EDUCATION/TRAINING PROGRAM

## 2022-02-23 PROCEDURE — 250N000013 HC RX MED GY IP 250 OP 250 PS 637: Performed by: SURGERY

## 2022-02-23 PROCEDURE — 250N000011 HC RX IP 250 OP 636: Performed by: STUDENT IN AN ORGANIZED HEALTH CARE EDUCATION/TRAINING PROGRAM

## 2022-02-23 PROCEDURE — 85027 COMPLETE CBC AUTOMATED: CPT | Performed by: STUDENT IN AN ORGANIZED HEALTH CARE EDUCATION/TRAINING PROGRAM

## 2022-02-23 PROCEDURE — 36415 COLL VENOUS BLD VENIPUNCTURE: CPT | Performed by: STUDENT IN AN ORGANIZED HEALTH CARE EDUCATION/TRAINING PROGRAM

## 2022-02-23 PROCEDURE — 120N000002 HC R&B MED SURG/OB UMMC

## 2022-02-23 RX ORDER — CHLORTHALIDONE 25 MG/1
25 TABLET ORAL EVERY EVENING
COMMUNITY

## 2022-02-23 RX ORDER — ACETAMINOPHEN 500 MG
1000 TABLET ORAL DAILY
COMMUNITY

## 2022-02-23 RX ADMIN — SODIUM CHLORIDE, POTASSIUM CHLORIDE, SODIUM LACTATE AND CALCIUM CHLORIDE: 600; 310; 30; 20 INJECTION, SOLUTION INTRAVENOUS at 14:24

## 2022-02-23 RX ADMIN — ACETAMINOPHEN 1000 MG: 500 TABLET, FILM COATED ORAL at 14:07

## 2022-02-23 RX ADMIN — CARVEDILOL 12.5 MG: 12.5 TABLET, FILM COATED ORAL at 07:46

## 2022-02-23 RX ADMIN — CARVEDILOL 12.5 MG: 12.5 TABLET, FILM COATED ORAL at 17:46

## 2022-02-23 RX ADMIN — OXYCODONE HYDROCHLORIDE 10 MG: 5 TABLET ORAL at 20:02

## 2022-02-23 RX ADMIN — PANTOPRAZOLE SODIUM 40 MG: 40 TABLET, DELAYED RELEASE ORAL at 07:46

## 2022-02-23 RX ADMIN — ENOXAPARIN SODIUM 40 MG: 40 INJECTION SUBCUTANEOUS at 13:00

## 2022-02-23 RX ADMIN — SODIUM CHLORIDE, POTASSIUM CHLORIDE, SODIUM LACTATE AND CALCIUM CHLORIDE: 600; 310; 30; 20 INJECTION, SOLUTION INTRAVENOUS at 22:42

## 2022-02-23 RX ADMIN — ACETAMINOPHEN 1000 MG: 500 TABLET, FILM COATED ORAL at 17:46

## 2022-02-23 RX ADMIN — OXYCODONE HYDROCHLORIDE 10 MG: 5 TABLET ORAL at 15:32

## 2022-02-23 RX ADMIN — SODIUM CHLORIDE, POTASSIUM CHLORIDE, SODIUM LACTATE AND CALCIUM CHLORIDE: 600; 310; 30; 20 INJECTION, SOLUTION INTRAVENOUS at 05:49

## 2022-02-23 RX ADMIN — OXYCODONE HYDROCHLORIDE 10 MG: 5 TABLET ORAL at 05:31

## 2022-02-23 RX ADMIN — OXYCODONE HYDROCHLORIDE 10 MG: 5 TABLET ORAL at 11:30

## 2022-02-23 RX ADMIN — ACETAMINOPHEN 1000 MG: 500 TABLET, FILM COATED ORAL at 07:47

## 2022-02-23 ASSESSMENT — ACTIVITIES OF DAILY LIVING (ADL)
ADLS_ACUITY_SCORE: 12
ADLS_ACUITY_SCORE: 11
ADLS_ACUITY_SCORE: 12
ADLS_ACUITY_SCORE: 12
ADLS_ACUITY_SCORE: 11
ADLS_ACUITY_SCORE: 11
ADLS_ACUITY_SCORE: 12
ADLS_ACUITY_SCORE: 11
ADLS_ACUITY_SCORE: 12
ADLS_ACUITY_SCORE: 11
ADLS_ACUITY_SCORE: 12
ADLS_ACUITY_SCORE: 12
ADLS_ACUITY_SCORE: 11
ADLS_ACUITY_SCORE: 12
ADLS_ACUITY_SCORE: 11
ADLS_ACUITY_SCORE: 11
ADLS_ACUITY_SCORE: 12

## 2022-02-23 NOTE — PROGRESS NOTES
Surgery Progress Note  2/23/22    S:  No events overnight.  Pt seen at bedside resting comfortably. Pain is well controlled.  No Flatus, No BM.  Ice chips and sips with meds. Ambulating to bathroom with assistance.     O:  Vitals:    02/22/22 1820 02/22/22 1856 02/22/22 2131 02/22/22 2254   BP: (!) 152/85 (!) 151/86 (!) 148/85 137/79   BP Location:   Left arm Left arm   Cuff Size:       Pulse: 89 88 84 76   Resp: 16 16  17   Temp:  99  F (37.2  C)  99  F (37.2  C)   TempSrc:  Oral  Oral   SpO2: 93% 93%  92%   Weight:       Height:         Physical exam:  A&Ox3, NAD  Breathing non-labored, uses CPAP at night  Soft, ND, minimally tender  Incisions c/d/i    Labs Reviewed: not collected yet    Most Recent Imaging Studies Reviewed: no images at this time       A/P: Jairo Austin is a 76 year old male POD# 1 s/p small bowel resection due to ~2.4cm enhancing bowel mass. 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 Waiting for permanent result for final diagnosis. AROBF    -Pain: managed with tylenol, oxy, and dilaudid  -Diet: advance to clear liquids, taking it slow  - MIVF until adequate PO  -Remove whiting today  -Activity: up with assistance, ad mireya  -GI ppx with protonix  -Lovenox pending hgb this AM  -Continue PTA coreg    Patient was seen and discussed with Dr. Anthony and Jaiden who will talk to Dr. Laura.    Duane Musa, MS3  [facility]    I, Emerson Anthony MD, personally saw the patient and agree with the above documentation by student doctor Duane and have made any necessary edits to the note.    Emerson Anthony MD  PGY-5 Surgery

## 2022-02-23 NOTE — PLAN OF CARE
Assumed care for pt [id number]  AOx4, VSS on RA. Denies nausea, pain prn oxycodone. Midline incision and lapsites with liquid bandage. Kelly patent with adequate output. Capno in place, CPAP on overnight. LPIV infusing IVMF at 115ml/hr, RPIV SL. NPO except meds and ice chips. Pt sleeping between cares. Needs met, safety maintained. Will continue plan of care.

## 2022-02-23 NOTE — PHARMACY-ADMISSION MEDICATION HISTORY
Admission Medication History Completed by Pharmacy    See [facility] Admission Navigator for allergy information, preferred outpatient pharmacy, prior to admission medications and immunization status.     Medication History Sources:     Patient with med list, pharmacy dispense records    Changes made to PTA medication list (reason):    Added: acetaminophen, chlorthalidone    Deleted: omeprazole    Changed: atorvastatin (added dose and route), lisinopril (added dose and route)    Additional Information:    None    Prior to Admission medications    Medication Sig Last Dose Taking? Auth Provider   acetaminophen (TYLENOL) 500 MG tablet Take 1,000 mg by mouth daily Past Week at Unknown time Yes Unknown, Entered By History   amLODIPine (NORVASC) 10 MG tablet Take 10 mg by mouth every morning  2/22/2022 at 0300 Yes Reported, Patient   aspirin 81 MG EC tablet Take 81 mg by mouth every morning   Yes Reported, Patient   atorvastatin (LIPITOR) 20 MG tablet Take 20 mg by mouth every morning  2/21/2022 at Unknown time Yes Reported, Patient   carvedilol (COREG) 12.5 MG tablet Take 12.5 mg by mouth 2 times daily (with meals)  2/22/2022 at 0300 Yes Reported, Patient   chlorthalidone (HYGROTON) 25 MG tablet Take 25 mg by mouth daily Past Week at Unknown time Yes Unknown, Entered By History   lisinopril (PRINIVIL/ZESTRIL) 20 MG tablet Take 20 mg by mouth 2 times daily  2/20/2022 at Unknown time Yes Reported, Patient   multivitamin w/minerals (MULTI-VITAMIN) tablet Take 1 tablet by mouth every morning  Yes Reported, Patient     Date completed: 02/23/22    Medication history completed by: Qiana Ojeda [facility]

## 2022-02-23 NOTE — PLAN OF CARE
Time: 5437-2689    Reason for Admission: Small bowel obstruction     Activity: Up with SBA. Ambulated in paula x2.     Neuro: A&O x4.     GI/: Pt denies passing gas and BM. Kelly pulled in AM, having adequate urine output.      Diet: Clear liquid- sips.      Incisions/drains: Midline incision and lap sites c/d/I.     IV Access: PIV x2. PIV infusing LR at 115mL/hr.     Vitals: VSS on RA.     Pain: controlled with scheduled tylenol and PRN oxy.     Plan: Continue with cares.

## 2022-02-23 NOTE — PROGRESS NOTES
Assumed care from 2409-5894. VSS on room air. Alert and oriented x4. Ambulated in halls with stand by assistance x2. Voids spontaneously. Denies passing flatus or stool. Pain managed with oxycodone x1. Abdominal incision and lap sites liquid bandaged and intact. Using abdominal binder and incentive spirometry. Continue with POC.

## 2022-02-23 NOTE — PROGRESS NOTES
"/79 (BP Location: Left arm)   Pulse 76   Temp 99  F (37.2  C) (Oral)   Resp 17   Ht 1.753 m (5' 9\")   Wt 74.4 kg (164 lb 0.4 oz)   SpO2 92%   BMI 24.22 kg/m       A&Ox4, VSS 2L NC. Pain currently managed with scheduled Tylenol, PRN Oxycodone, and PRN Dilaudid. NPO except medications and ice chips. Kelly with adequate output. PIVx2. LR administering @ 115 mL/hr continuous, new bag hung. Patient verbalized he would like his CPAP on when going to bed, currently at bedside. Patient dangled and marched in place at bedside, tolerated well. Continue plan of care.  "

## 2022-02-24 PROCEDURE — 250N000013 HC RX MED GY IP 250 OP 250 PS 637: Performed by: STUDENT IN AN ORGANIZED HEALTH CARE EDUCATION/TRAINING PROGRAM

## 2022-02-24 PROCEDURE — 120N000002 HC R&B MED SURG/OB UMMC

## 2022-02-24 PROCEDURE — 258N000003 HC RX IP 258 OP 636: Performed by: STUDENT IN AN ORGANIZED HEALTH CARE EDUCATION/TRAINING PROGRAM

## 2022-02-24 PROCEDURE — 250N000013 HC RX MED GY IP 250 OP 250 PS 637: Performed by: PHYSICIAN ASSISTANT

## 2022-02-24 PROCEDURE — 99024 POSTOP FOLLOW-UP VISIT: CPT | Performed by: PHYSICIAN ASSISTANT

## 2022-02-24 PROCEDURE — 250N000013 HC RX MED GY IP 250 OP 250 PS 637: Performed by: SURGERY

## 2022-02-24 PROCEDURE — 250N000011 HC RX IP 250 OP 636: Performed by: STUDENT IN AN ORGANIZED HEALTH CARE EDUCATION/TRAINING PROGRAM

## 2022-02-24 RX ORDER — POLYETHYLENE GLYCOL 3350 17 G/17G
17 POWDER, FOR SOLUTION ORAL DAILY
Status: DISCONTINUED | OUTPATIENT
Start: 2022-02-24 | End: 2022-02-25 | Stop reason: HOSPADM

## 2022-02-24 RX ADMIN — OXYCODONE HYDROCHLORIDE 10 MG: 5 TABLET ORAL at 20:06

## 2022-02-24 RX ADMIN — ENOXAPARIN SODIUM 40 MG: 40 INJECTION SUBCUTANEOUS at 12:19

## 2022-02-24 RX ADMIN — ACETAMINOPHEN 1000 MG: 500 TABLET, FILM COATED ORAL at 10:36

## 2022-02-24 RX ADMIN — OXYCODONE HYDROCHLORIDE 10 MG: 5 TABLET ORAL at 00:15

## 2022-02-24 RX ADMIN — ACETAMINOPHEN 1000 MG: 500 TABLET, FILM COATED ORAL at 00:15

## 2022-02-24 RX ADMIN — CARVEDILOL 12.5 MG: 12.5 TABLET, FILM COATED ORAL at 17:59

## 2022-02-24 RX ADMIN — OXYCODONE HYDROCHLORIDE 10 MG: 5 TABLET ORAL at 15:10

## 2022-02-24 RX ADMIN — OXYCODONE HYDROCHLORIDE 10 MG: 5 TABLET ORAL at 05:51

## 2022-02-24 RX ADMIN — POLYETHYLENE GLYCOL 3350 17 G: 17 POWDER, FOR SOLUTION ORAL at 07:41

## 2022-02-24 RX ADMIN — CARVEDILOL 12.5 MG: 12.5 TABLET, FILM COATED ORAL at 07:44

## 2022-02-24 RX ADMIN — OXYCODONE HYDROCHLORIDE 10 MG: 5 TABLET ORAL at 10:40

## 2022-02-24 RX ADMIN — ACETAMINOPHEN 1000 MG: 500 TABLET, FILM COATED ORAL at 16:03

## 2022-02-24 RX ADMIN — SODIUM CHLORIDE, POTASSIUM CHLORIDE, SODIUM LACTATE AND CALCIUM CHLORIDE: 600; 310; 30; 20 INJECTION, SOLUTION INTRAVENOUS at 06:35

## 2022-02-24 RX ADMIN — PANTOPRAZOLE SODIUM 40 MG: 40 TABLET, DELAYED RELEASE ORAL at 07:44

## 2022-02-24 RX ADMIN — ACETAMINOPHEN 1000 MG: 500 TABLET, FILM COATED ORAL at 22:03

## 2022-02-24 ASSESSMENT — ACTIVITIES OF DAILY LIVING (ADL)
ADLS_ACUITY_SCORE: 10
ADLS_ACUITY_SCORE: 10
ADLS_ACUITY_SCORE: 12
ADLS_ACUITY_SCORE: 10
ADLS_ACUITY_SCORE: 12
ADLS_ACUITY_SCORE: 10
ADLS_ACUITY_SCORE: 12
ADLS_ACUITY_SCORE: 12
ADLS_ACUITY_SCORE: 10
ADLS_ACUITY_SCORE: 12
ADLS_ACUITY_SCORE: 12
ADLS_ACUITY_SCORE: 10
ADLS_ACUITY_SCORE: 12
ADLS_ACUITY_SCORE: 10
ADLS_ACUITY_SCORE: 12
ADLS_ACUITY_SCORE: 10
ADLS_ACUITY_SCORE: 12
ADLS_ACUITY_SCORE: 12

## 2022-02-24 NOTE — PLAN OF CARE
Time: 5137-0014    Reason for Admission: small bowel mass    Activity: Up with SBA. Ambulated in paula x2.     Neuro: A&O x4.     GI/: Voiding spont. Passing gas and no BM per pt statement. Denies nausea.     Diet: full liquid, tolerating.      Incisions/drains: midline incision and lap sites liquid bandage, JOE.     IV Access: PIVx2, both SL.     Vitals: VSS on RA.     Pain: controlled with scheduled tylenol and PRN oxy.      Plan: possible discharge tomorrow. Continue with cares.

## 2022-02-24 NOTE — PLAN OF CARE
Assumed care of Patient from 6231-1687. A&Ox4. Hypertensive-not within notifying parameters- OVSS on RA. Patient reports abdominal pain controlled with Oxycodone and Tylenol. L PIV infusing LR at 115ml/hr. R PIV-saline locked. Tolerating clear liquid diet. Nausea denied throughout shift. Patient reports passing flatus, last BM prior to surgery. Midline abdominal incision and lap sites liquid bandaged and JOE. Abdominal binder in place for comfort. Pt voiding spontaneously with adequate output. Up with SBA. Using IS independently. SCDs on in bed. Awaiting return of bowel function. Continue plan of care.

## 2022-02-24 NOTE — PROGRESS NOTES
Surgical Oncology Progress Note    Interval History:  No acute events overnight. Pain controlled. Tolerating clear liquids without nausea or vomiting. No flatus or stool yet.     Physical Exam:   Temp:  [97.8  F (36.6  C)-99.3  F (37.4  C)] 98.4  F (36.9  C)  Pulse:  [58-68] 68  Resp:  [16-18] 16  BP: (146-156)/(82-87) 154/87  SpO2:  [92 %-93 %] 93 %  General: Alert, oriented, appears comfortable, NAD.  Respiratory: breathing non labored  Abdomen: Abdomen is soft, non-tender, non-distended. Incision is clean, dry and intact.     Data:   All laboratory and imaging data in the past 24 hours reviewed  I/O last 3 completed shifts:  In: 3195.83 [P.O.:340; I.V.:2855.83]  Out: 2850 [Urine:2850]  Recent Labs   Lab Test 02/23/22  0739 02/22/22  1207 02/11/22  1033   WBC 11.9* 13.2* 6.3   HGB 14.4 15.4 15.5    198 222      Recent Labs   Lab Test 02/23/22  0739 02/23/22  0525 02/22/22  1207 02/22/22  0551 02/11/22  1045 12/17/21  0840     --  139  --   --  136   POTASSIUM 4.4  --  3.4  --   --  3.7   CHLORIDE 104  --  105  --   --  100   CO2 30  --  27  --   --  26   BUN 19  --  17  --   --  23   CR 0.92  --  0.97  --  1.1 1.24   ANIONGAP 4  --  7  --   --  10   PAMELA 9.3  --  8.6  --   --  8.8   * 132* 160*   < >  --  117*    < > = values in this interval not displayed.      Recent Labs   Lab Test 12/17/21  0840   PROTTOTAL 7.0   ALBUMIN 3.6   BILITOTAL 0.3   ALKPHOS 106   AST 18   ALT 38     Assessment and Plan:     Jairo Austin is a 76 year old male POD# 2 s/p small bowel resection due to ~2.4cm enhancing bowel mass. 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 Waiting for permanent result for final diagnosis. AROBF     -Pain: managed with tylenol, oxy, and dilaudid  -Diet: full liquid, Discontinue IVF  -Activity: up with assistance, ad mireya  -GI ppx with protonix  -Lovenox pending hgb this AM, teaching needed  -Continue PTA coreg    Seen with Chief resident who  will discuss with Staff.     Kellie Avalos PA-C   Surgical Oncology

## 2022-02-25 VITALS
HEART RATE: 67 BPM | HEIGHT: 69 IN | BODY MASS INDEX: 24.29 KG/M2 | SYSTOLIC BLOOD PRESSURE: 132 MMHG | WEIGHT: 164.02 LBS | TEMPERATURE: 97.6 F | OXYGEN SATURATION: 93 % | DIASTOLIC BLOOD PRESSURE: 76 MMHG | RESPIRATION RATE: 18 BRPM

## 2022-02-25 PROCEDURE — 250N000013 HC RX MED GY IP 250 OP 250 PS 637: Performed by: SURGERY

## 2022-02-25 PROCEDURE — 250N000013 HC RX MED GY IP 250 OP 250 PS 637: Performed by: PHYSICIAN ASSISTANT

## 2022-02-25 PROCEDURE — 250N000013 HC RX MED GY IP 250 OP 250 PS 637: Performed by: STUDENT IN AN ORGANIZED HEALTH CARE EDUCATION/TRAINING PROGRAM

## 2022-02-25 PROCEDURE — 99024 POSTOP FOLLOW-UP VISIT: CPT | Performed by: PHYSICIAN ASSISTANT

## 2022-02-25 RX ORDER — OXYCODONE HYDROCHLORIDE 5 MG/1
5-10 TABLET ORAL EVERY 4 HOURS PRN
Qty: 40 TABLET | Refills: 0 | Status: SHIPPED | OUTPATIENT
Start: 2022-02-25 | End: 2024-06-25

## 2022-02-25 RX ORDER — POLYETHYLENE GLYCOL 3350 17 G/17G
17 POWDER, FOR SOLUTION ORAL DAILY PRN
Qty: 510 G | Refills: 0 | Status: SHIPPED | OUTPATIENT
Start: 2022-02-25 | End: 2024-06-25

## 2022-02-25 RX ADMIN — POLYETHYLENE GLYCOL 3350 17 G: 17 POWDER, FOR SOLUTION ORAL at 08:05

## 2022-02-25 RX ADMIN — ACETAMINOPHEN 1000 MG: 500 TABLET, FILM COATED ORAL at 08:05

## 2022-02-25 RX ADMIN — OXYCODONE HYDROCHLORIDE 10 MG: 5 TABLET ORAL at 00:52

## 2022-02-25 RX ADMIN — OXYCODONE HYDROCHLORIDE 10 MG: 5 TABLET ORAL at 12:05

## 2022-02-25 RX ADMIN — OXYCODONE HYDROCHLORIDE 10 MG: 5 TABLET ORAL at 08:05

## 2022-02-25 RX ADMIN — PANTOPRAZOLE SODIUM 40 MG: 40 TABLET, DELAYED RELEASE ORAL at 08:05

## 2022-02-25 RX ADMIN — CARVEDILOL 12.5 MG: 12.5 TABLET, FILM COATED ORAL at 08:05

## 2022-02-25 ASSESSMENT — ACTIVITIES OF DAILY LIVING (ADL)
ADLS_ACUITY_SCORE: 10

## 2022-02-25 NOTE — DISCHARGE SUMMARY
[facility] Discharge Summary    Jairo Austin MRN# [id number]   Age: 76 year old YOB: 1945     Date of Admission:  2/22/2022  Date of Discharge::  2/25/2022  Admitting Physician:  Sachin Laura MD  Discharge Physician:  Sachin Laura MD     PCP:  Trent Zelaya    Disposition: Patient discharged to home in stable condition.    Admission Diagnosis:  Small Bowel Mass  Hypertension  Hyperlipidemia  Ocular melanoma  Sleep apnea    Discharge Diagnosis:  Small Bowel Mass  Hypertension  Hyperlipidema  Ocular melanoma  Sleep apnea    Discharge medications  Current Discharge Medication List      START taking these medications    Details   oxyCODONE (ROXICODONE) 5 MG tablet Take 1-2 tablets (5-10 mg) by mouth every 4 hours as needed for moderate to severe pain  Qty: 40 tablet, Refills: 0    Associated Diagnoses: Postoperative pain      polyethylene glycol (MIRALAX) 17 GM/Dose powder Take 17 g by mouth daily as needed for constipation  Qty: 510 g, Refills: 0    Associated Diagnoses: Postoperative pain         CONTINUE these medications which have NOT CHANGED    Details   acetaminophen (TYLENOL) 500 MG tablet Take 1,000 mg by mouth daily      amLODIPine (NORVASC) 10 MG tablet Take 10 mg by mouth every morning       aspirin 81 MG EC tablet Take 81 mg by mouth every morning       atorvastatin (LIPITOR) 20 MG tablet Take 20 mg by mouth every morning       carvedilol (COREG) 12.5 MG tablet Take 12.5 mg by mouth 2 times daily (with meals)       chlorthalidone (HYGROTON) 25 MG tablet Take 25 mg by mouth daily      lisinopril (PRINIVIL/ZESTRIL) 20 MG tablet Take 20 mg by mouth 2 times daily   Refills: 3      multivitamin w/minerals (MULTI-VITAMIN) tablet Take 1 tablet by mouth every morning           Follow up, Special Instructions:  After Care     Future Labs/Procedures    Activity     Comments:    Your activity upon discharge: no lifting more than 10-15 lbs for 6 weeks.  "Frequent out of bed and ambulation is recommended.    Diet     Comments:    Follow this diet upon discharge: Regular diet    Discharge Instructions     Comments:    If your travel plans upon discharge include prolonged periods of sitting (a lengthy car or plane ride), it is highly beneficial to get up and walk at least once per hour to help prevent swelling and blood clots.     You may shower after operation, let warm soapy water run over incision and pat dry. Do not submerge, soak, or scrub incision.    Take incentive spirometer home for continued frequent use    You will be discharged with narcotic pain medications. Please take them only as needed and do not operate a car or heavy machinery for 24 hours after taking them.  Narcotic pain medications like oxycodone are constipating. Please ensure that you are drinking adequate amounts of fluids and taking stool softeners while you are taking narcotics. Take Miralax as needed for constipation.     Do not drive until you can with stand pressing the brake pedal quickly and fully without pain and you are not distracted by pain.     Call for fever greater than 101.5, chills, red skin around incision, drainage from incision, increased swelling from the incision, bleeding from the incision that is not controlled with light pressure, inability to tolerate diet,new nausea/vomiting or other new/worsening symptoms.   You may also call the surgical oncology nurse care coordinator from 8:00am-4:30pm ANDREAS Quintanilla at [phone]. For after hours questions or concerns you can contact the on-call surgical oncology resident (nights and weekends [phone] and ask \"I would like to page the Surgical Oncology Resident on call.\"). In emergencies, call [phone]    Follow Up:  Follow up in clinic with Dr. Laura on 3/17. You should be called to make an appointment within 3 business days. If you are not contacted, call [phone] to make an appointment.        Procedures:  2/22/21 Dr." Keya  1. Diagnostic Laparoscopy  2. Laparoscopic Lysis of Adhesions  3. Exploratory Laparotomy  4. Open Jejunal Resection    Consultations:  None    Brief HPI:  76 year old year old male with ocular melanoma in remission since 2019.  Was found to have a small bowel mass on surveillance imaging.  Given his relative health, melanoma in remission, and imaging characteristics concerning for possible GIST or other neoplasm.    Hospital Course:  The patient was admitted and underwent an open jejunal resection. The patient tolerated the procedure well. The patient recovered well with no post-operative complications. Prior to discharge pain was controlled with oral pain medication and the patient was able to ambulate and void without difficulty. The patient received appropriate education post operatively. On POD #3 the patient was discharged to home.    Surgical pathology  Pending     Kellie Avalos PA-C

## 2022-02-25 NOTE — PROGRESS NOTES
Surgical Oncology Progress Note    Interval History:  No acute events overnight. Pain controled. Denies nausea. Passing flatus but no bowel movement yet.     Physical Exam:   Temp:  [97  F (36.1  C)-99  F (37.2  C)] 97.6  F (36.4  C)  Pulse:  [53-66] 53  Resp:  [18-20] 18  BP: (123-149)/(76-89) 132/76  SpO2:  [93 %-96 %] 93 %  General: Alert, oriented, appears comfortable, NAD.  Respiratory: breathing non labored  Abdomen: Abdomen is soft, non-tender, mildly distended. Incision is clean, dry and intact.     Data:   All laboratory and imaging data in the past 24 hours reviewed  I/O last 3 completed shifts:  In: 900 [P.O.:900]  Out: 1600 [Urine:1600]  Recent Labs   Lab Test 02/23/22  0739 02/22/22  1207 02/11/22  1033   WBC 11.9* 13.2* 6.3   HGB 14.4 15.4 15.5    198 222      Recent Labs   Lab Test 02/23/22  0739 02/23/22  0525 02/22/22  1207 02/22/22  0551 02/11/22  1045 12/17/21  0840     --  139  --   --  136   POTASSIUM 4.4  --  3.4  --   --  3.7   CHLORIDE 104  --  105  --   --  100   CO2 30  --  27  --   --  26   BUN 19  --  17  --   --  23   CR 0.92  --  0.97  --  1.1 1.24   ANIONGAP 4  --  7  --   --  10   PAMELA 9.3  --  8.6  --   --  8.8   * 132* 160*   < >  --  117*    < > = values in this interval not displayed.      Recent Labs   Lab Test 12/17/21  0840   PROTTOTAL 7.0   ALBUMIN 3.6   BILITOTAL 0.3   ALKPHOS 106   AST 18   ALT 38     Assessment and Plan:     Jairo Austin is a 76 year old male POD# 3 s/p small bowel resection due to ~2.4cm enhancing bowel mass. 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 Waiting for permanent result for final diagnosis. AROBF     - Pain: managed with tylenol, oxy, and dilaudid  - Diet: regular diet  - Activity: up with assistance, ad mireya  - GI ppx with protonix  - Lovenox  - Continue PTA coreg  - Possible discharge to home today.     Seen with Chief resident who will discuss with Staff.     Kellie Avalos,  PA-C   Surgical Oncology

## 2022-02-25 NOTE — PLAN OF CARE
Discharge instruction provided, reviewed AVS with pt and given copy. Questions answered. Removed all IV access and Pt has all belongings. Discharged to home via wheelchair. Discharged to home.

## 2022-02-25 NOTE — PLAN OF CARE
POD 3 s/p small bowel resection due to bowel mass.  Alert, oriented, AVSS, up with SB.  Tolerating full liquids with no nausea.  Abdominal discomfort managed with tylenol and prn oxycodone.  Abdomen soft, + bowel sounds, passing gas, no BM since surgery.  Voiding adequate amounts.  PLAN: Await full ROBF.

## 2022-02-25 NOTE — PLAN OF CARE
Goal Outcome Evaluation:    Plan of Care Reviewed With: patient     Overall Patient Progress: improving    Status: POD# 2 s/p small bowel resection due to enhancing bowel mass.    -incisional pain well controlled with Oxycodone and scheduled Tylenol  -tolerating full liquid with good appetite and without nausea  -abdominal incision and lap sites with liquid bandage JOE  -voiding good amount  -last BM 02/21, bowel sound audible, passing gas  -PIV x 2 saline locked  -ambulated total of 3x in the hallways with SBA    Plan: Possible discharge to home tomorrow.  Continue with plan of care.

## 2022-02-28 ENCOUNTER — PATIENT OUTREACH (OUTPATIENT)
Dept: ONCOLOGY | Facility: CLINIC | Age: 77
End: 2022-02-28
Payer: COMMERCIAL

## 2022-02-28 DIAGNOSIS — G89.18 POSTOPERATIVE PAIN: ICD-10-CM

## 2022-02-28 DIAGNOSIS — G89.18 POSTOPERATIVE PAIN: Primary | ICD-10-CM

## 2022-02-28 LAB
PATH REPORT.COMMENTS IMP SPEC: ABNORMAL
PATH REPORT.COMMENTS IMP SPEC: YES
PATH REPORT.FINAL DX SPEC: ABNORMAL
PATH REPORT.GROSS SPEC: ABNORMAL
PATH REPORT.INTRAOP OBS SPEC DOC: ABNORMAL
PATH REPORT.MICROSCOPIC SPEC OTHER STN: ABNORMAL
PATH REPORT.RELEVANT HX SPEC: ABNORMAL
PATHOLOGY SYNOPTIC REPORT: ABNORMAL
PHOTO IMAGE: ABNORMAL

## 2022-02-28 PROCEDURE — 88342 IMHCHEM/IMCYTCHM 1ST ANTB: CPT | Mod: 26 | Performed by: PATHOLOGY

## 2022-02-28 PROCEDURE — 88341 IMHCHEM/IMCYTCHM EA ADD ANTB: CPT | Mod: 26 | Performed by: PATHOLOGY

## 2022-02-28 PROCEDURE — 88307 TISSUE EXAM BY PATHOLOGIST: CPT | Mod: 26 | Performed by: PATHOLOGY

## 2022-02-28 PROCEDURE — 88331 PATH CONSLTJ SURG 1 BLK 1SPC: CPT | Mod: 26 | Performed by: PATHOLOGY

## 2022-02-28 RX ORDER — OXYCODONE HYDROCHLORIDE 5 MG/1
5 TABLET ORAL EVERY 6 HOURS PRN
Qty: 20 TABLET | Refills: 0 | Status: SHIPPED | OUTPATIENT
Start: 2022-02-28 | End: 2024-06-25

## 2022-02-28 RX ORDER — OXYCODONE HYDROCHLORIDE 5 MG/1
5-10 TABLET ORAL EVERY 4 HOURS PRN
Qty: 40 TABLET | Refills: 0 | OUTPATIENT
Start: 2022-02-28

## 2022-02-28 NOTE — CONFIDENTIAL NOTE
Narcotic Refill Request    Medication(s) requested:  Oxycodone   Person Requesting Refill:Brent   What pain is the medication treating: Post operative pain   How is the medication being taken?:2 tabs every 4 hours   Does pt have enough for today? Yes  Is pain being adequately controlled on the current regimen?: yes   Experiencing any side effects from medication?: no     Date of most recent appointment:  2/22/22 Surgery Dr Mcdonald   Any No Show Visits:No   Next appointment:   3/18/22 Dr Mcdonald   Last fill date and by whom:  2/25/22 Kellie Avalos    Reviewed: No Access     Routed provider: Kellie avalos

## 2022-02-28 NOTE — TELEPHONE ENCOUNTER
POST-OP CALL  Feb 28, 2022    Jairo Austin is a 76 year old male s/p open jejunal resection with Dr. Laura 2/22.  Doing good.   Pain: Patient reports pain is controlled with 10 mg every 4 hours. He is taking it scheduled and setting an alarm because he was told to stay on top of the pain medication. Discussed only taking oxycodone as needed and trying 5 mg and spacing out how often he is taking it. He also can take tylenol. He agreed and eager to reduce the amount he is using.   Incisions: Patient denies surgical site swelling, bruising, erythema, bleeding or drainage.   Fevers/chills: Patient denies fever/chills.  Eating/drinking: Patient is able to eat and drink without any complaints.   Bowel habits: Had bowel movement yesterday. He does have Rose  Urine output: Voiding without difficulty.  Follow up appointment scheduled on 3/18 with Dr. Laura.  Patient will call with any questions or concerns.    Kellie Avalos PA-C

## 2022-03-01 DIAGNOSIS — C69.32 MALIGNANT MELANOMA OF CHOROID OF LEFT EYE (H): Primary | ICD-10-CM

## 2022-03-02 ENCOUNTER — TELEPHONE (OUTPATIENT)
Dept: ONCOLOGY | Facility: CLINIC | Age: 77
End: 2022-03-02
Payer: COMMERCIAL

## 2022-03-02 NOTE — TELEPHONE ENCOUNTER
Brent called to update us on how he is doing. He reports he is doing very well and does not need to take any oxycodone for pain. His 1-2 times per day taking Aleve. He is having regular bowel movements. He will call with questions or concerns.     Kellie Avalos PA-C

## 2022-03-18 ENCOUNTER — OFFICE VISIT (OUTPATIENT)
Dept: SURGERY | Facility: CLINIC | Age: 77
End: 2022-03-18
Attending: SURGERY
Payer: COMMERCIAL

## 2022-03-18 ENCOUNTER — OFFICE VISIT (OUTPATIENT)
Dept: OPHTHALMOLOGY | Facility: CLINIC | Age: 77
End: 2022-03-18
Attending: OPHTHALMOLOGY
Payer: COMMERCIAL

## 2022-03-18 VITALS
HEART RATE: 61 BPM | BODY MASS INDEX: 30.69 KG/M2 | WEIGHT: 207.8 LBS | DIASTOLIC BLOOD PRESSURE: 73 MMHG | OXYGEN SATURATION: 97 % | TEMPERATURE: 97.9 F | SYSTOLIC BLOOD PRESSURE: 119 MMHG

## 2022-03-18 DIAGNOSIS — C69.32 MALIGNANT MELANOMA OF CHOROID OF LEFT EYE (H): ICD-10-CM

## 2022-03-18 DIAGNOSIS — C49.A3 GASTROINTESTINAL STROMAL TUMOR (GIST) OF SMALL INTESTINE (H): Primary | ICD-10-CM

## 2022-03-18 PROCEDURE — 92250 FUNDUS PHOTOGRAPHY W/I&R: CPT | Performed by: OPHTHALMOLOGY

## 2022-03-18 PROCEDURE — 99214 OFFICE O/P EST MOD 30 MIN: CPT | Mod: GC | Performed by: OPHTHALMOLOGY

## 2022-03-18 PROCEDURE — 99024 POSTOP FOLLOW-UP VISIT: CPT | Performed by: SURGERY

## 2022-03-18 PROCEDURE — 92134 CPTRZ OPH DX IMG PST SGM RTA: CPT | Performed by: OPHTHALMOLOGY

## 2022-03-18 PROCEDURE — 76513 OPH US DX ANT SGM US UNI/BI: CPT | Performed by: OPHTHALMOLOGY

## 2022-03-18 PROCEDURE — 76512 OPH US DX B-SCAN: CPT | Mod: XU | Performed by: OPHTHALMOLOGY

## 2022-03-18 PROCEDURE — G0463 HOSPITAL OUTPT CLINIC VISIT: HCPCS | Mod: 25

## 2022-03-18 PROCEDURE — G0463 HOSPITAL OUTPT CLINIC VISIT: HCPCS

## 2022-03-18 ASSESSMENT — TONOMETRY
OD_IOP_MMHG: 14
OS_IOP_MMHG: 12
IOP_METHOD: TONOPEN

## 2022-03-18 ASSESSMENT — REFRACTION_WEARINGRX
OS_SPHERE: +3.00
OD_AXIS: 139
OD_ADD: +1.25
OD_CYLINDER: +0.75
OS_AXIS: 127
OD_SPHERE: +3.25
OS_CYLINDER: +0.75
OS_ADD: +1.25
SPECS_TYPE: PAL

## 2022-03-18 ASSESSMENT — SLIT LAMP EXAM - LIDS
COMMENTS: NORMAL
COMMENTS: NORMAL

## 2022-03-18 ASSESSMENT — VISUAL ACUITY
OD_CC+: -2
OS_CC+: -2
OS_CC: 20/20
CORRECTION_TYPE: GLASSES
OD_CC: 20/25
METHOD: SNELLEN - LINEAR

## 2022-03-18 ASSESSMENT — CONF VISUAL FIELD
OS_NORMAL: 1
METHOD: COUNTING FINGERS
OD_NORMAL: 1

## 2022-03-18 ASSESSMENT — PAIN SCALES - GENERAL: PAINLEVEL: NO PAIN (0)

## 2022-03-18 ASSESSMENT — CUP TO DISC RATIO
OD_RATIO: 0.25
OS_RATIO: 0.25

## 2022-03-18 ASSESSMENT — EXTERNAL EXAM - LEFT EYE: OS_EXAM: NORMAL

## 2022-03-18 ASSESSMENT — EXTERNAL EXAM - RIGHT EYE: OD_EXAM: NORMAL

## 2022-03-18 NOTE — LETTER
3/18/2022     RE: Jairo Austin  [address]  [facility] [place] [address]    Dear Colleague,    Thank you for referring your patient, Jairo Austin, to the [facility]. Please see a copy of my visit note below.    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  He did well and returns for follow-up.    Of Note: He is feeling well.  Eating well and passing flatus and having bowel movements.  No fevers, chills, or sweats.  On exam, he is soft, non-tender, and non-distended.  Incisions healing well.    /73   Pulse 61   Temp 97.9  F (36.6  C)   Wt 94.3 kg (207 lb 12.8 oz)   SpO2 97%   BMI 30.69 kg/m      Surgical Pathology (2/22/2022): GIST, low grade, 3.2 cm, margins negative.    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  He did well and returns for follow-up.  Final pathology showed GIST, low grade, 3.2 cm, margins negative.  He has recovered well from surgery.  We discussed GIST and the biology, natural history, and outcomes.  He has a low grade, low risk GIST that has been completely resected.  He likely does not require adjuvant therapy.  He already follows with Dr. De Souza and will have ongoing surveillance with him.  Questions were answered and the patient was in agreement with and understanding of the plan.    A total of 10 minutes were spent on this encounter including more than 50% in face to face time and the remainder in chart review, documentation, and coordination of care.    Again, thank you for allowing me to participate in the care of your patient.      Sincerely,    Sachin Laura MD

## 2022-03-18 NOTE — PROGRESS NOTES
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  diagnosis 10/2019, been getting annual eye exams no prior notice per patient.  DM II since ~ 2010, good control, + HTn.  No steroid use      PAST OCULAR SURGERY  I-125 with LR reposition 12/16/19 & 12/20/19      RETINAL IMAGING:  OCT 03/18/22   OD -  Macula - serous PED and SRF superior macula choroid, stable from prior, PHF attached  OS -  Macula - retina normal, PHF attached, choroid ?thick   Lesion - (prior) elevated poor image quality        UBM   OS - far IT lesion in CB size 3.02mm x 10.39T x 12.48L (10/2019) ->  2.59mm x 11.47T x 12.01L  (3/2020) ->  1.82mm x 11.5T x 10.47L (8/2020) -> 1.91 x 13.31T(12.44T) x 10.37L (11/2020) -> 1.88 x 13.31T x 10.38L (3/2021) -> 1.56mm x 12.00T x 10.47L (9/2021) -> 1.56mm x 12.40 T x 10.43L (3/2022)    U/S OS 03/18/22   A-scan - (prior)  lesion low reflective  B-scan - peripheral lesion elevated          OCT-A 1-21-19  OD - no CNVM        FA 1-21-20  OD - (transit) mulitple hyperF spots, leakage SN macula, no likely CNVM  OS - multiple hyperF spots    ICG 1-21-20  OD (transit) mulitple hyperF spots no CNVM likely  OS - multiple hyperF spots              ASSESSMENT & PLAN    #  CMM OS   - s/p I-125 12/16/19 & 12/20/19   - U/S (requires UBM d/t anterior)    - stable today on U/S   -  recheck 6 months        #.  OD   - doubt CNVM   - no likely CNVM on FA/OCT/OCT-A on 1/21/20   - had STS 20mg 12/20/19, new activity noted 1/21/20 with SRF   - fluid resolved 3/23/20      - new SRF noted 9/2021 stable today      - observe   - steroid avoidance d/w patient          # Syneresis OU   - advised S/Sx RD 3/2022    #. NS OU   - likely VS but  BCVA still good   - patient happy today with vision 3/2022        return to clinic: 6  month, OCT OU with DREW, U/S OS, optos left  eye    Joey Boyer MD  Vitreoretinal Surgery Fellow  [facility]     ATTESTATION     Attending Attestation:     Complete documentation of historical and exam elements from today's encounter can be found in the full encounter summary report (not reduplicated in this progress note).  I personally obtained the chief complaint(s) and history of present illness.  I confirmed and edited as necessary the review of systems, past medical/surgical history, family history, social history, and examination findings as documented by others; and I examined the patient myself.  I personally reviewed the relevant tests, images, and reports as documented above.  I personally reviewed the ophthalmic test(s) associated with this encounter, agree with the interpretation(s) as documented by the resident/fellow, and have edited the corresponding report(s) as necessary.   I formulated and edited as necessary the assessment and plan and discussed the findings and management plan with the patient and family    Charisma Rodriguez MD, PhD  , Vitreoretinal Surgery  Department of Ophthalmology  [facility]

## 2022-03-18 NOTE — NURSING NOTE
Chief Complaints and History of Present Illnesses   Patient presents with     Follow Up     6 month follow up CMM left eye     Chief Complaint(s) and History of Present Illness(es)     Follow Up     Comments: 6 month follow up CMM left eye              Comments     Pt states vision is the same as last visit. No eye pain today. No new flashes or floaters.   No redness or dryness.  DM2 BS: pt doesn't check sugars daily.  A1C: Unknown to pt, done yesterday.  No results found for: A1C    PETRA Castillo March 18, 2022 7:14 AM

## 2022-03-18 NOTE — NURSING NOTE
"Oncology Rooming Note    March 18, 2022 10:17 AM   Jairo Austin is a 76 year old male who presents for:    No chief complaint on file.    Initial Vitals: /73   Pulse 61   Temp 97.9  F (36.6  C)   Wt 94.3 kg (207 lb 12.8 oz)   SpO2 97%   BMI 30.69 kg/m   Estimated body mass index is 30.69 kg/m  as calculated from the following:    Height as of 2/22/22: 1.753 m (5' 9\").    Weight as of this encounter: 94.3 kg (207 lb 12.8 oz). Body surface area is 2.14 meters squared.  No Pain (0) Comment: Data Unavailable   No LMP for male patient.  Allergies reviewed: Yes  Medications reviewed: Yes    Medications: Medication refills not needed today.  Pharmacy name entered into [employer]: [id number] - [place], [place] - [address]    Clinical concerns: None       Tyra Cruz              "

## 2022-03-18 NOTE — PROGRESS NOTES
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  He did well and returns for follow-up.    Of Note: He is feeling well.  Eating well and passing flatus and having bowel movements.  No fevers, chills, or sweats.  On exam, he is soft, non-tender, and non-distended.  Incisions healing well.    /73   Pulse 61   Temp 97.9  F (36.6  C)   Wt 94.3 kg (207 lb 12.8 oz)   SpO2 97%   BMI 30.69 kg/m      Surgical Pathology (2/22/2022): GIST, low grade, 3.2 cm, margins negative.    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  He did well and returns for follow-up.  Final pathology showed GIST, low grade, 3.2 cm, margins negative.  He has recovered well from surgery.  We discussed GIST and the biology, natural history, and outcomes.  He has a low grade, low risk GIST that has been completely resected.  He likely does not require adjuvant therapy.  He already follows with Dr. De Souza and will have ongoing surveillance with him.  Questions were answered and the patient was in agreement with and understanding of the plan.    A total of 10 minutes were spent on this encounter including more than 50% in face to face time and the remainder in chart review, documentation, and coordination of care.

## 2022-03-30 ENCOUNTER — DOCUMENTATION ONLY (OUTPATIENT)
Dept: ONCOLOGY | Facility: CLINIC | Age: 77
End: 2022-03-30
Payer: COMMERCIAL

## 2022-03-30 NOTE — NURSING NOTE
Some [facility] paperwork came via right fax.   I handed these over to Aminata Kim, I placed on her desk.    Frances Johnson MA

## 2022-04-22 ENCOUNTER — LAB (OUTPATIENT)
Dept: LAB | Facility: CLINIC | Age: 77
End: 2022-04-22
Attending: INTERNAL MEDICINE
Payer: COMMERCIAL

## 2022-04-22 ENCOUNTER — VIRTUAL VISIT (OUTPATIENT)
Dept: ONCOLOGY | Facility: CLINIC | Age: 77
End: 2022-04-22
Attending: INTERNAL MEDICINE
Payer: COMMERCIAL

## 2022-04-22 DIAGNOSIS — C69.42 MALIGNANT MELANOMA OF UVEA OF LEFT EYE (H): Primary | ICD-10-CM

## 2022-04-22 DIAGNOSIS — C69.32 MALIGNANT MELANOMA OF CHOROID OF LEFT EYE (H): ICD-10-CM

## 2022-04-22 LAB
ALBUMIN SERPL-MCNC: 3.8 G/DL (ref 3.4–5)
ALP SERPL-CCNC: 121 U/L (ref 40–150)
ALT SERPL W P-5'-P-CCNC: 41 U/L (ref 0–70)
ANION GAP SERPL CALCULATED.3IONS-SCNC: 8 MMOL/L (ref 3–14)
AST SERPL W P-5'-P-CCNC: 23 U/L (ref 0–45)
BASOPHILS # BLD AUTO: 0.1 10E3/UL (ref 0–0.2)
BASOPHILS NFR BLD AUTO: 1 %
BILIRUB SERPL-MCNC: 0.4 MG/DL (ref 0.2–1.3)
BUN SERPL-MCNC: 18 MG/DL (ref 7–30)
CALCIUM SERPL-MCNC: 9.2 MG/DL (ref 8.5–10.1)
CHLORIDE BLD-SCNC: 108 MMOL/L (ref 94–109)
CO2 SERPL-SCNC: 26 MMOL/L (ref 20–32)
CREAT SERPL-MCNC: 0.87 MG/DL (ref 0.66–1.25)
EOSINOPHIL # BLD AUTO: 0.3 10E3/UL (ref 0–0.7)
EOSINOPHIL NFR BLD AUTO: 5 %
ERYTHROCYTE [DISTWIDTH] IN BLOOD BY AUTOMATED COUNT: 12.4 % (ref 10–15)
GFR SERPL CREATININE-BSD FRML MDRD: >90 ML/MIN/1.73M2
GLUCOSE BLD-MCNC: 126 MG/DL (ref 70–99)
HCT VFR BLD AUTO: 44.6 % (ref 40–53)
HGB BLD-MCNC: 14.6 G/DL (ref 13.3–17.7)
IMM GRANULOCYTES # BLD: 0 10E3/UL
IMM GRANULOCYTES NFR BLD: 0 %
LYMPHOCYTES # BLD AUTO: 1.3 10E3/UL (ref 0.8–5.3)
LYMPHOCYTES NFR BLD AUTO: 23 %
MCH RBC QN AUTO: 31.7 PG (ref 26.5–33)
MCHC RBC AUTO-ENTMCNC: 32.7 G/DL (ref 31.5–36.5)
MCV RBC AUTO: 97 FL (ref 78–100)
MONOCYTES # BLD AUTO: 0.8 10E3/UL (ref 0–1.3)
MONOCYTES NFR BLD AUTO: 13 %
NEUTROPHILS # BLD AUTO: 3.3 10E3/UL (ref 1.6–8.3)
NEUTROPHILS NFR BLD AUTO: 58 %
NRBC # BLD AUTO: 0 10E3/UL
NRBC BLD AUTO-RTO: 0 /100
PLATELET # BLD AUTO: 206 10E3/UL (ref 150–450)
POTASSIUM BLD-SCNC: 3.8 MMOL/L (ref 3.4–5.3)
PROT SERPL-MCNC: 7.2 G/DL (ref 6.8–8.8)
RBC # BLD AUTO: 4.6 10E6/UL (ref 4.4–5.9)
SODIUM SERPL-SCNC: 142 MMOL/L (ref 133–144)
WBC # BLD AUTO: 5.7 10E3/UL (ref 4–11)

## 2022-04-22 PROCEDURE — 36415 COLL VENOUS BLD VENIPUNCTURE: CPT | Performed by: PATHOLOGY

## 2022-04-22 PROCEDURE — 85025 COMPLETE CBC W/AUTO DIFF WBC: CPT | Performed by: PATHOLOGY

## 2022-04-22 PROCEDURE — 80053 COMPREHEN METABOLIC PANEL: CPT | Performed by: PATHOLOGY

## 2022-04-22 PROCEDURE — 99214 OFFICE O/P EST MOD 30 MIN: CPT | Mod: TEL | Performed by: INTERNAL MEDICINE

## 2022-04-22 NOTE — LETTER
4/22/2022     RE: Jairo Austin  [address]  [place] [address]    Dear Colleague,    Thank you for referring your patient, Jairo Austin, to the [facility]. Please see a copy of my visit note below.    Brent is a 76 year old who is being evaluated via a billable telephone visit.      What phone number would you like to be contacted at? [phone]  How would you like to obtain your AVS? Useful at [employer]  Phone call duration: 21 minutes      MEDICAL ONCOLOGY TELEPHONE VISIT  Melanoma Clinic  Apr 22, 2022    CHIEF COMPLAINT: Choroidal melanoma, left eye    Melanoma History:  1. 10/28/2019, he was initially seen and the left eye lesion measured 10.39T x 12.48L with a height of 3.02mm.  2. 11/4/2019, CT-scan negative for obvious liver metastasis  3. 11/11/2019, MRI liver showed small enhancing lesions in segment 8 and 4a, likely small hemangiomas.   4. 12/16/2019, he has I-125 plaque brachytherapy placement and delivery of 8,500 cGy total dose to the left eye lesion.  5. 2/6/2020, CT-CAP shows decreased appearance of the arterially enhancing liver lesion. No obvious liver metastasis or suspicious lesions.  6. 10/2/2020, CT-CAP with no evidence of metastatic disease in the chest, abdomen, or pelvis. The hepatic enhancing foci previously characterized as benign on abdominal MRI 11/11/2019 are redemonstrated and are stable.     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 He presents via telephone visit today.     He states he is doing well. 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 His restaging MRI-abdomen is negative for metastatic disease to liver.     The small bowel GIST was removed by Dr. 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 The tumor cells are positive for  and DOG1 while negative for S-100 and  Desmin. Surgical resection margins were negative.    He denies any nausea, vomiting. No abdominal pain. No fevers or chills.    ECOG performance status 0.      REVIEW OF SYSTEMS  A 12-point ROS negative except as in HPI.     Current Outpatient Medications   Medication Sig Dispense Refill     acetaminophen (TYLENOL) 500 MG tablet Take 1,000 mg by mouth daily       amLODIPine (NORVASC) 10 MG tablet Take 10 mg by mouth every morning        aspirin 81 MG EC tablet Take 81 mg by mouth every morning        atorvastatin (LIPITOR) 20 MG tablet Take 20 mg by mouth every morning        carvedilol (COREG) 12.5 MG tablet Take 12.5 mg by mouth 2 times daily (with meals)        chlorthalidone (HYGROTON) 25 MG tablet Take 25 mg by mouth daily       lisinopril (PRINIVIL/ZESTRIL) 20 MG tablet Take 20 mg by mouth 2 times daily   3     multivitamin w/minerals (THERA-VIT-M) tablet Take 1 tablet by mouth every morning       oxyCODONE (ROXICODONE) 5 MG tablet Take 1 tablet (5 mg) by mouth every 6 hours as needed for pain or moderate to severe pain 20 tablet 0     oxyCODONE (ROXICODONE) 5 MG tablet Take 1-2 tablets (5-10 mg) by mouth every 4 hours as needed for moderate to severe pain 40 tablet 0     polyethylene glycol (MIRALAX) 17 GM/Dose powder Take 17 g by mouth daily as needed for constipation 510 g 0       Past Medical History:   Diagnosis Date     Asbestos exposure 1/19/2007    chest x ray Jan 2007, November 2009. 11/30/11, 4/24/2013, 12/31/2014     Diabetes (H)      Hearing loss 1/19/2007    hearing aid on left. 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 Bilateral hip replacement, 2011    SOCIAL HISTORY   with 2 children and 4 grandchildren. Retired  and now working as a . Quit smoking 20 years ago. Smoked 2 packs per day x 10 years.  History   Smoking Status     Former Smoker     Packs/day: 0.00     Quit date: 2009   Smokeless Tobacco     Never Used    Social History    Substance and Sexual Activity      Alcohol use: Yes        Comment: Weekends/socially     History   Drug Use Unknown     FAMILY HISTORY  Paternal uncle: Throat cancer  Family History   Problem Relation Age of Onset     Glaucoma No family hx of      Macular Degeneration No family hx of        PHYSICAL EXAMINATION  There were no vitals taken for this visit.  No exam as this was telephone visit.      LABORATORY STUDIES  No visits with results within 1 Week(s) from this visit.   Latest known visit with results is:   Ancillary Procedure on 12/17/2021   Component Date Value Ref Range Status     Creatinine POCT 12/17/2021 1.4* 0.7 - 1.3 mg/dL Final     GFR, ESTIMATED POCT 12/17/2021 49* >60 mL/min/1.73m2 Final       IMAGING STUDIES  MR Abdomen w/o & w Contrast  Narrative: MRI ABDOMEN    CLINICAL HISTORY:  Uveal melanoma    TECHNIQUE:  Images were acquired with and without intravenous contrast  through the abdomen. The following MR images were acquired: TrueFISP,  multiplanar T2 weighted, axial T1 in/out of phase, axial fat-saturated  T1, diffusion-weighted. Multiplanar T1-weighted images with fat  saturation were before contrast administration and at multiple time  points following the administration of intravenous contrast. Contrast  dose: 19 mL Eovist    FINDINGS:    Comparison study: CT 2/11/2022    Liver: Noncirrhotic morphology of the liver. Stable T2 hyperintense  lesion along the medial aspect of hepatic segment 7 measuring 14 mm  (image 21:15) demonstrating peripheral early arterial enhancement with  progressive fill-in and no significant diffusion restriction,  compatible with a benign hemangioma. Additional arterially enhancing  ill-defined focus in hepatic dome measuring 13  mm (image 12:24) which  is isointense on additional postcontrast sequences with no additional  signal abnormalities, likely transient hepatic intensity difference.  Simple cyst in the left hepatic lobe. No significant hepatic  steatosis.    Gallbladder: Unremarkable.    Spleen: Unremarkable.    Kidneys: Bilateral simple T2 hyperintense cortical cysts.  Subcentimeter hemorrhagic cyst along the lateral aspect of the left  mid kidney. No suspicious enhancing renal lesions. No hydronephrosis.    Adrenal glands: Unremarkable.    Pancreas: Preservation of normal T1 hyperintense parenchyma. The  pancreatic duct within normal limits.    Bowel: Unobstructed. Scattered colonic diverticula. Small bowel mass  better seen on comparison CT.    Lymph nodes: No suspicious abdominal adenopathy.    Blood vessels: Major abdominal vasculature is    Lung bases: Clear.    Bones and soft tissues: Stable T2 hyperintense lesion in the T11  vertebral body, likely hemangioma.    Mesentery and abdominal wall: Unremarkable.    Ascites: None.  Impression: IMPRESSION:  No evidence of metastatic disease in the abdomen.    I have personally reviewed the examination and initial interpretation  and I agree with the findings.    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 Austin. He is a 76 year old man s/p plaque brachytherapy for uveal melanoma involving the ciliary body. He is doing well.    MR-Abdomen shows no new evidence of metastases.    -Continue follow-up in Ophthalmology.  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    Darrell Aranda M.D.   of  Medicine  Hematology, Oncology and Transplantation

## 2022-04-22 NOTE — PROGRESS NOTES
Brent is a 76 year old who is being evaluated via a billable telephone visit.      What phone number would you like to be contacted at? [phone]  How would you like to obtain your AVS? Raghavendra  Phone call duration: 21 minutes      MEDICAL ONCOLOGY TELEPHONE VISIT  [facility]  Apr 22, 2022    CHIEF COMPLAINT: Choroidal melanoma, left eye    Melanoma History:  1. 10/28/2019, he was initially seen and the left eye lesion measured 10.39T x 12.48L with a height of 3.02mm.  2. 11/4/2019, CT-scan negative for obvious liver metastasis  3. 11/11/2019, MRI liver showed small enhancing lesions in segment 8 and 4a, likely small hemangiomas.   4. 12/16/2019, he has I-125 plaque brachytherapy placement and delivery of 8,500 cGy total dose to the left eye lesion.  5. 2/6/2020, CT-CAP shows decreased appearance of the arterially enhancing liver lesion. No obvious liver metastasis or suspicious lesions.  6. 10/2/2020, CT-CAP with no evidence of metastatic disease in the chest, abdomen, or pelvis. The hepatic enhancing foci previously characterized as benign on abdominal MRI 11/11/2019 are redemonstrated and are stable.     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 He presents via telephone visit today.     He states he is doing well. 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 His restaging MRI-abdomen is negative for metastatic disease to liver.     The small bowel GIST was removed by Dr. 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 The tumor cells are positive for  and DOG1 while negative for S-100 and Desmin. Surgical resection margins were negative.    He denies any nausea, vomiting. No abdominal pain. No fevers or chills.    ECOG performance status 0.      REVIEW OF SYSTEMS  A 12-point ROS negative except as in HPI.     Current Outpatient Medications    Medication Sig Dispense Refill     acetaminophen (TYLENOL) 500 MG tablet Take 1,000 mg by mouth daily       amLODIPine (NORVASC) 10 MG tablet Take 10 mg by mouth every morning        aspirin 81 MG EC tablet Take 81 mg by mouth every morning        atorvastatin (LIPITOR) 20 MG tablet Take 20 mg by mouth every morning        carvedilol (COREG) 12.5 MG tablet Take 12.5 mg by mouth 2 times daily (with meals)        chlorthalidone (HYGROTON) 25 MG tablet Take 25 mg by mouth daily       lisinopril (PRINIVIL/ZESTRIL) 20 MG tablet Take 20 mg by mouth 2 times daily   3     multivitamin w/minerals (THERA-VIT-M) tablet Take 1 tablet by mouth every morning       oxyCODONE (ROXICODONE) 5 MG tablet Take 1 tablet (5 mg) by mouth every 6 hours as needed for pain or moderate to severe pain 20 tablet 0     oxyCODONE (ROXICODONE) 5 MG tablet Take 1-2 tablets (5-10 mg) by mouth every 4 hours as needed for moderate to severe pain 40 tablet 0     polyethylene glycol (MIRALAX) 17 GM/Dose powder Take 17 g by mouth daily as needed for constipation 510 g 0       Past Medical History:   Diagnosis Date     Asbestos exposure 1/19/2007    chest x ray Jan 2007, November 2009. 11/30/11, 4/24/2013, 12/31/2014     Diabetes (H)      Hearing loss 1/19/2007    hearing aid on left. 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 Bilateral hip replacement, 2011    SOCIAL HISTORY   with 2 children and 4 grandchildren. Retired and now working as a . Quit smoking 20 years ago. Smoked 2 packs per day x 10 years.  History   Smoking Status     Former Smoker     Packs/day: 0.00     Quit date: 2009   Smokeless Tobacco     Never Used    Social History    Substance and  Sexual Activity      Alcohol use: Yes        Comment: Weekends/socially     History   Drug Use Unknown     FAMILY HISTORY  Paternal uncle: Throat cancer  Family History   Problem Relation Age of Onset     Glaucoma No family hx of      Macular Degeneration No family hx of        PHYSICAL EXAMINATION  There were no vitals taken for this visit.  No exam as this was telephone visit.      LABORATORY STUDIES  No visits with results within 1 Week(s) from this visit.   Latest known visit with results is:   Ancillary Procedure on 12/17/2021   Component Date Value Ref Range Status     Creatinine POCT 12/17/2021 1.4* 0.7 - 1.3 mg/dL Final     GFR, ESTIMATED POCT 12/17/2021 49* >60 mL/min/1.73m2 Final       IMAGING STUDIES  MR Abdomen w/o & w Contrast  Narrative: MRI ABDOMEN    CLINICAL HISTORY:  Uveal melanoma    TECHNIQUE:  Images were acquired with and without intravenous contrast  through the abdomen. The following MR images were acquired: TrueFISP,  multiplanar T2 weighted, axial T1 in/out of phase, axial fat-saturated  T1, diffusion-weighted. Multiplanar T1-weighted images with fat  saturation were before contrast administration and at multiple time  points following the administration of intravenous contrast. Contrast  dose: 19 mL Eovist    FINDINGS:    Comparison study: CT 2/11/2022    Liver: Noncirrhotic morphology of the liver. Stable T2 hyperintense  lesion along the medial aspect of hepatic segment 7 measuring 14 mm  (image 21:15) demonstrating peripheral early arterial enhancement with  progressive fill-in and no significant diffusion restriction,  compatible with a benign hemangioma. Additional arterially enhancing  ill-defined focus in hepatic dome measuring 13 mm (image 12:24) which  is isointense on additional postcontrast sequences with no additional  signal abnormalities, likely transient hepatic intensity difference.  Simple cyst in the left hepatic lobe. No significant hepatic  steatosis.    Gallbladder:  Unremarkable.    Spleen: Unremarkable.    Kidneys: Bilateral simple T2 hyperintense cortical cysts.  Subcentimeter hemorrhagic cyst along the lateral aspect of the left  mid kidney. No suspicious enhancing renal lesions. No hydronephrosis.    Adrenal glands: Unremarkable.    Pancreas: Preservation of normal T1 hyperintense parenchyma. The  pancreatic duct within normal limits.    Bowel: Unobstructed. Scattered colonic diverticula. Small bowel mass  better seen on comparison CT.    Lymph nodes: No suspicious abdominal adenopathy.    Blood vessels: Major abdominal vasculature is    Lung bases: Clear.    Bones and soft tissues: Stable T2 hyperintense lesion in the T11  vertebral body, likely hemangioma.    Mesentery and abdominal wall: Unremarkable.    Ascites: None.  Impression: IMPRESSION:  No evidence of metastatic disease in the abdomen.    I have personally reviewed the examination and initial interpretation  and I agree with the findings.    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 Austin. He is a 76 year old man s/p plaque brachytherapy for uveal melanoma involving the ciliary body. He is doing well.    MR-Abdomen shows no new evidence of metastases.    -Continue follow-up in Ophthalmology.  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    Darrell Aranda M.D.   of Medicine  Hematology, Oncology and Transplantation

## 2022-04-24 ENCOUNTER — HEALTH MAINTENANCE LETTER (OUTPATIENT)
Age: 77
End: 2022-04-24

## 2022-06-19 ENCOUNTER — HEALTH MAINTENANCE LETTER (OUTPATIENT)
Age: 77
End: 2022-06-19

## 2022-08-18 ENCOUNTER — VIRTUAL VISIT (OUTPATIENT)
Dept: ONCOLOGY | Facility: CLINIC | Age: 77
End: 2022-08-18
Attending: INTERNAL MEDICINE
Payer: COMMERCIAL

## 2022-08-18 ENCOUNTER — ANCILLARY PROCEDURE (OUTPATIENT)
Dept: CT IMAGING | Facility: CLINIC | Age: 77
End: 2022-08-18
Attending: INTERNAL MEDICINE
Payer: COMMERCIAL

## 2022-08-18 ENCOUNTER — LAB (OUTPATIENT)
Dept: LAB | Facility: CLINIC | Age: 77
End: 2022-08-18
Payer: COMMERCIAL

## 2022-08-18 DIAGNOSIS — C69.42 MALIGNANT MELANOMA OF UVEA OF LEFT EYE (H): ICD-10-CM

## 2022-08-18 DIAGNOSIS — C69.42 MALIGNANT MELANOMA OF UVEA OF LEFT EYE (H): Primary | ICD-10-CM

## 2022-08-18 DIAGNOSIS — C69.32 MALIGNANT MELANOMA OF CHOROID OF LEFT EYE (H): ICD-10-CM

## 2022-08-18 LAB
ALBUMIN SERPL-MCNC: 3.9 G/DL (ref 3.4–5)
ALP SERPL-CCNC: 124 U/L (ref 40–150)
ALT SERPL W P-5'-P-CCNC: 37 U/L (ref 0–70)
ANION GAP SERPL CALCULATED.3IONS-SCNC: 6 MMOL/L (ref 3–14)
AST SERPL W P-5'-P-CCNC: 28 U/L (ref 0–45)
BASOPHILS # BLD AUTO: 0 10E3/UL (ref 0–0.2)
BASOPHILS NFR BLD AUTO: 1 %
BILIRUB SERPL-MCNC: 0.3 MG/DL (ref 0.2–1.3)
BUN SERPL-MCNC: 21 MG/DL (ref 7–30)
CALCIUM SERPL-MCNC: 9.4 MG/DL (ref 8.5–10.1)
CHLORIDE BLD-SCNC: 105 MMOL/L (ref 94–109)
CO2 SERPL-SCNC: 29 MMOL/L (ref 20–32)
CREAT BLD-MCNC: 1 MG/DL (ref 0.7–1.3)
CREAT SERPL-MCNC: 0.85 MG/DL (ref 0.66–1.25)
EOSINOPHIL # BLD AUTO: 0.3 10E3/UL (ref 0–0.7)
EOSINOPHIL NFR BLD AUTO: 6 %
ERYTHROCYTE [DISTWIDTH] IN BLOOD BY AUTOMATED COUNT: 12.5 % (ref 10–15)
GFR SERPL CREATININE-BSD FRML MDRD: >60 ML/MIN/1.73M2
GFR SERPL CREATININE-BSD FRML MDRD: >90 ML/MIN/1.73M2
GLUCOSE BLD-MCNC: 135 MG/DL (ref 70–99)
HCT VFR BLD AUTO: 42.1 % (ref 40–53)
HGB BLD-MCNC: 14.1 G/DL (ref 13.3–17.7)
IMM GRANULOCYTES # BLD: 0 10E3/UL
IMM GRANULOCYTES NFR BLD: 0 %
LYMPHOCYTES # BLD AUTO: 1.4 10E3/UL (ref 0.8–5.3)
LYMPHOCYTES NFR BLD AUTO: 26 %
MCH RBC QN AUTO: 32.9 PG (ref 26.5–33)
MCHC RBC AUTO-ENTMCNC: 33.5 G/DL (ref 31.5–36.5)
MCV RBC AUTO: 98 FL (ref 78–100)
MONOCYTES # BLD AUTO: 0.7 10E3/UL (ref 0–1.3)
MONOCYTES NFR BLD AUTO: 13 %
NEUTROPHILS # BLD AUTO: 2.8 10E3/UL (ref 1.6–8.3)
NEUTROPHILS NFR BLD AUTO: 54 %
NRBC # BLD AUTO: 0 10E3/UL
NRBC BLD AUTO-RTO: 0 /100
PLATELET # BLD AUTO: 214 10E3/UL (ref 150–450)
POTASSIUM BLD-SCNC: 4.3 MMOL/L (ref 3.4–5.3)
PROT SERPL-MCNC: 7.6 G/DL (ref 6.8–8.8)
RBC # BLD AUTO: 4.28 10E6/UL (ref 4.4–5.9)
SODIUM SERPL-SCNC: 140 MMOL/L (ref 133–144)
WBC # BLD AUTO: 5.2 10E3/UL (ref 4–11)

## 2022-08-18 PROCEDURE — 82565 ASSAY OF CREATININE: CPT | Mod: 91 | Performed by: PATHOLOGY

## 2022-08-18 PROCEDURE — 85025 COMPLETE CBC W/AUTO DIFF WBC: CPT | Performed by: PATHOLOGY

## 2022-08-18 PROCEDURE — 99213 OFFICE O/P EST LOW 20 MIN: CPT | Mod: TEL | Performed by: INTERNAL MEDICINE

## 2022-08-18 PROCEDURE — 80053 COMPREHEN METABOLIC PANEL: CPT | Performed by: PATHOLOGY

## 2022-08-18 PROCEDURE — 36415 COLL VENOUS BLD VENIPUNCTURE: CPT | Performed by: PATHOLOGY

## 2022-08-18 PROCEDURE — 71260 CT THORAX DX C+: CPT | Performed by: RADIOLOGY

## 2022-08-18 PROCEDURE — 74177 CT ABD & PELVIS W/CONTRAST: CPT | Performed by: RADIOLOGY

## 2022-08-18 RX ORDER — IOPAMIDOL 755 MG/ML
117 INJECTION, SOLUTION INTRAVASCULAR ONCE
Status: COMPLETED | OUTPATIENT
Start: 2022-08-18 | End: 2022-08-18

## 2022-08-18 RX ADMIN — IOPAMIDOL 117 ML: 755 INJECTION, SOLUTION INTRAVASCULAR at 07:18

## 2022-08-18 NOTE — PROGRESS NOTES
Brent is a 76 year old who is being evaluated via a billable telephone visit.      What phone number would you like to be contacted at? [phone]    Phone call duration: 21 minutes    MEDICAL ONCOLOGY TELEPHONE VISIT  [facility]  Aug 18, 2022    CHIEF COMPLAINT: Choroidal melanoma, left eye    Melanoma History:  1. 10/28/2019, he was initially seen and the left eye lesion measured 10.39T x 12.48L with a height of 3.02mm.  2. 11/4/2019, CT-scan negative for obvious liver metastasis  3. 11/11/2019, MRI liver showed small enhancing lesions in segment 8 and 4a, likely small hemangiomas.   4. 12/16/2019, he has I-125 plaque brachytherapy placement and delivery of 8,500 cGy total dose to the left eye lesion.  5. 2/6/2020, CT-CAP shows decreased appearance of the arterially enhancing liver lesion. No obvious liver metastasis or suspicious lesions.  6. 10/2/2020, CT-CAP with no evidence of metastatic disease in the chest, abdomen, or pelvis. The hepatic enhancing foci previously characterized as benign on abdominal MRI 11/11/2019 are redemonstrated and are stable.     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 He presents via telephone visit today.    He moved 4 days ago into assisted living. He is still unpacking. His grandkids have already come over to see the new place and to enjoy the pool. He is doing well.    He denies any nausea, vomiting, diarrhea. He had a hernia develop on the left side, about a week and half ago. He has an ultrasound and surgical consult coming up.     CT-CAP shows no new evidence of metastatic disease to liver. No new lung nodules. There is a fat containing left inguinal hernia that I can appreciate, but no involvement of bowels. He denies any nausea, vomiting. No abdominal pain. No fevers or chills.    ECOG performance status 0 to1.      REVIEW OF SYSTEMS  A 12-point ROS negative except as in HPI.     Current Outpatient  Medications   Medication Sig Dispense Refill     acetaminophen (TYLENOL) 500 MG tablet Take 1,000 mg by mouth daily       amLODIPine (NORVASC) 10 MG tablet Take 10 mg by mouth every morning        aspirin 81 MG EC tablet Take 81 mg by mouth every morning        atorvastatin (LIPITOR) 20 MG tablet Take 20 mg by mouth every morning        carvedilol (COREG) 12.5 MG tablet Take 12.5 mg by mouth 2 times daily (with meals)        chlorthalidone (HYGROTON) 25 MG tablet Take 25 mg by mouth daily       lisinopril (PRINIVIL/ZESTRIL) 20 MG tablet Take 20 mg by mouth 2 times daily   3     multivitamin w/minerals (THERA-VIT-M) tablet Take 1 tablet by mouth every morning       oxyCODONE (ROXICODONE) 5 MG tablet Take 1 tablet (5 mg) by mouth every 6 hours as needed for pain or moderate to severe pain 20 tablet 0     oxyCODONE (ROXICODONE) 5 MG tablet Take 1-2 tablets (5-10 mg) by mouth every 4 hours as needed for moderate to severe pain 40 tablet 0     polyethylene glycol (MIRALAX) 17 GM/Dose powder Take 17 g by mouth daily as needed for constipation 510 g 0       Past Medical History:   Diagnosis Date     Asbestos exposure 1/19/2007    chest x ray Jan 2007, November 2009. 11/30/11, 4/24/2013, 12/31/2014     Diabetes (H)      Hearing loss 1/19/2007    hearing aid on left. 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 Bilateral hip replacement, 2011    SOCIAL HISTORY   with 2 children and 4 grandchildren. Retired and now working as a . Quit smoking 20 years ago. Smoked 2 packs per day x 10 years.  History   Smoking Status     Former Smoker     Packs/day: 0.00     Quit date: 2009   Smokeless Tobacco     Never Used    Social History     Substance and Sexual Activity      Alcohol use: Yes        Comment: Weekends/socially     History   Drug Use Unknown     FAMILY HISTORY  Paternal uncle: Throat cancer  Family History   Problem Relation Age of Onset     Glaucoma No family hx of      Macular Degeneration No family hx of        PHYSICAL EXAMINATION  There were no vitals taken for this visit.  No exam as this was telephone visit.      LABORATORY STUDIES  Ancillary Procedure on 08/18/2022   Component Date Value Ref Range Status     Creatinine POCT 08/18/2022 1.0  0.7 - 1.3 mg/dL Final     GFR, ESTIMATED POCT 08/18/2022 >60  >60 mL/min/1.73m2 Final   Lab on 08/18/2022   Component Date Value Ref Range Status     Sodium 08/18/2022 140  133 - 144 mmol/L Final     Potassium 08/18/2022 4.3  3.4 - 5.3 mmol/L Final     Chloride 08/18/2022 105  94 - 109 mmol/L Final     Carbon Dioxide (CO2) 08/18/2022 29  20 - 32 mmol/L Final     Anion Gap 08/18/2022 6  3 - 14 mmol/L Final     Urea Nitrogen 08/18/2022 21  7 - 30 mg/dL Final     Creatinine 08/18/2022 0.85  0.66 - 1.25 mg/dL Final     Calcium 08/18/2022 9.4  8.5 - 10.1 mg/dL Final     Glucose 08/18/2022 135 (A) 70 - 99 mg/dL Final     Alkaline Phosphatase 08/18/2022 124  40 - 150 U/L Final     AST 08/18/2022 28  0 - 45 U/L Final     ALT 08/18/2022 37  0 - 70 U/L Final     Protein Total 08/18/2022 7.6  6.8 - 8.8 g/dL Final     Albumin 08/18/2022 3.9  3.4 - 5.0 g/dL Final     Bilirubin Total 08/18/2022 0.3  0.2 - 1.3 mg/dL Final     GFR Estimate 08/18/2022 >90  >60 mL/min/1.73m2 Final     WBC Count 08/18/2022 5.2  4.0 - 11.0 10e3/uL Final     RBC Count 08/18/2022 4.28 (A) 4.40 - 5.90 10e6/uL Final     Hemoglobin 08/18/2022 14.1  13.3 - 17.7 g/dL Final     Hematocrit 08/18/2022 42.1  40.0 - 53.0 % Final     MCV 08/18/2022 98  78 - 100 fL Final     MCH 08/18/2022 32.9  26.5 - 33.0 pg Final     MCHC 08/18/2022 33.5  31.5 - 36.5 g/dL Final     RDW 08/18/2022 12.5  10.0 - 15.0 % Final     Platelet Count 08/18/2022 214   150 - 450 10e3/uL Final     % Neutrophils 08/18/2022 54  % Final     % Lymphocytes 08/18/2022 26  % Final     % Monocytes 08/18/2022 13  % Final     % Eosinophils 08/18/2022 6  % Final     % Basophils 08/18/2022 1  % Final     % Immature Granulocytes 08/18/2022 0  % Final     NRBCs per 100 WBC 08/18/2022 0  <1 /100 Final     Absolute Neutrophils 08/18/2022 2.8  1.6 - 8.3 10e3/uL Final     Absolute Lymphocytes 08/18/2022 1.4  0.8 - 5.3 10e3/uL Final     Absolute Monocytes 08/18/2022 0.7  0.0 - 1.3 10e3/uL Final     Absolute Eosinophils 08/18/2022 0.3  0.0 - 0.7 10e3/uL Final     Absolute Basophils 08/18/2022 0.0  0.0 - 0.2 10e3/uL Final     Absolute Immature Granulocytes 08/18/2022 0.0  <=0.4 10e3/uL Final     Absolute NRBCs 08/18/2022 0.0  10e3/uL Final         IMAGING STUDIES  CT Chest/Abdomen/Pelvis w Contrast  Narrative: EXAMINATION: CT CHEST/ABDOMEN/PELVIS W CONTRAST, 8/18/2022 7:31 AM    TECHNIQUE:  Helical CT images from the thoracic inlet through the  symphysis pubis were obtained with IV contrast. Contrast dose: Isovue  370 117cc    COMPARISON: 4/22/2022 and 2/11/2022, 1/29/2021    HISTORY: Malignant melanoma of uvea of left eye (H)    FINDINGS:  CHEST:  LUNGS:  Unchanged calcified granuloma in the superior right lower lobe (series  9, image 55).  Unchanged calcified right hilar lymph node.  Unchanged left posterior chunky pleural calcifications.   No mass or consolidation. No pleural effusion or pneumothorax. The  airway is patent.    MEDIASTINUM:   Heart size is within normal limits. No pericardial effusion. No  thoracic lymphadenopathy. Thyroid is unremarkable.    ABDOMEN/PELVIS:  Marked beam hardening artifact from hip arthroplasties limits  evaluation of the pelvis.    LIVER:  Hepatic segment 3 and 8 cysts confirmed on 4/22/2002 MRI (series 6,  image 285 and 256).  Unchanged posterior-medial hepatic segment 8 peripheral nodular  enhancing observation previously characterized as a hemangioma (series  4,  image 81).  Unchanged superior hepatic segment 8 arterial enhancing observation  (series 4, image 48) previously characterized as  variant perfusion.  Resolved on delayed imaging.  Unchanged calcifications scattered throughout the liver likely  sequelae of remote granulomatous infection.    BILIARY:   Within normal limits.    PANCREAS:   Within normal limits.    SPLEEN:   Unchanged calcifications likely sequelae of remote granulomatous  infection. Incidental splenule.    ADRENAL GLANDS:   Within normal limits.    :   Unchanged renal cysts, some of which demonstrate hemorrhage as  confirmed on MRI (for instance series 6, image 51 and 388.  No hydronephrosis.  Visualized portions of the urinary bladder are not thickened.    STOMACH:   Decompressed which limits evaluation    BOWEL:   Normal caliber of the small and large bowel.  Appendix is visualized.  New postsurgical changes of small bowel resection and side-to-side  anastomosis in the right lower quadrant with removal of previously  described GIST compared to 2/11/2022.    PERITONEUM/FLUID:   No free fluid.    VESSELS:   No aneurysmal dilatation of the abdominal aorta.  The portal,  splenic, and superior mesenteric veins are patent.  The origins of the  celiac and superior mesenteric arteries are patent.    LYMPH NODES:   No lymphadenopathy.    BONES/SOFT TISSUES:   No aggressive osseous lesions.  Bilateral hip arthroplasties. Prominent posterior right heterotopic  ossification. No acute hardware complication identified.  Scattered sclerotic foci of the axial skeleton with margins suggesting  bone island. Examples series 7, images 79, 71, 59).  Impression: IMPRESSION:   1.  Interval postsurgical changes of small bowel GIST resection.  2.  No evidence of metastatic disease in the chest, abdomen or pelvis.  3.  Remaining incidental findings are stable as described above  compared to multiple priors.     I have personally reviewed the examination and initial  interpretation  and I agree with the findings.    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 Austin. He is a 76 year old man s/p plaque brachytherapy for uveal melanoma involving the ciliary body. He is doing well. ROS is negative.    -Continue follow-up in Ophthalmology.  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    Darrell Aranda M.D.   of Medicine  Hematology, Oncology and Transplantation

## 2022-08-18 NOTE — LETTER
8/18/2022         RE: Jairo Austin  [address]  [facility] [address]        Dear Colleague,    Thank you for referring your patient, Jairo Austin, to the [facility]. Please see a copy of my visit note below.    Brent is a 76 year old who is being evaluated via a billable telephone visit.      What phone number would you like to be contacted at? [phone]    Phone call duration: 21 minutes    MEDICAL ONCOLOGY TELEPHONE VISIT  Melanoma [facility]  Aug 18, 2022    CHIEF COMPLAINT: Choroidal melanoma, left eye    Melanoma History:  1. 10/28/2019, he was initially seen and the left eye lesion measured 10.39T x 12.48L with a height of 3.02mm.  2. 11/4/2019, CT-scan negative for obvious liver metastasis  3. 11/11/2019, MRI liver showed small enhancing lesions in segment 8 and 4a, likely small hemangiomas.   4. 12/16/2019, he has I-125 plaque brachytherapy placement and delivery of 8,500 cGy total dose to the left eye lesion.  5. 2/6/2020, CT-CAP shows decreased appearance of the arterially enhancing liver lesion. No obvious liver metastasis or suspicious lesions.  6. 10/2/2020, CT-CAP with no evidence of metastatic disease in the chest, abdomen, or pelvis. The hepatic enhancing foci previously characterized as benign on abdominal MRI 11/11/2019 are redemonstrated and are stable.     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 He presents via telephone visit today.    He moved 4 days ago into assisted living. He is still unpacking. His grandkids have already come over to see the new place and to enjoy the pool. He is doing well.    He denies any nausea, vomiting, diarrhea. He had a hernia develop on the left side, about a week and half ago. He has an ultrasound and surgical consult coming up.     CT-CAP shows no new evidence of metastatic disease to liver. No new lung nodules. There is a fat  containing left inguinal hernia that I can appreciate, but no involvement of bowels. He denies any nausea, vomiting. No abdominal pain. No fevers or chills.    ECOG performance status 0 to1.      REVIEW OF SYSTEMS  A 12-point ROS negative except as in HPI.     Current Outpatient Medications   Medication Sig Dispense Refill     acetaminophen (TYLENOL) 500 MG tablet Take 1,000 mg by mouth daily       amLODIPine (NORVASC) 10 MG tablet Take 10 mg by mouth every morning        aspirin 81 MG EC tablet Take 81 mg by mouth every morning        atorvastatin (LIPITOR) 20 MG tablet Take 20 mg by mouth every morning        carvedilol (COREG) 12.5 MG tablet Take 12.5 mg by mouth 2 times daily (with meals)        chlorthalidone (HYGROTON) 25 MG tablet Take 25 mg by mouth daily       lisinopril (PRINIVIL/ZESTRIL) 20 MG tablet Take 20 mg by mouth 2 times daily   3     multivitamin w/minerals (THERA-VIT-M) tablet Take 1 tablet by mouth every morning       oxyCODONE (ROXICODONE) 5 MG tablet Take 1 tablet (5 mg) by mouth every 6 hours as needed for pain or moderate to severe pain 20 tablet 0     oxyCODONE (ROXICODONE) 5 MG tablet Take 1-2 tablets (5-10 mg) by mouth every 4 hours as needed for moderate to severe pain 40 tablet 0     polyethylene glycol (MIRALAX) 17 GM/Dose powder Take 17 g by mouth daily as needed for constipation 510 g 0       Past Medical History:   Diagnosis Date     Asbestos exposure 1/19/2007    chest x ray Jan 2007, November 2009. 11/30/11, 4/24/2013, 12/31/2014     Diabetes (H)      Hearing loss 1/19/2007    hearing aid on left. 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 Bilateral hip replacement, 2011    SOCIAL HISTORY   with 2  children and 4 grandchildren. Retired and now working as a . Quit smoking 20 years ago. Smoked 2 packs per day x 10 years.  History   Smoking Status     Former Smoker     Packs/day: 0.00     Quit date: 2009   Smokeless Tobacco     Never Used    Social History    Substance and Sexual Activity      Alcohol use: Yes        Comment: Weekends/socially     History   Drug Use Unknown     FAMILY HISTORY  Paternal uncle: Throat cancer  Family History   Problem Relation Age of Onset     Glaucoma No family hx of      Macular Degeneration No family hx of        PHYSICAL EXAMINATION  There were no vitals taken for this visit.  No exam as this was telephone visit.      LABORATORY STUDIES  Ancillary Procedure on 08/18/2022   Component Date Value Ref Range Status     Creatinine POCT 08/18/2022 1.0  0.7 - 1.3 mg/dL Final     GFR, ESTIMATED POCT 08/18/2022 >60  >60 mL/min/1.73m2 Final   Lab on 08/18/2022   Component Date Value Ref Range Status     Sodium 08/18/2022 140  133 - 144 mmol/L Final     Potassium 08/18/2022 4.3  3.4 - 5.3 mmol/L Final     Chloride 08/18/2022 105  94 - 109 mmol/L Final     Carbon Dioxide (CO2) 08/18/2022 29  20 - 32 mmol/L Final     Anion Gap 08/18/2022 6  3 - 14 mmol/L Final     Urea Nitrogen 08/18/2022 21  7 - 30 mg/dL Final     Creatinine 08/18/2022 0.85  0.66 - 1.25 mg/dL Final     Calcium 08/18/2022 9.4  8.5 - 10.1 mg/dL Final     Glucose 08/18/2022 135 (A) 70 - 99 mg/dL Final     Alkaline Phosphatase 08/18/2022 124  40 - 150 U/L Final     AST 08/18/2022 28  0 - 45 U/L Final     ALT 08/18/2022 37  0 - 70 U/L Final     Protein Total 08/18/2022 7.6  6.8 - 8.8 g/dL Final     Albumin 08/18/2022 3.9  3.4 - 5.0 g/dL Final     Bilirubin Total 08/18/2022 0.3  0.2 - 1.3 mg/dL Final     GFR Estimate 08/18/2022 >90  >60 mL/min/1.73m2 Final     WBC Count 08/18/2022 5.2  4.0 - 11.0 10e3/uL Final     RBC Count 08/18/2022 4.28 (A) 4.40 - 5.90 10e6/uL Final     Hemoglobin 08/18/2022 14.1  13.3 - 17.7  g/dL Final     Hematocrit 08/18/2022 42.1  40.0 - 53.0 % Final     MCV 08/18/2022 98  78 - 100 fL Final     MCH 08/18/2022 32.9  26.5 - 33.0 pg Final     MCHC 08/18/2022 33.5  31.5 - 36.5 g/dL Final     RDW 08/18/2022 12.5  10.0 - 15.0 % Final     Platelet Count 08/18/2022 214  150 - 450 10e3/uL Final     % Neutrophils 08/18/2022 54  % Final     % Lymphocytes 08/18/2022 26  % Final     % Monocytes 08/18/2022 13  % Final     % Eosinophils 08/18/2022 6  % Final     % Basophils 08/18/2022 1  % Final     % Immature Granulocytes 08/18/2022 0  % Final     NRBCs per 100 WBC 08/18/2022 0  <1 /100 Final     Absolute Neutrophils 08/18/2022 2.8  1.6 - 8.3 10e3/uL Final     Absolute Lymphocytes 08/18/2022 1.4  0.8 - 5.3 10e3/uL Final     Absolute Monocytes 08/18/2022 0.7  0.0 - 1.3 10e3/uL Final     Absolute Eosinophils 08/18/2022 0.3  0.0 - 0.7 10e3/uL Final     Absolute Basophils 08/18/2022 0.0  0.0 - 0.2 10e3/uL Final     Absolute Immature Granulocytes 08/18/2022 0.0  <=0.4 10e3/uL Final     Absolute NRBCs 08/18/2022 0.0  10e3/uL Final         IMAGING STUDIES  CT Chest/Abdomen/Pelvis w Contrast  Narrative: EXAMINATION: CT CHEST/ABDOMEN/PELVIS W CONTRAST, 8/18/2022 7:31 AM    TECHNIQUE:  Helical CT images from the thoracic inlet through the  symphysis pubis were obtained with IV contrast. Contrast dose: Isovue  370 117cc    COMPARISON: 4/22/2022 and 2/11/2022, 1/29/2021    HISTORY: Malignant melanoma of uvea of left eye (H)    FINDINGS:  CHEST:  LUNGS:  Unchanged calcified granuloma in the superior right lower lobe (series  9, image 55).  Unchanged calcified right hilar lymph node.  Unchanged left posterior chunky pleural calcifications.   No mass or consolidation. No pleural effusion or pneumothorax. The  airway is patent.    MEDIASTINUM:   Heart size is within normal limits. No pericardial effusion. No  thoracic lymphadenopathy. Thyroid is unremarkable.    ABDOMEN/PELVIS:  Marked beam hardening artifact from hip  arthroplasties limits  evaluation of the pelvis.    LIVER:  Hepatic segment 3 and 8 cysts confirmed on 4/22/2002 MRI (series 6,  image 285 and 256).  Unchanged posterior-medial hepatic segment 8 peripheral nodular  enhancing observation previously characterized as a hemangioma (series  4, image 81).  Unchanged superior hepatic segment 8 arterial enhancing observation  (series 4, image 48) previously characterized as  variant perfusion.  Resolved on delayed imaging.  Unchanged calcifications scattered throughout the liver likely  sequelae of remote granulomatous infection.    BILIARY:   Within normal limits.    PANCREAS:   Within normal limits.    SPLEEN:   Unchanged calcifications likely sequelae of remote granulomatous  infection. Incidental splenule.    ADRENAL GLANDS:   Within normal limits.    :   Unchanged renal cysts, some of which demonstrate hemorrhage as  confirmed on MRI (for instance series 6, image 51 and 388.  No hydronephrosis.  Visualized portions of the urinary bladder are not thickened.    STOMACH:   Decompressed which limits evaluation    BOWEL:   Normal caliber of the small and large bowel.  Appendix is visualized.  New postsurgical changes of small bowel resection and side-to-side  anastomosis in the right lower quadrant with removal of previously  described GIST compared to 2/11/2022.    PERITONEUM/FLUID:   No free fluid.    VESSELS:   No aneurysmal dilatation of the abdominal aorta.  The portal,  splenic, and superior mesenteric veins are patent.  The origins of the  celiac and superior mesenteric arteries are patent.    LYMPH NODES:   No lymphadenopathy.    BONES/SOFT TISSUES:   No aggressive osseous lesions.  Bilateral hip arthroplasties. Prominent posterior right heterotopic  ossification. No acute hardware complication identified.  Scattered sclerotic foci of the axial skeleton with margins suggesting  bone island. Examples series 7, images 79, 71, 59).  Impression: IMPRESSION:   1.   Interval postsurgical changes of small bowel GIST resection.  2.  No evidence of metastatic disease in the chest, abdomen or pelvis.  3.  Remaining incidental findings are stable as described above  compared to multiple priors.     I have personally reviewed the examination and initial interpretation  and I agree with the findings.    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 Austin. He is a 76 year old man s/p plaque brachytherapy for uveal melanoma involving the ciliary body. He is doing well. ROS is negative.    -Continue follow-up in Ophthalmology.  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      Again, thank you for allowing me to participate in the care of your patient.      Sincerely,    Darrell De Souza MD

## 2022-09-13 DIAGNOSIS — C69.32 MALIGNANT MELANOMA OF CHOROID OF LEFT EYE (H): Primary | ICD-10-CM

## 2022-09-23 ENCOUNTER — OFFICE VISIT (OUTPATIENT)
Dept: OPHTHALMOLOGY | Facility: CLINIC | Age: 77
End: 2022-09-23
Attending: OPHTHALMOLOGY
Payer: COMMERCIAL

## 2022-09-23 DIAGNOSIS — C69.32 MALIGNANT MELANOMA OF CHOROID OF LEFT EYE (H): ICD-10-CM

## 2022-09-23 PROCEDURE — 92250 FUNDUS PHOTOGRAPHY W/I&R: CPT | Performed by: OPHTHALMOLOGY

## 2022-09-23 PROCEDURE — 99214 OFFICE O/P EST MOD 30 MIN: CPT | Mod: GC | Performed by: OPHTHALMOLOGY

## 2022-09-23 PROCEDURE — G0463 HOSPITAL OUTPT CLINIC VISIT: HCPCS

## 2022-09-23 PROCEDURE — 76510 OPH US DX B-SCAN&QUAN A-SCAN: CPT | Performed by: OPHTHALMOLOGY

## 2022-09-23 PROCEDURE — 76513 OPH US DX ANT SGM US UNI/BI: CPT | Performed by: OPHTHALMOLOGY

## 2022-09-23 PROCEDURE — 92134 CPTRZ OPH DX IMG PST SGM RTA: CPT | Performed by: OPHTHALMOLOGY

## 2022-09-23 PROCEDURE — 99207 FUNDUS PHOTOS OS (LEFT EYE): CPT | Performed by: OPHTHALMOLOGY

## 2022-09-23 ASSESSMENT — VISUAL ACUITY
OD_CC: 20/25
METHOD: SNELLEN - LINEAR
OS_CC+: -1
OD_CC+: +1
OS_CC: 20/20

## 2022-09-23 ASSESSMENT — CONF VISUAL FIELD
OS_NORMAL: 1
OD_NORMAL: 1
METHOD: COUNTING FINGERS

## 2022-09-23 ASSESSMENT — SLIT LAMP EXAM - LIDS
COMMENTS: NORMAL
COMMENTS: NORMAL

## 2022-09-23 ASSESSMENT — CUP TO DISC RATIO
OD_RATIO: 0.25
OS_RATIO: 0.25

## 2022-09-23 ASSESSMENT — TONOMETRY
OS_IOP_MMHG: 11
IOP_METHOD: TONOPEN
OD_IOP_MMHG: 12

## 2022-09-23 ASSESSMENT — EXTERNAL EXAM - LEFT EYE: OS_EXAM: NORMAL

## 2022-09-23 ASSESSMENT — EXTERNAL EXAM - RIGHT EYE: OD_EXAM: NORMAL

## 2022-09-23 NOTE — PROGRESS NOTES
CC -   CMM OS  s/p I-125    INTERVAL HISTORY - VA stable, mild FBS, happy with VA    PMH -   Jairo Austin is a 76 year old  patient referred by the [facility] for evaluation and treat of a choroidal lesion left eye.  diagnosis 10/2019, been getting annual eye exams no prior notice per patient.  DM II since ~ 2010, good control, + HTn.  No steroid use      PAST OCULAR SURGERY  I-125 with LR reposition 12/16/19 & 12/20/19      RETINAL IMAGING:  OCT 09/23/22  OD -  Macula - serous PED and SRF superior macula choroid, stable from prior, PHF attached; New disruption of IS/OS junction and Subretinal fluid superotemporal to macula; trace SRF inferotemporal to disc  OS -  Macula - retina normal, PHF attached, choroid ?thick   Lesion - (prior) elevated poor image quality today   Periphery - Trace subretinal fluid Inferotemporal to macula      UBM   OS - far IT lesion in CB size 3.02mm x 10.39T x 12.48L (10/2019) ->  2.59mm x 11.47T x 12.01L  (3/2020) ->  1.82mm x 11.5T x 10.47L (8/2020) -> 1.91 x 13.31T(12.44T) x 10.37L (11/2020) -> 1.88 x 13.31T x 10.38L (3/2021) -> 1.56mm x 12.00T x 10.47L (9/2021) -> 1.56mm x 12.40 T x 10.43L (3/2022)->1.60x12.43 (9/2022)     U/S OS 09/23/22  A-scan - (prior)  lesion low reflective  B-scan - peripheral lesion elevated    Optos 09/23/22  Consistent with exam OU    OCT-A 1-21-19  OD - no CNVM    FA 1-21-20  OD - (transit) mulitple hyperF spots, leakage SN macula, no likely CNVM  OS - multiple hyperF spots    ICG 1-21-20  OD (transit) mulitple hyperF spots no CNVM likely  OS - multiple hyperF spots              ASSESSMENT & PLAN    #  CMM OS   - s/p I-125 12/16/19 & 12/20/19   - U/S (requires UBM d/t anterior)    - stable today on U/S   -  recheck 6 months    #.  OD   - doubt CNVM   - no likely CNVM on FA/OCT/OCT-A on 1/21/20   - had STS 20mg 12/20/19, new activity noted 1/21/20 with SRF   - fluid resolved 3/23/20    - new SRF noted 9/2021 resolved     -new SRF noted 9/23/22   - mild  extramacular     - observe at this time   - recheck 3 months  '   - steroid avoidance d/w patient    #  OS   -new SRF noted 9/23/2022   - mild extramacular   -observe at this time      # Syneresis OU   - advised S/Sx RD 9/2022    #. NS OU   - likely VS but  BCVA still good   - patient happy today with vision 9/2022        return to clinic: 3 month, OCT OU with DREW, DFE OU    Addy Reyes MD MPH  Vitreoretinal Fellow PGY-5  [facility]       ATTESTATION     Attending Attestation:     Complete documentation of historical and exam elements from today's encounter can be found in the full encounter summary report (not reduplicated in this progress note).  I personally obtained the chief complaint(s) and history of present illness.  I confirmed and edited as necessary the review of systems, past medical/surgical history, family history, social history, and examination findings as documented by others; and I examined the patient myself.  I personally reviewed the relevant tests, images, and reports as documented above.  I personally reviewed the ophthalmic test(s) associated with this encounter, agree with the interpretation(s) as documented by the resident/fellow, and have edited the corresponding report(s) as necessary.   I formulated and edited as necessary the assessment and plan and discussed the findings and management plan with the patient and family    Charisma Rodriguez MD, PhD  , Vitreoretinal Surgery  Department of Ophthalmology  [facility]

## 2022-09-23 NOTE — NURSING NOTE
Chief Complaints and History of Present Illnesses   Patient presents with     Follow Up     Malignant melanoma of choroid of left eye     Chief Complaint(s) and History of Present Illness(es)     Follow Up     Comments: Malignant melanoma of choroid of left eye              Comments     Pt states no change in VA since last visit  Pt states no flashes, floaters eye pain or redness    Ana Man COT 7:25 AM September 23, 2022

## 2022-11-19 ENCOUNTER — HEALTH MAINTENANCE LETTER (OUTPATIENT)
Age: 77
End: 2022-11-19

## 2022-12-13 DIAGNOSIS — C69.32 MALIGNANT MELANOMA OF CHOROID OF LEFT EYE (H): Primary | ICD-10-CM

## 2022-12-15 ENCOUNTER — LAB (OUTPATIENT)
Dept: LAB | Facility: CLINIC | Age: 77
End: 2022-12-15
Payer: COMMERCIAL

## 2022-12-15 ENCOUNTER — ANCILLARY PROCEDURE (OUTPATIENT)
Dept: CT IMAGING | Facility: CLINIC | Age: 77
End: 2022-12-15
Attending: INTERNAL MEDICINE
Payer: COMMERCIAL

## 2022-12-15 ENCOUNTER — VIRTUAL VISIT (OUTPATIENT)
Dept: ONCOLOGY | Facility: CLINIC | Age: 77
End: 2022-12-15
Attending: INTERNAL MEDICINE
Payer: COMMERCIAL

## 2022-12-15 DIAGNOSIS — C69.32 MALIGNANT MELANOMA OF CHOROID OF LEFT EYE (H): ICD-10-CM

## 2022-12-15 DIAGNOSIS — C69.42 MALIGNANT MELANOMA OF UVEA OF LEFT EYE (H): ICD-10-CM

## 2022-12-15 DIAGNOSIS — C69.32 MALIGNANT MELANOMA OF CHOROID OF LEFT EYE (H): Primary | ICD-10-CM

## 2022-12-15 LAB
ALBUMIN SERPL BCG-MCNC: 4.3 G/DL (ref 3.5–5.2)
ALP SERPL-CCNC: 130 U/L (ref 40–129)
ALT SERPL W P-5'-P-CCNC: 27 U/L (ref 10–50)
ANION GAP SERPL CALCULATED.3IONS-SCNC: 9 MMOL/L (ref 7–15)
AST SERPL W P-5'-P-CCNC: 22 U/L (ref 10–50)
BASOPHILS # BLD AUTO: 0 10E3/UL (ref 0–0.2)
BASOPHILS NFR BLD AUTO: 1 %
BILIRUB SERPL-MCNC: 0.4 MG/DL
BUN SERPL-MCNC: 17.8 MG/DL (ref 8–23)
CALCIUM SERPL-MCNC: 9.7 MG/DL (ref 8.8–10.2)
CHLORIDE SERPL-SCNC: 103 MMOL/L (ref 98–107)
CREAT SERPL-MCNC: 0.92 MG/DL (ref 0.67–1.17)
DEPRECATED HCO3 PLAS-SCNC: 29 MMOL/L (ref 22–29)
EOSINOPHIL # BLD AUTO: 0.3 10E3/UL (ref 0–0.7)
EOSINOPHIL NFR BLD AUTO: 6 %
ERYTHROCYTE [DISTWIDTH] IN BLOOD BY AUTOMATED COUNT: 11.9 % (ref 10–15)
GFR SERPL CREATININE-BSD FRML MDRD: 87 ML/MIN/1.73M2
GLUCOSE SERPL-MCNC: 144 MG/DL (ref 70–99)
HCT VFR BLD AUTO: 45.5 % (ref 40–53)
HGB BLD-MCNC: 15.1 G/DL (ref 13.3–17.7)
IMM GRANULOCYTES # BLD: 0 10E3/UL
IMM GRANULOCYTES NFR BLD: 0 %
LYMPHOCYTES # BLD AUTO: 1.4 10E3/UL (ref 0.8–5.3)
LYMPHOCYTES NFR BLD AUTO: 25 %
MCH RBC QN AUTO: 32.5 PG (ref 26.5–33)
MCHC RBC AUTO-ENTMCNC: 33.2 G/DL (ref 31.5–36.5)
MCV RBC AUTO: 98 FL (ref 78–100)
MONOCYTES # BLD AUTO: 0.7 10E3/UL (ref 0–1.3)
MONOCYTES NFR BLD AUTO: 12 %
NEUTROPHILS # BLD AUTO: 3.2 10E3/UL (ref 1.6–8.3)
NEUTROPHILS NFR BLD AUTO: 56 %
NRBC # BLD AUTO: 0 10E3/UL
NRBC BLD AUTO-RTO: 0 /100
PLATELET # BLD AUTO: 213 10E3/UL (ref 150–450)
POTASSIUM SERPL-SCNC: 3.9 MMOL/L (ref 3.4–5.3)
PROT SERPL-MCNC: 7.4 G/DL (ref 6.4–8.3)
RBC # BLD AUTO: 4.65 10E6/UL (ref 4.4–5.9)
SODIUM SERPL-SCNC: 141 MMOL/L (ref 136–145)
WBC # BLD AUTO: 5.6 10E3/UL (ref 4–11)

## 2022-12-15 PROCEDURE — 99214 OFFICE O/P EST MOD 30 MIN: CPT | Mod: TEL | Performed by: INTERNAL MEDICINE

## 2022-12-15 PROCEDURE — 85025 COMPLETE CBC W/AUTO DIFF WBC: CPT | Performed by: PATHOLOGY

## 2022-12-15 PROCEDURE — 74177 CT ABD & PELVIS W/CONTRAST: CPT | Performed by: RADIOLOGY

## 2022-12-15 PROCEDURE — 80053 COMPREHEN METABOLIC PANEL: CPT | Performed by: PATHOLOGY

## 2022-12-15 PROCEDURE — 71260 CT THORAX DX C+: CPT | Performed by: RADIOLOGY

## 2022-12-15 PROCEDURE — 36415 COLL VENOUS BLD VENIPUNCTURE: CPT | Performed by: PATHOLOGY

## 2022-12-15 RX ORDER — IOPAMIDOL 755 MG/ML
115 INJECTION, SOLUTION INTRAVASCULAR ONCE
Status: COMPLETED | OUTPATIENT
Start: 2022-12-15 | End: 2022-12-15

## 2022-12-15 RX ADMIN — IOPAMIDOL 115 ML: 755 INJECTION, SOLUTION INTRAVASCULAR at 07:45

## 2022-12-15 NOTE — LETTER
12/15/2022         RE: Jairo Austin  [address]  [address]  [facility] [address]        Dear Colleague,    Thank you for referring your patient, Jairo Austin, to the [facility]. Please see a copy of my visit note below.    Brent is a 77 year old who is being evaluated via a billable telephone visit.      What phone number would you like to be contacted at? [phone]  How would you like to obtain your AVS? Raghavendra QUIÑONES    Distant Location (provider location):  On-site  Phone call duration: 21 minutes    MEDICAL ONCOLOGY TELEPHONE VISIT  Melanoma Clinic  Dec 15, 2022    CHIEF COMPLAINT: Choroidal melanoma, left eye    Melanoma History:  1. 10/28/2019, he was initially seen and the left eye lesion measured 10.39T x 12.48L with a height of 3.02mm.  2. 11/4/2019, CT-scan negative for obvious liver metastasis  3. 11/11/2019, MRI liver showed small enhancing lesions in segment 8 and 4a, likely small hemangiomas.   4. 12/16/2019, he has I-125 plaque brachytherapy placement and delivery of 8,500 cGy total dose to the left eye lesion.  5. 2/6/2020, CT-CAP shows decreased appearance of the arterially enhancing liver lesion. No obvious liver metastasis or suspicious lesions.  6. 10/2/2020, CT-CAP with no evidence of metastatic disease in the chest, abdomen, or pelvis. The hepatic enhancing foci previously characterized as benign on abdominal MRI 11/11/2019 are redemonstrated and are stable.     HISTORY OF PRESENT ILLNESS  Jairo Austin is a 77 year old male with choroidal melanoma of the left eye. He presents via telephone visit today.    He will see Dr. Watt 12/21. Vision is good.    He is doing well in the assisted living. He denies any nausea, vomiting, diarrhea. No abdominal pain. No fevers or chills    CT-CAP shows no new evidence of metastatic disease to liver. No new lung nodules..      REVIEW OF SYSTEMS  A 12-point ROS negative except as in  HPI.     Current Outpatient Medications   Medication Sig Dispense Refill     acetaminophen (TYLENOL) 500 MG tablet Take 1,000 mg by mouth daily       amLODIPine (NORVASC) 10 MG tablet Take 10 mg by mouth every morning        aspirin 81 MG EC tablet Take 81 mg by mouth every morning        atorvastatin (LIPITOR) 20 MG tablet Take 20 mg by mouth every morning        carvedilol (COREG) 12.5 MG tablet Take 12.5 mg by mouth 2 times daily (with meals)        lisinopril (PRINIVIL/ZESTRIL) 20 MG tablet Take 20 mg by mouth 2 times daily   3     multivitamin w/minerals (THERA-VIT-M) tablet Take 1 tablet by mouth every morning       chlorthalidone (HYGROTON) 25 MG tablet Take 25 mg by mouth daily (Patient not taking: Reported on 12/15/2022)       oxyCODONE (ROXICODONE) 5 MG tablet Take 1 tablet (5 mg) by mouth every 6 hours as needed for pain or moderate to severe pain (Patient not taking: Reported on 12/15/2022) 20 tablet 0     oxyCODONE (ROXICODONE) 5 MG tablet Take 1-2 tablets (5-10 mg) by mouth every 4 hours as needed for moderate to severe pain (Patient not taking: Reported on 12/15/2022) 40 tablet 0     polyethylene glycol (MIRALAX) 17 GM/Dose powder Take 17 g by mouth daily as needed for constipation (Patient not taking: Reported on 12/15/2022) 510 g 0       Past Medical History:   Diagnosis Date     Asbestos exposure 1/19/2007    chest x ray Jan 2007, November 2009. 11/30/11, 4/24/2013, 12/31/2014     Diabetes (H)      Hearing loss 1/19/2007    hearing aid on left. 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 Bilateral hip replacement, 2011    SOCIAL HISTORY   with 2 children and 4 grandchildren. Retired and now working as a school bus  . Quit smoking 20 years ago. Smoked 2 packs per day x 10 years.  History   Smoking Status     Former     Packs/day: 0.00     Quit date: 2009   Smokeless Tobacco     Never    Social History    Substance and Sexual Activity      Alcohol use: Yes        Comment: Weekends/socially     History   Drug Use Unknown     FAMILY HISTORY  Paternal uncle: Throat cancer  Family History   Problem Relation Age of Onset     Glaucoma No family hx of      Macular Degeneration No family hx of        PHYSICAL EXAMINATION  There were no vitals taken for this visit.  No exam as this was telephone visit.      LABORATORY STUDIES  Lab on 12/15/2022   Component Date Value Ref Range Status     Sodium 12/15/2022 141  136 - 145 mmol/L Final     Potassium 12/15/2022 3.9  3.4 - 5.3 mmol/L Final     Chloride 12/15/2022 103  98 - 107 mmol/L Final     Carbon Dioxide (CO2) 12/15/2022 29  22 - 29 mmol/L Final     Anion Gap 12/15/2022 9  7 - 15 mmol/L Final     Urea Nitrogen 12/15/2022 17.8  8.0 - 23.0 mg/dL Final     Creatinine 12/15/2022 0.92  0.67 - 1.17 mg/dL Final     Calcium 12/15/2022 9.7  8.8 - 10.2 mg/dL Final     Glucose 12/15/2022 144 (H)  70 - 99 mg/dL Final     Alkaline Phosphatase 12/15/2022 130 (H)  40 - 129 U/L Final     AST 12/15/2022 22  10 - 50 U/L Final     ALT 12/15/2022 27  10 - 50 U/L Final     Protein Total 12/15/2022 7.4  6.4 - 8.3 g/dL Final     Albumin 12/15/2022 4.3  3.5 - 5.2 g/dL Final     Bilirubin Total 12/15/2022 0.4  <=1.2 mg/dL Final     GFR Estimate 12/15/2022 87  >60 mL/min/1.73m2 Final     WBC Count 12/15/2022 5.6  4.0 - 11.0 10e3/uL Final     RBC Count 12/15/2022 4.65  4.40 - 5.90 10e6/uL Final     Hemoglobin 12/15/2022 15.1  13.3 - 17.7 g/dL Final     Hematocrit 12/15/2022 45.5  40.0 - 53.0 % Final     MCV 12/15/2022 98  78 - 100 fL Final     MCH 12/15/2022 32.5  26.5 - 33.0 pg Final     MCHC 12/15/2022 33.2  31.5 - 36.5 g/dL Final     RDW 12/15/2022 11.9  10.0 - 15.0 % Final     Platelet Count 12/15/2022  213  150 - 450 10e3/uL Final     % Neutrophils 12/15/2022 56  % Final     % Lymphocytes 12/15/2022 25  % Final     % Monocytes 12/15/2022 12  % Final     % Eosinophils 12/15/2022 6  % Final     % Basophils 12/15/2022 1  % Final     % Immature Granulocytes 12/15/2022 0  % Final     NRBCs per 100 WBC 12/15/2022 0  <1 /100 Final     Absolute Neutrophils 12/15/2022 3.2  1.6 - 8.3 10e3/uL Final     Absolute Lymphocytes 12/15/2022 1.4  0.8 - 5.3 10e3/uL Final     Absolute Monocytes 12/15/2022 0.7  0.0 - 1.3 10e3/uL Final     Absolute Eosinophils 12/15/2022 0.3  0.0 - 0.7 10e3/uL Final     Absolute Basophils 12/15/2022 0.0  0.0 - 0.2 10e3/uL Final     Absolute Immature Granulocytes 12/15/2022 0.0  <=0.4 10e3/uL Final     Absolute NRBCs 12/15/2022 0.0  10e3/uL Final         IMAGING STUDIES  CT Chest/Abdomen/Pelvis w Contrast  Narrative: EXAM: CT CHEST/ABDOMEN/PELVIS W CONTRAST  LOCATION: [facility]  DATE/TIME: 12/15/2022 8:02 AM    INDICATION: 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  COMPARISON: CT of the chest, abdomen, and pelvis performed 8/18/2022.  TECHNIQUE: CT scan of the chest, abdomen, and pelvis was performed following injection of IV contrast. Multiplanar reformats were obtained. Dose reduction techniques were used.   CONTRAST: Isovue-370 115 mL.    FINDINGS:   LUNGS AND PLEURA: No pleural effusions. Calcified granuloma in the right lower lobe is unchanged. Pleural calcifications on the left posteriorly are also unchanged. No lung masses or consolidations.    MEDIASTINUM/AXILLAE: No enlarged lymph nodes in the chest. Calcified right hilar lymph nodes are unchanged. No pericardial effusion. Ectasia of the ascending thoracic aorta measures 4.2 cm, unchanged.    CORONARY ARTERY CALCIFICATION: Mild.    HEPATOBILIARY: Small hepatic cysts are unchanged. No suspicious hepatic lesions are identified. No calcified gallstones.    PANCREAS: Normal.    SPLEEN: Scattered  calcified granulomas in the spleen.    ADRENAL GLANDS: Normal.    KIDNEYS/BLADDER: A 1 cm hyperdense lesion in the interpolar region of the left kidney is unchanged, and likely represents a hemorrhagic or proteinaceous renal cyst. Several additional bilateral renal cysts are also unchanged, and would require no   specific follow-up. No hydronephrosis.    BOWEL: Postoperative changes involving the small bowel in the left midabdomen are noted. Diverticulosis of the colon. No acute inflammatory change. No obstruction.     LYMPH NODES: No lymphadenopathy.    VASCULATURE: Unremarkable.    PELVIC ORGANS: Multiple images through the pelvis are degraded by artifact from bilateral hip arthroplasties. There is mild prostatic enlargement.    MUSCULOSKELETAL: Small paraumbilical hernia contains a few loops of nondilated small bowel. Degenerative changes throughout the thoracolumbar spine. No destructive bony lesions.  Impression: IMPRESSION:  No evidence for metastatic malignancy in the chest, abdomen, or pelvis.        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 Austin. He is a 77 year old man s/p plaque brachytherapy for uveal melanoma involving the ciliary body. He is doing well and denies any complaints.   -Continue follow-up in Ophthalmology.  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    Darrell Aranda M.D.   of Medicine  Hematology, Oncology and Transplantation

## 2022-12-15 NOTE — PROGRESS NOTES
Bretn is a 77 year old who is being evaluated via a billable telephone visit.      What phone number would you like to be contacted at? [phone]  How would you like to obtain your AVS? Raghavendra QUIÑONES    Distant Location (provider location):  On-site  Phone call duration: 21 minutes    MEDICAL ONCOLOGY TELEPHONE VISIT  Melanoma Clinic  Dec 15, 2022    CHIEF COMPLAINT: Choroidal melanoma, left eye    Melanoma History:  1. 10/28/2019, he was initially seen and the left eye lesion measured 10.39T x 12.48L with a height of 3.02mm.  2. 11/4/2019, CT-scan negative for obvious liver metastasis  3. 11/11/2019, MRI liver showed small enhancing lesions in segment 8 and 4a, likely small hemangiomas.   4. 12/16/2019, he has I-125 plaque brachytherapy placement and delivery of 8,500 cGy total dose to the left eye lesion.  5. 2/6/2020, CT-CAP shows decreased appearance of the arterially enhancing liver lesion. No obvious liver metastasis or suspicious lesions.  6. 10/2/2020, CT-CAP with no evidence of metastatic disease in the chest, abdomen, or pelvis. The hepatic enhancing foci previously characterized as benign on abdominal MRI 11/11/2019 are redemonstrated and are stable.     HISTORY OF PRESENT ILLNESS  Jairo Austin is a 77 year old male with choroidal melanoma of the left eye. He presents via telephone visit today.    He will see Dr. Watt 12/21. Vision is good.    He is doing well in the assisted living. He denies any nausea, vomiting, diarrhea. No abdominal pain. No fevers or chills    CT-CAP shows no new evidence of metastatic disease to liver. No new lung nodules..      REVIEW OF SYSTEMS  A 12-point ROS negative except as in HPI.     Current Outpatient Medications   Medication Sig Dispense Refill     acetaminophen (TYLENOL) 500 MG tablet Take 1,000 mg by mouth daily       amLODIPine (NORVASC) 10 MG tablet Take 10 mg by mouth every morning        aspirin 81 MG EC tablet Take 81 mg by mouth every morning         atorvastatin (LIPITOR) 20 MG tablet Take 20 mg by mouth every morning        carvedilol (COREG) 12.5 MG tablet Take 12.5 mg by mouth 2 times daily (with meals)        lisinopril (PRINIVIL/ZESTRIL) 20 MG tablet Take 20 mg by mouth 2 times daily   3     multivitamin w/minerals (THERA-VIT-M) tablet Take 1 tablet by mouth every morning       chlorthalidone (HYGROTON) 25 MG tablet Take 25 mg by mouth daily (Patient not taking: Reported on 12/15/2022)       oxyCODONE (ROXICODONE) 5 MG tablet Take 1 tablet (5 mg) by mouth every 6 hours as needed for pain or moderate to severe pain (Patient not taking: Reported on 12/15/2022) 20 tablet 0     oxyCODONE (ROXICODONE) 5 MG tablet Take 1-2 tablets (5-10 mg) by mouth every 4 hours as needed for moderate to severe pain (Patient not taking: Reported on 12/15/2022) 40 tablet 0     polyethylene glycol (MIRALAX) 17 GM/Dose powder Take 17 g by mouth daily as needed for constipation (Patient not taking: Reported on 12/15/2022) 510 g 0       Past Medical History:   Diagnosis Date     Asbestos exposure 1/19/2007    chest x ray Jan 2007, November 2009. 11/30/11, 4/24/2013, 12/31/2014     Diabetes (H)      Hearing loss 1/19/2007    hearing aid on left. 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 Bilateral hip replacement, 2011    SOCIAL HISTORY   with 2 children and 4 grandchildren. Retired and now working as a . Quit smoking 20 years ago. Smoked 2 packs per day x 10 years.  History   Smoking Status     Former     Packs/day: 0.00     Quit date: 2009   Smokeless Tobacco     Never    Social History    Substance and Sexual Activity      Alcohol use: Yes        Comment: Weekends/socially      History   Drug Use Unknown     FAMILY HISTORY  Paternal uncle: Throat cancer  Family History   Problem Relation Age of Onset     Glaucoma No family hx of      Macular Degeneration No family hx of        PHYSICAL EXAMINATION  There were no vitals taken for this visit.  No exam as this was telephone visit.      LABORATORY STUDIES  Lab on 12/15/2022   Component Date Value Ref Range Status     Sodium 12/15/2022 141  136 - 145 mmol/L Final     Potassium 12/15/2022 3.9  3.4 - 5.3 mmol/L Final     Chloride 12/15/2022 103  98 - 107 mmol/L Final     Carbon Dioxide (CO2) 12/15/2022 29  22 - 29 mmol/L Final     Anion Gap 12/15/2022 9  7 - 15 mmol/L Final     Urea Nitrogen 12/15/2022 17.8  8.0 - 23.0 mg/dL Final     Creatinine 12/15/2022 0.92  0.67 - 1.17 mg/dL Final     Calcium 12/15/2022 9.7  8.8 - 10.2 mg/dL Final     Glucose 12/15/2022 144 (H)  70 - 99 mg/dL Final     Alkaline Phosphatase 12/15/2022 130 (H)  40 - 129 U/L Final     AST 12/15/2022 22  10 - 50 U/L Final     ALT 12/15/2022 27  10 - 50 U/L Final     Protein Total 12/15/2022 7.4  6.4 - 8.3 g/dL Final     Albumin 12/15/2022 4.3  3.5 - 5.2 g/dL Final     Bilirubin Total 12/15/2022 0.4  <=1.2 mg/dL Final     GFR Estimate 12/15/2022 87  >60 mL/min/1.73m2 Final     WBC Count 12/15/2022 5.6  4.0 - 11.0 10e3/uL Final     RBC Count 12/15/2022 4.65  4.40 - 5.90 10e6/uL Final     Hemoglobin 12/15/2022 15.1  13.3 - 17.7 g/dL Final     Hematocrit 12/15/2022 45.5  40.0 - 53.0 % Final     MCV 12/15/2022 98  78 - 100 fL Final     MCH 12/15/2022 32.5  26.5 - 33.0 pg Final     MCHC 12/15/2022 33.2  31.5 - 36.5 g/dL Final     RDW 12/15/2022 11.9  10.0 - 15.0 % Final     Platelet Count 12/15/2022 213  150 - 450 10e3/uL Final     % Neutrophils 12/15/2022 56  % Final     % Lymphocytes 12/15/2022 25  % Final     % Monocytes 12/15/2022 12  % Final     % Eosinophils 12/15/2022 6  % Final     % Basophils 12/15/2022 1  % Final     % Immature Granulocytes 12/15/2022 0  % Final     NRBCs  per 100 WBC 12/15/2022 0  <1 /100 Final     Absolute Neutrophils 12/15/2022 3.2  1.6 - 8.3 10e3/uL Final     Absolute Lymphocytes 12/15/2022 1.4  0.8 - 5.3 10e3/uL Final     Absolute Monocytes 12/15/2022 0.7  0.0 - 1.3 10e3/uL Final     Absolute Eosinophils 12/15/2022 0.3  0.0 - 0.7 10e3/uL Final     Absolute Basophils 12/15/2022 0.0  0.0 - 0.2 10e3/uL Final     Absolute Immature Granulocytes 12/15/2022 0.0  <=0.4 10e3/uL Final     Absolute NRBCs 12/15/2022 0.0  10e3/uL Final         IMAGING STUDIES  CT Chest/Abdomen/Pelvis w Contrast  Narrative: EXAM: CT CHEST/ABDOMEN/PELVIS W CONTRAST  LOCATION: [facility]  DATE/TIME: 12/15/2022 8:02 AM    INDICATION: 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  COMPARISON: CT of the chest, abdomen, and pelvis performed 8/18/2022.  TECHNIQUE: CT scan of the chest, abdomen, and pelvis was performed following injection of IV contrast. Multiplanar reformats were obtained. Dose reduction techniques were used.   CONTRAST: Isovue-370 115 mL.    FINDINGS:   LUNGS AND PLEURA: No pleural effusions. Calcified granuloma in the right lower lobe is unchanged. Pleural calcifications on the left posteriorly are also unchanged. No lung masses or consolidations.    MEDIASTINUM/AXILLAE: No enlarged lymph nodes in the chest. Calcified right hilar lymph nodes are unchanged. No pericardial effusion. Ectasia of the ascending thoracic aorta measures 4.2 cm, unchanged.    CORONARY ARTERY CALCIFICATION: Mild.    HEPATOBILIARY: Small hepatic cysts are unchanged. No suspicious hepatic lesions are identified. No calcified gallstones.    PANCREAS: Normal.    SPLEEN: Scattered calcified granulomas in the spleen.    ADRENAL GLANDS: Normal.    KIDNEYS/BLADDER: A 1 cm hyperdense lesion in the interpolar region of the left kidney is unchanged, and likely represents a hemorrhagic or proteinaceous renal cyst. Several additional bilateral renal cysts are also unchanged,  and would require no   specific follow-up. No hydronephrosis.    BOWEL: Postoperative changes involving the small bowel in the left midabdomen are noted. Diverticulosis of the colon. No acute inflammatory change. No obstruction.     LYMPH NODES: No lymphadenopathy.    VASCULATURE: Unremarkable.    PELVIC ORGANS: Multiple images through the pelvis are degraded by artifact from bilateral hip arthroplasties. There is mild prostatic enlargement.    MUSCULOSKELETAL: Small paraumbilical hernia contains a few loops of nondilated small bowel. Degenerative changes throughout the thoracolumbar spine. No destructive bony lesions.  Impression: IMPRESSION:  No evidence for metastatic malignancy in the chest, abdomen, or pelvis.        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 Austin. He is a 77 year old man s/p plaque brachytherapy for uveal melanoma involving the ciliary body. He is doing well and denies any complaints.   -Continue follow-up in Ophthalmology.  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    Darrell Aranda M.D.   of Medicine  Hematology, Oncology and Transplantation

## 2022-12-27 ENCOUNTER — OFFICE VISIT (OUTPATIENT)
Dept: OPHTHALMOLOGY | Facility: CLINIC | Age: 77
End: 2022-12-27
Attending: OPHTHALMOLOGY
Payer: COMMERCIAL

## 2022-12-27 DIAGNOSIS — C69.32 MALIGNANT MELANOMA OF CHOROID OF LEFT EYE (H): ICD-10-CM

## 2022-12-27 PROCEDURE — G0463 HOSPITAL OUTPT CLINIC VISIT: HCPCS | Mod: 25

## 2022-12-27 PROCEDURE — 92134 CPTRZ OPH DX IMG PST SGM RTA: CPT | Performed by: OPHTHALMOLOGY

## 2022-12-27 PROCEDURE — 99213 OFFICE O/P EST LOW 20 MIN: CPT | Performed by: OPHTHALMOLOGY

## 2022-12-27 ASSESSMENT — CONF VISUAL FIELD
METHOD: COUNTING FINGERS
OD_SUPERIOR_TEMPORAL_RESTRICTION: 0
OS_SUPERIOR_TEMPORAL_RESTRICTION: 0
OD_SUPERIOR_NASAL_RESTRICTION: 0
OS_INFERIOR_TEMPORAL_RESTRICTION: 0
OS_INFERIOR_NASAL_RESTRICTION: 0
OS_SUPERIOR_NASAL_RESTRICTION: 0
OD_INFERIOR_NASAL_RESTRICTION: 0
OD_NORMAL: 1
OS_NORMAL: 1
OD_INFERIOR_TEMPORAL_RESTRICTION: 0

## 2022-12-27 ASSESSMENT — VISUAL ACUITY
METHOD: SNELLEN - LINEAR
CORRECTION_TYPE: GLASSES
OD_CC: 20/20
OS_CC: 20/30
OD_CC+: -1

## 2022-12-27 ASSESSMENT — REFRACTION_WEARINGRX
OD_ADD: +1.25
OS_ADD: +1.25
SPECS_TYPE: PAL
OS_SPHERE: +3.00
OD_CYLINDER: +0.75
OD_AXIS: 139
OD_SPHERE: +3.25
OS_AXIS: 127
OS_CYLINDER: +0.75

## 2022-12-27 ASSESSMENT — EXTERNAL EXAM - RIGHT EYE: OD_EXAM: NORMAL

## 2022-12-27 ASSESSMENT — TONOMETRY
IOP_METHOD: TONOPEN
OS_IOP_MMHG: 14
OD_IOP_MMHG: 14

## 2022-12-27 ASSESSMENT — CUP TO DISC RATIO
OD_RATIO: 0.25
OS_RATIO: 0.25

## 2022-12-27 ASSESSMENT — SLIT LAMP EXAM - LIDS
COMMENTS: NORMAL
COMMENTS: NORMAL

## 2022-12-27 ASSESSMENT — EXTERNAL EXAM - LEFT EYE: OS_EXAM: NORMAL

## 2022-12-27 NOTE — PROGRESS NOTES
CC -   CMM OS  s/p I-125    INTERVAL HISTORY - [facility] stable, mild FBS OS     PMH -   Jairo Austin is a 77 year old  patient referred by the [facility] for evaluation and treat of a choroidal lesion left eye.  diagnosis 10/2019, been getting annual eye exams no prior notice per patient.  DM II since ~ 2010, good control, + HTn.  No steroid use      PAST OCULAR SURGERY  I-125 with LR reposition 12/16/19 & 12/20/19      RETINAL IMAGING:  OCT 09/23/22  OD - Macula - serous PED SN macula, mild RPE irregular, mild SRF by IT disk margin,  PHF attached  OS -  Macula - retina normal, PHF attached, choroid ?thick   Lesion - (prior) elevated poor image quality today   Periphery - Trace subretinal fluid Inferotemporal to macula      UBM   OS - far IT lesion in CB size 3.02mm x 10.39T x 12.48L (10/2019) ->  2.59mm x 11.47T x 12.01L  (3/2020) ->  1.82mm x 11.5T x 10.47L (8/2020) -> 1.91 x 13.31T(12.44T) x 10.37L (11/2020) -> 1.88 x 13.31T x 10.38L (3/2021) -> 1.56mm x 12.00T x 10.47L (9/2021) -> 1.56mm x 12.40 T x 10.43L (3/2022)->1.60x12.43 (9/2022)     U/S OS 09/23/22  A-scan - (prior)  lesion low reflective  B-scan - peripheral lesion elevated    Optos 09/23/22  Consistent with exam OU    OCT-A 1-21-19  OD - no CNVM    FA 1-21-20  OD - (transit) mulitple hyperF spots, leakage SN macula, no likely CNVM  OS - multiple hyperF spots    ICG 1-21-20  OD (transit) mulitple hyperF spots no CNVM likely  OS - multiple hyperF spots              ASSESSMENT & PLAN    #  CMM OS   - s/p I-125 12/16/19 & 12/20/19   - U/S (requires UBM d/t anterior)    - stable 9/2022 on U/S   -  recheck 6 months (~ 3/2023)    #.  OD   - doubt CNVM   - no likely CNVM on FA/OCT/OCT-A on 1/21/20   - had STS 20mg 12/20/19, new activity noted 1/21/20 with SRF   - fluid resolved 3/23/20    - new SRF noted 9/2021 resolved     -new SRF noted 9/23/22   - mild extramacular   - stable vs better today 12/2022     - observe at this time   - recheck 3 months  '   -  steroid avoidance d/w patient    #  OS   -new SRF noted 9/23/2022   - mild extramacular   -observe at this time      # Syneresis OU   - advised S/Sx RD 9/2022    #. NS OU   - likely VS but  BCVA still good   - patient happy today with vision 9/2022        return to clinic: 3 month,, OCT OU, Optos OU, U/S OS      ATTESTATION     Attending Attestation:     Complete documentation of historical and exam elements from today's encounter can be found in the full encounter summary report (not reduplicated in this progress note).  I personally obtained the chief complaint(s) and history of present illness.  I confirmed and edited as necessary the review of systems, past medical/surgical history, family history, social history, and examination findings as documented by others; and I examined the patient myself.  I personally reviewed the relevant tests, images, and reports as documented above.  I formulated and edited as necessary the assessment and plan and discussed the findings and management plan with the patient and family    Charisma Rodriguez MD, PhD  , Vitreoretinal Surgery  Department of Ophthalmology  [facility]

## 2022-12-27 NOTE — NURSING NOTE
Chief Complaints and History of Present Illnesses   Patient presents with     Follow Up     3 month follow up CMM OS     Chief Complaint(s) and History of Present Illness(es)     Follow Up            Comments: 3 month follow up CMM OS          Comments    Pt states vision is the same as last visit, no changes. No eye pain today, but pt feels some irritation in LE at times.   No new flashes or floaters. No new redness or dryness.  DM2 BS:Pt does not check sugars daily.  A1C: below 7 per pt, taken about 1 month ago.  No results found for: A1C    PETRA Castillo December 27, 2022 7:19 AM

## 2023-03-15 DIAGNOSIS — C69.32 MALIGNANT MELANOMA OF CHOROID OF LEFT EYE (H): Primary | ICD-10-CM

## 2023-03-27 ENCOUNTER — OFFICE VISIT (OUTPATIENT)
Dept: OPHTHALMOLOGY | Facility: CLINIC | Age: 78
End: 2023-03-27
Attending: OPHTHALMOLOGY
Payer: COMMERCIAL

## 2023-03-27 DIAGNOSIS — C69.32 MALIGNANT MELANOMA OF CHOROID OF LEFT EYE (H): ICD-10-CM

## 2023-03-27 PROCEDURE — 92134 CPTRZ OPH DX IMG PST SGM RTA: CPT | Performed by: OPHTHALMOLOGY

## 2023-03-27 PROCEDURE — 92250 FUNDUS PHOTOGRAPHY W/I&R: CPT | Performed by: OPHTHALMOLOGY

## 2023-03-27 PROCEDURE — 99207 FUNDUS PHOTOS OU (BOTH EYES): CPT | Mod: 26 | Performed by: OPHTHALMOLOGY

## 2023-03-27 PROCEDURE — 99207 UBM-ANTERIOR SEGMENT ULTRASOUND OS (LEFT EYE): CPT | Mod: 26 | Performed by: OPHTHALMOLOGY

## 2023-03-27 PROCEDURE — G0463 HOSPITAL OUTPT CLINIC VISIT: HCPCS | Performed by: OPHTHALMOLOGY

## 2023-03-27 PROCEDURE — 76512 OPH US DX B-SCAN: CPT | Performed by: OPHTHALMOLOGY

## 2023-03-27 PROCEDURE — 99214 OFFICE O/P EST MOD 30 MIN: CPT | Performed by: OPHTHALMOLOGY

## 2023-03-27 PROCEDURE — 76513 OPH US DX ANT SGM US UNI/BI: CPT | Performed by: OPHTHALMOLOGY

## 2023-03-27 ASSESSMENT — CONF VISUAL FIELD
METHOD: COUNTING FINGERS
OS_INFERIOR_NASAL_RESTRICTION: 0
OS_SUPERIOR_NASAL_RESTRICTION: 0
OS_INFERIOR_TEMPORAL_RESTRICTION: 0
OD_NORMAL: 1
OD_SUPERIOR_NASAL_RESTRICTION: 0
OS_SUPERIOR_TEMPORAL_RESTRICTION: 0
OD_SUPERIOR_TEMPORAL_RESTRICTION: 0
OD_INFERIOR_NASAL_RESTRICTION: 0
OS_NORMAL: 1
OD_INFERIOR_TEMPORAL_RESTRICTION: 0

## 2023-03-27 ASSESSMENT — VISUAL ACUITY
METHOD: SNELLEN - LINEAR
OD_CC+: +2
OS_CC: 20/30
CORRECTION_TYPE: GLASSES
OD_CC: 20/25

## 2023-03-27 ASSESSMENT — REFRACTION_WEARINGRX
OD_SPHERE: +3.25
OD_CYLINDER: +0.75
OS_ADD: +1.25
OD_ADD: +1.25
OS_AXIS: 127
OS_CYLINDER: +0.75
OS_SPHERE: +3.00
OD_AXIS: 139
SPECS_TYPE: PAL

## 2023-03-27 ASSESSMENT — SLIT LAMP EXAM - LIDS
COMMENTS: NORMAL
COMMENTS: NORMAL

## 2023-03-27 ASSESSMENT — TONOMETRY
OS_IOP_MMHG: 15
IOP_METHOD: TONOPEN
OD_IOP_MMHG: 15

## 2023-03-27 ASSESSMENT — CUP TO DISC RATIO
OS_RATIO: 0.25
OD_RATIO: 0.25

## 2023-03-27 ASSESSMENT — EXTERNAL EXAM - RIGHT EYE: OD_EXAM: NORMAL

## 2023-03-27 ASSESSMENT — EXTERNAL EXAM - LEFT EYE: OS_EXAM: NORMAL

## 2023-03-27 NOTE — NURSING NOTE
Chief Complaints and History of Present Illnesses   Patient presents with     Follow Up     3 month follow up CMM OS     Chief Complaint(s) and History of Present Illness(es)     Follow Up            Comments: 3 month follow up CMM OS          Comments    Pt states vision is the same as last visit. Pt feeling some irritation in LE, slightly better with drops.  No new flashes or floaters. No redness or dryness.  DM1 BS: Pt does not check blood sugars daily.  A1C: 6.1 taken about 1 year ago per pt.  No results found for: A1C    PETRA Castillo March 27, 2023 8:19 AM

## 2023-03-27 NOTE — PROGRESS NOTES
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  diagnosis 10/2019, been getting annual eye exams no prior notice per patient.  DM II since ~ 2010, good control, + HTn.  No steroid use      PAST OCULAR SURGERY  I-125 with LR reposition 12/16/19 & 12/20/19      RETINAL IMAGING:  OCT 03/27/23   OD - Macula - serous PED SN macula, mild RPE irregular, mild SRF by IT disk margin,  PHF attached  OS -  Macula - retina normal, PHF attached, choroid ?thick   Lesion - (prior) elevated poor image quality today   Periphery - Trace subretinal fluid Inferotemporal to macula      UBM   OS - far IT lesion in CB size 3.02mm x 10.39T x 12.48L (10/2019) ->  2.59mm x 11.47T x 12.01L  (3/2020) ->  1.82mm x 11.5T x 10.47L (8/2020) -> 1.91 x 13.31T(12.44T) x 10.37L (11/2020) -> 1.88 x 13.31T x 10.38L (3/2021) -> 1.56mm x 12.00T x 10.47L (9/2021) -> 1.56mm x 12.40 T x 10.43L (3/2022)->1.60x12.43 (9/2022) ->1.38 x 11.42L x 12.44T (3/2023)    U/S OS 09/23/22  A-scan - (prior)  lesion low reflective  B-scan - peripheral lesion elevated    Optos 09/23/22  Consistent with exam OU    OCT-A 1-21-19  OD - no CNVM    FA 1-21-20  OD - (transit) mulitple hyperF spots, leakage SN macula, no likely CNVM  OS - multiple hyperF spots    ICG 1-21-20  OD (transit) mulitple hyperF spots no CNVM likely  OS - multiple hyperF spots              ASSESSMENT & PLAN    #  CMM OS   - s/p I-125 12/16/19 & 12/20/19   - U/S (requires UBM d/t anterior)    - stable  on U/S   -  recheck 6 months (~ 9/2023)    #.  OD   - doubt CNVM   - no likely CNVM on FA/OCT/OCT-A on 1/21/20   - had STS 20mg 12/20/19, new activity noted 1/21/20 with SRF   - fluid resolved 3/23/20    - new SRF noted 9/2021 resolved afterwards     - new SRF noted 9/23/22   - mild extramacular   - stable vs  better  12/2022   - recurrent 3/2023 ST edge of macula/extramacular     - observe at this time   - recheck 3-6 months  '   - steroid avoidance d/w patient    #  OS   -new SRF noted 9/23/2022   - mild extramacular   -observe at this time      # Syneresis OU   - advised S/Sx RD 3/2023    #. NS OU   - likely VS problems driving at night 3/2023   - patient requests referral   - OK to proceed with CE/IOL OU          return to clinic: 3-6 month,, OCT OU - 55 degree OU, Optos OU, U/S OS      ATTESTATION     Attending Attestation:     Complete documentation of historical and exam elements from today's encounter can be found in the full encounter summary report (not reduplicated in this progress note).  I personally obtained the chief complaint(s) and history of present illness.  I confirmed and edited as necessary the review of systems, past medical/surgical history, family history, social history, and examination findings as documented by others; and I examined the patient myself.  I personally reviewed the relevant tests, images, and reports as documented above.  I formulated and edited as necessary the assessment and plan and discussed the findings and management plan with the patient and family    Charisma Rodriguez MD, PhD  , Vitreoretinal Surgery  Department of Ophthalmology  [facility]

## 2023-04-09 ENCOUNTER — HEALTH MAINTENANCE LETTER (OUTPATIENT)
Age: 78
End: 2023-04-09

## 2023-04-13 ENCOUNTER — VIRTUAL VISIT (OUTPATIENT)
Dept: ONCOLOGY | Facility: CLINIC | Age: 78
End: 2023-04-13
Attending: INTERNAL MEDICINE
Payer: COMMERCIAL

## 2023-04-13 ENCOUNTER — LAB (OUTPATIENT)
Dept: LAB | Facility: CLINIC | Age: 78
End: 2023-04-13
Payer: COMMERCIAL

## 2023-04-13 ENCOUNTER — ANCILLARY PROCEDURE (OUTPATIENT)
Dept: CT IMAGING | Facility: CLINIC | Age: 78
End: 2023-04-13
Attending: INTERNAL MEDICINE
Payer: COMMERCIAL

## 2023-04-13 VITALS — HEIGHT: 72 IN | BODY MASS INDEX: 28.71 KG/M2 | WEIGHT: 212 LBS

## 2023-04-13 DIAGNOSIS — C69.32 MALIGNANT MELANOMA OF CHOROID OF LEFT EYE (H): ICD-10-CM

## 2023-04-13 DIAGNOSIS — C69.32 MALIGNANT MELANOMA OF CHOROID OF LEFT EYE (H): Primary | ICD-10-CM

## 2023-04-13 LAB
ALBUMIN SERPL BCG-MCNC: 4.4 G/DL (ref 3.5–5.2)
ALP SERPL-CCNC: 117 U/L (ref 40–129)
ALT SERPL W P-5'-P-CCNC: 30 U/L (ref 10–50)
ANION GAP SERPL CALCULATED.3IONS-SCNC: 7 MMOL/L (ref 7–15)
AST SERPL W P-5'-P-CCNC: 24 U/L (ref 10–50)
BASOPHILS # BLD AUTO: 0 10E3/UL (ref 0–0.2)
BASOPHILS NFR BLD AUTO: 1 %
BILIRUB SERPL-MCNC: 0.3 MG/DL
BUN SERPL-MCNC: 20.6 MG/DL (ref 8–23)
CALCIUM SERPL-MCNC: 9.7 MG/DL (ref 8.8–10.2)
CHLORIDE SERPL-SCNC: 111 MMOL/L (ref 98–107)
CREAT BLD-MCNC: 1.2 MG/DL (ref 0.7–1.3)
CREAT SERPL-MCNC: 0.99 MG/DL (ref 0.67–1.17)
DEPRECATED HCO3 PLAS-SCNC: 28 MMOL/L (ref 22–29)
EOSINOPHIL # BLD AUTO: 0.3 10E3/UL (ref 0–0.7)
EOSINOPHIL NFR BLD AUTO: 6 %
ERYTHROCYTE [DISTWIDTH] IN BLOOD BY AUTOMATED COUNT: 12.3 % (ref 10–15)
GFR SERPL CREATININE-BSD FRML MDRD: 79 ML/MIN/1.73M2
GFR SERPL CREATININE-BSD FRML MDRD: >60 ML/MIN/1.73M2
GLUCOSE SERPL-MCNC: 153 MG/DL (ref 70–99)
HCT VFR BLD AUTO: 45.3 % (ref 40–53)
HGB BLD-MCNC: 14.9 G/DL (ref 13.3–17.7)
IMM GRANULOCYTES # BLD: 0 10E3/UL
IMM GRANULOCYTES NFR BLD: 0 %
LYMPHOCYTES # BLD AUTO: 1.4 10E3/UL (ref 0.8–5.3)
LYMPHOCYTES NFR BLD AUTO: 25 %
MCH RBC QN AUTO: 32.3 PG (ref 26.5–33)
MCHC RBC AUTO-ENTMCNC: 32.9 G/DL (ref 31.5–36.5)
MCV RBC AUTO: 98 FL (ref 78–100)
MONOCYTES # BLD AUTO: 0.7 10E3/UL (ref 0–1.3)
MONOCYTES NFR BLD AUTO: 13 %
NEUTROPHILS # BLD AUTO: 3.1 10E3/UL (ref 1.6–8.3)
NEUTROPHILS NFR BLD AUTO: 55 %
NRBC # BLD AUTO: 0 10E3/UL
NRBC BLD AUTO-RTO: 0 /100
PLATELET # BLD AUTO: 198 10E3/UL (ref 150–450)
POTASSIUM SERPL-SCNC: 4.1 MMOL/L (ref 3.4–5.3)
PROT SERPL-MCNC: 7.6 G/DL (ref 6.4–8.3)
RBC # BLD AUTO: 4.61 10E6/UL (ref 4.4–5.9)
SODIUM SERPL-SCNC: 146 MMOL/L (ref 136–145)
WBC # BLD AUTO: 5.6 10E3/UL (ref 4–11)

## 2023-04-13 PROCEDURE — 74177 CT ABD & PELVIS W/CONTRAST: CPT | Mod: GC | Performed by: RADIOLOGY

## 2023-04-13 PROCEDURE — 80053 COMPREHEN METABOLIC PANEL: CPT | Performed by: PATHOLOGY

## 2023-04-13 PROCEDURE — 85025 COMPLETE CBC W/AUTO DIFF WBC: CPT | Performed by: PATHOLOGY

## 2023-04-13 PROCEDURE — 71260 CT THORAX DX C+: CPT | Mod: GC | Performed by: RADIOLOGY

## 2023-04-13 PROCEDURE — 36415 COLL VENOUS BLD VENIPUNCTURE: CPT | Performed by: PATHOLOGY

## 2023-04-13 PROCEDURE — 82565 ASSAY OF CREATININE: CPT | Mod: 91 | Performed by: PATHOLOGY

## 2023-04-13 PROCEDURE — 99214 OFFICE O/P EST MOD 30 MIN: CPT | Mod: TEL | Performed by: INTERNAL MEDICINE

## 2023-04-13 RX ORDER — IOPAMIDOL 755 MG/ML
114 INJECTION, SOLUTION INTRAVASCULAR ONCE
Status: COMPLETED | OUTPATIENT
Start: 2023-04-13 | End: 2023-04-13

## 2023-04-13 RX ADMIN — IOPAMIDOL 114 ML: 755 INJECTION, SOLUTION INTRAVASCULAR at 07:02

## 2023-04-13 ASSESSMENT — PAIN SCALES - GENERAL: PAINLEVEL: NO PAIN (0)

## 2023-04-13 NOTE — LETTER
4/13/2023         RE: Jairo Austin  [address]  [address]  [facility] [address]        Dear Colleague,    Thank you for referring your patient, Jairo Austin, to the [facility]. Please see a copy of my visit note below.    Virtual Visit Details    Type of service:  Telephone Visit   Phone call duration: 25 minutes       MEDICAL ONCOLOGY TELEPHONE VISIT  [facility]  Apr 13, 2023    CHIEF COMPLAINT: Choroidal melanoma, left eye    Melanoma History:  1. 10/28/2019, he was initially seen and the left eye lesion measured 10.39T x 12.48L with a height of 3.02mm.  2. 11/4/2019, CT-scan negative for obvious liver metastasis  3. 11/11/2019, MRI liver showed small enhancing lesions in segment 8 and 4a, likely small hemangiomas.   4. 12/16/2019, he has I-125 plaque brachytherapy placement and delivery of 8,500 cGy total dose to the left eye lesion.  5. 2/6/2020, CT-CAP shows decreased appearance of the arterially enhancing liver lesion. No obvious liver metastasis or suspicious lesions.  6. 10/2/2020, CT-CAP with no evidence of metastatic disease in the chest, abdomen, or pelvis. The hepatic enhancing foci previously characterized as benign on abdominal MRI 11/11/2019 are redemonstrated and are stable.   7. 12/15/2022 CT- CAP No evidence for metastatic malignancy     HISTORY OF PRESENT ILLNESS  Jairo Austin is a 77 year old male with choroidal melanoma of the left eye. He presents via telephone visit today.    He saw Dr. Rodriguez, ophthalmologist on 3/27. Patient reports that his vision is stable but will need laser therapy for cataract at some point. Has a follow up appt with ophthal in 3 months.     He is doing well. He denies any nausea, vomiting, diarrhea. No new lesions or rash. No abdominal pain. No fevers or chills    Previous CT-CAP 12/15/2022 showed no new evidence of metastatic disease to liver. No new lung nodules.     REVIEW OF SYSTEMS  A  12-point ROS negative except as in HPI.     Current Outpatient Medications   Medication Sig Dispense Refill    acetaminophen (TYLENOL) 500 MG tablet Take 1,000 mg by mouth daily      amLODIPine (NORVASC) 10 MG tablet Take 10 mg by mouth every morning       aspirin 81 MG EC tablet Take 81 mg by mouth every morning       atorvastatin (LIPITOR) 20 MG tablet Take 20 mg by mouth every morning       carvedilol (COREG) 12.5 MG tablet Take 12.5 mg by mouth 2 times daily (with meals)       chlorthalidone (HYGROTON) 25 MG tablet Take 25 mg by mouth daily      lisinopril (PRINIVIL/ZESTRIL) 20 MG tablet Take 20 mg by mouth 2 times daily   3    multivitamin w/minerals (THERA-VIT-M) tablet Take 1 tablet by mouth every morning      oxyCODONE (ROXICODONE) 5 MG tablet Take 1 tablet (5 mg) by mouth every 6 hours as needed for pain or moderate to severe pain 20 tablet 0    oxyCODONE (ROXICODONE) 5 MG tablet Take 1-2 tablets (5-10 mg) by mouth every 4 hours as needed for moderate to severe pain 40 tablet 0    polyethylene glycol (MIRALAX) 17 GM/Dose powder Take 17 g by mouth daily as needed for constipation 510 g 0       Past Medical History:   Diagnosis Date    Asbestos exposure 1/19/2007    chest x ray Jan 2007, November 2009. 11/30/11, 4/24/2013, 12/31/2014    Diabetes (H)     Hearing loss 1/19/2007    hearing aid on left. 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[employer])    Hypertension     Melanoma (H)     Obese     Osteoarthritis of hip 11/30/2011    Right hip replacement 12/5/11 Dr Jj Reyes, will have left hip replacement 1/2/13 Dr Reyes    Sleep apnea 1/19/2007    wears CPAP device at night     PAST SURGICAL HISTORY  1. Bilateral hip replacement, 2011    SOCIAL HISTORY   with 2 children and 4 grandchildren. Retired and now working as a . Quit smoking 20 years ago. Smoked 2 packs per day x 10 years.  History   Smoking Status    Former    Packs/day: 0.00    Types: Cigarettes    Quit date: 2009    Smokeless Tobacco    Never    Social History    Substance and Sexual Activity      Alcohol use: Yes        Comment: Weekends/socially     History   Drug Use Unknown     FAMILY HISTORY  Paternal uncle: Throat cancer  Family History   Problem Relation Age of Onset    Glaucoma No family hx of     Macular Degeneration No family hx of        PHYSICAL EXAMINATION  Ht 1.829 m (6')   Wt 96.2 kg (212 lb)   BMI 28.75 kg/m    No exam as this was telephone visit.      LABORATORY STUDIES  Ancillary Procedure on 04/13/2023   Component Date Value Ref Range Status    Creatinine POCT 04/13/2023 1.2  0.7 - 1.3 mg/dL Final    GFR, ESTIMATED POCT 04/13/2023 >60  >60 mL/min/1.73m2 Final   Lab on 04/13/2023   Component Date Value Ref Range Status    Sodium 04/13/2023 146 (H)  136 - 145 mmol/L Final    Potassium 04/13/2023 4.1  3.4 - 5.3 mmol/L Final    Chloride 04/13/2023 111 (H)  98 - 107 mmol/L Final    Carbon Dioxide (CO2) 04/13/2023 28  22 - 29 mmol/L Final    Anion Gap 04/13/2023 7  7 - 15 mmol/L Final    Urea Nitrogen 04/13/2023 20.6  8.0 - 23.0 mg/dL Final    Creatinine 04/13/2023 0.99  0.67 - 1.17 mg/dL Final    Calcium 04/13/2023 9.7  8.8 - 10.2 mg/dL Final    Glucose 04/13/2023 153 (H)  70 - 99 mg/dL Final    Alkaline Phosphatase 04/13/2023 117  40 - 129 U/L Final    AST 04/13/2023 24  10 - 50 U/L Final    ALT 04/13/2023 30  10 - 50 U/L Final    Protein Total 04/13/2023 7.6  6.4 - 8.3 g/dL Final    Albumin 04/13/2023 4.4  3.5 - 5.2 g/dL Final    Bilirubin Total 04/13/2023 0.3  <=1.2 mg/dL Final    GFR Estimate 04/13/2023 79  >60 mL/min/1.73m2 Final    WBC Count 04/13/2023 5.6  4.0 - 11.0 10e3/uL Final    RBC Count 04/13/2023 4.61  4.40 - 5.90 10e6/uL Final    Hemoglobin 04/13/2023 14.9  13.3 - 17.7 g/dL Final    Hematocrit 04/13/2023 45.3  40.0 - 53.0 % Final    MCV 04/13/2023 98  78 - 100 fL Final    MCH 04/13/2023 32.3  26.5 - 33.0 pg Final    MCHC 04/13/2023 32.9  31.5 - 36.5 g/dL Final    RDW 04/13/2023 12.3  10.0 -  15.0 % Final    Platelet Count 04/13/2023 198  150 - 450 10e3/uL Final    % Neutrophils 04/13/2023 55  % Final    % Lymphocytes 04/13/2023 25  % Final    % Monocytes 04/13/2023 13  % Final    % Eosinophils 04/13/2023 6  % Final    % Basophils 04/13/2023 1  % Final    % Immature Granulocytes 04/13/2023 0  % Final    NRBCs per 100 WBC 04/13/2023 0  <1 /100 Final    Absolute Neutrophils 04/13/2023 3.1  1.6 - 8.3 10e3/uL Final    Absolute Lymphocytes 04/13/2023 1.4  0.8 - 5.3 10e3/uL Final    Absolute Monocytes 04/13/2023 0.7  0.0 - 1.3 10e3/uL Final    Absolute Eosinophils 04/13/2023 0.3  0.0 - 0.7 10e3/uL Final    Absolute Basophils 04/13/2023 0.0  0.0 - 0.2 10e3/uL Final    Absolute Immature Granulocytes 04/13/2023 0.0  <=0.4 10e3/uL Final    Absolute NRBCs 04/13/2023 0.0  10e3/uL Final         IMAGING STUDIES:  CT -CAP 4/13/2023 shows no new mets or lung nodules.    CT Chest/Abdomen/Pelvis w Contrast  Narrative: EXAM: CT CHEST/ABDOMEN/PELVIS W CONTRAST, 4/13/2023 7:18 AM    TECHNIQUE:  Helical CT images from the lung bases through the  symphysis pubis were obtained with Isovue 370 114cc intravenous  contrast. Coronal and sagittal reformatted images were generated at a  workstation for further assessment.    HISTORY: Malignant melanoma of choroid of left eye (H)    COMPARISON: CT chest and pelvis 12/15/2022, 8/18/2022, MR abdomen  4/22/2022    FINDINGS:     Chest:   Central tracheobronchial tree is patent. Stable calcified granuloma  the right lower lobe. Stable left pleural calcifications.No pleural  effusion or pneumothorax. Heart is normal size without pericardial  effusion.  No central pulmonary embolism. Non-aneurysmal thoracic  aortawith scattered atherosclerotic calcifications. Stable mediastinal  lymph nodes. Calcified right hilar lymph nodes. No abnormal axillary  or hilar lymph nodes.    Abdomen/Pelvis:  No suspicious liver lesions. Areas of arterial hyperenhancement  present in the right dome, series 5  image 52, and medially in segment  7, series 5 image 86, stable. Lesion in segment 7 was consistent with  a benign hemangioma prior MRI. The lesion in the right dome was felt  to be secondary to transient hepatic intensity difference with no  correlate on other sequences on MRI, again likely variant perfusion  related. Stable hepatic cyst in the left lateral segment, series 5  image 90. Heterogeneous arterial enhancement adjacent to the  gallbladder fossa present, series 5 images 101 through 149. This  region becomes isoattenuating on portal venous phase also favoring  perfusional anomaly. This has increased in conspicuity from prior CT  12/15/2022. A few additional scattered too small to characterize  low-density lesions in the right hepatic lobe likely correlate with  cysts on MRI. Small calcification along the middle hepatic vein.  Patent hepatic vasculature. Focal fatty infiltration along the  falciform ligament. No gallstones or evidence of acute cholecystitis.  No suspicious pancreatic lesions. Pancreatic duct is not dilated.  Spleen is within normal limits with calcified granulomas noted.  Splenule present in the hilum along the pancreatic tail. No adrenal  nodules.     No hydronephrosis or obstructing renal stones. Stable hyperdense 1 cm  cyst of the left kidney. Stable simple cysts of the bilateral kidneys.  Evaluation of the pelvis is limited by metal streak artifact from  bilateral hip arthroplasties. Prominent anterior dome of the bladder,  which may represent vesicourachal diverticulum, not well evaluated due  to artifact.     No abnormally thickened or dilated bowel. Appendix is unremarkable.  Diverticulosis without radiographic evidence of diverticulitis. No  free fluid or air within the abdomen.    No infrarenal aortic aneurysm. Stable bilateral inguinal lymph nodes.  No suspicious lymphadenopathy. Left testicular varices.    Bones / Soft Tissues:   No suspicious lesions or acute abnormalities.  Bilateral hip  arthroplasties with heterotopic calcification on the right. Lipoma  along the right piriformis suspected.  Impression: IMPRESSION:   1. No acute intrathoracic or intra-abdominal process.  2. No evidence for metastatic disease.    I have personally reviewed the examination and initial interpretation  and I agree with the findings.    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 Austin. He is a 77 year old man s/p plaque brachytherapy for uveal melanoma involving the ciliary body. He is doing well and denies any complaints.   -Continue follow-up with Ophthalmology.  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      Patient's plan of care was discussed with Dr. Kalpesh CHAVARRIA  Oncology Fellow, PGY-4  Pager: [phone]    ---  I evaluated the patient and reviewed the plan of care with the patient and the Oncology Fellow. I agree with the assessment and plan documented in the clinical note.    Darrell Aranda M.D.   of Medicine  Hematology, Oncology and Transplantation

## 2023-04-13 NOTE — PROGRESS NOTES
MEDICAL ONCOLOGY TELEPHONE VISIT  [facility]  Apr 13, 2023    CHIEF COMPLAINT: Choroidal melanoma, left eye    Melanoma History:  1. 10/28/2019, he was initially seen and the left eye lesion measured 10.39T x 12.48L with a height of 3.02mm.  2. 11/4/2019, CT-scan negative for obvious liver metastasis  3. 11/11/2019, MRI liver showed small enhancing lesions in segment 8 and 4a, likely small hemangiomas.   4. 12/16/2019, he has I-125 plaque brachytherapy placement and delivery of 8,500 cGy total dose to the left eye lesion.  5. 2/6/2020, CT-CAP shows decreased appearance of the arterially enhancing liver lesion. No obvious liver metastasis or suspicious lesions.  6. 10/2/2020, CT-CAP with no evidence of metastatic disease in the chest, abdomen, or pelvis. The hepatic enhancing foci previously characterized as benign on abdominal MRI 11/11/2019 are redemonstrated and are stable.   7. 12/15/2022 CT- CAP No evidence for metastatic malignancy     HISTORY OF PRESENT ILLNESS  Jairo Austin is a 77 year old male with choroidal melanoma of the left eye. He presents via telephone visit today.    He saw Dr. Rodriguez, ophthalmologist on 3/27. Patient reports that his vision is stable but will need laser therapy for cataract at some point. Has a follow up appt with ophthal in 3 months.     He is doing well. He denies any nausea, vomiting, diarrhea. No new lesions or rash. No abdominal pain. No fevers or chills    Previous CT-CAP 12/15/2022 showed no new evidence of metastatic disease to liver. No new lung nodules.     REVIEW OF SYSTEMS  A 12-point ROS negative except as in HPI.     Current Outpatient Medications   Medication Sig Dispense Refill     acetaminophen (TYLENOL) 500 MG tablet Take 1,000 mg by mouth daily       amLODIPine (NORVASC) 10 MG tablet Take 10 mg by mouth every morning        aspirin 81 MG EC tablet Take 81 mg by mouth every morning        atorvastatin (LIPITOR) 20 MG tablet Take 20 mg by mouth  every morning        carvedilol (COREG) 12.5 MG tablet Take 12.5 mg by mouth 2 times daily (with meals)        chlorthalidone (HYGROTON) 25 MG tablet Take 25 mg by mouth daily       lisinopril (PRINIVIL/ZESTRIL) 20 MG tablet Take 20 mg by mouth 2 times daily   3     multivitamin w/minerals (THERA-VIT-M) tablet Take 1 tablet by mouth every morning       oxyCODONE (ROXICODONE) 5 MG tablet Take 1 tablet (5 mg) by mouth every 6 hours as needed for pain or moderate to severe pain 20 tablet 0     oxyCODONE (ROXICODONE) 5 MG tablet Take 1-2 tablets (5-10 mg) by mouth every 4 hours as needed for moderate to severe pain 40 tablet 0     polyethylene glycol (MIRALAX) 17 GM/Dose powder Take 17 g by mouth daily as needed for constipation 510 g 0       Past Medical History:   Diagnosis Date     Asbestos exposure 1/19/2007    chest x ray Jan 2007, November 2009. 11/30/11, 4/24/2013, 12/31/2014     Diabetes (H)      Hearing loss 1/19/2007    hearing aid on left. 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 Bilateral hip replacement, 2011    SOCIAL HISTORY   with 2 children and 4 grandchildren. Retired and now working as a . Quit smoking 20 years ago. Smoked 2 packs per day x 10 years.  History   Smoking Status     Former     Packs/day: 0.00     Types: Cigarettes     Quit date: 2009   Smokeless Tobacco     Never    Social History    Substance and Sexual Activity      Alcohol use: Yes        Comment: Weekends/socially     History   Drug Use Unknown     FAMILY HISTORY  Paternal uncle: Throat cancer  Family History   Problem Relation Age of Onset     Glaucoma No family hx of      Macular Degeneration No family hx of        PHYSICAL  EXAMINATION  Ht 1.829 m (6')   Wt 96.2 kg (212 lb)   BMI 28.75 kg/m    No exam as this was telephone visit.      LABORATORY STUDIES  Ancillary Procedure on 04/13/2023   Component Date Value Ref Range Status     Creatinine POCT 04/13/2023 1.2  0.7 - 1.3 mg/dL Final     GFR, ESTIMATED POCT 04/13/2023 >60  >60 mL/min/1.73m2 Final   Lab on 04/13/2023   Component Date Value Ref Range Status     Sodium 04/13/2023 146 (H)  136 - 145 mmol/L Final     Potassium 04/13/2023 4.1  3.4 - 5.3 mmol/L Final     Chloride 04/13/2023 111 (H)  98 - 107 mmol/L Final     Carbon Dioxide (CO2) 04/13/2023 28  22 - 29 mmol/L Final     Anion Gap 04/13/2023 7  7 - 15 mmol/L Final     Urea Nitrogen 04/13/2023 20.6  8.0 - 23.0 mg/dL Final     Creatinine 04/13/2023 0.99  0.67 - 1.17 mg/dL Final     Calcium 04/13/2023 9.7  8.8 - 10.2 mg/dL Final     Glucose 04/13/2023 153 (H)  70 - 99 mg/dL Final     Alkaline Phosphatase 04/13/2023 117  40 - 129 U/L Final     AST 04/13/2023 24  10 - 50 U/L Final     ALT 04/13/2023 30  10 - 50 U/L Final     Protein Total 04/13/2023 7.6  6.4 - 8.3 g/dL Final     Albumin 04/13/2023 4.4  3.5 - 5.2 g/dL Final     Bilirubin Total 04/13/2023 0.3  <=1.2 mg/dL Final     GFR Estimate 04/13/2023 79  >60 mL/min/1.73m2 Final     WBC Count 04/13/2023 5.6  4.0 - 11.0 10e3/uL Final     RBC Count 04/13/2023 4.61  4.40 - 5.90 10e6/uL Final     Hemoglobin 04/13/2023 14.9  13.3 - 17.7 g/dL Final     Hematocrit 04/13/2023 45.3  40.0 - 53.0 % Final     MCV 04/13/2023 98  78 - 100 fL Final     MCH 04/13/2023 32.3  26.5 - 33.0 pg Final     MCHC 04/13/2023 32.9  31.5 - 36.5 g/dL Final     RDW 04/13/2023 12.3  10.0 - 15.0 % Final     Platelet Count 04/13/2023 198  150 - 450 10e3/uL Final     % Neutrophils 04/13/2023 55  % Final     % Lymphocytes 04/13/2023 25  % Final     % Monocytes 04/13/2023 13  % Final     % Eosinophils 04/13/2023 6  % Final     % Basophils 04/13/2023 1  % Final     % Immature Granulocytes 04/13/2023 0  % Final      NRBCs per 100 WBC 04/13/2023 0  <1 /100 Final     Absolute Neutrophils 04/13/2023 3.1  1.6 - 8.3 10e3/uL Final     Absolute Lymphocytes 04/13/2023 1.4  0.8 - 5.3 10e3/uL Final     Absolute Monocytes 04/13/2023 0.7  0.0 - 1.3 10e3/uL Final     Absolute Eosinophils 04/13/2023 0.3  0.0 - 0.7 10e3/uL Final     Absolute Basophils 04/13/2023 0.0  0.0 - 0.2 10e3/uL Final     Absolute Immature Granulocytes 04/13/2023 0.0  <=0.4 10e3/uL Final     Absolute NRBCs 04/13/2023 0.0  10e3/uL Final         IMAGING STUDIES:  CT -CAP 4/13/2023 shows no new mets or lung nodules.    CT Chest/Abdomen/Pelvis w Contrast  Narrative: EXAM: CT CHEST/ABDOMEN/PELVIS W CONTRAST, 4/13/2023 7:18 AM    TECHNIQUE:  Helical CT images from the lung bases through the  symphysis pubis were obtained with Isovue 370 114cc intravenous  contrast. Coronal and sagittal reformatted images were generated at a  workstation for further assessment.    HISTORY: Malignant melanoma of choroid of left eye (H)    COMPARISON: CT chest and pelvis 12/15/2022, 8/18/2022, MR abdomen  4/22/2022    FINDINGS:     Chest:   Central tracheobronchial tree is patent. Stable calcified granuloma  the right lower lobe. Stable left pleural calcifications.No pleural  effusion or pneumothorax. Heart is normal size without pericardial  effusion.  No central pulmonary embolism. Non-aneurysmal thoracic  aortawith scattered atherosclerotic calcifications. Stable mediastinal  lymph nodes. Calcified right hilar lymph nodes. No abnormal axillary  or hilar lymph nodes.    Abdomen/Pelvis:  No suspicious liver lesions. Areas of arterial hyperenhancement  present in the right dome, series 5 image 52, and medially in segment  7, series 5 image 86, stable. Lesion in segment 7 was consistent with  a benign hemangioma prior MRI. The lesion in the right dome was felt  to be secondary to transient hepatic intensity difference with no  correlate on other sequences on MRI, again likely variant  perfusion  related. Stable hepatic cyst in the left lateral segment, series 5  image 90. Heterogeneous arterial enhancement adjacent to the  gallbladder fossa present, series 5 images 101 through 149. This  region becomes isoattenuating on portal venous phase also favoring  perfusional anomaly. This has increased in conspicuity from prior CT  12/15/2022. A few additional scattered too small to characterize  low-density lesions in the right hepatic lobe likely correlate with  cysts on MRI. Small calcification along the middle hepatic vein.  Patent hepatic vasculature. Focal fatty infiltration along the  falciform ligament. No gallstones or evidence of acute cholecystitis.  No suspicious pancreatic lesions. Pancreatic duct is not dilated.  Spleen is within normal limits with calcified granulomas noted.  Splenule present in the hilum along the pancreatic tail. No adrenal  nodules.     No hydronephrosis or obstructing renal stones. Stable hyperdense 1 cm  cyst of the left kidney. Stable simple cysts of the bilateral kidneys.  Evaluation of the pelvis is limited by metal streak artifact from  bilateral hip arthroplasties. Prominent anterior dome of the bladder,  which may represent vesicourachal diverticulum, not well evaluated due  to artifact.     No abnormally thickened or dilated bowel. Appendix is unremarkable.  Diverticulosis without radiographic evidence of diverticulitis. No  free fluid or air within the abdomen.    No infrarenal aortic aneurysm. Stable bilateral inguinal lymph nodes.  No suspicious lymphadenopathy. Left testicular varices.    Bones / Soft Tissues:   No suspicious lesions or acute abnormalities. Bilateral hip  arthroplasties with heterotopic calcification on the right. Lipoma  along the right piriformis suspected.  Impression: IMPRESSION:   1. No acute intrathoracic or intra-abdominal process.  2. No evidence for metastatic disease.    I have personally reviewed the examination and initial  interpretation  and I agree with the findings.    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 Austin. He is a 77 year old man s/p plaque brachytherapy for uveal melanoma involving the ciliary body. He is doing well and denies any complaints.   -Continue follow-up with Ophthalmology.  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      Patient's plan of care was discussed with Dr. Kalpesh CHAVARRIA  Oncology Fellow, PGY-4  Pager: [phone]    ---  I evaluated the patient and reviewed the plan of care with the patient and the Oncology Fellow. I agree with the assessment and plan documented in the clinical note.    Darrell Aranda M.D.   of Medicine  Hematology, Oncology and Transplantation

## 2023-04-13 NOTE — NURSING NOTE
Is the patient currently in the state of [place]? YES    Visit mode:TELEPHONE    If the visit is dropped, the patient can be reconnected by: VIDEO VISIT: Text to cell phone: [phone]    Will anyone else be joining the visit? NO      How would you like to obtain your AVS? MyChart    Are changes needed to the allergy or medication list? NO    Reason for visit: Video Visit (Follow Up)      Aimee Johnson

## 2023-06-01 ENCOUNTER — HEALTH MAINTENANCE LETTER (OUTPATIENT)
Age: 78
End: 2023-06-01

## 2023-08-26 NOTE — TELEPHONE ENCOUNTER
Radiation Oncology Intake: New Consult Screening        REFERRAL INFORMATION:   Scheduled by, Callback #: Maggie From [facility]:  [phone]    Diagnosis: Choroidal Melanoma  Referring provider:  Jennifer  (BMT) Protocol #:     Have you had previous Radiation Therapy with us?: No  Have you had previous Radiation Therapy elsewhere?: No  Is there a specific Radiation Oncologist requested?: [facility]     Are you okay with traveling to our location daily to receive treatment(BMT: YES):   Are there records pertaining to this diagnosis from external facilities?:    Where:     Previous Radiation Therapy(Y/N; location):         Previous Chemotherap(Y/N; location):      Biopsy(Y/N; location):     Surgery(Y/N):         Imaging:     Previous Testing/Procedures  Previous Genetic Counseling(Y/N; Location):   (Breast)Oncotype:    (Prostate)PSA:    (H&N)Dental:    PFT:     ADDITIONAL INFORMATION:  Special Needs:   Other:         99

## 2023-09-09 ENCOUNTER — HEALTH MAINTENANCE LETTER (OUTPATIENT)
Age: 78
End: 2023-09-09

## 2023-09-20 ENCOUNTER — TELEPHONE (OUTPATIENT)
Dept: OPHTHALMOLOGY | Facility: CLINIC | Age: 78
End: 2023-09-20
Payer: COMMERCIAL

## 2023-09-20 NOTE — TELEPHONE ENCOUNTER
[facility] Call Center    Phone Message    May a detailed message be left on voicemail: yes     Reason for Call: Other: Pt has an appt with Dr. Rodriguez on 9/29. He states the [facility] has not received an auth form yet from the clinic. He is requesting that this be sent to the [facility] asap. Please follow up with pt when complete. Thank you.     Action Taken: Message routed to:  [facility]    Travel Screening: Not Applicable

## 2023-09-20 NOTE — TELEPHONE ENCOUNTER
Note to P Central Pre-Registration and benefit pool to assist in authorization request.    Gustavo Leiva RN 11:01 AM 09/20/23

## 2023-09-25 ENCOUNTER — TRANSCRIBE ORDERS (OUTPATIENT)
Dept: OTHER | Age: 78
End: 2023-09-25

## 2023-09-25 DIAGNOSIS — D03.9 MELANOMA IN SITU (H): Primary | ICD-10-CM

## 2023-10-17 ENCOUNTER — PATIENT OUTREACH (OUTPATIENT)
Dept: ONCOLOGY | Facility: CLINIC | Age: 78
End: 2023-10-17
Payer: COMMERCIAL

## 2023-10-17 NOTE — PROGRESS NOTES
[facility]: Cancer Care                                                                                          Patient called inform [facility] still needs reference number for upcoming oncology appointments.  Routing message to Central Pre-Registration & Benefit team to follow-up.  Notified patient via AppDirect.    Sonia Dumas RN  Cancer Care Coordinator  [facility]

## 2023-10-25 DIAGNOSIS — C69.32 MALIGNANT MELANOMA OF CHOROID OF LEFT EYE (H): Primary | ICD-10-CM

## 2023-11-06 ENCOUNTER — OFFICE VISIT (OUTPATIENT)
Dept: OPHTHALMOLOGY | Facility: CLINIC | Age: 78
End: 2023-11-06
Attending: OPHTHALMOLOGY
Payer: COMMERCIAL

## 2023-11-06 DIAGNOSIS — H35.713 CENTRAL SEROUS CHORIORETINOPATHY OF BOTH EYES: ICD-10-CM

## 2023-11-06 DIAGNOSIS — C69.32 MALIGNANT MELANOMA OF CHOROID OF LEFT EYE (H): Primary | ICD-10-CM

## 2023-11-06 DIAGNOSIS — H43.393 VITREOUS SYNERESIS OF BOTH EYES: ICD-10-CM

## 2023-11-06 PROCEDURE — G0463 HOSPITAL OUTPT CLINIC VISIT: HCPCS | Performed by: OPHTHALMOLOGY

## 2023-11-06 PROCEDURE — 92134 CPTRZ OPH DX IMG PST SGM RTA: CPT | Performed by: OPHTHALMOLOGY

## 2023-11-06 PROCEDURE — 76513 OPH US DX ANT SGM US UNI/BI: CPT | Performed by: OPHTHALMOLOGY

## 2023-11-06 PROCEDURE — 99207 FUNDUS PHOTOS OU (BOTH EYES): CPT | Mod: 26 | Performed by: OPHTHALMOLOGY

## 2023-11-06 PROCEDURE — 92250 FUNDUS PHOTOGRAPHY W/I&R: CPT | Performed by: OPHTHALMOLOGY

## 2023-11-06 PROCEDURE — 99214 OFFICE O/P EST MOD 30 MIN: CPT | Mod: GC | Performed by: OPHTHALMOLOGY

## 2023-11-06 ASSESSMENT — EXTERNAL EXAM - LEFT EYE: OS_EXAM: NORMAL

## 2023-11-06 ASSESSMENT — REFRACTION_WEARINGRX
OD_ADD: +1.25
OD_SPHERE: +3.25
OD_CYLINDER: +0.75
OD_AXIS: 139
SPECS_TYPE: PAL
OS_SPHERE: +3.00
OS_ADD: +1.25
OS_CYLINDER: +0.75
OS_AXIS: 127

## 2023-11-06 ASSESSMENT — VISUAL ACUITY
OS_CC: 20/30
METHOD: SNELLEN - LINEAR
OD_CC: 20/25
CORRECTION_TYPE: GLASSES

## 2023-11-06 ASSESSMENT — CUP TO DISC RATIO
OS_RATIO: 0.25
OD_RATIO: 0.25

## 2023-11-06 ASSESSMENT — SLIT LAMP EXAM - LIDS
COMMENTS: NORMAL
COMMENTS: NORMAL

## 2023-11-06 ASSESSMENT — TONOMETRY
OS_IOP_MMHG: 09
IOP_METHOD: TONOPEN
OD_IOP_MMHG: 10

## 2023-11-06 ASSESSMENT — EXTERNAL EXAM - RIGHT EYE: OD_EXAM: NORMAL

## 2023-11-06 NOTE — NURSING NOTE
Chief Complaints and History of Present Illnesses   Patient presents with    Follow Up     Chief Complaint(s) and History of Present Illness(es)       Follow Up              Laterality: both eyes    Associated symptoms: Negative for flashes and floaters    Treatments tried: no treatments    Pain scale: 0/10              Comments    Follow up for retina exam and CMM left eye, CRS right eye.  The patient reports stable vision and no known changes.  Minna Murcia, COA, COA 7:13 AM 11/06/2023

## 2023-11-06 NOTE — PROGRESS NOTES
CC -   CMM OS  s/p I-125 in 2019    INTERVAL HISTORY - [facility] stable, no new flashes or floaters, no complaints today. No new changes to his health.  no steroid use    PMH -   Jairo Austin is a 77 year old  patient with CMM OS, referred by the [facility] for evaluation and treat of a choroidal lesion left eye.  diagnosis 10/2019, been getting annual eye exams no prior notice per patient.  DM II since ~ 2010, good control, + HTn.  No steroid use      PAST OCULAR SURGERY  I-125 with LR reposition 12/16/19 & 12/20/19      RETINAL IMAGING:  OCT 11/06/2023  OD - Macula - superotemporal trace SRF, serous PED SN macula, mild RPE irregular, mild SRF by IT disk margin, trace peripapillary SRF, PHF attached  OS -  Macula - retina normal, PHF attached, choroid ?thick   Lesion - (prior) elevated poor image quality today   Periphery - mild subretinal fluid Inferotemporal to macula      UBM   OS - far IT lesion in CB size 3.02mm x 10.39T x 12.48L (10/2019) ->  2.59mm x 11.47T x 12.01L  (3/2020) ->  1.82mm x 11.5T x 10.47L (8/2020) -> 1.91 x 13.31T(12.44T) x 10.37L (11/2020) -> 1.88 x 13.31T x 10.38L (3/2021) -> 1.56mm x 12.00T x 10.47L (9/2021) -> 1.56mm x 12.40 T x 10.43L (3/2022)->1.60x12.43 (9/2022) ->1.38 x 11.42L x 12.44T (3/2023) -> 1.40 x 11.40L x 12/45T (11/6/23)    U/S OS 09/23/22  A-scan - (prior)  lesion low reflective  B-scan - peripheral lesion elevated    Optos 09/23/22  Consistent with exam OU    OCT-A 1-21-19  OD - no CNVM    FA 1-21-20  OD - (transit) mulitple hyperF spots, leakage SN macula, no likely CNVM  OS - multiple hyperF spots    ICG 1-21-20  OD (transit) mulitple hyperF spots no CNVM likely  OS - multiple hyperF spots        ASSESSMENT & PLAN    #  CMM OS   - s/p I-125 12/16/19 & 12/20/19   - U/S (requires UBM d/t anterior)    - stable  on U/S   - recheck 6 months (~ 5/2024)    #.  OD   - doubt CNVM   - no likely CNVM on FA/OCT/OCT-A on 1/21/20   - had STS 20mg 12/20/19, new activity noted 1/21/20 with  SRF   - fluid resolved 3/23/20    - new SRF noted 9/2021 resolved afterward   - new SRF noted 9/23/22 extramacula mild improved 12/2022   - recurrent 3/2023 ST edge of macula/extramacular   - stable/better tdoay, macular PED stable     - observe at this time   - recheck 6 months  '   - steroid avoidance d/w patient    #  OS   -new SRF noted 9/23/2022   - mild extramacular stable today   - observe at this time    # Syneresis OU   - advised S/Sx RD 3/2023    #. NS OU   - likely VS problems driving at night 3/2023   - Has appointment in next few weeks with outside ophthalmologist for CE IOL evaluation   - OK to proceed with CE/IOL OU    # Refractive error, and presbyopia, both eyes   - [facility]      Karlos Mendoza MD  PGY-3 Ophthalmology    ATTESTATION     Attending Attestation:     Complete documentation of historical and exam elements from today's encounter can be found in the full encounter summary report (not reduplicated in this progress note).  I personally obtained the chief complaint(s) and history of present illness.  I confirmed and edited as necessary the review of systems, past medical/surgical history, family history, social history, and examination findings as documented by others; and I examined the patient myself.  I personally reviewed the relevant tests, images, and reports as documented above.  I personally reviewed the ophthalmic test(s) associated with this encounter, agree with the interpretation(s) as documented by the resident/fellow, and have edited the corresponding report(s) as necessary.   I formulated and edited as necessary the assessment and plan and discussed the findings and management plan with the patient and family    Charisma Rodriguez MD, PhD  , Vitreoretinal Surgery  Department of Ophthalmology  [facility]

## 2024-01-27 ENCOUNTER — HEALTH MAINTENANCE LETTER (OUTPATIENT)
Age: 79
End: 2024-01-27

## 2024-04-23 DIAGNOSIS — C69.32 MALIGNANT MELANOMA OF CHOROID OF LEFT EYE (H): Primary | ICD-10-CM

## 2024-04-26 ENCOUNTER — PATIENT OUTREACH (OUTPATIENT)
Dept: ONCOLOGY | Facility: CLINIC | Age: 79
End: 2024-04-26
Payer: COMMERCIAL

## 2024-04-26 NOTE — PROGRESS NOTES
[facility]: Cancer Care                                                                                          Changed patient's enrollment status to maintenance.    Sonia Dumas RN  Cancer Care Coordinator  [facility]

## 2024-05-06 ENCOUNTER — OFFICE VISIT (OUTPATIENT)
Dept: OPHTHALMOLOGY | Facility: CLINIC | Age: 79
End: 2024-05-06
Attending: OPHTHALMOLOGY
Payer: COMMERCIAL

## 2024-05-06 DIAGNOSIS — C69.32 MALIGNANT MELANOMA OF CHOROID OF LEFT EYE (H): ICD-10-CM

## 2024-05-06 PROCEDURE — 76513 OPH US DX ANT SGM US UNI/BI: CPT | Performed by: OPHTHALMOLOGY

## 2024-05-06 PROCEDURE — 99207 FUNDUS PHOTOS OU (BOTH EYES): CPT | Mod: 26 | Performed by: OPHTHALMOLOGY

## 2024-05-06 PROCEDURE — 92250 FUNDUS PHOTOGRAPHY W/I&R: CPT | Performed by: OPHTHALMOLOGY

## 2024-05-06 PROCEDURE — 92134 CPTRZ OPH DX IMG PST SGM RTA: CPT | Performed by: OPHTHALMOLOGY

## 2024-05-06 PROCEDURE — 99213 OFFICE O/P EST LOW 20 MIN: CPT | Performed by: OPHTHALMOLOGY

## 2024-05-06 PROCEDURE — 99214 OFFICE O/P EST MOD 30 MIN: CPT | Mod: GC | Performed by: OPHTHALMOLOGY

## 2024-05-06 ASSESSMENT — REFRACTION_WEARINGRX
OD_SPHERE: PLANO
OS_CYLINDER: +0.75
OD_CYLINDER: SPHERE
OS_SPHERE: PLANO
SPECS_TYPE: PAL
OD_ADD: +2.75
OS_ADD: +2.75
OS_AXIS: 017

## 2024-05-06 ASSESSMENT — CONF VISUAL FIELD
OS_INFERIOR_NASAL_RESTRICTION: 0
OD_INFERIOR_TEMPORAL_RESTRICTION: 0
OS_NORMAL: 1
METHOD: COUNTING FINGERS
OD_NORMAL: 1
OS_SUPERIOR_TEMPORAL_RESTRICTION: 0
OD_SUPERIOR_TEMPORAL_RESTRICTION: 0
OD_SUPERIOR_NASAL_RESTRICTION: 0
OD_INFERIOR_NASAL_RESTRICTION: 0
OS_SUPERIOR_NASAL_RESTRICTION: 0
OS_INFERIOR_TEMPORAL_RESTRICTION: 0

## 2024-05-06 ASSESSMENT — TONOMETRY
OS_IOP_MMHG: 11
OD_IOP_MMHG: 09
IOP_METHOD: TONOPEN

## 2024-05-06 ASSESSMENT — EXTERNAL EXAM - RIGHT EYE: OD_EXAM: NORMAL

## 2024-05-06 ASSESSMENT — SLIT LAMP EXAM - LIDS
COMMENTS: NORMAL
COMMENTS: NORMAL

## 2024-05-06 ASSESSMENT — VISUAL ACUITY
OD_CC+: +1
OS_CC+: -2
OS_CC: 20/25
CORRECTION_TYPE: GLASSES
OD_CC: 20/25
METHOD: SNELLEN - LINEAR

## 2024-05-06 ASSESSMENT — EXTERNAL EXAM - LEFT EYE: OS_EXAM: NORMAL

## 2024-05-06 ASSESSMENT — CUP TO DISC RATIO
OS_RATIO: 0.25
OD_RATIO: 0.25

## 2024-05-06 NOTE — NURSING NOTE
"Chief Complaints and History of Present Illnesses   Patient presents with    Follow Up     6 month follow up CMM OS     Chief Complaint(s) and History of Present Illness(es)       Follow Up              Comments: 6 month follow up CMM OS              Comments    Pt states vision has been good since last visit. No eye pain today. No new flashes or floaters.  No redness or dryness.  DM2 BS: Pt does not check sugars daily.    No results found for: \"A1C\"    PETRA Castillo May 6, 2024 7:11 AM                         "

## 2024-05-06 NOTE — PROGRESS NOTES
CC -   CMM OS  s/p I-125 in 2019    INTERVAL HISTORY - six month follow up. VA stable, no new flashes or floaters, no complaints today. Underwent cataract surgery in  December at [facility] happy with results    [facility] -   Jairo Austin is a 78 year old  patient with CMM OS, referred by the [facility] for evaluation and treat of a choroidal lesion left eye.  diagnosis 10/2019, been getting annual eye exams no prior notice per patient.  DM II since ~ 2010, good control, + HTn.  No steroid use      PAST OCULAR SURGERY  I-125 with LR reposition 12/16/19 & 12/20/19  CE/IOL OU 12/2023      RETINAL IMAGING:  OCT 05/06/2024  OD - Macula - small  serous PED SN macula, mild RPE irregular, mild SRF by IT disk margin, trace peripapillary SRF, PHF attached  OS -  Macula - retina normal, PHF attached, choroid ?thick   Lesion - (prior) elevated poor image quality today   Periphery - mild subretinal fluid Inferotemporal to macula      UBM   OS - far IT lesion in CB size 3.02mm x 10.39T x 12.48L (10/2019) ->  2.59mm x 11.47T x 12.01L  (3/2020) ->  1.82mm x 11.5T x 10.47L (8/2020) -> 1.91 x 13.31T(12.44T) x 10.37L (11/2020) -> 1.88 x 13.31T x 10.38L (3/2021) -> 1.56mm x 12.00T x 10.47L (9/2021) -> 1.56mm x 12.40 T x 10.43L (3/2022)->1.60x12.43 (9/2022) ->1.38 x 11.42L x 12.44T (3/2023) -> 1.40 x 11.40L x 12/45T (11/6/23) -> 1.32 x 11.04L x 11.43T (05/06/24)    U/S OS 09/23/22  A-scan - (prior)  lesion low reflective  B-scan - peripheral lesion elevated    Optos 09/23/22  Consistent with exam OU    OCT-A 1-21-19  OD - no CNVM    FA 1-21-20  OD - (transit) mulitple hyperF spots, leakage SN macula, no likely CNVM  OS - multiple hyperF spots    ICG 1-21-20  OD (transit) mulitple hyperF spots no CNVM likely  OS - multiple hyperF spots        ASSESSMENT & PLAN    #  CMM OS   - s/p I-125 12/16/19 & 12/20/19   - U/S (requires UBM d/t anterior)    - stable  on U/S today    - recheck 6 months (~ 11/2024)     - will change to U/S q12  months in future   - will need q6 month monitoring d/t     #.  OD   - doubt CNVM   - no likely CNVM on FA/OCT/OCT-A on 1/21/20   - had STS 20mg 12/20/19, new activity noted 1/21/20 with SRF   - fluid resolved 3/23/20    - new SRF noted 9/2021 resolved afterward   - new SRF noted 9/23/22 extramacula mild improved 12/2022   - recurrent 3/2023 ST edge of macula/extramacular   - resolved 2023     - stable today, macular PED stable   - observe at this time   - recheck 6 months     - steroid avoidance d/w patient    #  OS   -new SRF noted 9/23/2022   - mild extramacular stable today   - observe at this time    # Syneresis OU   - advised S/Sx RD 5/2024    #. Pseudophakia OU   - Underwent CEIOL in Dec 2023   - Doing well    # Refractive error, and presbyopia, both eyes   - [facility] with [facility]        Return in about 6 months (around 11/6/2024) for DFE OU, OCT OU, Optos Photo, UBM OS.       Aidee Silva MD  PGY3 Ophthalmology Resident  [facility]    ATTESTATION     Attending Attestation:     Complete documentation of historical and exam elements from today's encounter can be found in the full encounter summary report (not reduplicated in this progress note).  I personally obtained the chief complaint(s) and history of present illness.  I confirmed and edited as necessary the review of systems, past medical/surgical history, family history, social history, and examination findings as documented by others; and I examined the patient myself.  I personally reviewed the relevant tests, images, and reports as documented above.  I personally reviewed the ophthalmic test(s) associated with this encounter, agree with the interpretation(s) as documented by the resident/fellow, and have edited the corresponding report(s) as necessary.   I formulated and edited as necessary the assessment and plan and discussed the findings and management plan with the patient and family    Charisma Rodriguez MD,  PhD  , Vitreoretinal Surgery  Department of Ophthalmology  [facility]

## 2024-05-08 PROBLEM — C69.42 MALIGNANT MELANOMA OF UVEA OF LEFT EYE (H): Status: ACTIVE | Noted: 2019-11-19

## 2024-05-13 ENCOUNTER — PATIENT OUTREACH (OUTPATIENT)
Dept: ONCOLOGY | Facility: CLINIC | Age: 79
End: 2024-05-13
Payer: COMMERCIAL

## 2024-05-13 NOTE — PROGRESS NOTES
[facility]: Cancer Care                                                                                          Patient called to confirm whether or not he has [facility] authorization for upcoming appts.  Message sent to authorization team.    Sonia Dumas RN  Cancer Care Coordinator  [facility]

## 2024-05-22 ENCOUNTER — ANCILLARY PROCEDURE (OUTPATIENT)
Dept: CT IMAGING | Facility: CLINIC | Age: 79
End: 2024-05-22
Attending: INTERNAL MEDICINE
Payer: COMMERCIAL

## 2024-05-22 ENCOUNTER — ANCILLARY PROCEDURE (OUTPATIENT)
Dept: MRI IMAGING | Facility: CLINIC | Age: 79
End: 2024-05-22
Attending: INTERNAL MEDICINE
Payer: COMMERCIAL

## 2024-05-22 ENCOUNTER — LAB (OUTPATIENT)
Dept: LAB | Facility: CLINIC | Age: 79
End: 2024-05-22
Attending: INTERNAL MEDICINE
Payer: COMMERCIAL

## 2024-05-22 DIAGNOSIS — C69.32 MALIGNANT MELANOMA OF CHOROID OF LEFT EYE (H): ICD-10-CM

## 2024-05-22 LAB
ALBUMIN SERPL BCG-MCNC: 4.4 G/DL (ref 3.5–5.2)
ALP SERPL-CCNC: 125 U/L (ref 40–150)
ALT SERPL W P-5'-P-CCNC: 32 U/L (ref 0–70)
ANION GAP SERPL CALCULATED.3IONS-SCNC: 10 MMOL/L (ref 7–15)
AST SERPL W P-5'-P-CCNC: 33 U/L (ref 0–45)
BASOPHILS # BLD AUTO: 0 10E3/UL (ref 0–0.2)
BASOPHILS NFR BLD AUTO: 1 %
BILIRUB SERPL-MCNC: 0.5 MG/DL
BUN SERPL-MCNC: 20.7 MG/DL (ref 8–23)
CALCIUM SERPL-MCNC: 9.5 MG/DL (ref 8.8–10.2)
CHLORIDE SERPL-SCNC: 103 MMOL/L (ref 98–107)
CREAT SERPL-MCNC: 0.92 MG/DL (ref 0.67–1.17)
DEPRECATED HCO3 PLAS-SCNC: 28 MMOL/L (ref 22–29)
EGFRCR SERPLBLD CKD-EPI 2021: 86 ML/MIN/1.73M2
EOSINOPHIL # BLD AUTO: 0.2 10E3/UL (ref 0–0.7)
EOSINOPHIL NFR BLD AUTO: 4 %
ERYTHROCYTE [DISTWIDTH] IN BLOOD BY AUTOMATED COUNT: 12.5 % (ref 10–15)
GLUCOSE SERPL-MCNC: 119 MG/DL (ref 70–99)
HCT VFR BLD AUTO: 44.8 % (ref 40–53)
HGB BLD-MCNC: 15.1 G/DL (ref 13.3–17.7)
IMM GRANULOCYTES # BLD: 0 10E3/UL
IMM GRANULOCYTES NFR BLD: 0 %
LYMPHOCYTES # BLD AUTO: 1.3 10E3/UL (ref 0.8–5.3)
LYMPHOCYTES NFR BLD AUTO: 22 %
MCH RBC QN AUTO: 32.7 PG (ref 26.5–33)
MCHC RBC AUTO-ENTMCNC: 33.7 G/DL (ref 31.5–36.5)
MCV RBC AUTO: 97 FL (ref 78–100)
MONOCYTES # BLD AUTO: 0.7 10E3/UL (ref 0–1.3)
MONOCYTES NFR BLD AUTO: 12 %
NEUTROPHILS # BLD AUTO: 3.6 10E3/UL (ref 1.6–8.3)
NEUTROPHILS NFR BLD AUTO: 61 %
NRBC # BLD AUTO: 0 10E3/UL
NRBC BLD AUTO-RTO: 0 /100
PLATELET # BLD AUTO: 190 10E3/UL (ref 150–450)
POTASSIUM SERPL-SCNC: 4 MMOL/L (ref 3.4–5.3)
PROT SERPL-MCNC: 7.4 G/DL (ref 6.4–8.3)
RADIOLOGIST FLAGS: ABNORMAL
RBC # BLD AUTO: 4.62 10E6/UL (ref 4.4–5.9)
SODIUM SERPL-SCNC: 141 MMOL/L (ref 135–145)
WBC # BLD AUTO: 5.9 10E3/UL (ref 4–11)

## 2024-05-22 PROCEDURE — 80053 COMPREHEN METABOLIC PANEL: CPT | Performed by: PATHOLOGY

## 2024-05-22 PROCEDURE — 74183 MRI ABD W/O CNTR FLWD CNTR: CPT | Performed by: RADIOLOGY

## 2024-05-22 PROCEDURE — A9581 GADOXETATE DISODIUM INJ: HCPCS | Performed by: RADIOLOGY

## 2024-05-22 PROCEDURE — 36415 COLL VENOUS BLD VENIPUNCTURE: CPT | Performed by: PATHOLOGY

## 2024-05-22 PROCEDURE — 71250 CT THORAX DX C-: CPT | Performed by: RADIOLOGY

## 2024-05-22 PROCEDURE — 85025 COMPLETE CBC W/AUTO DIFF WBC: CPT | Performed by: PATHOLOGY

## 2024-05-22 NOTE — PROGRESS NOTES
Virtual Visit Details    Type of service:  Telephone Visit   Phone call duration: 30 minutes   Originating Location (pt. 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 Patient presents with a choroidal lesion in the left eye detected 10/2019 at the [facility] ophthalmology clinic.  Evaluation in ophthalmology clinic (Charisma Rodriguez MD) and B-scan ultrasound 10/28/2019, revealed an elevated choroidal lesion in the lateral left eye measuring 12.48 mm(L) x 10.39 mm(W) x 3.02 mm(H).  CT chest, abdomen, pelvis 11/04/2019, revealed multiple prominent mediastinal nodes including a 1.2 cm pericarinal lymph node, and subcentimeter calcified right hilar lymph nodes.  There was a 1.1 cm ill-defined enhancing lesion in segment 8 of the liver, and a 10 mm enhancing lesion in segment 4A, a 1.5 cm arterial enhancing lesion in segment 7 that were not well visualized on portal phase imaging.  There was a small hypodensity in segment 2/3 that was too small to characterize, likely a small cyst, and additional scattered low-density lesions on the portal venous phase that were too small to characterize.  There was an ill-defined area of hypodensity along the falciform ligament, likely focal fatty change.  MRI abdomen 11/11/2019, revealed 3 small areas of arterial phase hyperenhancement in the liver, 2 of the lesions were in segment 8, and one lesion in segment 7 corresponding to the arterially enhancing foci on recent CT scan (11/04/2019).  The segment 8 lesions demonstrated no abnormal signal on any other sequence and considered to be benign vascular shunt. The segment 7 lesion was a T2 hyperintense focus compatible with a flash filling hemangioma.   There were tiny cysts scattered throughout the liver.  There were no suspicious lesions with features of metastases.  Patient underwent disinsertion of the lateral rectus muscle and 125-I plaque brachytherapy (8500 cGy, 16 mm plaque with 13 seeds) 12/16/2019, and plaque removal and reinsertion of the lateral rectus muscle 12/20/2019.      Restaging CT chest, abdomen, pelvis 02/06/2020, was negative for adenopathy or metastases. Follow-up B-scan ultrasound of the left eye choroidal lesion 03/23/2020, revealed a response to plaque brachytherapy measuring 12.01 mm(L) x 11.47 mmW) x 2.59 mm(H), which has been sustained; 07/21/2020, 10.47 mm(L) x 11.5 mW) x  1.82 mm(H); 11/06/2020, 10.37 mm(L) x 13.31 mm(W) x  1.91 mm(H); 03/12/2021, 10.38 mm(L) x 13.31 mm(W) x  1.88 mm(H); 09/17/2021, 10.47 mm(L) x 12.00 mm(W) x  1.56 mm(H); 03/18/2022, 10.43 mm(L) x 12.40 mm(W) x 1.56 mm(H); 09/23/2022, 12.43 mm(W) x 1.60 mm(H); 03/27/2023, 11.42 mm(L) x 12.44 mm(W) x  1.38 mm(H); 11/06/2023, 11.40 mm(L) x 12.45 mm(W) x 1.40 mm(H); 05/06/2024, 1.32 mm(H) x 11.04 mm(L) x 11.43 mm(W).  Restaging CT chest, abdomen, pelvis 06/05/2020, 10/02/2020, 04/30/2021, 08/19/2021, 12/17/2021, 02/11/2022, 08/18/2022, 12/15/2022, 04/13/2023, was negative for adenopathy or metastases.  Patient reports arthritis discomfort in his hands but otherwise feels well.  He denies fever, anorexia, weight loss, headache, mouth sores, cough, dyspnea, chest pain, abdominal pain, nausea, vomiting, constipation, diarrhea, bleeding, or bone pain.     PAST HISTORY:   -Hypertension.  -Diabetes.  -Hyperlipidemia.  -Obstructive sleep apnea.  -History of asbestos exposure.  -Stage IIB uveal melanoma of the left eye (cT2b, cN0, cM0).  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  Surveillance CT chest, abdomen, pelvis (for uveal melanoma) 08/19/2021, 12/17/2021, 02/11/2022 revealed 3.2 x 1.7 cm, lobular, peripherally-enhancing lesion in the left midabdomen  adjacent to small bowel with central calcification that was seen on prior CT scans. Exploratory laparotomy, open jejunal resection 02/22/2022, revealed a 3.2 cm, grade 1 gastrointestinal stromal tumor, spindle cell type, in the jejunum that was positive for KIT () and DOG1.  There was 1 mitosis/5 mm .  There was no necrosis.  Surgical margins were tumor free.  There were no lymph nodes in the resection specimen.  Adjuvant therapy was not required.   -Colon polyp.  Two tubular adenomas were removed from the ascending colon at the time of colonoscopy, 01/22/2024.  -Diverticulosis.  -Bilateral sensorineural hearing loss.  -History of vitamin D deficiency.  -Osteoarthritis.  -Cataract extraction, intraocular lens implant, left eye, 01/05/2024; right eye, 01/19/2024.  -Ventral hernia repair, 2023.  -Bilateral total hip arthroplasty, right hip, 2008; left hip, 2009.  -Appendectomy, 1952.    ALLERGIES: Metronidazole causes rash and pruritus.    MEDICATIONS:   Current Outpatient Medications   Medication Sig Dispense Refill    acetaminophen (TYLENOL) 500 MG tablet Take 1,000 mg by mouth daily      amLODIPine (NORVASC) 10 MG tablet Take 10 mg by mouth every morning       aspirin 81 MG EC tablet Take 81 mg by mouth every morning       atorvastatin (LIPITOR) 20 MG tablet Take 20 mg by mouth every morning       carvedilol (COREG) 12.5 MG tablet Take 12.5 mg by mouth 2 times daily (with meals)       chlorthalidone (HYGROTON) 25 MG tablet Take 25 mg by mouth daily      lisinopril (PRINIVIL/ZESTRIL) 20 MG tablet Take 20 mg by mouth 2 times daily   3    multivitamin w/minerals (THERA-VIT-M) tablet Take 1 tablet by mouth every morning      oxyCODONE (ROXICODONE) 5 MG tablet Take 1 tablet (5 mg) by mouth every 6 hours as needed for pain or moderate to severe pain 20 tablet 0    oxyCODONE (ROXICODONE) 5 MG tablet Take 1-2 tablets (5-10 mg) by mouth every 4 hours as needed for moderate to severe pain 40 tablet 0    polyethylene  glycol (MIRALAX) 17 GM/Dose powder Take 17 g by mouth daily as needed for constipation 510 g 0     FAMILY HISTORY: Father  at age 62 of myocardial infarction.  Mother  at age 73.  There is 1 Sister Lizzy age 74 is alive and well.  There is 1 brother Chencho age 65 is alive and well.  There is 1 daughter age 50 and 1 son age 42 are both alive and well.  There is no family history of melanoma.    SOCIAL HISTORY: Patient was born and raised in [place].  He is  54 years and lives in [place] with his spouse.  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  He is currently a  for the [employer] for the last 7 years.  Patient is an 80-pack-year smoking history and quit at age 62.  He drinks beer on occasion.  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    Social History     Tobacco Use    Smoking status: Former     Current packs/day: 0.00     Types: Cigarettes     Quit date:      Years since quitting: 15.3    Smokeless tobacco: Never   Substance Use Topics    Alcohol use: Yes     Comment: Weekends/socially    Drug use: Never       REVIEW OF SYSTEMS: Review of systems reviewed with the patient and otherwise negative except for those detailed above.    PHYSICAL EXAM: Not done. Telehealth visit. There were no vitals taken for this visit..  There is no height or weight on file to calculate BSA.      LABS REVIEWED:   Component      Latest Ref Rng 2024  8:17 AM   WBC      4.0 - 11.0 10e3/uL 5.9    RBC Count      4.40 - 5.90 10e6/uL 4.62    Hemoglobin      13.3 - 17.7 g/dL 15.1    Hematocrit      40.0 - 53.0 % 44.8    MCV      78 - 100 fL 97    MCH      26.5 - 33.0 pg 32.7    MCHC      31.5 - 36.5 g/dL 33.7    RDW      10.0 - 15.0 % 12.5    Platelet Count      150 - 450 10e3/uL 190    % Neutrophils      % 61    % Lymphocytes      % 22    % Monocytes      % 12    %  Eosinophils      % 4    % Basophils      % 1    % Immature Granulocytes      % 0    NRBCs per 100 WBC      <1 /100 0    Absolute Neutrophils      1.6 - 8.3 10e3/uL 3.6    Absolute Lymphocytes      0.8 - 5.3 10e3/uL 1.3    Absolute Monocytes      0.0 - 1.3 10e3/uL 0.7    Absolute Eosinophils      0.0 - 0.7 10e3/uL 0.2    Absolute Basophils      0.0 - 0.2 10e3/uL 0.0    Absolute Immature Granulocytes      <=0.4 10e3/uL 0.0    Absolute NRBCs      10e3/uL 0.0    Sodium      135 - 145 mmol/L 141    Potassium      3.4 - 5.3 mmol/L 4.0    Carbon Dioxide (CO2)      22 - 29 mmol/L 28    Anion Gap      7 - 15 mmol/L 10    Urea Nitrogen      8.0 - 23.0 mg/dL 20.7    Creatinine      0.67 - 1.17 mg/dL 0.92    GFR Estimate      >60 mL/min/1.73m2 86    Calcium      8.8 - 10.2 mg/dL 9.5    Chloride      98 - 107 mmol/L 103    Glucose      70 - 99 mg/dL 119 (H)    Alkaline Phosphatase      40 - 150 U/L 125    AST      0 - 45 U/L 33    ALT      0 - 70 U/L 32    Protein Total      6.4 - 8.3 g/dL 7.4    Albumin      3.5 - 5.2 g/dL 4.4    Bilirubin Total      <=1.2 mg/dL 0.5        IMAGING REVIEWED: CT chest and MRI 05/22/2024 for metastases.    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 Multiplanar reconstructions including  axial and coronal. Images were reviewed in bone, soft tissue, and lung  windows.    Findings:    Chest:    Normal visualized thyroid. 10 mm precarinal lymph node on series 2  image 25 similar to prior studies for 13 2023 and 12/15/2022. 9 mm  prevascular lymph node on series 2 image 23 also stable. Calcified  right hilar lymph node. No abnormal axillary lymphadenopathy. Heart  size is normal without pericardial effusion. Mild  ectasia of the  ascending thoracic aorta measuring up to 4.2 cm, similar to prior  studies. Normal caliber of the main pulmonary artery. Normal thoracic  esophagus.    The central tracheobronchial tree is patent. No pleural effusion. Mild  streaky right basilar atelectasis. Calcified granuloma in the right  lower lobe is stable. No concerning pulmonary nodule. Similar areas of  pleural calcifications along the posterior left lower lobe.    Upper abdomen:    Calcified granulomas throughout the spleen. Simple density left renal  cyst.    Bones:    No aggressive appearing bony lesion.      Impression    Impression: No evidence of metastatic disease in the chest.    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 The following MR images were acquired: TrueFISP,  multiplanar T2 weighted, axial T1 in/out of phase, axial fat-saturated  T1, diffusion-weighted. Multiplanar T1-weighted images with fat  saturation were obtained before contrast administration and at  multiple time points following the administration of intravenous  contrast. Contrast dose: 20mL Eovist    FINDINGS:    Liver: No hepatic head or iron deposition. Stable T2 hyperintense  early and delayed enhancing hemangioma in hepatic segment 7 measuring  11 mm (series 27 image 15 and series 17 image 27). Stable ill-defined  focus of late arterial enhancement in hepatic segment 8, which becomes  homogeneous to adjacent parenchyma on later phase images without  corresponding signal abnormality on T2 or diffusion-weighted images,  compatible with vascular  shunt. Subcentimeter T2 hyperintense,  nonenhancing cysts in the left and right hepatic lobes (series 4  images 26 and 17). No new focal suspicious hepatic lesion.    Gallbladder/biliary tree: Normal gallbladder. T2 hypointense, T1  hyperintense foci measuring 6 and 4 mm in the lower common bile duct  (series 13 image 51 and series 4 image 21). Slightly increased  prominence of the common bile duct measuring up to 11 mm (previously 9  mm) and oral mild prominence of the central intrahepatic biliary tree.    Spleen: Normal.    Kidneys: Bilateral T2 hyperintense, nonenhancing cortical cysts. T1  hyperintense, nonenhancing hemorrhagic/proteinaceous cyst arising from  the midpole of the left kidney. No hydronephrosis.    Adrenal glands: Normal.    Pancreas: Normal.    Bowel: No dilated small or large bowel. Colonic diverticulosis.  Duodenal diverticulum.    Lymph nodes: No lymphadenopathy.    Blood vessels: The major abdominal vasculature is patent.    Lung bases: Clear.    Bones and soft tissues: No acute or aggressive osseous abnormality.  T11 vertebral body hemangioma. Mild bilateral gynecomastia.    Mesentery and abdominal wall: Abdominal wall incisional changes.    Ascites: Trace free fluid in the pelvis.      Impression    IMPRESSION:   1. No evidence of metastatic disease within the abdomen.  2. New choledocholithiasis with increased prominence of the  extrahepatic biliary tree and stable mild prominence of the central  intrahepatic biliary tree. Correlate for signs of biliary obstruction.    [Access Center: New choledocholithiasis with increased prominence of  the biliary tree]    This report will be copied to the [facility] to ensure a  provider acknowledges the finding. Access Center is available Monday  through Friday 8am-3:30 pm.     ORALIA HOOD DO         SYSTEM ID:  [id number]       IMPRESSION/PLAN: Stage IIB uveal melanoma of the left eye.  There was a 3.02 mm thick melanoma in the lateral  left choroid with ciliary body involvement.  Patient underwent definitive 125-I plaque brachytherapy (12/16/2019).  There is no evidence of recurrence or metastases noted on restaging CT chest and MRI abdomen.  Gene expression profile was not performed and surveillance will follow with a medium to high risk groups consisting of clinical evaluation and CT chest and MRI abdomen every 3 months for years 1-2, every 6 months for years 3-5, then annually for years 6-10.  Patient will return to clinic in 6 months with CBC, metabolic panel, CT chest, MRI abdomen. The current and past history obtained from the patient and outside medical records, clinical evaluation, reviewing diagnostic tests and viewing images with the patient, and assessment and planning occurred over 60 minutes.       Wilian Menendez MD    cc: MD Charisma García MD Kathryn E Dusenbery, MD

## 2024-05-22 NOTE — DISCHARGE INSTRUCTIONS
MRI Contrast Discharge Instructions    The IV contrast you received today will pass out of your body in your  urine. This will happen in the next 24 hours. You will not feel this process.  Your urine will not change color.    Drink at least 4 extra glasses of water or juice today (unless your doctor  has restricted your fluids). This reduces the stress on your kidneys.  You may take your regular medicines.    If you are on dialysis: It is best to have dialysis today.    If you have a reaction: Most reactions happen right away. If you have  any new symptoms after leaving the hospital (such as hives or swelling),  call your hospital at the correct number below. Or call your family doctor.  If you have breathing distress or wheezing, call 911.    Special instructions: ***    I have read and understand the above information.    Signature:______________________________________ Date:___________    Staff:__________________________________________ Date:___________     Time:__________    [place] [facility] Departments:    ___[place]: [phone]  ___[facility]: [phone]  ___[place]: [phone] ___[facility]: [phone]  ___[facility]: [phone]  ___[facility]: [phone]  ___[phone]  ___[facility]: [phone]  ___[phone]

## 2024-05-24 ENCOUNTER — VIRTUAL VISIT (OUTPATIENT)
Dept: ONCOLOGY | Facility: CLINIC | Age: 79
End: 2024-05-24
Attending: INTERNAL MEDICINE
Payer: COMMERCIAL

## 2024-05-24 VITALS — HEIGHT: 71 IN | BODY MASS INDEX: 30.38 KG/M2 | WEIGHT: 217 LBS

## 2024-05-24 DIAGNOSIS — C69.42 MALIGNANT MELANOMA OF UVEA OF LEFT EYE (H): Primary | ICD-10-CM

## 2024-05-24 PROCEDURE — 99215 OFFICE O/P EST HI 40 MIN: CPT | Mod: 93 | Performed by: INTERNAL MEDICINE

## 2024-05-24 ASSESSMENT — PAIN SCALES - GENERAL: PAINLEVEL: NO PAIN (0)

## 2024-05-24 NOTE — PATIENT INSTRUCTIONS
Return to clinic in 6 months with labs and CT chest and MRI abdomen performed prior to the clinic visit.

## 2024-05-24 NOTE — LETTER
5/24/2024         RE: Jairo Austin  [address]  [address]  [facility] [address]        Dear Colleague,    Thank you for referring your patient, Jairo Austin, to the [facility]. Please see a copy of my visit note below.    Virtual Visit Details    Type of service:  Telephone Visit   Phone call duration: 30 minutes   Originating Location (pt. 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 Patient presents with a choroidal lesion in the left eye detected 10/2019 at the [facility] ophthalmology clinic.  Evaluation in ophthalmology clinic (Charisma Rodriguez MD) and B-scan ultrasound 10/28/2019, revealed an elevated choroidal lesion in the lateral left eye measuring 12.48 mm(L) x 10.39 mm(W) x 3.02 mm(H).  CT chest, abdomen, pelvis 11/04/2019, revealed multiple prominent mediastinal nodes including a 1.2 cm pericarinal lymph node, and subcentimeter calcified right hilar lymph nodes.  There was a 1.1 cm ill-defined enhancing lesion in segment 8 of the liver, and a 10 mm enhancing lesion in segment 4A, a 1.5 cm arterial enhancing lesion in segment 7 that were not well visualized on portal phase imaging.  There was a small hypodensity in segment 2/3 that was too small to characterize, likely a small cyst, and additional scattered low-density lesions on the portal venous phase that were too small to characterize.  There was an ill-defined area of hypodensity along the falciform ligament, likely focal fatty change.  MRI abdomen 11/11/2019, revealed 3 small areas of arterial phase hyperenhancement in the liver, 2 of the lesions were in segment 8, and one lesion in segment 7 corresponding  to the arterially enhancing foci on recent CT scan (11/04/2019).  The segment 8 lesions demonstrated no abnormal signal on any other sequence and considered to be benign vascular shunt. The segment 7 lesion was a T2 hyperintense focus compatible with a flash filling hemangioma.  There were tiny cysts scattered throughout the liver.  There were no suspicious lesions with features of metastases.  Patient underwent disinsertion of the lateral rectus muscle and 125-I plaque brachytherapy (8500 cGy, 16 mm plaque with 13 seeds) 12/16/2019, and plaque removal and reinsertion of the lateral rectus muscle 12/20/2019.      Restaging CT chest, abdomen, pelvis 02/06/2020, was negative for adenopathy or metastases. Follow-up B-scan ultrasound of the left eye choroidal lesion 03/23/2020, revealed a response to plaque brachytherapy measuring 12.01 mm(L) x 11.47 mmW) x 2.59 mm(H), which has been sustained; 07/21/2020, 10.47 mm(L) x 11.5 mW) x  1.82 mm(H); 11/06/2020, 10.37 mm(L) x 13.31 mm(W) x  1.91 mm(H); 03/12/2021, 10.38 mm(L) x 13.31 mm(W) x  1.88 mm(H); 09/17/2021, 10.47 mm(L) x 12.00 mm(W) x  1.56 mm(H); 03/18/2022, 10.43 mm(L) x 12.40 mm(W) x 1.56 mm(H); 09/23/2022, 12.43 mm(W) x 1.60 mm(H); 03/27/2023, 11.42 mm(L) x 12.44 mm(W) x  1.38 mm(H); 11/06/2023, 11.40 mm(L) x 12.45 mm(W) x 1.40 mm(H); 05/06/2024, 1.32 mm(H) x 11.04 mm(L) x 11.43 mm(W).  Restaging CT chest, abdomen, pelvis 06/05/2020, 10/02/2020, 04/30/2021, 08/19/2021, 12/17/2021, 02/11/2022, 08/18/2022, 12/15/2022, 04/13/2023, was negative for adenopathy or metastases.  Patient reports arthritis discomfort in his hands but otherwise feels well.  He denies fever, anorexia, weight loss, headache, mouth sores, cough, dyspnea, chest pain, abdominal pain, nausea, vomiting, constipation, diarrhea, bleeding, or bone pain.     PAST HISTORY:   -Hypertension.  -Diabetes.  -Hyperlipidemia.  -Obstructive sleep apnea.  -History of asbestos exposure.  -Stage IIB uveal melanoma  of the left eye (cT2b, cN0, cM0).  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  Surveillance CT chest, abdomen, pelvis (for uveal melanoma) 08/19/2021, 12/17/2021, 02/11/2022 revealed 3.2 x 1.7 cm, lobular, peripherally-enhancing lesion in the left midabdomen adjacent to small bowel with central calcification that was seen on prior CT scans. Exploratory laparotomy, open jejunal resection 02/22/2022, revealed a 3.2 cm, grade 1 gastrointestinal stromal tumor, spindle cell type, in the jejunum that was positive for KIT () and DOG1.  There was 1 mitosis/5 mm .  There was no necrosis.  Surgical margins were tumor free.  There were no lymph nodes in the resection specimen.  Adjuvant therapy was not required.   -Colon polyp.  Two tubular adenomas were removed from the ascending colon at the time of colonoscopy, 01/22/2024.  -Diverticulosis.  -Bilateral sensorineural hearing loss.  -History of vitamin D deficiency.  -Osteoarthritis.  -Cataract extraction, intraocular lens implant, left eye, 01/05/2024; right eye, 01/19/2024.  -Ventral hernia repair, 2023.  -Bilateral total hip arthroplasty, right hip, 2008; left hip, 2009.  -Appendectomy, 1952.    ALLERGIES: Metronidazole causes rash and pruritus.    MEDICATIONS:   Current Outpatient Medications   Medication Sig Dispense Refill    acetaminophen (TYLENOL) 500 MG tablet Take 1,000 mg by mouth daily      amLODIPine (NORVASC) 10 MG tablet Take 10 mg by mouth every morning       aspirin 81 MG EC tablet Take 81 mg by mouth every morning       atorvastatin (LIPITOR) 20 MG tablet Take 20 mg by mouth every morning       carvedilol (COREG) 12.5 MG tablet Take 12.5 mg by mouth 2 times daily (with meals)       chlorthalidone (HYGROTON) 25 MG tablet Take 25 mg by mouth daily      lisinopril (PRINIVIL/ZESTRIL) 20 MG tablet Take 20 mg by mouth 2 times daily   3    multivitamin w/minerals (THERA-VIT-M) tablet Take 1 tablet by mouth every morning      oxyCODONE (ROXICODONE)  5 MG tablet Take 1 tablet (5 mg) by mouth every 6 hours as needed for pain or moderate to severe pain 20 tablet 0    oxyCODONE (ROXICODONE) 5 MG tablet Take 1-2 tablets (5-10 mg) by mouth every 4 hours as needed for moderate to severe pain 40 tablet 0    polyethylene glycol (MIRALAX) 17 GM/Dose powder Take 17 g by mouth daily as needed for constipation 510 g 0     FAMILY HISTORY: Father  at age 62 of myocardial infarction.  Mother  at age 73.  There is 1 Sister Lizzy age 74 is alive and well.  There is 1 brother Chencho age 65 is alive and well.  There is 1 daughter age 50 and 1 son age 42 are both alive and well.  There is no family history of melanoma.    SOCIAL HISTORY: Patient was born and raised in [place].  He is  54 years and lives in [place] with his spouse.  Patient was employed as a [employer] sheet- for 44 years before retiring in .  He is currently a  for the [employer] for the last 7 years.  Patient is an 80-pack-year smoking history and quit at age 62.  He drinks beer on occasion.  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    Social History     Tobacco Use    Smoking status: Former     Current packs/day: 0.00     Types: Cigarettes     Quit date:      Years since quitting: 15.3    Smokeless tobacco: Never   Substance Use Topics    Alcohol use: Yes     Comment: Weekends/socially    Drug use: Never       REVIEW OF SYSTEMS: Review of systems reviewed with the patient and otherwise negative except for those detailed above.    PHYSICAL EXAM: Not done. Telehealth visit. There were no vitals taken for this visit..  There is no height or weight on file to calculate BSA.      LABS REVIEWED:   Component      Latest Ref Rng 2024  8:17 AM   WBC      4.0 - 11.0 10e3/uL 5.9    RBC Count      4.40 - 5.90 10e6/uL 4.62    Hemoglobin      13.3 - 17.7 g/dL 15.1    Hematocrit      40.0 -  53.0 % 44.8    MCV      78 - 100 fL 97    MCH      26.5 - 33.0 pg 32.7    MCHC      31.5 - 36.5 g/dL 33.7    RDW      10.0 - 15.0 % 12.5    Platelet Count      150 - 450 10e3/uL 190    % Neutrophils      % 61    % Lymphocytes      % 22    % Monocytes      % 12    % Eosinophils      % 4    % Basophils      % 1    % Immature Granulocytes      % 0    NRBCs per 100 WBC      <1 /100 0    Absolute Neutrophils      1.6 - 8.3 10e3/uL 3.6    Absolute Lymphocytes      0.8 - 5.3 10e3/uL 1.3    Absolute Monocytes      0.0 - 1.3 10e3/uL 0.7    Absolute Eosinophils      0.0 - 0.7 10e3/uL 0.2    Absolute Basophils      0.0 - 0.2 10e3/uL 0.0    Absolute Immature Granulocytes      <=0.4 10e3/uL 0.0    Absolute NRBCs      10e3/uL 0.0    Sodium      135 - 145 mmol/L 141    Potassium      3.4 - 5.3 mmol/L 4.0    Carbon Dioxide (CO2)      22 - 29 mmol/L 28    Anion Gap      7 - 15 mmol/L 10    Urea Nitrogen      8.0 - 23.0 mg/dL 20.7    Creatinine      0.67 - 1.17 mg/dL 0.92    GFR Estimate      >60 mL/min/1.73m2 86    Calcium      8.8 - 10.2 mg/dL 9.5    Chloride      98 - 107 mmol/L 103    Glucose      70 - 99 mg/dL 119 (H)    Alkaline Phosphatase      40 - 150 U/L 125    AST      0 - 45 U/L 33    ALT      0 - 70 U/L 32    Protein Total      6.4 - 8.3 g/dL 7.4    Albumin      3.5 - 5.2 g/dL 4.4    Bilirubin Total      <=1.2 mg/dL 0.5        IMAGING REVIEWED: CT chest and MRI 05/22/2024 for metastases.    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 Multiplanar reconstructions including  axial and coronal. Images were reviewed in bone, soft tissue, and lung  windows.    Findings:    Chest:    Normal visualized thyroid. 10 mm precarinal lymph node on  series 2  image 25 similar to prior studies for 13 2023 and 12/15/2022. 9 mm  prevascular lymph node on series 2 image 23 also stable. Calcified  right hilar lymph node. No abnormal axillary lymphadenopathy. Heart  size is normal without pericardial effusion. Mild ectasia of the  ascending thoracic aorta measuring up to 4.2 cm, similar to prior  studies. Normal caliber of the main pulmonary artery. Normal thoracic  esophagus.    The central tracheobronchial tree is patent. No pleural effusion. Mild  streaky right basilar atelectasis. Calcified granuloma in the right  lower lobe is stable. No concerning pulmonary nodule. Similar areas of  pleural calcifications along the posterior left lower lobe.    Upper abdomen:    Calcified granulomas throughout the spleen. Simple density left renal  cyst.    Bones:    No aggressive appearing bony lesion.      Impression    Impression: No evidence of metastatic disease in the chest.    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 The following MR images were acquired: TrueFISP,  multiplanar T2 weighted, axial T1 in/out of phase, axial fat-saturated  T1, diffusion-weighted. Multiplanar T1-weighted images with fat  saturation were obtained before contrast administration and at  multiple time points following the administration of intravenous  contrast. Contrast dose: 20mL Eovist    FINDINGS:    Liver: No hepatic head or iron deposition. Stable T2 hyperintense  early and delayed enhancing hemangioma in hepatic segment 7 measuring  11 mm (series 27 image 15 and series 17  image 27). Stable ill-defined  focus of late arterial enhancement in hepatic segment 8, which becomes  homogeneous to adjacent parenchyma on later phase images without  corresponding signal abnormality on T2 or diffusion-weighted images,  compatible with vascular shunt. Subcentimeter T2 hyperintense,  nonenhancing cysts in the left and right hepatic lobes (series 4  images 26 and 17). No new focal suspicious hepatic lesion.    Gallbladder/biliary tree: Normal gallbladder. T2 hypointense, T1  hyperintense foci measuring 6 and 4 mm in the lower common bile duct  (series 13 image 51 and series 4 image 21). Slightly increased  prominence of the common bile duct measuring up to 11 mm (previously 9  mm) and oral mild prominence of the central intrahepatic biliary tree.    Spleen: Normal.    Kidneys: Bilateral T2 hyperintense, nonenhancing cortical cysts. T1  hyperintense, nonenhancing hemorrhagic/proteinaceous cyst arising from  the midpole of the left kidney. No hydronephrosis.    Adrenal glands: Normal.    Pancreas: Normal.    Bowel: No dilated small or large bowel. Colonic diverticulosis.  Duodenal diverticulum.    Lymph nodes: No lymphadenopathy.    Blood vessels: The major abdominal vasculature is patent.    Lung bases: Clear.    Bones and soft tissues: No acute or aggressive osseous abnormality.  T11 vertebral body hemangioma. Mild bilateral gynecomastia.    Mesentery and abdominal wall: Abdominal wall incisional changes.    Ascites: Trace free fluid in the pelvis.      Impression    IMPRESSION:   1. No evidence of metastatic disease within the abdomen.  2. New choledocholithiasis with increased prominence of the  extrahepatic biliary tree and stable mild prominence of the central  intrahepatic biliary tree. Correlate for signs of biliary obstruction.    [Access Center: New choledocholithiasis with increased prominence of  the biliary tree]    This report will be copied to the [facility] to ensure  a  provider acknowledges the finding. Access Center is available Monday  through Friday 8am-3:30 pm.     ORALIA GARCIAKOSTAS,          SYSTEM ID:  [id number]       IMPRESSION/PLAN: Stage IIB uveal melanoma of the left eye.  There was a 3.02 mm thick melanoma in the lateral left choroid with ciliary body involvement.  Patient underwent definitive 125-I plaque brachytherapy (12/16/2019).  There is no evidence of recurrence or metastases noted on restaging CT chest and MRI abdomen.  Gene expression profile was not performed and surveillance will follow with a medium to high risk groups consisting of clinical evaluation and CT chest and MRI abdomen every 3 months for years 1-2, every 6 months for years 3-5, then annually for years 6-10.  Patient will return to clinic in 6 months with CBC, metabolic panel, CT chest, MRI abdomen. The current and past history obtained from the patient and outside medical records, clinical evaluation, reviewing diagnostic tests and viewing images with the patient, and assessment and planning occurred over 60 minutes.       Wilian Menendez MD    cc: MD Charisma García MD Kathryn E Dusenbery, MD

## 2024-06-15 ENCOUNTER — HEALTH MAINTENANCE LETTER (OUTPATIENT)
Age: 79
End: 2024-06-15

## 2024-06-25 ENCOUNTER — HOSPITAL ENCOUNTER (INPATIENT)
Facility: CLINIC | Age: 79
LOS: 10 days | Discharge: HOME | DRG: 417 | End: 2024-07-05
Attending: EMERGENCY MEDICINE | Admitting: INTERNAL MEDICINE
Payer: COMMERCIAL

## 2024-06-25 DIAGNOSIS — R06.6 HICCUPS: Primary | ICD-10-CM

## 2024-06-25 DIAGNOSIS — K21.9 GASTROESOPHAGEAL REFLUX DISEASE WITHOUT ESOPHAGITIS: ICD-10-CM

## 2024-06-25 DIAGNOSIS — K81.9 CHOLECYSTITIS: ICD-10-CM

## 2024-06-25 DIAGNOSIS — K80.50 CHOLEDOCHOLITHIASIS: ICD-10-CM

## 2024-06-25 LAB
ABO/RH(D): NORMAL
ALBUMIN SERPL BCG-MCNC: 4.1 G/DL (ref 3.5–5.2)
ALP SERPL-CCNC: 205 U/L (ref 40–150)
ALT SERPL W P-5'-P-CCNC: 252 U/L (ref 0–70)
ANION GAP SERPL CALCULATED.3IONS-SCNC: 12 MMOL/L (ref 7–15)
ANTIBODY SCREEN: NEGATIVE
APTT PPP: 31 SECONDS (ref 22–38)
AST SERPL W P-5'-P-CCNC: 84 U/L (ref 0–45)
BASOPHILS # BLD AUTO: 0 10E3/UL (ref 0–0.2)
BASOPHILS NFR BLD AUTO: 0 %
BILIRUB SERPL-MCNC: 2.5 MG/DL
BUN SERPL-MCNC: 15.1 MG/DL (ref 8–23)
CALCIUM SERPL-MCNC: 9.8 MG/DL (ref 8.8–10.2)
CHLORIDE SERPL-SCNC: 88 MMOL/L (ref 98–107)
CREAT SERPL-MCNC: 0.95 MG/DL (ref 0.67–1.17)
DEPRECATED HCO3 PLAS-SCNC: 30 MMOL/L (ref 22–29)
EGFRCR SERPLBLD CKD-EPI 2021: 83 ML/MIN/1.73M2
EOSINOPHIL # BLD AUTO: 0 10E3/UL (ref 0–0.7)
EOSINOPHIL NFR BLD AUTO: 0 %
ERYTHROCYTE [DISTWIDTH] IN BLOOD BY AUTOMATED COUNT: 12.3 % (ref 10–15)
GLUCOSE SERPL-MCNC: 115 MG/DL (ref 70–99)
HCT VFR BLD AUTO: 42.8 % (ref 40–53)
HGB BLD-MCNC: 15.1 G/DL (ref 13.3–17.7)
IMM GRANULOCYTES # BLD: 0 10E3/UL
IMM GRANULOCYTES NFR BLD: 0 %
INR PPP: 1.05 (ref 0.85–1.15)
LACTATE SERPL-SCNC: 1.4 MMOL/L (ref 0.7–2)
LIPASE SERPL-CCNC: 42 U/L (ref 13–60)
LYMPHOCYTES # BLD AUTO: 0.5 10E3/UL (ref 0.8–5.3)
LYMPHOCYTES NFR BLD AUTO: 4 %
MAGNESIUM SERPL-MCNC: 1.7 MG/DL (ref 1.7–2.3)
MCH RBC QN AUTO: 33.6 PG (ref 26.5–33)
MCHC RBC AUTO-ENTMCNC: 35.3 G/DL (ref 31.5–36.5)
MCV RBC AUTO: 95 FL (ref 78–100)
MONOCYTES # BLD AUTO: 1.1 10E3/UL (ref 0–1.3)
MONOCYTES NFR BLD AUTO: 8 %
NEUTROPHILS # BLD AUTO: 11 10E3/UL (ref 1.6–8.3)
NEUTROPHILS NFR BLD AUTO: 87 %
NRBC # BLD AUTO: 0 10E3/UL
NRBC BLD AUTO-RTO: 0 /100
PLATELET # BLD AUTO: 181 10E3/UL (ref 150–450)
POTASSIUM SERPL-SCNC: 3 MMOL/L (ref 3.4–5.3)
PROT SERPL-MCNC: 7.8 G/DL (ref 6.4–8.3)
RBC # BLD AUTO: 4.49 10E6/UL (ref 4.4–5.9)
SODIUM SERPL-SCNC: 130 MMOL/L (ref 135–145)
SPECIMEN EXPIRATION DATE: NORMAL
WBC # BLD AUTO: 12.6 10E3/UL (ref 4–11)

## 2024-06-25 PROCEDURE — 80053 COMPREHEN METABOLIC PANEL: CPT | Performed by: EMERGENCY MEDICINE

## 2024-06-25 PROCEDURE — 36415 COLL VENOUS BLD VENIPUNCTURE: CPT | Performed by: EMERGENCY MEDICINE

## 2024-06-25 PROCEDURE — 99285 EMERGENCY DEPT VISIT HI MDM: CPT | Mod: 25

## 2024-06-25 PROCEDURE — 87149 DNA/RNA DIRECT PROBE: CPT | Performed by: EMERGENCY MEDICINE

## 2024-06-25 PROCEDURE — 85610 PROTHROMBIN TIME: CPT | Performed by: EMERGENCY MEDICINE

## 2024-06-25 PROCEDURE — 87181 SC STD AGAR DILUTION PER AGT: CPT | Performed by: EMERGENCY MEDICINE

## 2024-06-25 PROCEDURE — 258N000003 HC RX IP 258 OP 636: Performed by: EMERGENCY MEDICINE

## 2024-06-25 PROCEDURE — 83735 ASSAY OF MAGNESIUM: CPT | Performed by: EMERGENCY MEDICINE

## 2024-06-25 PROCEDURE — 85730 THROMBOPLASTIN TIME PARTIAL: CPT | Performed by: EMERGENCY MEDICINE

## 2024-06-25 PROCEDURE — 250N000011 HC RX IP 250 OP 636: Mod: JZ | Performed by: EMERGENCY MEDICINE

## 2024-06-25 PROCEDURE — 86900 BLOOD TYPING SEROLOGIC ABO: CPT | Performed by: EMERGENCY MEDICINE

## 2024-06-25 PROCEDURE — 120N000001 HC R&B MED SURG/OB

## 2024-06-25 PROCEDURE — 99223 1ST HOSP IP/OBS HIGH 75: CPT | Performed by: INTERNAL MEDICINE

## 2024-06-25 PROCEDURE — 85025 COMPLETE CBC W/AUTO DIFF WBC: CPT | Performed by: EMERGENCY MEDICINE

## 2024-06-25 PROCEDURE — 83690 ASSAY OF LIPASE: CPT | Performed by: EMERGENCY MEDICINE

## 2024-06-25 PROCEDURE — 83605 ASSAY OF LACTIC ACID: CPT | Performed by: EMERGENCY MEDICINE

## 2024-06-25 RX ORDER — PIPERACILLIN SODIUM, TAZOBACTAM SODIUM 4; .5 G/20ML; G/20ML
4.5 INJECTION, POWDER, LYOPHILIZED, FOR SOLUTION INTRAVENOUS ONCE
Status: COMPLETED | OUTPATIENT
Start: 2024-06-25 | End: 2024-06-25

## 2024-06-25 RX ORDER — PIPERACILLIN SODIUM, TAZOBACTAM SODIUM 3; .375 G/15ML; G/15ML
3.38 INJECTION, POWDER, LYOPHILIZED, FOR SOLUTION INTRAVENOUS EVERY 6 HOURS
Status: DISCONTINUED | OUTPATIENT
Start: 2024-06-26 | End: 2024-06-26

## 2024-06-25 RX ORDER — MORPHINE SULFATE 4 MG/ML
4 INJECTION, SOLUTION INTRAMUSCULAR; INTRAVENOUS ONCE
Status: COMPLETED | OUTPATIENT
Start: 2024-06-25 | End: 2024-06-25

## 2024-06-25 RX ADMIN — SODIUM CHLORIDE 1000 ML: 9 INJECTION, SOLUTION INTRAVENOUS at 21:05

## 2024-06-25 RX ADMIN — MORPHINE SULFATE 4 MG: 4 INJECTION, SOLUTION INTRAMUSCULAR; INTRAVENOUS at 21:17

## 2024-06-25 RX ADMIN — PIPERACILLIN AND TAZOBACTAM 4.5 G: 4; .5 INJECTION, POWDER, FOR SOLUTION INTRAVENOUS at 21:41

## 2024-06-25 ASSESSMENT — ACTIVITIES OF DAILY LIVING (ADL)
ADLS_ACUITY_SCORE: 38

## 2024-06-25 ASSESSMENT — COLUMBIA-SUICIDE SEVERITY RATING SCALE - C-SSRS
1. IN THE PAST MONTH, HAVE YOU WISHED YOU WERE DEAD OR WISHED YOU COULD GO TO SLEEP AND NOT WAKE UP?: NO
6. HAVE YOU EVER DONE ANYTHING, STARTED TO DO ANYTHING, OR PREPARED TO DO ANYTHING TO END YOUR LIFE?: NO
2. HAVE YOU ACTUALLY HAD ANY THOUGHTS OF KILLING YOURSELF IN THE PAST MONTH?: NO

## 2024-06-26 ENCOUNTER — APPOINTMENT (OUTPATIENT)
Dept: GENERAL RADIOLOGY | Facility: CLINIC | Age: 79
DRG: 417 | End: 2024-06-26
Attending: INTERNAL MEDICINE
Payer: COMMERCIAL

## 2024-06-26 ENCOUNTER — ANESTHESIA (OUTPATIENT)
Dept: SURGERY | Facility: CLINIC | Age: 79
DRG: 417 | End: 2024-06-26
Payer: COMMERCIAL

## 2024-06-26 ENCOUNTER — ANESTHESIA EVENT (OUTPATIENT)
Dept: SURGERY | Facility: CLINIC | Age: 79
DRG: 417 | End: 2024-06-26
Payer: COMMERCIAL

## 2024-06-26 LAB
ACINETOBACTER SPECIES: NOT DETECTED
ALBUMIN SERPL BCG-MCNC: 3.2 G/DL (ref 3.5–5.2)
ALP SERPL-CCNC: 162 U/L (ref 40–150)
ALT SERPL W P-5'-P-CCNC: 159 U/L (ref 0–70)
ANION GAP SERPL CALCULATED.3IONS-SCNC: 9 MMOL/L (ref 7–15)
AST SERPL W P-5'-P-CCNC: 46 U/L (ref 0–45)
BILIRUB SERPL-MCNC: 1.8 MG/DL
BUN SERPL-MCNC: 16.3 MG/DL (ref 8–23)
CALCIUM SERPL-MCNC: 8.8 MG/DL (ref 8.8–10.2)
CHLORIDE SERPL-SCNC: 98 MMOL/L (ref 98–107)
CITROBACTER SPECIES: NOT DETECTED
CREAT SERPL-MCNC: 1.08 MG/DL (ref 0.67–1.17)
CTX-M: NOT DETECTED
DEPRECATED HCO3 PLAS-SCNC: 28 MMOL/L (ref 22–29)
EGFRCR SERPLBLD CKD-EPI 2021: 71 ML/MIN/1.73M2
ENTEROBACTER SPECIES: DETECTED
ERCP: NORMAL
ERYTHROCYTE [DISTWIDTH] IN BLOOD BY AUTOMATED COUNT: 12.7 % (ref 10–15)
ESCHERICHIA COLI: NOT DETECTED
GLUCOSE BLDC GLUCOMTR-MCNC: 110 MG/DL (ref 70–99)
GLUCOSE BLDC GLUCOMTR-MCNC: 112 MG/DL (ref 70–99)
GLUCOSE BLDC GLUCOMTR-MCNC: 115 MG/DL (ref 70–99)
GLUCOSE BLDC GLUCOMTR-MCNC: 118 MG/DL (ref 70–99)
GLUCOSE BLDC GLUCOMTR-MCNC: 158 MG/DL (ref 70–99)
GLUCOSE BLDC GLUCOMTR-MCNC: 97 MG/DL (ref 70–99)
GLUCOSE SERPL-MCNC: 109 MG/DL (ref 70–99)
HBA1C MFR BLD: 5.9 %
HCT VFR BLD AUTO: 35.5 % (ref 40–53)
HGB BLD-MCNC: 12.3 G/DL (ref 13.3–17.7)
IMP: NOT DETECTED
KLEBSIELLA OXYTOCA: NOT DETECTED
KLEBSIELLA PNEUMONIAE: DETECTED
KPC: NOT DETECTED
MCH RBC QN AUTO: 33.1 PG (ref 26.5–33)
MCHC RBC AUTO-ENTMCNC: 34.6 G/DL (ref 31.5–36.5)
MCV RBC AUTO: 95 FL (ref 78–100)
NDM: NOT DETECTED
OXA (DETECTED/NOT DETECTED): NOT DETECTED
PLATELET # BLD AUTO: 156 10E3/UL (ref 150–450)
POTASSIUM SERPL-SCNC: 4.1 MMOL/L (ref 3.4–5.3)
POTASSIUM SERPL-SCNC: 4.1 MMOL/L (ref 3.4–5.3)
PROT SERPL-MCNC: 6.3 G/DL (ref 6.4–8.3)
PROTEUS SPECIES: NOT DETECTED
PSEUDOMONAS AERUGINOSA: NOT DETECTED
RBC # BLD AUTO: 3.72 10E6/UL (ref 4.4–5.9)
SODIUM SERPL-SCNC: 135 MMOL/L (ref 135–145)
UPPER EUS: NORMAL
VIM: NOT DETECTED
WBC # BLD AUTO: 12.1 10E3/UL (ref 4–11)

## 2024-06-26 PROCEDURE — 250N000025 HC SEVOFLURANE, PER MIN: Performed by: INTERNAL MEDICINE

## 2024-06-26 PROCEDURE — 710N000009 HC RECOVERY PHASE 1, LEVEL 1, PER MIN: Performed by: INTERNAL MEDICINE

## 2024-06-26 PROCEDURE — C9113 INJ PANTOPRAZOLE SODIUM, VIA: HCPCS | Mod: JZ | Performed by: INTERNAL MEDICINE

## 2024-06-26 PROCEDURE — 85027 COMPLETE CBC AUTOMATED: CPT | Performed by: INTERNAL MEDICINE

## 2024-06-26 PROCEDURE — 0DJ08ZZ INSPECTION OF UPPER INTESTINAL TRACT, VIA NATURAL OR ARTIFICIAL OPENING ENDOSCOPIC: ICD-10-PCS | Performed by: INTERNAL MEDICINE

## 2024-06-26 PROCEDURE — 250N000011 HC RX IP 250 OP 636: Performed by: NURSE ANESTHETIST, CERTIFIED REGISTERED

## 2024-06-26 PROCEDURE — 258N000003 HC RX IP 258 OP 636: Performed by: NURSE ANESTHETIST, CERTIFIED REGISTERED

## 2024-06-26 PROCEDURE — 120N000001 HC R&B MED SURG/OB

## 2024-06-26 PROCEDURE — 258N000003 HC RX IP 258 OP 636: Performed by: ANESTHESIOLOGY

## 2024-06-26 PROCEDURE — 36415 COLL VENOUS BLD VENIPUNCTURE: CPT | Performed by: INTERNAL MEDICINE

## 2024-06-26 PROCEDURE — 360N000083 HC SURGERY LEVEL 3 W/ FLUORO, PER MIN: Performed by: INTERNAL MEDICINE

## 2024-06-26 PROCEDURE — 250N000011 HC RX IP 250 OP 636: Mod: JZ | Performed by: INTERNAL MEDICINE

## 2024-06-26 PROCEDURE — 99100 ANES PT EXTEME AGE<1 YR&>70: CPT | Performed by: NURSE ANESTHETIST, CERTIFIED REGISTERED

## 2024-06-26 PROCEDURE — 83036 HEMOGLOBIN GLYCOSYLATED A1C: CPT | Performed by: INTERNAL MEDICINE

## 2024-06-26 PROCEDURE — 43260 ERCP W/SPECIMEN COLLECTION: CPT | Performed by: NURSE ANESTHETIST, CERTIFIED REGISTERED

## 2024-06-26 PROCEDURE — 250N000013 HC RX MED GY IP 250 OP 250 PS 637: Performed by: INTERNAL MEDICINE

## 2024-06-26 PROCEDURE — 250N000012 HC RX MED GY IP 250 OP 636 PS 637: Performed by: INTERNAL MEDICINE

## 2024-06-26 PROCEDURE — 0FC98ZZ EXTIRPATION OF MATTER FROM COMMON BILE DUCT, VIA NATURAL OR ARTIFICIAL OPENING ENDOSCOPIC: ICD-10-PCS | Performed by: INTERNAL MEDICINE

## 2024-06-26 PROCEDURE — 999N000141 HC STATISTIC PRE-PROCEDURE NURSING ASSESSMENT: Performed by: INTERNAL MEDICINE

## 2024-06-26 PROCEDURE — C2617 STENT, NON-COR, TEM W/O DEL: HCPCS | Performed by: INTERNAL MEDICINE

## 2024-06-26 PROCEDURE — 74330 X-RAY BILE/PANC ENDOSCOPY: CPT | Mod: TC

## 2024-06-26 PROCEDURE — 0F798DZ DILATION OF COMMON BILE DUCT WITH INTRALUMINAL DEVICE, VIA NATURAL OR ARTIFICIAL OPENING ENDOSCOPIC: ICD-10-PCS | Performed by: INTERNAL MEDICINE

## 2024-06-26 PROCEDURE — 80053 COMPREHEN METABOLIC PANEL: CPT | Performed by: INTERNAL MEDICINE

## 2024-06-26 PROCEDURE — C1769 GUIDE WIRE: HCPCS | Performed by: INTERNAL MEDICINE

## 2024-06-26 PROCEDURE — 250N000009 HC RX 250: Performed by: NURSE ANESTHETIST, CERTIFIED REGISTERED

## 2024-06-26 PROCEDURE — 99233 SBSQ HOSP IP/OBS HIGH 50: CPT | Performed by: INTERNAL MEDICINE

## 2024-06-26 PROCEDURE — 272N000001 HC OR GENERAL SUPPLY STERILE: Performed by: INTERNAL MEDICINE

## 2024-06-26 PROCEDURE — 370N000017 HC ANESTHESIA TECHNICAL FEE, PER MIN: Performed by: INTERNAL MEDICINE

## 2024-06-26 PROCEDURE — 43260 ERCP W/SPECIMEN COLLECTION: CPT | Performed by: ANESTHESIOLOGY

## 2024-06-26 PROCEDURE — 250N000011 HC RX IP 250 OP 636: Mod: JZ | Performed by: NURSE ANESTHETIST, CERTIFIED REGISTERED

## 2024-06-26 DEVICE — COTTON-LEUNG BILIARY STENT
Type: IMPLANTABLE DEVICE | Site: MOUTH | Status: FUNCTIONAL
Brand: COTTON-LEUNG

## 2024-06-26 RX ORDER — DEXTROSE MONOHYDRATE 25 G/50ML
25-50 INJECTION, SOLUTION INTRAVENOUS
Status: DISCONTINUED | OUTPATIENT
Start: 2024-06-26 | End: 2024-06-29

## 2024-06-26 RX ORDER — CEFEPIME HYDROCHLORIDE 2 G/1
2 INJECTION, POWDER, FOR SOLUTION INTRAVENOUS EVERY 8 HOURS
Status: DISCONTINUED | OUTPATIENT
Start: 2024-06-26 | End: 2024-07-05 | Stop reason: HOSPADM

## 2024-06-26 RX ORDER — NALOXONE HYDROCHLORIDE 0.4 MG/ML
0.4 INJECTION, SOLUTION INTRAMUSCULAR; INTRAVENOUS; SUBCUTANEOUS
Status: DISCONTINUED | OUTPATIENT
Start: 2024-06-26 | End: 2024-07-05 | Stop reason: HOSPADM

## 2024-06-26 RX ORDER — AMOXICILLIN 250 MG
1 CAPSULE ORAL 2 TIMES DAILY PRN
Status: DISCONTINUED | OUTPATIENT
Start: 2024-06-26 | End: 2024-07-03

## 2024-06-26 RX ORDER — AMOXICILLIN 250 MG
2 CAPSULE ORAL 2 TIMES DAILY PRN
Status: DISCONTINUED | OUTPATIENT
Start: 2024-06-26 | End: 2024-07-03

## 2024-06-26 RX ORDER — AMOXICILLIN 250 MG
1 CAPSULE ORAL 2 TIMES DAILY
Status: DISCONTINUED | OUTPATIENT
Start: 2024-06-26 | End: 2024-07-05 | Stop reason: HOSPADM

## 2024-06-26 RX ORDER — FENTANYL CITRATE 50 UG/ML
INJECTION, SOLUTION INTRAMUSCULAR; INTRAVENOUS PRN
Status: DISCONTINUED | OUTPATIENT
Start: 2024-06-26 | End: 2024-06-26

## 2024-06-26 RX ORDER — HYDROMORPHONE HYDROCHLORIDE 2 MG/1
4 TABLET ORAL EVERY 4 HOURS PRN
Status: DISCONTINUED | OUTPATIENT
Start: 2024-06-26 | End: 2024-07-05 | Stop reason: HOSPADM

## 2024-06-26 RX ORDER — SODIUM CHLORIDE AND POTASSIUM CHLORIDE 150; 900 MG/100ML; MG/100ML
INJECTION, SOLUTION INTRAVENOUS CONTINUOUS
Status: DISCONTINUED | OUTPATIENT
Start: 2024-06-26 | End: 2024-06-28

## 2024-06-26 RX ORDER — POLYETHYLENE GLYCOL 3350 17 G/17G
17 POWDER, FOR SOLUTION ORAL 2 TIMES DAILY PRN
Status: DISCONTINUED | OUTPATIENT
Start: 2024-06-26 | End: 2024-06-30

## 2024-06-26 RX ORDER — LISINOPRIL 20 MG/1
20 TABLET ORAL 2 TIMES DAILY
Status: DISCONTINUED | OUTPATIENT
Start: 2024-06-26 | End: 2024-07-05 | Stop reason: HOSPADM

## 2024-06-26 RX ORDER — ATORVASTATIN CALCIUM 20 MG/1
20 TABLET, FILM COATED ORAL EVERY MORNING
Status: DISCONTINUED | OUTPATIENT
Start: 2024-06-26 | End: 2024-07-05 | Stop reason: HOSPADM

## 2024-06-26 RX ORDER — ACETAMINOPHEN 325 MG/1
650 TABLET ORAL EVERY 4 HOURS PRN
Status: DISCONTINUED | OUTPATIENT
Start: 2024-06-26 | End: 2024-07-05 | Stop reason: HOSPADM

## 2024-06-26 RX ORDER — PROPOFOL 10 MG/ML
INJECTION, EMULSION INTRAVENOUS PRN
Status: DISCONTINUED | OUTPATIENT
Start: 2024-06-26 | End: 2024-06-26

## 2024-06-26 RX ORDER — ASPIRIN 81 MG/1
81 TABLET ORAL EVERY MORNING
Status: DISCONTINUED | OUTPATIENT
Start: 2024-06-26 | End: 2024-07-05 | Stop reason: HOSPADM

## 2024-06-26 RX ORDER — MULTIPLE VITAMINS W/ MINERALS TAB 9MG-400MCG
1 TAB ORAL EVERY MORNING
Status: DISCONTINUED | OUTPATIENT
Start: 2024-06-26 | End: 2024-07-02

## 2024-06-26 RX ORDER — CALCIUM CARBONATE 500 MG/1
1000 TABLET, CHEWABLE ORAL 4 TIMES DAILY PRN
Status: DISCONTINUED | OUTPATIENT
Start: 2024-06-26 | End: 2024-07-05 | Stop reason: HOSPADM

## 2024-06-26 RX ORDER — DEXAMETHASONE SODIUM PHOSPHATE 4 MG/ML
INJECTION, SOLUTION INTRA-ARTICULAR; INTRALESIONAL; INTRAMUSCULAR; INTRAVENOUS; SOFT TISSUE PRN
Status: DISCONTINUED | OUTPATIENT
Start: 2024-06-26 | End: 2024-06-26

## 2024-06-26 RX ORDER — ONDANSETRON 2 MG/ML
4 INJECTION INTRAMUSCULAR; INTRAVENOUS EVERY 6 HOURS PRN
Status: DISCONTINUED | OUTPATIENT
Start: 2024-06-26 | End: 2024-06-27

## 2024-06-26 RX ORDER — ONDANSETRON 4 MG/1
4 TABLET, ORALLY DISINTEGRATING ORAL EVERY 6 HOURS PRN
Status: DISCONTINUED | OUTPATIENT
Start: 2024-06-26 | End: 2024-06-27

## 2024-06-26 RX ORDER — LIDOCAINE 40 MG/G
CREAM TOPICAL
Status: DISCONTINUED | OUTPATIENT
Start: 2024-06-26 | End: 2024-07-05 | Stop reason: HOSPADM

## 2024-06-26 RX ORDER — AMOXICILLIN 250 MG
2 CAPSULE ORAL 2 TIMES DAILY
Status: DISCONTINUED | OUTPATIENT
Start: 2024-06-26 | End: 2024-07-05 | Stop reason: HOSPADM

## 2024-06-26 RX ORDER — SODIUM CHLORIDE, SODIUM LACTATE, POTASSIUM CHLORIDE, CALCIUM CHLORIDE 600; 310; 30; 20 MG/100ML; MG/100ML; MG/100ML; MG/100ML
INJECTION, SOLUTION INTRAVENOUS CONTINUOUS
Status: DISCONTINUED | OUTPATIENT
Start: 2024-06-26 | End: 2024-06-26 | Stop reason: HOSPADM

## 2024-06-26 RX ORDER — CARVEDILOL 12.5 MG/1
12.5 TABLET ORAL 2 TIMES DAILY WITH MEALS
Status: DISCONTINUED | OUTPATIENT
Start: 2024-06-26 | End: 2024-07-05 | Stop reason: HOSPADM

## 2024-06-26 RX ORDER — NALOXONE HYDROCHLORIDE 0.4 MG/ML
0.2 INJECTION, SOLUTION INTRAMUSCULAR; INTRAVENOUS; SUBCUTANEOUS
Status: DISCONTINUED | OUTPATIENT
Start: 2024-06-26 | End: 2024-07-05 | Stop reason: HOSPADM

## 2024-06-26 RX ORDER — ACETAMINOPHEN 650 MG/1
650 SUPPOSITORY RECTAL EVERY 4 HOURS PRN
Status: DISCONTINUED | OUTPATIENT
Start: 2024-06-26 | End: 2024-07-05 | Stop reason: HOSPADM

## 2024-06-26 RX ORDER — CHLORTHALIDONE 25 MG/1
25 TABLET ORAL EVERY EVENING
Status: DISCONTINUED | OUTPATIENT
Start: 2024-06-26 | End: 2024-07-01

## 2024-06-26 RX ORDER — AMLODIPINE BESYLATE 10 MG/1
10 TABLET ORAL EVERY MORNING
Status: DISCONTINUED | OUTPATIENT
Start: 2024-06-26 | End: 2024-07-05 | Stop reason: HOSPADM

## 2024-06-26 RX ORDER — METRONIDAZOLE 500 MG/1
500 TABLET ORAL 3 TIMES DAILY
Status: DISCONTINUED | OUTPATIENT
Start: 2024-06-26 | End: 2024-07-05 | Stop reason: HOSPADM

## 2024-06-26 RX ORDER — HYDROMORPHONE HYDROCHLORIDE 1 MG/ML
0.3 INJECTION, SOLUTION INTRAMUSCULAR; INTRAVENOUS; SUBCUTANEOUS
Status: DISCONTINUED | OUTPATIENT
Start: 2024-06-26 | End: 2024-07-05

## 2024-06-26 RX ORDER — HYDROMORPHONE HYDROCHLORIDE 2 MG/1
2 TABLET ORAL EVERY 4 HOURS PRN
Status: DISCONTINUED | OUTPATIENT
Start: 2024-06-26 | End: 2024-07-05 | Stop reason: HOSPADM

## 2024-06-26 RX ORDER — ONDANSETRON 2 MG/ML
INJECTION INTRAMUSCULAR; INTRAVENOUS PRN
Status: DISCONTINUED | OUTPATIENT
Start: 2024-06-26 | End: 2024-06-26

## 2024-06-26 RX ORDER — POTASSIUM CHLORIDE 1500 MG/1
20 TABLET, EXTENDED RELEASE ORAL ONCE
Status: COMPLETED | OUTPATIENT
Start: 2024-06-26 | End: 2024-06-26

## 2024-06-26 RX ORDER — DEXMEDETOMIDINE HYDROCHLORIDE 4 UG/ML
INJECTION, SOLUTION INTRAVENOUS PRN
Status: DISCONTINUED | OUTPATIENT
Start: 2024-06-26 | End: 2024-06-26

## 2024-06-26 RX ORDER — NICOTINE POLACRILEX 4 MG
15-30 LOZENGE BUCCAL
Status: DISCONTINUED | OUTPATIENT
Start: 2024-06-26 | End: 2024-06-29

## 2024-06-26 RX ORDER — POTASSIUM CHLORIDE 1500 MG/1
40 TABLET, EXTENDED RELEASE ORAL ONCE
Status: COMPLETED | OUTPATIENT
Start: 2024-06-26 | End: 2024-06-26

## 2024-06-26 RX ORDER — LIDOCAINE HYDROCHLORIDE 20 MG/ML
INJECTION, SOLUTION INFILTRATION; PERINEURAL PRN
Status: DISCONTINUED | OUTPATIENT
Start: 2024-06-26 | End: 2024-06-26

## 2024-06-26 RX ADMIN — ONDANSETRON 4 MG: 2 INJECTION INTRAMUSCULAR; INTRAVENOUS at 15:56

## 2024-06-26 RX ADMIN — PHENYLEPHRINE HYDROCHLORIDE 200 MCG: 10 INJECTION INTRAVENOUS at 16:12

## 2024-06-26 RX ADMIN — FENTANYL CITRATE 50 MCG: 50 INJECTION INTRAMUSCULAR; INTRAVENOUS at 15:46

## 2024-06-26 RX ADMIN — HYDROMORPHONE HYDROCHLORIDE 0.3 MG: 1 INJECTION, SOLUTION INTRAMUSCULAR; INTRAVENOUS; SUBCUTANEOUS at 14:12

## 2024-06-26 RX ADMIN — CARVEDILOL 12.5 MG: 12.5 TABLET, FILM COATED ORAL at 03:55

## 2024-06-26 RX ADMIN — LIDOCAINE HYDROCHLORIDE 60 MG: 20 INJECTION, SOLUTION INFILTRATION; PERINEURAL at 15:48

## 2024-06-26 RX ADMIN — POTASSIUM CHLORIDE AND SODIUM CHLORIDE: 900; 150 INJECTION, SOLUTION INTRAVENOUS at 03:18

## 2024-06-26 RX ADMIN — LISINOPRIL 20 MG: 20 TABLET ORAL at 03:56

## 2024-06-26 RX ADMIN — PANTOPRAZOLE SODIUM 40 MG: 40 INJECTION, POWDER, FOR SOLUTION INTRAVENOUS at 10:10

## 2024-06-26 RX ADMIN — PIPERACILLIN AND TAZOBACTAM 3.38 G: 3; .375 INJECTION, POWDER, FOR SOLUTION INTRAVENOUS at 10:12

## 2024-06-26 RX ADMIN — SODIUM CHLORIDE, POTASSIUM CHLORIDE, SODIUM LACTATE AND CALCIUM CHLORIDE: 600; 310; 30; 20 INJECTION, SOLUTION INTRAVENOUS at 15:35

## 2024-06-26 RX ADMIN — FENTANYL CITRATE 50 MCG: 50 INJECTION INTRAMUSCULAR; INTRAVENOUS at 16:07

## 2024-06-26 RX ADMIN — LISINOPRIL 20 MG: 20 TABLET ORAL at 21:00

## 2024-06-26 RX ADMIN — DEXAMETHASONE SODIUM PHOSPHATE 4 MG: 4 INJECTION, SOLUTION INTRA-ARTICULAR; INTRALESIONAL; INTRAMUSCULAR; INTRAVENOUS; SOFT TISSUE at 15:56

## 2024-06-26 RX ADMIN — POTASSIUM CHLORIDE 40 MEQ: 1500 TABLET, EXTENDED RELEASE ORAL at 03:56

## 2024-06-26 RX ADMIN — PHENYLEPHRINE HYDROCHLORIDE 200 MCG: 10 INJECTION INTRAVENOUS at 16:16

## 2024-06-26 RX ADMIN — AMLODIPINE BESYLATE 10 MG: 10 TABLET ORAL at 10:11

## 2024-06-26 RX ADMIN — INSULIN ASPART 1 UNITS: 100 INJECTION, SOLUTION INTRAVENOUS; SUBCUTANEOUS at 22:59

## 2024-06-26 RX ADMIN — FENTANYL CITRATE 50 MCG: 50 INJECTION INTRAMUSCULAR; INTRAVENOUS at 16:09

## 2024-06-26 RX ADMIN — HYDROMORPHONE HYDROCHLORIDE 4 MG: 2 TABLET ORAL at 03:57

## 2024-06-26 RX ADMIN — ATORVASTATIN CALCIUM 20 MG: 10 TABLET, FILM COATED ORAL at 10:11

## 2024-06-26 RX ADMIN — CARVEDILOL 12.5 MG: 12.5 TABLET, FILM COATED ORAL at 18:17

## 2024-06-26 RX ADMIN — FENTANYL CITRATE 50 MCG: 50 INJECTION INTRAMUSCULAR; INTRAVENOUS at 15:54

## 2024-06-26 RX ADMIN — SENNOSIDES AND DOCUSATE SODIUM 1 TABLET: 50; 8.6 TABLET ORAL at 10:11

## 2024-06-26 RX ADMIN — PHENYLEPHRINE HYDROCHLORIDE 100 MCG: 10 INJECTION INTRAVENOUS at 16:09

## 2024-06-26 RX ADMIN — METRONIDAZOLE 500 MG: 500 TABLET ORAL at 21:01

## 2024-06-26 RX ADMIN — POTASSIUM CHLORIDE 20 MEQ: 1500 TABLET, EXTENDED RELEASE ORAL at 05:50

## 2024-06-26 RX ADMIN — SUCCINYLCHOLINE CHLORIDE 180 MG: 20 INJECTION, SOLUTION INTRAMUSCULAR; INTRAVENOUS; PARENTERAL at 15:48

## 2024-06-26 RX ADMIN — CEFEPIME 2 G: 2 INJECTION, POWDER, FOR SOLUTION INTRAVENOUS at 23:48

## 2024-06-26 RX ADMIN — PROPOFOL 200 MG: 10 INJECTION, EMULSION INTRAVENOUS at 15:48

## 2024-06-26 RX ADMIN — DEXMEDETOMIDINE HYDROCHLORIDE 20 MCG: 200 INJECTION INTRAVENOUS at 16:02

## 2024-06-26 RX ADMIN — PHENYLEPHRINE HYDROCHLORIDE 200 MCG: 10 INJECTION INTRAVENOUS at 16:20

## 2024-06-26 RX ADMIN — POTASSIUM CHLORIDE AND SODIUM CHLORIDE: 900; 150 INJECTION, SOLUTION INTRAVENOUS at 14:01

## 2024-06-26 RX ADMIN — PIPERACILLIN AND TAZOBACTAM 3.38 G: 3; .375 INJECTION, POWDER, FOR SOLUTION INTRAVENOUS at 03:53

## 2024-06-26 RX ADMIN — SENNOSIDES AND DOCUSATE SODIUM 1 TABLET: 50; 8.6 TABLET ORAL at 21:00

## 2024-06-26 ASSESSMENT — ACTIVITIES OF DAILY LIVING (ADL)
ADLS_ACUITY_SCORE: 27
ADLS_ACUITY_SCORE: 33
ADLS_ACUITY_SCORE: 38
ADLS_ACUITY_SCORE: 33
ADLS_ACUITY_SCORE: 38
ADLS_ACUITY_SCORE: 27
ADLS_ACUITY_SCORE: 33
ADLS_ACUITY_SCORE: 38
ADLS_ACUITY_SCORE: 33
ADLS_ACUITY_SCORE: 38
ADLS_ACUITY_SCORE: 33
ADLS_ACUITY_SCORE: 27

## 2024-06-26 ASSESSMENT — ENCOUNTER SYMPTOMS
DYSRHYTHMIAS: 0
SEIZURES: 0

## 2024-06-26 ASSESSMENT — LIFESTYLE VARIABLES: TOBACCO_USE: 0

## 2024-06-26 ASSESSMENT — COPD QUESTIONNAIRES: COPD: 0

## 2024-06-26 NOTE — ANESTHESIA CARE TRANSFER NOTE
Patient: Jairo Austin    Procedure: Procedure(s):  ENDOSCOPIC RETROGRADE CHOLANGIOPANCREATOGRAPHY, COMPLEX and ENDOSCOPIC ULTRASOUND       Diagnosis: Choledocholithiasis [K80.50]  Diagnosis Additional Information: No value filed.    Anesthesia Type:   General     Note:    Oropharynx: oropharynx clear of all foreign objects and spontaneously breathing  Level of Consciousness: awake  Oxygen Supplementation: face mask  Level of Supplemental Oxygen (L/min / FiO2): 6  Independent Airway: airway patency satisfactory and stable  Dentition: dentition unchanged  Vital Signs Stable: post-procedure vital signs reviewed and stable  Report to RN Given: handoff report given  Patient transferred to: PACU  Comments: Neuromuscular blockade not used after succinylcholine for intubation, spontaneous return of TOF 4/4 with sustained tetany, spontaneous respirations, adequate tidal volumes, followed commands to voice, oropharynx suctioned with soft flexible catheter, extubated atraumatically, extubated with suction, airway patent after extubation.  Oxygen via facemask at 6 liters per minute to PACU. Oxygen tubing connected to wall O2 in PACU, SpO2, NiBP, and EKG monitors and alarms on and functioning, report on patient's clinical status given to PACU RN, RN questions answered.         Handoff Report: Identifed the Patient, Identified the Reponsible Provider, Reviewed the pertinent medical history, Discussed the surgical course, Reviewed Intra-OP anesthesia mangement and issues during anesthesia, Set expectations for post-procedure period and Allowed opportunity for questions and acknowledgement of understanding      Vitals:  Vitals Value Taken Time   /65 06/26/24 1633   Temp     Pulse 74 06/26/24 1634   Resp 29 06/26/24 1634   SpO2 98 % 06/26/24 1634   Vitals shown include unfiled device data.    Electronically Signed By: SASHA Colunga CRNA  June 26, 2024  4:36 PM

## 2024-06-26 NOTE — UTILIZATION REVIEW
Admission Status; Secondary Review Determination       Under the authority of the Utilization Management Committee, the utilization review process indicated a secondary review on the above patient. The review outcome is based on review of the medical records, discussions with staff, and applying clinical experience noted on the date of the review.     (x) Inpatient Status Appropriate - This patient's medical care is consistent with medical management for inpatient care and reasonable inpatient medical practice.     RATIONALE FOR DETERMINATION --  concurrent stay review    Patient requires inpatient admission versus short stay observation or outpatient treatment for the following reasons:    A 78-year-old male with a history of hypertension, hyperlipidemia, diet-controlled diabetes, CARMITA, asbestosis exposure, stage IIb uveal melanoma (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), and stage II GIST (s/p exploratory laparotomy and open jejunal resection 2/2022), presented to the ER with a 4-day history of upper abdominal pain and nausea. Initial labs indicated leukocytosis (14), elevated CRP (16), and abnormal LFTs (alkaline phosphatase 212, , , total bilirubin 2.8). A CT A/P revealed acute cholecystitis with distal choledocholithiasis.   LFTs are trending down, but still elevated.  White blood cells are still elevated at 12.1 despite treatment with IV Zosyn.  Patient requires IV fluids.  Gastroenterology recommended ERCP.  Surgical consult recommended n.p.o., IV fluids and reevaluate after the ERCP.    78-year-old man with acute cholecystitis and distal choledocholithiasis admitted with abdominal pain, leukocytosis, increased CRP and LFTs requires IV fluids and IV antibiotics/Zosyn.  Despite IV antibiotic, the patient leukocytosis persistent 12.1, LFTs are trending down, but still elevated.    The expected length of stay at the time of admission was more than 2 nights because of the severity of illness,  intensity of service provided, and risk for adverse outcome. Inpatient admission is appropriate.         This document was produced using voice recognition software       The information on this document is developed by the utilization review team in order for the business office to ensure compliance. This only denotes the appropriateness of proper admission status and does not reflect the quality of care rendered.   The definitions of Inpatient Status and Observation Status used in making the determination above are those provided in the CMS Coverage Manual, Chapter 1 and Chapter 6, section 70.4.   Sincerely,   ALEKS COVARRUBIAS MD   Utilization Review  Physician Advisor  [facility]

## 2024-06-26 NOTE — H&P
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    He was seen at urgent care in [place] on 6/25.  Labs showed leukocytosis of 14, elevated CRP of 16, elevated LFTs with alkaline phosphatase 212, , , total bilirubin 2.8.    CT A/P was showed acute cholecystitis with distal choledocholithiasis.    There were no beds available at [facility], thus patient drove and presented to [facility] ER.    He denies any fever but reports having chills.    Acute cholecystitis with cholelithiasis and choledocholithiasis  -Has had abdominal pain for 4 days.  LFTs abnormal-alkaline phosphatase 205, , AST 84, total bilirubin 2.5  -CT A/P obtained at urgent care showed cholelithiasis with acute cholecystitis and choledocholithiasis  -Currently afebrile but has mild leukocytosis  -Start on IV Zosyn  -N.p.o.  -Pain control with Tylenol, oxycodone/IV Dilaudid as needed  -Zofran for nausea or vomiting  -Consult [facility] and general surgery    Hypovolemic hyponatremia  -Sodium 130  -Hydrate with NS +20 mEq KCl at 100 mL/h  -Recheck labs in morning    Hypokalemia  -Replace potassium per protocol.  Also add 20 mg KCl to IV fluids  -Monitor    Dehydration  -Secondary to poor oral intake for last 4-5 days due to acute cholecystitis  -Administer NS +20 mg KCl at 100 mL/h    Benign essential hypertension  PTA on amlodipine 10 mg, carvedilol 12.5 mg, chlorthalidone 25 mg, lisinopril 20 mg  "twice daily  -Resume amlodipine and carvedilol  -Hold lisinopril and chlorthalidone for now and resume as needed    Hyperlipidemia  -Continue atorvastatin 20 mg    Diabetes mellitus type 2, diet controlled  -Not currently on medication  -Placed on sliding scale insulin      Recent mechanical fall  Right scalp hematoma  Right periorbital hematoma  -Patient fell 5 days before admission while cutting a tree branch.  He has right scalp hematoma and right periorbital hematoma.  He did not seek medical care at that time  -Since his injuries are improving and he has no new symptoms, continue to monitor    CARMITA  - continue CPAP as able    H/o asbestosis exposure  - noted    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, 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  Stage II GIST of jejunum, s/p exploratory laparotomy with jejunal resection 2/2022  - follows with [facility] oncology  - no recurrence or mets            Diet:  NPO  DVT Prophylaxis: Pneumatic Compression Devices  Kelly Catheter: Not present  Lines: None     Cardiac Monitoring: None  Code Status:  full code    Clinically Significant Risk Factors Present on Admission        # Hypokalemia: Lowest K = 3 mmol/L in last 2 days, will replace as needed         # Drug Induced Platelet Defect: home medication list includes an antiplatelet medication   # Hypertension: Noted on problem list             # Overweight: Estimated body mass index is 28.59 kg/m  as calculated from the following:    Height as of this encounter: 1.803 m (5' 11\").    Weight as of this encounter: 93 kg (205 lb).              Disposition Plan     Medically Ready for Discharge:            Miri Flores MD  Hospitalist Service  [facility]  Securely message with [employer] (more info)  Text page via [employer] Paging/Directory     ______________________________________________________________________    Chief Complaint     Abdominal pain    History is obtained from the patient    History of Present Illness "     Jairo Austin is a 78 year old male with history of hypertension, hyperlipidemia, diet-controlled diabetes, CARMITA, asbestosis exposure, stage IIb UVL melanoma of left eye, stage II GIST of small intestine, s/p exploratory laparotomy, open jejunal resection 2/2022, ventral hernia repair 2023, appendectomy 1952, who presented to ER on 6/25/2024 with 4-day history of upper abdominal pain.    Patient reports he had been experiencing significant abdominal pain for 4 days.  This was associated with nausea.  He tried to vomit but was unable to do so.  He has had poor appetite.    He was seen at urgent care in [place] on 6/25.  Labs there showed leukocytosis of 14, elevated CRP of 16, elevated LFTs with alkaline phosphatase 212, , , total bilirubin 2.8.    CT A/P was obtained that showed acute cholecystitis with distal choledocholithiasis.    There were no beds available at [facility], thus patient drove and presented to [facility] ER.    He denies any fever but reports having chills.    He had a mechanical fall about 5 days before admission.  Patient reports that he was cutting a tree branch along [address].  He slipped on the rocks and fell hitting the right side of his head and face against the rocks.  He did not lose consciousness and did not obtain any medical consultation at that time.  He reports that his injuries have significantly improved in past 5 days.  He denies any headache or visual changes.      Past Medical History    Past Medical History:   Diagnosis Date    Asbestos exposure 1/19/2007    chest x ray Jan 2007, November 2009. 11/30/11, 4/24/2013, 12/31/2014    Diabetes (H)     Hearing loss 1/19/2007    hearing aid on left. 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[employer])    Hypertension     Melanoma (H)     Obese     Osteoarthritis of hip 11/30/2011    Right hip replacement 12/5/11 Dr Jj Reyes, will have left hip replacement 1/2/13 Dr Reyes    Sleep apnea 1/19/2007     wears CPAP device at night       Past Surgical History   Past Surgical History:   Procedure Laterality Date    AS PARTIAL HIP REPLACEMENT      cyst removal back      INSERT PLAQUE RADIOACTIVE Left 12/16/2019    Procedure: 1) I-125 plaque placement  16 mm LEFT eye 2) intraoperative ultrasound 3) disinsertion of lateral rectus muscle;  Surgeon: Chairsma Rodriguez MD;  Location: [facility] OR    LAPAROSCOPIC RESECTION SMALL BOWEL N/A 2/22/2022    Procedure: Diagnostic laparoscopy, laparoscopic lysis of adhesions, open small bowel resection;  Surgeon: Sachin Laura MD;  Location: [facility] OR    ORTHOPEDIC SURGERY Bilateral     hip replacements    REMOVE PLAQUE RADIOACTIVE Left 12/20/2019    Procedure: 1) I-125 plaque removal, left eye 2) reinsertion lateral rectus muscle, left eye;  Surgeon: Charisma Rodriguez MD;  Location: [facility] OR       Prior to Admission Medications   Prior to Admission Medications   Prescriptions Last Dose Informant Patient Reported? Taking?   acetaminophen (TYLENOL) 500 MG tablet 6/25/2024 at am Self Yes Yes   Sig: Take 1,000 mg by mouth daily   amLODIPine (NORVASC) 10 MG tablet 6/25/2024 at am Self Yes Yes   Sig: Take 10 mg by mouth every morning    aspirin 81 MG EC tablet 6/25/2024 at am Self Yes Yes   Sig: Take 81 mg by mouth every morning    atorvastatin (LIPITOR) 20 MG tablet 6/25/2024 at am Self Yes Yes   Sig: Take 20 mg by mouth every morning    carvedilol (COREG) 12.5 MG tablet 6/25/2024 at am Self Yes Yes   Sig: Take 12.5 mg by mouth 2 times daily (with meals)    chlorthalidone (HYGROTON) 25 MG tablet 6/24/2024 at pm Self Yes Yes   Sig: Take 25 mg by mouth every evening   lisinopril (PRINIVIL/ZESTRIL) 20 MG tablet 6/25/2024 at am Self Yes Yes   Sig: Take 20 mg by mouth 2 times daily    multivitamin w/minerals (THERA-VIT-M) tablet 6/25/2024 at am Self Yes Yes   Sig: Take 1 tablet by mouth every morning      Facility-Administered Medications: None        Review of Systems    The 10 point  Review of Systems is negative other than noted in the HPI or here.      Physical Exam   Vital Signs: Temp: 99.2  F (37.3  C) Temp src: Temporal BP: (!) 157/80 Pulse: 105     SpO2: 95 % O2 Device: None (Room air)    Weight: 205 lbs 0 oz    Constitutional - alert, resting in bed, appears comfortable  Head - right scalp hematoma  HEENT - normal eye lids and lashes, no conjunctival hyperemia, no icterus, extraocular movements are normal, right periorbital bruising, normal nose, no discharge, moist oral mucosa, no ulcers or exudates, normal external ear  Neck - no thyromegaly or lymphadenopathy. Tracheal is midline  CV - regular rate and rhythm, no murmurs, no edema  Pulmonary - lungs are clear to auscultation bilaterally, no wheezing or rhonchi  GI - abdomen is soft, non distended, tenderness in RUQ, no organomegaly  Neurological - alert and oriented, normal speech, no focal deficits  Musculoskeletal - no joint erythema or swelling, ROM is ok          Medical Decision Making       60 MINUTES SPENT BY ME on the date of service doing chart review, history, exam, documentation & further activities per the note.      Data     I have personally reviewed the following data over the past 24 hrs:    12.6 (H)  \   15.1   / 181     130 (L) 88 (L) 15.1 /  115 (H)   3.0 (L) 30 (H) 0.95 \     ALT: 252 (H) AST: 84 (H) AP: 205 (H) TBILI: 2.5 (H)   ALB: 4.1 TOT PROTEIN: 7.8 LIPASE: 42     Procal: N/A CRP: N/A Lactic Acid: 1.4       INR:  1.05 PTT:  31   D-dimer:  N/A Fibrinogen:  N/A       Imaging results reviewed over the past 24 hrs:   Recent Results (from the past 24 hour(s))   CT Abdomen Pelvis w Contrast    Narrative    -------------------------------- Original Report  -------------------------------    EXAM:  CT ABDOMEN AND PELVIS WITH CONTRAST    CLINICAL INFORMATION:  78-year-old male presents with right upper quadrant pain  and elevated white count.    TECHNICAL INFORMATION:  Spiral acquisition of data was obtained from  the  diaphragm to the symphysis pubis after the IV administration of contrast.     CONTRAST: 100 mL of Omnipaque 350. Oral contrast was administered.    SEDATION: None.    COMPARISON:  None.    INTERPRETATION:  Limited evaluation of the chest demonstrates right basilar  subsegmental atelectasis.  Calcified right hilar lymph node.    Abdominal wall demonstrate a healed midline laparotomy incision.    Osseous structures:  Bilateral total hip arthroplasties.  Degenerative lumbar  spondylosis with facet and disc degeneration.  No suspicious osseous lesions or  acute abnormalities. Bone density of the L1 vertebra measures 147 Hounsfield  units.    Liver:  Liver size and morphology are normal.  No intrahepatic bile duct  dilatation.  There are 2 small to categorize hypodense lesions in both the right  and left hepatic lobes.     Gallbladder:  The gallbladder is distended with wall thickening and surrounding  inflammatory stranding.  The common bile duct is dilated and there is a  noncalcified but slightly dense filling defect in the distal common bile duct  (series 3, image 62 and series 5, image 37) measuring 5 x 6 mm.      Spleen:  Normal size, position, enhancement and morphology.      Pancreas:  Normal in size, position, morphology and enhancement.    Adrenal glands:  Normal in shape, contour and enhancement.      Kidneys, ureters and bladder:  Bilateral renal cysts and several too small to  categorize hypodense lesions.  There is also slightly hyperdense exophytic left  renal lesion.  No hydronephrosis, perinephric stranding or calcifications.  The  urinary bladder is normal.     Stomach, small and large bowel:  Mild distal colonic diverticulosis without wall  thickening or surrounding inflammatory stranding.  The small bowel and stomach  are normal.  No evidence of acute appendicitis.     Prostate and seminal vesicles:  Normal size, position, morphology and  enhancement.      Vascular structures:  Mild  atherosclerotic vascular disease.  No aneurysm or  dissection.    Intraperitoneal space:  Trace free pelvic fluid.  No pneumoperitoneum.    No retroperitoneal, mesenteric, pelvic or inguinal adenopathy.    IMPRESSION:   1.  Acute cholecystitis with the distal choledocholithiasis.  Trace ascites,  otherwise no complications by CT criteria.  2.  Bilateral simple and hemorrhagic renal cysts.  3.  Mild diverticulosis without evidence of acute diverticulitis.     We are committed to maintaining high-quality CT images while improving patient  safety.  We minimize radiation dose by adjusting the mA and/or kV according to  each patient's size.    A message was sent to Lucie PADILLA on 6/25/2024 3:29 PM CDT and have  been documented in [employer]. Message ID [id number].  Read by: Jose Herring M.D.  Reviewed and Electronically Signed by: Jose Herring M.D.

## 2024-06-26 NOTE — ANESTHESIA PROCEDURE NOTES
Airway       Patient location during procedure: OR       Procedure Start/Stop Times: 6/26/2024 3:51 PM  Staff -        Anesthesiologist:  Nathanael Medina MD       CRNA: Sara Robin APRN CRNA       Performed By: CRNA  Consent for Airway        Urgency: elective  Indications and Patient Condition       Indications for airway management: liang-procedural       Induction type:RSI       Mask difficulty assessment: 0 - not attempted    Final Airway Details       Final airway type: endotracheal airway       Successful airway: ETT - single  Endotracheal Airway Details        ETT size (mm): 8.0       Cuffed: yes       Successful intubation technique: video laryngoscopy       VL Blade Size: Glidescope 4       Grade View of Cords: 1       Adjucts: stylet       Position: Right       Measured from: gums/teeth       Secured at (cm): 22       Bite Block used: endo bite block.    Post intubation assessment        Placement verified by: capnometry, equal breath sounds and chest rise        Number of attempts at approach: 1       Secured with: tape       Ease of procedure: easy       Dentition: Intact and Unchanged    Medication(s) Administered   Medication Administration Time: 6/26/2024 3:51 PM

## 2024-06-26 NOTE — ED PROVIDER NOTES
Emergency Department Note      History of Present Illness     Chief Complaint   Abdominal Pain    HPI   Jairo Austin is a 78 year old male with a history of type 2 diabetes, cardiac arrest, hyperlipidemia, and hypertension who presents to the ED with his daughter for evaluation of abdominal pain for the last 4 days. The patient states he was seen at a clinic in [place] today due to right-sided abdominal pain. He was diagnosed with cholecystitis and was told he needed surgery but due to no openings there, he was told to come here. Notes the pain has been constant for 4 days, worsening with time, and became severe today. Currently rates his pain a 7/10 in severity. Reports a recent fall with injury and bruising surrounding his right eye. States he has had several herniorrhaphies in the past with the most recent earlier this year.  No other abdominal surgeries.  Denies nausea or left-sided abdominal pain.  States that he takes a baby aspirin and no other blood thinners.    Independent Historian   Daughter as detailed above.    Review of External Notes   External chart from today reviewed which showed elevated bilirubin, transaminitis.  Outside CT today showed choledocholithiasis and cholecystitis.  Past Medical History   Medical History and Problem List   Benign neoplasm of ascending colon  Diverticular disease of large intestine  GIST  Gout  Hemorrhoids  Incisional hernia  Asbestos exposure  Type 2 diabetes  HLD  HTN  Malignant melanoma of uvea of eye (L)  Ascending aorta dilation  Impotence  Memory disorder  Obesity  OA of hip  Tobacco use  Sensorineural hearing loss (B)  Sleep apnea  Cardiac arrest     Medications   Amlodipine  Atorvastatin  Norco  Carvedilol  Lisinopril  Aspirin 81 mg  Hygroton  Oxycodone    Surgical History   Partial hip replacement (B)  Radioactive plaque placement and removal (L)  Small bowel resection    Physical Exam   Patient Vitals for the past 24 hrs:   BP Temp Temp src Pulse  "SpO2 Height Weight   06/25/24 1932 (!) 157/80 99.2  F (37.3  C) Temporal 105 95 % 1.803 m (5' 11\") 93 kg (205 lb)     Physical Exam  General: Laying on the ED bed  HEENT: Normocephalic, atraumatic  Cardiac: Warm and well perfused  Pulm: Breathing comfortably, no accessory muscle usage, no conversational dyspnea  GI: Abdomen soft, right-sided tenderness with focality in the right upper quadrant, no rigidity or guarding  MSK: No bony deformities  Skin: Warm and dry  Neuro: Moves all extremities  Psych: Pleasant mood and affect    Diagnostics   Lab Results   Labs Ordered and Resulted from Time of ED Arrival to Time of ED Departure - No data to display    Imaging   No orders to display     Independent Interpretation   None  ED Course    Medications Administered   Medications   morphine (PF) injection 4 mg (has no administration in time range)   sodium chloride 0.9% BOLUS 1,000 mL (has no administration in time range)   piperacillin-tazobactam (ZOSYN) 4.5 g vial to attach to  mL bag (has no administration in time range)       Procedures   Procedures     Discussion of Management   Admitting Hospitalist, Dr. Flores    Social Determinants of Health adding to complexity of care   None    ED Course   ED Course as of 06/25/24 2049 Tue Jun 25, 2024 2012 I obtained history and performed a physical exam as noted above.    2037 I spoke with Dr. Flores of the hospitalist service regarding admission.     Medical Decision Making / Diagnosis     MIPS       None    [facility]   Jairo Austin is a 78 year old male who presents with right-sided abdominal pain diagnosed with choledocholithiasis and cholecystitis at an outside facility.  He is mildly tachycardic here, leukocytosis outside labs concerning for possible sepsis.  Blood cultures drawn and Zosyn ordered.  No hypotension and alert and oriented here.  Plan is for admission to the hospitalist for GI consultation for possible ERCP, surgery consultation for possible " cholecystectomy.    Disposition   The patient was admitted to the hospital.     ICD-10 Codes:     ICD-10-CM    1. Choledocholithiasis  K80.50       2. Cholecystitis  K81.9              Scribe Disclosure:  I, Racquel Silverman, am serving as a scribe at 8:01 PM on 6/25/2024 to document services personally performed by Weston Ashford MD based on my observations and the provider's statements to me.        Weston Ashford MD  06/25/24 2049

## 2024-06-26 NOTE — ANESTHESIA PREPROCEDURE EVALUATION
Anesthesia Pre-Procedure Evaluation    Patient: Jairo Austin   MRN: [id number] : 1945        Procedure : Procedure(s):  ENDOSCOPIC RETROGRADE CHOLANGIOPANCREATOGRAPHY, COMPLEX and ENDOSCOPIC ULTRASOUND          Past Medical History:   Diagnosis Date    Asbestos exposure 2007    chest x ray 2007, 2009. 11, 2013, 2014    Diabetes (H)     Hearing loss 2007    hearing aid on left. 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[employer])    Hypertension     Melanoma (H)     Obese     Osteoarthritis of hip 2011    Right hip replacement 11 Dr Jj Reyes, will have left hip replacement 13 Dr Reyes    Sleep apnea 2007    wears CPAP device at night      Past Surgical History:   Procedure Laterality Date    AS PARTIAL HIP REPLACEMENT      cyst removal back      INSERT PLAQUE RADIOACTIVE Left 2019    Procedure: 1) I-125 plaque placement  16 mm LEFT eye 2) intraoperative ultrasound 3) disinsertion of lateral rectus muscle;  Surgeon: Charisma Rodriguez MD;  Location: [facility]    LAPAROSCOPIC RESECTION SMALL BOWEL N/A 2022    Procedure: Diagnostic laparoscopy, laparoscopic lysis of adhesions, open small bowel resection;  Surgeon: Sachin Laura MD;  Location: [facility]    ORTHOPEDIC SURGERY Bilateral     hip replacements    REMOVE PLAQUE RADIOACTIVE Left 2019    Procedure: 1) I-125 plaque removal, left eye 2) reinsertion lateral rectus muscle, left eye;  Surgeon: Charisma Rodriguez MD;  Location: [facility] OR      Allergies   Allergen Reactions    Metronidazole Itching and Rash      Social History     Tobacco Use    Smoking status: Former     Current packs/day: 0.00     Types: Cigarettes     Quit date:      Years since quitting: 15.4    Smokeless tobacco: Never   Substance Use Topics    Alcohol use: Yes     Comment: Weekends/socially      Wt Readings from Last 1 Encounters:   24 93 kg (205 lb)      Echo 2023  Indication for study:  Ascending Aorta Aneurysm   Cardiac Rhythm: Regular.Study quality: Fair.     Final Impressions:    1. Normal left ventricular size, mildly increased wall thickness, low normal global systolic function, calculated EF of 52 %.    2. Right ventricular cavity size is normal, global systolic RV function is normal.    3. Moderately enlarged left atrium.    4. Presence of inter-atrial septum aneurysm (best seen in subcostal view).    5. The aortic valve is sclerotic, no stenosis and mild regurgitation.    6. The mitral valve is sclerotic, trace mitral regurgitation.    7. Tricuspid valve is normal.    8. The ascending aorta is dilated with a maximal diameter of 4.4 cm.    9. Normal estimated pulmonary pressures by tricuspid regurgitation velocity and right atrial pressure (21 mmHg plus RAP).   10. The aortic sinus is dilated with a maximal diameter of 4.7 cm.   11. No pericardial effusion.     Chamber Sizes and Function   Normal left ventricular size, mildly increased wall thickness, low normal global systolic function, calculated EF of 52 %. Left atrial size is moderately enlarged. Presence of inter-atrial septum aneurysm (best seen in subcostal view). Right ventricular cavity size is normal, global systolic RV function is normal. The right atrium is normal. Right atrial volume index is 23 ml/mÂ . Right atrial area is 18 cmÂ . The pulmonary artery is not well visualized. The sinus of Valsalva is dilated. The ascending aorta is dilated.     Valves, RV Pressures and Diastolic Function     The aortic valve is sclerotic, no stenosis and mild regurgitation. The mitral valve is sclerotic, trace mitral regurgitation. Normal diastolic function. The tricuspid valve is normal in structure. Tricuspid regurgitation is trace regurgitation. The tricuspid regurgitant velocity is 2.3 m/s, the estimated right ventricular systolic pressure is 21 mmHg plus right atrial pressure. There is normal estimated pulmonary pressure by tricuspid  regurgitation velocity and right atrial pressure. The pulmonic valve is not well visualized. Trace pulmonary regurgitation.     Masses, Effusion, Shunts   There is no pericardial effusion. The inferior vena cava is normal sized, respiratory size variation greater than 50%. Interatrial septum is not well visualized.     CT angiogram 2021  FINAL IMPRESSION     ===========================================================================   ===============================     1. Normal epicardial coronary arteries without atherosclerosis, stenosis,   or anomaly.   2. Total calcium score 0.   2. Dilated aortic root (42mm max cusp-commissure) and ascending aorta   (41x42 mm, area 13.6cm2, area:height   ratio 7.5; lower risk given index <10).     RECOMMENDATIONS: Please see separate radiology report for additional   details.     STUDY QUALITY: Study quality is excellent.     CAD-RADS: CAD-RADS Classification 0 (0% stenosis).     CALCIUM SCORING: Total coronary artery calcium score 0.     DOMINANCE: Right dominant coronary artery system.     LM: The LM is normal.     LAD: The LAD is normal. It gives rise to several small diagonal branches   which are normal.     LCX: The LCx is normal.     OM1: The first obtuse marginal is normal.     OM2: The second obtuse marginal is normal.     OM3: The third obtuse marginal is normal.     RCA: The RCA is normal.     RIGHT PDA: The right PDA is normal.     RIGHT PLB: The right posterolateral branch is normal.     OTHER FINDINGS: Thoracic aorta:     Aortic sinus maximum cusp-commissure: [id number] mm.     Ascending aorta maximum diameters: 41x42 mm, area 13.6cm2     Descending thoracic aorta maximum diameters: 26x26 mm.   Pericardium: No effusion.   Left atrium: Normal contrast opacification.   Atrial septum: No evidence of shunt.   Pulmonary veins: Normal anatomy.       Anesthesia Evaluation   Pt has had prior anesthetic.     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 2 b    Hearing loss  Hearing aid    (+) sleep apnea, uses CPAP,                                   (-) tobacco use, asthma and COPD   Neurologic:    (-) no seizures and no CVA   Cardiovascular: Comment: Dilated ascending aorta    (+) Dyslipidemia hypertension- -   -  - -                                   (-) CAD, CHF and arrhythmias   METS/Exercise Tolerance:     Hematologic:       Musculoskeletal:   (+)  arthritis,             GI/Hepatic: Comment: Increased LFT's  Abdominal pain x 4 days    Hx of GIST tumor small intestine    (+)          cholecystitis/cholelithiasis,       (-) GERD and liver disease   Renal/Genitourinary: Comment: Hyponatremia, Na 130  Hypokalemia  dehydration   (-) renal disease   Endo:     (+)  type II DM (diet controlled),   Not using insulin,              (-) Type I DM   Psychiatric/Substance Use:       Infectious Disease:       Malignancy:   (+) Malignancy, History of Other.Other CA uveal melanoma left eye status post.    Other:            Physical Exam    Airway        Mallampati: III   TM distance: > 3 FB   Neck ROM: full   Mouth opening: < 3 cm    Respiratory Devices and Support         Dental  no notable dental history     (+) Modest Abnormalities - crowns, retainers, 1 or 2 missing teeth      Cardiovascular          Rhythm and rate: regular     Pulmonary           breath sounds clear to auscultation           OUTSIDE LABS:  CBC:   Lab Results   Component Value Date    WBC 12.1 (H) 06/26/2024    WBC 12.6 (H) 06/25/2024    HGB 12.3 (L) 06/26/2024    HGB 15.1 06/25/2024    HCT 35.5 (L) 06/26/2024    HCT 42.8 06/25/2024     06/26/2024     06/25/2024     BMP:   Lab Results   Component Value Date     06/26/2024     (L) 06/25/2024    POTASSIUM 4.1 06/26/2024    POTASSIUM 4.1 06/26/2024    CHLORIDE 98 06/26/2024    CHLORIDE 88 (L) 06/25/2024    CO2 28 06/26/2024    CO2 30 (H) 06/25/2024    BUN 16.3 06/26/2024    BUN  15.1 06/25/2024    CR 1.08 06/26/2024    CR 0.95 06/25/2024     (H) 06/26/2024     (H) 06/26/2024     COAGS:   Lab Results   Component Value Date    PTT 31 06/25/2024    INR 1.05 06/25/2024     POC:   Lab Results   Component Value Date    BGM 93 12/20/2019     HEPATIC:   Lab Results   Component Value Date    ALBUMIN 3.2 (L) 06/26/2024    PROTTOTAL 6.3 (L) 06/26/2024     (H) 06/26/2024    AST 46 (H) 06/26/2024    ALKPHOS 162 (H) 06/26/2024    BILITOTAL 1.8 (H) 06/26/2024     OTHER:   Lab Results   Component Value Date    LACT 1.4 06/25/2024    PAMELA 8.8 06/26/2024    MAG 1.7 06/25/2024    LIPASE 42 06/25/2024       Anesthesia Plan    ASA Status:  3       Anesthesia Type: General.     - Airway: ETT   Induction: Intravenous, Propofol.   Maintenance: Balanced.   Techniques and Equipment:     - Airway: Video-Laryngoscope       Consents    Anesthesia Plan(s) and associated risks, benefits, and realistic alternatives discussed. Questions answered and patient/representative(s) expressed understanding.     - Discussed:     - Discussed with:  Patient            Postoperative Care    Pain management: Multi-modal analgesia.   PONV prophylaxis: Ondansetron (or other 5HT-3), Dexamethasone or Solumedrol     Comments:               Seymour Avalos MD    I have reviewed the pertinent notes and labs in the chart from the past 30 days and (re)examined the patient.  Any updates or changes from those notes are reflected in this note.    # Hypokalemia: Lowest K = 3 mmol/L in last 2 days, will replace as needed  # Hyponatremia: Lowest Na = 130 mmol/L in last 30 days, will monitor as appropriate      # Hypoalbuminemia: Lowest albumin = 3.2 g/dL at 6/26/2024  9:04 AM, will monitor as appropriate    # Drug Induced Platelet Defect: home medication list includes an antiplatelet medication  # Overweight: Estimated body mass index is 28.59 kg/m  as calculated from the following:    Height as of this encounter: 1.803 m (5'  "11\").    Weight as of this encounter: 93 kg (205 lb).      "

## 2024-06-26 NOTE — PLAN OF CARE
Orientation: A&Ox4  Activity: SBA   Diet/BS Checks: NPO ex meds. BG checks q4hrs  Tele: N/A  IV Access/Drains: R PIV infusing NS+  KCL 20mEq/L @ 100ml/hr wit int abx  Pain Management: C/o of abdominal pain, PRN IV dilaudid given x1  Abnormal VS/Results: VSS on RA. Blood cultures positive for enterobacter and klebsiella pneumoniae  Bowel/Bladder: Continent B/B. No BM this shift.   Skin/Wounds: Scattered bruising in Scalp,  R eye and abdomen. Scattered scabs  Consults: GI, surgery  D/C Disposition:  Discharge pending  Other Info: Pt down for ECRP this shift.Plan for Lap Rebecca tomorrow

## 2024-06-26 NOTE — CONSULTS
[facility]  Gastroenterology Consultation         Jairo Austin  [address]    [facility] [address]  78 year old male    Admission Date/Time: 6/25/2024  Primary Care Provider: Nisa Alaniz  Referring / Attending Physician:  Sandra    We were asked to see the patient in consultation by Dr. Flores for evaluation of choledocholithiasis.      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  Pain is mainly along the right side, greatest in RUQ and travels down to RLQ.  He denies any fever but reports having chills.     He was seen at urgent care in [place] on 6/25.  Labs showed leukocytosis of 14, elevated CRP of 16, elevated LFTs with alkaline phosphatase 212, , , total bilirubin 2.8.     CT A/P showed acute cholecystitis with distal choledocholithiasis.      ROS: A comprehensive ten point review of systems was negative aside from those in mentioned in the HPI.      PAST MED HX:  I have reviewed this patient's medical history and updated it with pertinent information if needed.   Past Medical History:   Diagnosis Date    Asbestos exposure 1/19/2007    chest x ray Jan 2007, November 2009. 11/30/11, 4/24/2013, 12/31/2014    Diabetes (H)     Hearing loss 1/19/2007    hearing aid on left. 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[employer])    Hypertension     Melanoma (H)     Obese     Osteoarthritis of hip 11/30/2011    Right hip replacement 12/5/11 Dr Jj Reyes, will have left hip replacement 1/2/13 Dr Reyes    Sleep apnea  1/19/2007    wears CPAP device at night       MEDICATIONS:   Prior to Admission Medications   Prescriptions Last Dose Informant Patient Reported? Taking?   acetaminophen (TYLENOL) 500 MG tablet 6/25/2024 at am Self Yes Yes   Sig: Take 1,000 mg by mouth daily   amLODIPine (NORVASC) 10 MG tablet 6/25/2024 at am Self Yes Yes   Sig: Take 10 mg by mouth every morning    aspirin 81 MG EC tablet 6/25/2024 at am Self Yes Yes   Sig: Take 81 mg by mouth every morning    atorvastatin (LIPITOR) 20 MG tablet 6/25/2024 at am Self Yes Yes   Sig: Take 20 mg by mouth every morning    carvedilol (COREG) 12.5 MG tablet 6/25/2024 at am Self Yes Yes   Sig: Take 12.5 mg by mouth 2 times daily (with meals)    chlorthalidone (HYGROTON) 25 MG tablet 6/24/2024 at pm Self Yes Yes   Sig: Take 25 mg by mouth every evening   lisinopril (PRINIVIL/ZESTRIL) 20 MG tablet 6/25/2024 at am Self Yes Yes   Sig: Take 20 mg by mouth 2 times daily    multivitamin w/minerals (THERA-VIT-M) tablet 6/25/2024 at am Self Yes Yes   Sig: Take 1 tablet by mouth every morning      Facility-Administered Medications: None       ALLERGIES:   Allergies   Allergen Reactions    Metronidazole Itching and Rash       SOCIAL HISTORY:  Social History     Tobacco Use    Smoking status: Former     Current packs/day: 0.00     Types: Cigarettes     Quit date: 2009     Years since quitting: 15.4    Smokeless tobacco: Never   Substance Use Topics    Alcohol use: Yes     Comment: Weekends/socially    Drug use: Never       FAMILY HISTORY:  Family History   Problem Relation Age of Onset    Glaucoma No family hx of     Macular Degeneration No family hx of        PHYSICAL EXAM:   Vital Signs with Ranges  Temp: 98.7  F (37.1  C) Temp src: Oral BP: 117/65 Pulse: 71   Resp: 20 SpO2: 93 % O2 Device: None (Room air)    I/O last 3 completed shifts:  In: 1160 [P.O.:60; IV Piggyback:1100]  Out: -     Constitutional: Alert, oriented to person, place, date, situation.  Cooperative, lying in bed in  NAD.   Respiratory:  Lungs CTAB.  No crackles, wheezes, or rhonchi, no labored breathing.  Cardiovascular:  Heart RRR, no MRG, no edema.  GI:  Abdomen is soft, non distended, tenderness in RUQ, no organomegaly   Skin/Integumen:  Warm, dry, non-diaphoretic.  No jaundice.  MSK: CMS x4 intact.      ADDITIONAL COMMENTS:   I reviewed the patient's new clinical lab test results.   Recent Labs   Lab Test 06/25/24 2103 05/22/24  0817 04/13/23  0612   WBC 12.6* 5.9 5.6   HGB 15.1 15.1 14.9   MCV 95 97 98    190 198   INR 1.05  --   --      Recent Labs   Lab Test 06/25/24 2103 05/22/24  0817 04/13/23  0612   POTASSIUM 3.0* 4.0 4.1   CHLORIDE 88* 103 111*   CO2 30* 28 28   BUN 15.1 20.7 20.6   ANIONGAP 12 10 7     Recent Labs   Lab Test 06/25/24 2103 05/22/24  0817 04/13/23  0612   ALBUMIN 4.1 4.4 4.4   BILITOTAL 2.5* 0.5 0.3   * 32 30   AST 84* 33 24   LIPASE 42  --   --        I reviewed the patient's new imaging results.        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  He denies any fever but reports having chills.     He was seen at urgent care in [place] on 6/25.  Labs showed leukocytosis of 14, elevated CRP of 16, elevated LFTs with alkaline phosphatase 212, , , total bilirubin 2.8.     CT A/P showed acute cholecystitis with distal choledocholithiasis.       Acute cholecystitis with cholelithiasis and choledocholithiasis  *Presents with abdominal pain for 4 days.  LFTs abnormal- alkaline phosphatase 205, , AST 84, total bilirubin 2.5  *CT A/P obtained  at urgent care showed cholelithiasis with acute cholecystitis and choledocholithiasis  *Currently afebrile but has mild leukocytosis  -Start on IV Zosyn  -N.p.o.  -Pain control and anti-emetics per protocol  -General surgery consulted  -Plan for ERCP this afternoon        [facility].  Office: [phone]  Cell: [phone] (Dr. Neil)

## 2024-06-26 NOTE — CONSULTS
[facility] General Surgery Consultation    Physician Attestation   I have reviewed and discussed with the advanced practice provider their history, physical and plan for Jairo Austin.  I did not participate in a shared visit; this is an advanced practice provider only visit.      Celio Parker MD  Date of Service (when I saw the patient): I did not personally see this patient today.      Jairo Austin MRN# [id number]   YOB: 1945 Age: 78 year old      Date of Admission:  6/25/2024  Date of Consult: 6/26/2024         Assessment and Plan:   Patient is a 78 year old male with choledocholithiasis and acute cholecystitis. 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    PLAN:  - Keep NPO, continue IV fluids and antibiotics.  - Okay for clear liquids following ERCP from our standpoint. NPO at midnight for laparoscopic cholecystectomy vs robotic cholecystectomy tomorrow. Discussed with patient that he is high risk for open cholecystectomy given previous abdominal surgeries.  - The benefits and risks of surgical intervention versus non-operative management including but not limited to infection, bleeding, conversion to open, bile leak, bile duct injury, retained gallstones, injuring neighboring structures, and anesthesia complications with the patient. He expressed understanding and would like to proceed with surgery. All questions were answered to the patient's satisfaction.  - From a trauma perspective, no heat CT noted in Care Everywhere for fall. Patient with significant eye ecchymosis and right scalp bruising. Will get CT head without contrast to evaluate.         Requesting Physician:      Dr. 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 The pain has been worse after eating. He also endorses long standing history of diarrhea after meals. He's been having chills, no fever. There were no beds available at [facility] and so he was instructed to drive to [facility]. 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 He has not had recurrence or mets with uveal melanoma and GIST. He was seen on 5/24/22 with imaging for staging and at that time choledocholithiasis was seen but LFTs were wnl and he denies abdominal complaints at that time. Patient also [place] his head and right eye. He's been able to see well but does have pain at the right scalp. He denies hitting any other body parts with his fall.    Labs were notable for leukocytosis of 14, elevated CRP of 16, elevated LFTs with alkaline phosphatase 212, , , total bilirubin 2.8. CT concerning for acute cholecystitis with distal choledocholithiasis. Incidental bilateral simple and hemorrhagic renal cysts and mild diverticulosis without evidence of acute diverticulitis seen.           Physical Exam:   Blood pressure 137/80, pulse 73, temperature 98.7  F (37.1  C), temperature source Oral, resp. rate 20, height 1.803 m (5' 11\"), weight 93 kg (205 lb), SpO2 93%.  205 lbs 0 oz  General: Vital signs reviewed, in no apparent distress  Eyes: Anicteric, right eye ecchymosis, ocular movements intact  HENT: Normocephalic, atraumatic, trachea midline   Respiratory: Breathing nonlabored  Cardiovascular: Regular rate and rhythm  GI: Abdomen soft, nondistended, midline abdominal " scar, firm and tender in RUQ without guarding  Musculoskeletal: No gross deformities  Neurologic: Grossly nonfocal exam  Psychiatric: Normal mood, affect and insight  Integumentary: Warm and dry         Past Medical History:     Past Medical History:   Diagnosis Date    Asbestos exposure 1/19/2007    chest x ray Jan 2007, November 2009. 11/30/11, 4/24/2013, 12/31/2014    Diabetes (H)     Hearing loss 1/19/2007    hearing aid on left. 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[employer])    Hypertension     Melanoma (H)     Obese     Osteoarthritis of hip 11/30/2011    Right hip replacement 12/5/11 Dr Jj Reyes, will have left hip replacement 1/2/13 Dr Reyes    Sleep apnea 1/19/2007    wears CPAP device at night            Past Surgical History:     Past Surgical History:   Procedure Laterality Date    AS PARTIAL HIP REPLACEMENT      cyst removal back      INSERT PLAQUE RADIOACTIVE Left 12/16/2019    Procedure: 1) I-125 plaque placement  16 mm LEFT eye 2) intraoperative ultrasound 3) disinsertion of lateral rectus muscle;  Surgeon: Charisma Rodriguez MD;  Location: [facility] OR    LAPAROSCOPIC RESECTION SMALL BOWEL N/A 2/22/2022    Procedure: Diagnostic laparoscopy, laparoscopic lysis of adhesions, open small bowel resection;  Surgeon: Sachin Laura MD;  Location: [facility] OR    ORTHOPEDIC SURGERY Bilateral     hip replacements    REMOVE PLAQUE RADIOACTIVE Left 12/20/2019    Procedure: 1) I-125 plaque removal, left eye 2) reinsertion lateral rectus muscle, left eye;  Surgeon: Charisma Rodriguez MD;  Location: [facility] OR            Current Medications:         Current Facility-Administered Medications   Medication Dose Route Frequency Provider Last Rate Last Admin    amLODIPine (NORVASC) tablet 10 mg  10 mg Oral Miri High MD   10 mg at 06/26/24 1011    [Held by provider] aspirin EC tablet 81 mg  81 mg Oral Miri High MD        atorvastatin (LIPITOR) tablet 20 mg  20 mg Oral Miri High MD   20  mg at 06/26/24 1011    carvedilol (COREG) tablet 12.5 mg  12.5 mg Oral BID w/meals Miri Flores MD   12.5 mg at 06/26/24 0355    [Held by provider] chlorthalidone (HYGROTON) tablet 25 mg  25 mg Oral QPM Miri Flores MD        insulin aspart (NovoLOG) injection (RAPID ACTING)  1-7 Units Subcutaneous Q4H Miri Flores MD        lisinopril (ZESTRIL) tablet 20 mg  20 mg Oral BID Miri Flores MD   20 mg at 06/26/24 0356    [Held by provider] multivitamin w/minerals (THERA-VIT-M) tablet 1 tablet  1 tablet Oral QAM Miri Flores MD        pantoprazole (PROTONIX) IV push injection 40 mg  40 mg Intravenous Daily with breakfast Miri Flores MD   40 mg at 06/26/24 1010    piperacillin-tazobactam (ZOSYN) 3.375 g vial to attach to  mL bag  3.375 g Intravenous Q6H Miri Flores MD   3.375 g at 06/26/24 1012    senna-docusate (SENOKOT-S/PERICOLACE) 8.6-50 MG per tablet 1 tablet  1 tablet Oral BID Miri Flores MD   1 tablet at 06/26/24 1011    Or    senna-docusate (SENOKOT-S/PERICOLACE) 8.6-50 MG per tablet 2 tablet  2 tablet Oral BID Miri Flores MD        sodium chloride (PF) 0.9% PF flush 3 mL  3 mL Intracatheter Q8H Miri Flores MD   3 mL at 06/26/24 0313       Current Facility-Administered Medications   Medication Dose Route Frequency Provider Last Rate Last Admin    acetaminophen (TYLENOL) tablet 650 mg  650 mg Oral Q4H PRN Miri Flores MD        Or    acetaminophen (TYLENOL) Suppository 650 mg  650 mg Rectal Q4H PRN Miri Flores MD        calcium carbonate (TUMS) chewable tablet 1,000 mg  1,000 mg Oral 4x Daily PRN Miri Flores MD        glucose gel 15-30 g  15-30 g Oral Q15 Min PRN Miri Flores MD        Or    dextrose 50 % injection 25-50 mL  25-50 mL Intravenous Q15 Min PRN Miri Flores MD        Or    glucagon injection 1 mg  1 mg Subcutaneous Q15 Min PRMiri Rucker MD        HYDROmorphone (DILAUDID) injection 1 mg  1 mg Intravenous Q2H PRMiri Rucker MD         HYDROmorphone (DILAUDID) tablet 2 mg  2 mg Oral Q4H PRN Miri Flores MD        HYDROmorphone (DILAUDID) tablet 4 mg  4 mg Oral Q4H PRN Miri Flores MD   4 mg at 06/26/24 0357    HYDROmorphone (PF) (DILAUDID) injection 0.3 mg  0.3 mg Intravenous Q2H PRN Miri Flores MD        lidocaine (LMX4) cream   Topical Q1H PRN Miri Flores MD        lidocaine 1 % 0.1-1 mL  0.1-1 mL Other Q1H PRN Miri Flores MD        naloxone (NARCAN) injection 0.2 mg  0.2 mg Intravenous Q2 Min PRN Miri Flores MD        Or    naloxone (NARCAN) injection 0.4 mg  0.4 mg Intravenous Q2 Min PRN Miri Flores MD        Or    naloxone (NARCAN) injection 0.2 mg  0.2 mg Intramuscular Q2 Min PRN Miri Flores MD        Or    naloxone (NARCAN) injection 0.4 mg  0.4 mg Intramuscular Q2 Min PRN Miri Flores MD        ondansetron (ZOFRAN ODT) ODT tab 4 mg  4 mg Oral Q6H PRN Miri Flores MD        Or    ondansetron (ZOFRAN) injection 4 mg  4 mg Intravenous Q6H PRN Miri Flores MD        polyethylene glycol (MIRALAX) Packet 17 g  17 g Oral BID PRN Miri Flores MD        senadeel-docusate (SENOKOT-S/PERICOLACE) 8.6-50 MG per tablet 1 tablet  1 tablet Oral BID PRN Miri Flores MD        Or    senna-docusate (SENOKOT-S/PERICOLACE) 8.6-50 MG per tablet 2 tablet  2 tablet Oral BID PRN Miri Flores MD        sodium chloride (PF) 0.9% PF flush 3 mL  3 mL Intracatheter q1 min prn Miri Flores MD                Home Medications:     Prior to Admission medications    Medication Sig Last Dose Taking? Auth Provider Long Term End Date   acetaminophen (TYLENOL) 500 MG tablet Take 1,000 mg by mouth daily 6/25/2024 at am Yes Unknown, Entered By History     amLODIPine (NORVASC) 10 MG tablet Take 10 mg by mouth every morning  6/25/2024 at am Yes Reported, Patient Yes    aspirin 81 MG EC tablet Take 81 mg by mouth every morning  6/25/2024 at am Yes Reported, Patient     atorvastatin (LIPITOR) 20 MG tablet Take 20 mg by mouth every  morning  6/25/2024 at am Yes Reported, Patient Yes    carvedilol (COREG) 12.5 MG tablet Take 12.5 mg by mouth 2 times daily (with meals)  6/25/2024 at am Yes Reported, Patient Yes    chlorthalidone (HYGROTON) 25 MG tablet Take 25 mg by mouth every evening 6/24/2024 at pm Yes Unknown, Entered By History No    lisinopril (PRINIVIL/ZESTRIL) 20 MG tablet Take 20 mg by mouth 2 times daily  6/25/2024 at am Yes Reported, Patient Yes    multivitamin w/minerals (THERA-VIT-M) tablet Take 1 tablet by mouth every morning 6/25/2024 at am Yes Reported, Patient              Allergies:     Allergies   Allergen Reactions    Metronidazole Itching and Rash            Family History:     Family History   Problem Relation Age of Onset    Glaucoma No family hx of     Macular Degeneration No family hx of            Social History:   Jairo Austin  reports that he quit smoking about 15 years ago. His smoking use included cigarettes. He has never used smokeless tobacco. He reports current alcohol use. He reports that he does not use drugs.          Review of Systems:   The 12 point Review of Systems is negative other than noted in the HPI.         Labs/Imaging   All new lab and imaging data was reviewed.   Recent Labs   Lab 06/26/24  0904 06/25/24  2103   WBC 12.1* 12.6*   HGB 12.3* 15.1   HCT 35.5* 42.8   MCV 95 95    181     Recent Labs   Lab 06/26/24  0904 06/26/24  0850 06/26/24  0302 06/25/24  2103     --   --  130*   POTASSIUM 4.1  4.1  --   --  3.0*   CHLORIDE 98  --   --  88*   CO2 28  --   --  30*   ANIONGAP 9  --   --  12   * 115* 118* 115*   BUN 16.3  --   --  15.1   CR 1.08  --   --  0.95   GFRESTIMATED 71  --   --  83   PAMELA 8.8  --   --  9.8   MAG  --   --   --  1.7   PROTTOTAL 6.3*  --   --  7.8   ALBUMIN 3.2*  --   --  4.1   BILITOTAL 1.8*  --   --  2.5*   ALKPHOS 162*  --   --  205*   AST 46*  --   --  84*   *  --   --  252*       I have personally reviewed the imaging studies-     MRCP  5/22    IMPRESSION:   1. No evidence of metastatic disease within the abdomen.  2. New choledocholithiasis with increased prominence of the  extrahepatic biliary tree and stable mild prominence of the central  intrahepatic biliary tree. Correlate for signs of biliary obstruction.    CT ABD PELVIS 6/25- [facility]    INTERPRETATION:  Limited evaluation of the chest demonstrates right basilar   subsegmental atelectasis.  Calcified right hilar lymph node.     Abdominal wall demonstrate a healed midline laparotomy incision.     Osseous structures:  Bilateral total hip arthroplasties.  Degenerative lumbar   spondylosis with facet and disc degeneration.  No suspicious osseous lesions or   acute abnormalities. Bone density of the L1 vertebra measures 147 Hounsfield   units.     Liver: Liver size and morphology are normal.  No intrahepatic bile duct   dilatation.  There are 2 small to categorize hypodense lesions in both the right   and left hepatic lobes.     Gallbladder:  The gallbladder is distended with wall thickening and surrounding   inflammatory stranding.  The common bile duct is dilated and there is a   noncalcified but slightly dense filling defect in the distal common bile duct   (series 3, image 62 and series 5, image 37) measuring 5 x 6 mm.      Spleen:  Normal size, position, enhancement and morphology.      Pancreas:  Normal in size, position, morphology and enhancement.     Adrenal glands:  Normal in shape, contour and enhancement.      Kidneys, ureters and bladder:  Bilateral renal cysts and several too small to   categorize hypodense lesions.  There is also slightly hyperdense exophytic left   renal lesion.  No hydronephrosis, perinephric stranding or calcifications.  The   urinary bladder is normal.     Stomach, small and large bowel:  Mild distal colonic diverticulosis without wall   thickening or surrounding inflammatory stranding.  The small bowel and stomach   are normal.  No  evidence of acute appendicitis.     Prostate and seminal vesicles:  Normal size, position, morphology and   enhancement.      Vascular structures:  Mild atherosclerotic vascular disease.  No aneurysm or   dissection.     Intraperitoneal space:  Trace free pelvic fluid.  No pneumoperitoneum.     No retroperitoneal, mesenteric, pelvic or inguinal adenopathy.     IMPRESSION:   1.  Acute cholecystitis with the distal choledocholithiasis.  Trace ascites,   otherwise no complications by CT criteria.   2.  Bilateral simple and hemorrhagic renal cysts.   3.  Mild diverticulosis without evidence of acute diverticulitis.       Asia Hi PA-C    75 minutes spent on date of the encounter doing patient visit, chart review, and documentation.

## 2024-06-26 NOTE — PROGRESS NOTES
RECEIVING UNIT ED HANDOFF REVIEW    ED Nurse Handoff Report was reviewed by: Joanne Carroll RN on June 26, 2024 at 12:15 AM

## 2024-06-26 NOTE — PHARMACY-ADMISSION MEDICATION HISTORY
Pharmacist Admission Medication History    Admission medication history is complete. The information provided in this note is only as accurate as the sources available at the time of the update.    Information Source(s): Patient and CareEverywhere/SureScripts & own medication list via in-person    Pertinent Information: none    Changes made to PTA medication list:  Added: None  Deleted: Historical oxycodone, miralax  Changed: None    Allergies reviewed with patient and updates made in EHR: yes    Medication History Completed By: Sara Bocanegra [facility] 6/25/2024 9:33 PM    PTA Med List   Medication Sig Last Dose    acetaminophen (TYLENOL) 500 MG tablet Take 1,000 mg by mouth daily 6/25/2024 at am    amLODIPine (NORVASC) 10 MG tablet Take 10 mg by mouth every morning  6/25/2024 at am    aspirin 81 MG EC tablet Take 81 mg by mouth every morning  6/25/2024 at am    atorvastatin (LIPITOR) 20 MG tablet Take 20 mg by mouth every morning  6/25/2024 at am    carvedilol (COREG) 12.5 MG tablet Take 12.5 mg by mouth 2 times daily (with meals)  6/25/2024 at am    chlorthalidone (HYGROTON) 25 MG tablet Take 25 mg by mouth every evening 6/24/2024 at pm    lisinopril (PRINIVIL/ZESTRIL) 20 MG tablet Take 20 mg by mouth 2 times daily  6/25/2024 at am    multivitamin w/minerals (THERA-VIT-M) tablet Take 1 tablet by mouth every morning 6/25/2024 at am

## 2024-06-26 NOTE — ED NOTES
"[facility]  ED Nurse Handoff Report    ED Chief complaint: Abdominal Pain      ED Diagnosis:   Final diagnoses:   Choledocholithiasis   Cholecystitis       Code Status: TBD    Allergies:   Allergies   Allergen Reactions    Metronidazole     No Clinical Screening - See Comments Itching and Rash     Nitroimidazoles       Patient Story: Patient up at [facility], started having severe right side abdominal pain, went to clinic up there and CT showed cholecyctitis, referred down here for surgical consult.   Focused Assessment:  A/o x4, continues to have severe abdominal pain, improved with pain medication. Hematoma to right eye, scraps on left arm/elbow from fall up at [facility] earlier this week.     Treatments and/or interventions provided:   Medications   morphine (PF) injection 4 mg (has no administration in time range)   sodium chloride 0.9% BOLUS 1,000 mL (1,000 mLs Intravenous $New Bag 6/25/24 7340)   piperacillin-tazobactam (ZOSYN) 4.5 g vial to attach to  mL bag (has no administration in time range)       Patient's response to treatments and/or interventions: improved pain    To be done/followed up on inpatient unit:  Surgical/GI consults    Does this patient have any cognitive concerns?:  None    Activity level - Baseline/Home:  Independent  Activity Level - Current:   Independent    Patient's Preferred language: English   Needed?: No    Isolation: None  Infection: Not Applicable  Patient tested for COVID 19 prior to admission: NO  Bariatric?: No    Vital Signs:   Vitals:    06/25/24 1932   BP: (!) 157/80   Pulse: 105   Temp: 99.2  F (37.3  C)   TempSrc: Temporal   SpO2: 95%   Weight: 93 kg (205 lb)   Height: 1.803 m (5' 11\")       Cardiac Rhythm:     Was the PSS-3 completed:   Yes  What interventions are required if any?               Family Comments: Family at bedside, updated    For the majority of the shift this patient's behavior was Green.   Behavioral interventions performed were " Cares explained.    ED NURSE PHONE NUMBER: *[phone]

## 2024-06-26 NOTE — CONSULTS
"CLINICAL NUTRITION SERVICES  -  ASSESSMENT NOTE      Future/Additional Recommendations:     Reviewed diet order, menu and meal ordering process.  Discussed importance of good po intake for energy and recovery, and available supplements.     Malnutrition:   % Weight Loss:  > 2% in 1 week (severe malnutrition) - wt down ~3% in the past week  % Intake:  </= 50% for >/= 5 days (severe malnutrition)  Subcutaneous Fat Loss:  None observed  Muscle Loss:  Clavicle bone region - mild depletion  Fluid Retention:  None noted    Malnutrition Diagnosis: Severe malnutrition  In Context of:  Acute illness or injury        REASON FOR ASSESSMENT  Jairo Austin is a 78 year old male seen by Registered Dietitian for Admission Nutrition Risk Screen:  Have you recently lost weight without trying? \"2-13#\"  Have you been eating poorly because of a decreased appetite? \"YES\"        NUTRITION HISTORY  Chart reviewed  Visited with pt this morning  Pt tells me that he is typically a very good eater  Follows a regular diet  Has had abd pain and a headache for the past week, and as a result, his po intake has been decreased      CURRENT NUTRITION ORDERS  Diet Order:     NPO    Pt states plan is to \"remove the kidney stone today and then take out the bladder\"     Surgery consult pending      NUTRITION FOCUSED PHYSICAL ASSESSMENT FOR DIAGNOSING MALNUTRITION)  Yes          Observed:    Muscle wasting (refer to documentation in Malnutrition section)    Obtained from Chart/Interdisciplinary Team:  Acute cholecystitis with distal choledocholithiasis     ANTHROPOMETRICS  Height: 5' 11\"  Weight:(6/26) 93 kg / 205 lbs 0 oz  Body mass index is 28.59 kg/m .  Weight Status:  Overweight BMI 25-29.9  IBW: 78.2 kg  % IBW: 119%  Weight History:   Pt states that his usual wt is 212#.  He has lost ~7# (3%) in the past week  Wt Readings from Last 10 Encounters:   06/26/24 93 kg (205 lb)   05/24/24 98.4 kg (217 lb)   04/13/23 96.2 kg (212 lb)   03/18/22 94.3 " kg (207 lb 12.8 oz)   02/22/22 74.4 kg (164 lb 0.4 oz)   02/11/22 97.1 kg (214 lb 1.6 oz)   01/28/22 97.9 kg (215 lb 12.8 oz)   02/06/20 98.7 kg (217 lb 9.6 oz)   12/20/19 96.3 kg (212 lb 4.9 oz)   12/16/19 98.9 kg (218 lb 0.6 oz)         LABS  Labs reviewed    MEDICATIONS  Medications reviewed      ASSESSED NUTRITION NEEDS PER APPROVED PRACTICE GUIDELINES:    Dosing Weight 93 kg  Estimated Energy Needs: 1756-1427 kcals (20-25 Kcal/Kg)  Justification: maintenance  Estimated Protein Needs:  grams protein (1-1.2 g pro/Kg)  Justification: preservation of lean body mass  Estimated Fluid Needs: 9641-6661  mL (1 mL/Kcal)  Justification: maintenance    MALNUTRITION:  % Weight Loss:  > 2% in 1 week (severe malnutrition) - wt down ~3% in the past week  % Intake:  </= 50% for >/= 5 days (severe malnutrition)  Subcutaneous Fat Loss:  None observed  Muscle Loss:  Clavicle bone region - mild depletion  Fluid Retention:  None noted    Malnutrition Diagnosis: Severe malnutrition  In Context of:  Acute illness or injury    NUTRITION DIAGNOSIS:  Inadequate protein-energy intake related to NPO status as evidenced by pt not meeting nutrition needs      NUTRITION INTERVENTIONS  Recommendations / Nutrition Prescription  NPO (diet per MD)  .      Implementation  Nutrition education: Reviewed diet order, menu and meal ordering process.  Discussed importance of good po intake for energy and recovery, and available supplements.  .      Nutrition Goals  Pt to receive nutrition in the next 2-3 days  .      MONITORING AND EVALUATION:  Progress towards goals will be monitored and evaluated per protocol and Practice Guidelines

## 2024-06-26 NOTE — ED TRIAGE NOTES
Patient here  with abdominal pain.  He was sent here due being dx with cholecystitis .     Triage Assessment (Adult)       Row Name 06/25/24 1936          Triage Assessment    Airway WDL WDL        Respiratory WDL    Respiratory WDL WDL        Skin Circulation/Temperature WDL    Skin Circulation/Temperature WDL WDL        Cardiac WDL    Cardiac WDL WDL        Peripheral/Neurovascular WDL    Peripheral Neurovascular WDL WDL        Cognitive/Neuro/Behavioral WDL    Cognitive/Neuro/Behavioral WDL WDL

## 2024-06-26 NOTE — PROGRESS NOTES
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     He was seen at urgent care in [place] on 6/25.  Labs showed leukocytosis of 14, elevated CRP of 16, elevated LFTs with alkaline phosphatase 212, , , total bilirubin 2.8.     CT A/P was showed acute cholecystitis with distal choledocholithiasis.     There were no beds available at [facility], thus patient drove and presented to [facility] ER.     He denies any fever but reported having chills.     Acute cholecystitis with cholelithiasis and choledocholithiasis  Enterobacter and Klebsiella pneumonia bacteremia: 2 out of 2 bottles positive    -Has had abdominal pain for 4 days.  LFTs abnormal-alkaline phosphatase 205, , AST 84, total bilirubin 2.5  -CT A/P obtained at urgent care showed cholelithiasis with acute cholecystitis and choledocholithiasis  -Currently afebrile but has mild leukocytosis  -Blood cultures from admission 2 out of 2 growing Enterobacter and Klebsiella pneumonia    --Patient has been admitted for further evaluation and management.  --Continue to monitor patient closely.  --Patient is receiving IV Zosyn, considering patient is growing Enterobacter and Klebsiella pneumonia, will switch him to IV cefepime 2 g every 8 hours and metronidazole 500 mg p.o. 3 times daily  --Will monitor patient closely, patient thinks that he might have developed rash with Flagyl in the past.  --N.p.o. this morning  for tentative ERCP later this afternoon, appreciate gastroenterology help.  --General surgery has been consulted, recommending to make patient n.p.o. after midnight for tentative laparoscopic cholecystectomy versus robotic cholecystectomy tomorrow 06/27/2024.  --Continue IV fluids at 100 mL/h.  --Recheck blood cultures tomorrow morning.  --Appreciate [facility] and general surgery help.     Hypovolemic hyponatremia with Sodium 130 on presentation     --Continue NS +20 mEq KCl , will decrease rate to 50 ml   -- recheck CMP tomorrow morning      Hypokalemia  --Replace potassium per protocol.  Also add 20 mg KCl to IV fluids  --Improved , continue to monitor     Dehydration  --Secondary to poor oral intake for last 4-5 days due to acute cholecystitis  --Administer NS +20 mg KCl at 100 mL/h, decreasing rate but will continue fluids as patient has been NPO for ERCP and then again will be NPO after midnight for possible surgery     Benign essential hypertension  PTA on amlodipine 10 mg, carvedilol 12.5 mg, chlorthalidone 25 mg, lisinopril 20 mg twice daily  -- Continue PTA amlodipine and carvedilol  -- Continue to Hold lisinopril and chlorthalidone for now      Hyperlipidemia  --Continue atorvastatin 20 mg     Diabetes mellitus type 2, diet controlled  --Not currently on medication  --Continue sliding scale insulin  -- will check HgbA1c    Severe malnutrition in context of acute illness or injury: Been evaluated by RD on 06/26    -- Currently NPO. for procedure.  -- Appreciate RD following      Recent mechanical fall  Right scalp hematoma  Right periorbital hematoma  Patient fell 5 days before admission while cutting a tree branch.  He has right scalp hematoma and right periorbital hematoma.  He did not seek medical care at that time      -Since his injuries are improving and he has no new symptoms, continue to monitor, CT head without contrast has been ordered , will follow up on the results      CARMITA  -- continue CPAP as  "able     H/o asbestosis exposure  -- noted     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, 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  Stage II GIST of jejunum, s/p exploratory laparotomy with jejunal resection 2/2022  -- follows with [facility] oncology  -- no recurrence or mets      Diet: NPO per Anesthesia Guidelines for Procedure/Surgery Except for: Meds    DVT Prophylaxis: Pneumatic Compression Devices and Ambulate every shift, holding chemical DVT prophylaxis till patient has completed his CT scan of the head.  Kelly Catheter: Not present  Lines: None     Cardiac Monitoring: None  Code Status: Full Code      Clinically Significant Risk Factors Present on Admission        # Hypokalemia: Lowest K = 3 mmol/L in last 2 days, will replace as needed         # Drug Induced Platelet Defect: home medication list includes an antiplatelet medication   # Hypertension: Noted on problem list         # Overweight: Estimated body mass index is 28.59 kg/m  as calculated from the following:    Height as of this encounter: 1.803 m (5' 11\").    Weight as of this encounter: 93 kg (205 lb).            Disposition Plan     Medically Ready for Discharge: Anticipated in 2-4 Days  Will ask physical and Occupational Therapy to evaluate patient.  Patient currently lives at home with wife.      Kaila Weiss MD  Hospitalist Service  [facility]  Securely message with [employer] (more info)  Text page via [employer] Paging/Directory   ______________________________________________________________________    Interval History     Patient care was assumed this morning, patient was seen and examined.  Patient is denying any chest pain, palpitations or shortness of breath.  Patient does have some swelling at head and periorbital ecchymosis from his recent fall.  Patient is denying any neurological deficit.  We discussed about doing head CT without contrast.  Patient is currently n.p.o., has been evaluated by GI and it is aware about plan for tentative " ERCP later today.  We discussed about possible surgery later tomorrow if okay with surgery once the ERCP has been completed.    Physical Exam   Vital Signs: Temp: 98.6  F (37  C) Temp src: Oral BP: 139/79 Pulse: 78   Resp: 20 SpO2: 93 % O2 Device: None (Room air)    Weight: 205 lbs 0 oz    Physical Exam  Vitals and nursing note reviewed.   Constitutional:       Appearance: He is well-developed.   HENT:      Head: Normocephalic and atraumatic.   Eyes:      Pupils: Pupils are equal, round, and reactive to light.   Neck:      Thyroid: No thyromegaly.   Cardiovascular:      Rate and Rhythm: Normal rate and regular rhythm.      Heart sounds: Normal heart sounds.   Pulmonary:      Effort: Pulmonary effort is normal. No respiratory distress.      Breath sounds: Normal breath sounds.   Abdominal:      General: Bowel sounds are normal. There is no distension.      Palpations: Abdomen is soft.   Musculoskeletal:         General: No tenderness. Normal range of motion.      Cervical back: Normal range of motion and neck supple.   Skin:     General: Skin is warm and dry.      Comments: Periorbital ecchymosis, right-sided skull laceration   Neurological:      Mental Status: He is alert and oriented to person, place, and time.   Psychiatric:         Behavior: Behavior normal.       Medical Decision Making       52 MINUTES SPENT BY ME on the date of service doing chart review, history, exam, documentation & further activities per the note.      Data     I have personally reviewed the following data over the past 24 hrs:    12.1 (H)  \   12.3 (L)   / 156     135 98 16.3 /  110 (H)   4.1; 4.1 28 1.08 \     ALT: 159 (H) AST: 46 (H) AP: 162 (H) TBILI: 1.8 (H)   ALB: 3.2 (L) TOT PROTEIN: 6.3 (L) LIPASE: 42     Procal: N/A CRP: N/A Lactic Acid: 1.4       INR:  1.05 PTT:  31   D-dimer:  N/A Fibrinogen:  N/A       Imaging results reviewed over the past 24 hrs:   No results found for this or any previous visit (from the past 24  hour(s)).  Recent Labs   Lab 06/26/24  1123 06/26/24  0904 06/26/24  0850 06/26/24  0302 06/25/24  2103   WBC  --  12.1*  --   --  12.6*   HGB  --  12.3*  --   --  15.1   MCV  --  95  --   --  95   PLT  --  156  --   --  181   INR  --   --   --   --  1.05   NA  --  135  --   --  130*   POTASSIUM  --  4.1  4.1  --   --  3.0*   CHLORIDE  --  98  --   --  88*   CO2  --  28  --   --  30*   BUN  --  16.3  --   --  15.1   CR  --  1.08  --   --  0.95   ANIONGAP  --  9  --   --  12   PAMELA  --  8.8  --   --  9.8   * 109* 115*   < > 115*   ALBUMIN  --  3.2*  --   --  4.1   PROTTOTAL  --  6.3*  --   --  7.8   BILITOTAL  --  1.8*  --   --  2.5*   ALKPHOS  --  162*  --   --  205*   ALT  --  159*  --   --  252*   AST  --  46*  --   --  84*   LIPASE  --   --   --   --  42    < > = values in this interval not displayed.

## 2024-06-26 NOTE — ANESTHESIA POSTPROCEDURE EVALUATION
Patient: Jairo Austin    Procedure: Procedure(s):  ENDOSCOPIC RETROGRADE CHOLANGIOPANCREATOGRAPHY, COMPLEX and ENDOSCOPIC ULTRASOUND       Anesthesia Type:  General    Note:     Postop Pain Control: Uneventful            Sign Out: Well controlled pain   PONV:    Neuro/Psych: Uneventful            Sign Out: Acceptable/Baseline neuro status   Airway/Respiratory: Uneventful            Sign Out: Acceptable/Baseline resp. status   CV/Hemodynamics: Uneventful            Sign Out: Acceptable CV status; No obvious hypovolemia; No obvious fluid overload   Other NRE:    DID A NON-ROUTINE EVENT OCCUR?            Last vitals:  Vitals Value Taken Time   /74 06/26/24 1730   Temp 37.3  C (99.2  F) 06/26/24 1715   Pulse 69 06/26/24 1746   Resp 29 06/26/24 1746   SpO2 94 % 06/26/24 1746   Vitals shown include unfiled device data.    Electronically Signed By: Nathanael Medina MD  June 26, 2024  6:58 PM   [Normal] : normal rate, regular rhythm, normal S1 and S2 and no murmur heard

## 2024-06-27 ENCOUNTER — ANESTHESIA (OUTPATIENT)
Dept: SURGERY | Facility: CLINIC | Age: 79
DRG: 417 | End: 2024-06-27
Payer: COMMERCIAL

## 2024-06-27 ENCOUNTER — ANESTHESIA EVENT (OUTPATIENT)
Dept: SURGERY | Facility: CLINIC | Age: 79
DRG: 417 | End: 2024-06-27
Payer: COMMERCIAL

## 2024-06-27 ENCOUNTER — APPOINTMENT (OUTPATIENT)
Dept: CT IMAGING | Facility: CLINIC | Age: 79
DRG: 417 | End: 2024-06-27
Attending: PHYSICIAN ASSISTANT
Payer: COMMERCIAL

## 2024-06-27 ENCOUNTER — APPOINTMENT (OUTPATIENT)
Dept: SURGERY | Facility: PHYSICIAN GROUP | Age: 79
End: 2024-06-27
Payer: COMMERCIAL

## 2024-06-27 LAB
ALBUMIN SERPL BCG-MCNC: 3.1 G/DL (ref 3.5–5.2)
ALP SERPL-CCNC: 174 U/L (ref 40–150)
ALT SERPL W P-5'-P-CCNC: 129 U/L (ref 0–70)
ANION GAP SERPL CALCULATED.3IONS-SCNC: 12 MMOL/L (ref 7–15)
AST SERPL W P-5'-P-CCNC: 38 U/L (ref 0–45)
BILIRUB SERPL-MCNC: 1.5 MG/DL
BUN SERPL-MCNC: 14.8 MG/DL (ref 8–23)
CALCIUM SERPL-MCNC: 9.1 MG/DL (ref 8.8–10.2)
CHLORIDE SERPL-SCNC: 98 MMOL/L (ref 98–107)
CREAT SERPL-MCNC: 0.75 MG/DL (ref 0.67–1.17)
CRP SERPL-MCNC: 268.67 MG/L
DEPRECATED HCO3 PLAS-SCNC: 26 MMOL/L (ref 22–29)
EGFRCR SERPLBLD CKD-EPI 2021: >90 ML/MIN/1.73M2
ERYTHROCYTE [DISTWIDTH] IN BLOOD BY AUTOMATED COUNT: 12.6 % (ref 10–15)
GLUCOSE BLDC GLUCOMTR-MCNC: 127 MG/DL (ref 70–99)
GLUCOSE BLDC GLUCOMTR-MCNC: 139 MG/DL (ref 70–99)
GLUCOSE BLDC GLUCOMTR-MCNC: 149 MG/DL (ref 70–99)
GLUCOSE BLDC GLUCOMTR-MCNC: 159 MG/DL (ref 70–99)
GLUCOSE SERPL-MCNC: 148 MG/DL (ref 70–99)
HCT VFR BLD AUTO: 38.7 % (ref 40–53)
HGB BLD-MCNC: 13.1 G/DL (ref 13.3–17.7)
MCH RBC QN AUTO: 32.6 PG (ref 26.5–33)
MCHC RBC AUTO-ENTMCNC: 33.9 G/DL (ref 31.5–36.5)
MCV RBC AUTO: 96 FL (ref 78–100)
PLATELET # BLD AUTO: 178 10E3/UL (ref 150–450)
POTASSIUM SERPL-SCNC: 3.8 MMOL/L (ref 3.4–5.3)
PROT SERPL-MCNC: 6.8 G/DL (ref 6.4–8.3)
RBC # BLD AUTO: 4.02 10E6/UL (ref 4.4–5.9)
SODIUM SERPL-SCNC: 136 MMOL/L (ref 135–145)
WBC # BLD AUTO: 17.1 10E3/UL (ref 4–11)

## 2024-06-27 PROCEDURE — 120N000001 HC R&B MED SURG/OB

## 2024-06-27 PROCEDURE — 710N000009 HC RECOVERY PHASE 1, LEVEL 1, PER MIN: Performed by: SURGERY

## 2024-06-27 PROCEDURE — 250N000009 HC RX 250: Performed by: PHYSICIAN ASSISTANT

## 2024-06-27 PROCEDURE — 99233 SBSQ HOSP IP/OBS HIGH 50: CPT | Performed by: INTERNAL MEDICINE

## 2024-06-27 PROCEDURE — 250N000011 HC RX IP 250 OP 636: Performed by: PHYSICIAN ASSISTANT

## 2024-06-27 PROCEDURE — 87040 BLOOD CULTURE FOR BACTERIA: CPT | Performed by: INTERNAL MEDICINE

## 2024-06-27 PROCEDURE — 47562 LAPAROSCOPIC CHOLECYSTECTOMY: CPT | Performed by: NURSE ANESTHETIST, CERTIFIED REGISTERED

## 2024-06-27 PROCEDURE — 250N000013 HC RX MED GY IP 250 OP 250 PS 637: Performed by: PHYSICIAN ASSISTANT

## 2024-06-27 PROCEDURE — 250N000025 HC SEVOFLURANE, PER MIN: Performed by: SURGERY

## 2024-06-27 PROCEDURE — 258N000003 HC RX IP 258 OP 636: Performed by: NURSE ANESTHETIST, CERTIFIED REGISTERED

## 2024-06-27 PROCEDURE — 999N000040 HC STATISTIC CONSULT NO CHARGE VASC ACCESS

## 2024-06-27 PROCEDURE — 250N000011 HC RX IP 250 OP 636: Mod: JZ | Performed by: INTERNAL MEDICINE

## 2024-06-27 PROCEDURE — 47562 LAPAROSCOPIC CHOLECYSTECTOMY: CPT | Performed by: SURGERY

## 2024-06-27 PROCEDURE — 272N000001 HC OR GENERAL SUPPLY STERILE: Performed by: SURGERY

## 2024-06-27 PROCEDURE — 250N000009 HC RX 250: Performed by: SURGERY

## 2024-06-27 PROCEDURE — 0DNU4ZZ RELEASE OMENTUM, PERCUTANEOUS ENDOSCOPIC APPROACH: ICD-10-PCS | Performed by: SURGERY

## 2024-06-27 PROCEDURE — 8E0W4CZ ROBOTIC ASSISTED PROCEDURE OF TRUNK REGION, PERCUTANEOUS ENDOSCOPIC APPROACH: ICD-10-PCS | Performed by: SURGERY

## 2024-06-27 PROCEDURE — C9113 INJ PANTOPRAZOLE SODIUM, VIA: HCPCS | Mod: JZ | Performed by: INTERNAL MEDICINE

## 2024-06-27 PROCEDURE — 88304 TISSUE EXAM BY PATHOLOGIST: CPT | Mod: TC | Performed by: SURGERY

## 2024-06-27 PROCEDURE — 0FT44ZZ RESECTION OF GALLBLADDER, PERCUTANEOUS ENDOSCOPIC APPROACH: ICD-10-PCS | Performed by: SURGERY

## 2024-06-27 PROCEDURE — 4A1BXSH MONITORING OF GASTROINTESTINAL VASCULAR PERFUSION USING INDOCYANINE GREEN DYE, EXTERNAL APPROACH: ICD-10-PCS | Performed by: SURGERY

## 2024-06-27 PROCEDURE — 36415 COLL VENOUS BLD VENIPUNCTURE: CPT | Performed by: PHYSICIAN ASSISTANT

## 2024-06-27 PROCEDURE — 250N000011 HC RX IP 250 OP 636: Performed by: NURSE ANESTHETIST, CERTIFIED REGISTERED

## 2024-06-27 PROCEDURE — 999N000128 HC STATISTIC PERIPHERAL IV START W/O US GUIDANCE

## 2024-06-27 PROCEDURE — 82247 BILIRUBIN TOTAL: CPT | Performed by: PHYSICIAN ASSISTANT

## 2024-06-27 PROCEDURE — 258N000003 HC RX IP 258 OP 636: Performed by: ANESTHESIOLOGY

## 2024-06-27 PROCEDURE — S2900 ROBOTIC SURGICAL SYSTEM: HCPCS | Performed by: SURGERY

## 2024-06-27 PROCEDURE — 86140 C-REACTIVE PROTEIN: CPT | Performed by: INTERNAL MEDICINE

## 2024-06-27 PROCEDURE — 0DBU0ZZ EXCISION OF OMENTUM, OPEN APPROACH: ICD-10-PCS | Performed by: SURGERY

## 2024-06-27 PROCEDURE — 999N000141 HC STATISTIC PRE-PROCEDURE NURSING ASSESSMENT: Performed by: SURGERY

## 2024-06-27 PROCEDURE — 250N000013 HC RX MED GY IP 250 OP 250 PS 637: Performed by: INTERNAL MEDICINE

## 2024-06-27 PROCEDURE — 85027 COMPLETE CBC AUTOMATED: CPT | Performed by: PHYSICIAN ASSISTANT

## 2024-06-27 PROCEDURE — 250N000011 HC RX IP 250 OP 636: Performed by: ANESTHESIOLOGY

## 2024-06-27 PROCEDURE — 47562 LAPAROSCOPIC CHOLECYSTECTOMY: CPT | Mod: AS | Performed by: PHYSICIAN ASSISTANT

## 2024-06-27 PROCEDURE — 250N000009 HC RX 250: Performed by: NURSE ANESTHETIST, CERTIFIED REGISTERED

## 2024-06-27 PROCEDURE — 47562 LAPAROSCOPIC CHOLECYSTECTOMY: CPT | Performed by: ANESTHESIOLOGY

## 2024-06-27 PROCEDURE — 370N000017 HC ANESTHESIA TECHNICAL FEE, PER MIN: Performed by: SURGERY

## 2024-06-27 PROCEDURE — 99100 ANES PT EXTEME AGE<1 YR&>70: CPT | Performed by: NURSE ANESTHETIST, CERTIFIED REGISTERED

## 2024-06-27 PROCEDURE — 258N000001 HC RX 258: Performed by: SURGERY

## 2024-06-27 PROCEDURE — 360N000080 HC SURGERY LEVEL 7, PER MIN: Performed by: SURGERY

## 2024-06-27 PROCEDURE — 70450 CT HEAD/BRAIN W/O DYE: CPT

## 2024-06-27 PROCEDURE — 88304 TISSUE EXAM BY PATHOLOGIST: CPT | Mod: 26 | Performed by: PATHOLOGY

## 2024-06-27 RX ORDER — DEXMEDETOMIDINE HYDROCHLORIDE 4 UG/ML
INJECTION, SOLUTION INTRAVENOUS PRN
Status: DISCONTINUED | OUTPATIENT
Start: 2024-06-27 | End: 2024-06-27

## 2024-06-27 RX ORDER — CEFAZOLIN SODIUM/WATER 2 G/20 ML
2 SYRINGE (ML) INTRAVENOUS
Status: COMPLETED | OUTPATIENT
Start: 2024-06-27 | End: 2024-06-27

## 2024-06-27 RX ORDER — FENTANYL CITRATE 0.05 MG/ML
50 INJECTION, SOLUTION INTRAMUSCULAR; INTRAVENOUS EVERY 5 MIN PRN
Status: DISCONTINUED | OUTPATIENT
Start: 2024-06-27 | End: 2024-06-27 | Stop reason: HOSPADM

## 2024-06-27 RX ORDER — CEFAZOLIN SODIUM 2 G/100ML
2 INJECTION, SOLUTION INTRAVENOUS SEE ADMIN INSTRUCTIONS
Status: DISCONTINUED | OUTPATIENT
Start: 2024-06-27 | End: 2024-06-27

## 2024-06-27 RX ORDER — ONDANSETRON 4 MG/1
4 TABLET, ORALLY DISINTEGRATING ORAL EVERY 6 HOURS PRN
Status: DISCONTINUED | OUTPATIENT
Start: 2024-06-27 | End: 2024-07-05 | Stop reason: HOSPADM

## 2024-06-27 RX ORDER — FENTANYL CITRATE 0.05 MG/ML
25 INJECTION, SOLUTION INTRAMUSCULAR; INTRAVENOUS EVERY 5 MIN PRN
Status: DISCONTINUED | OUTPATIENT
Start: 2024-06-27 | End: 2024-06-27 | Stop reason: HOSPADM

## 2024-06-27 RX ORDER — HYDROMORPHONE HCL IN WATER/PF 6 MG/30 ML
0.2 PATIENT CONTROLLED ANALGESIA SYRINGE INTRAVENOUS EVERY 5 MIN PRN
Status: DISCONTINUED | OUTPATIENT
Start: 2024-06-27 | End: 2024-06-27 | Stop reason: HOSPADM

## 2024-06-27 RX ORDER — ONDANSETRON 2 MG/ML
4 INJECTION INTRAMUSCULAR; INTRAVENOUS EVERY 6 HOURS PRN
Status: DISCONTINUED | OUTPATIENT
Start: 2024-06-27 | End: 2024-07-05 | Stop reason: HOSPADM

## 2024-06-27 RX ORDER — ONDANSETRON 2 MG/ML
INJECTION INTRAMUSCULAR; INTRAVENOUS PRN
Status: DISCONTINUED | OUTPATIENT
Start: 2024-06-27 | End: 2024-06-27

## 2024-06-27 RX ORDER — HEPARIN SODIUM 5000 [USP'U]/.5ML
5000 INJECTION, SOLUTION INTRAVENOUS; SUBCUTANEOUS
Status: COMPLETED | OUTPATIENT
Start: 2024-06-27 | End: 2024-06-27

## 2024-06-27 RX ORDER — SODIUM CHLORIDE, SODIUM LACTATE, POTASSIUM CHLORIDE, CALCIUM CHLORIDE 600; 310; 30; 20 MG/100ML; MG/100ML; MG/100ML; MG/100ML
INJECTION, SOLUTION INTRAVENOUS CONTINUOUS
Status: DISCONTINUED | OUTPATIENT
Start: 2024-06-27 | End: 2024-06-27 | Stop reason: HOSPADM

## 2024-06-27 RX ORDER — PROPOFOL 10 MG/ML
INJECTION, EMULSION INTRAVENOUS PRN
Status: DISCONTINUED | OUTPATIENT
Start: 2024-06-27 | End: 2024-06-27

## 2024-06-27 RX ORDER — CEFAZOLIN SODIUM 2 G/100ML
2 INJECTION, SOLUTION INTRAVENOUS
Status: DISCONTINUED | OUTPATIENT
Start: 2024-06-27 | End: 2024-06-27

## 2024-06-27 RX ORDER — FLUMAZENIL 0.1 MG/ML
0.2 INJECTION, SOLUTION INTRAVENOUS
Status: ACTIVE | OUTPATIENT
Start: 2024-06-27 | End: 2024-06-28

## 2024-06-27 RX ORDER — DEXAMETHASONE SODIUM PHOSPHATE 4 MG/ML
4 INJECTION, SOLUTION INTRA-ARTICULAR; INTRALESIONAL; INTRAMUSCULAR; INTRAVENOUS; SOFT TISSUE
Status: DISCONTINUED | OUTPATIENT
Start: 2024-06-27 | End: 2024-06-27 | Stop reason: HOSPADM

## 2024-06-27 RX ORDER — FENTANYL CITRATE 50 UG/ML
INJECTION, SOLUTION INTRAMUSCULAR; INTRAVENOUS PRN
Status: DISCONTINUED | OUTPATIENT
Start: 2024-06-27 | End: 2024-06-27

## 2024-06-27 RX ORDER — ONDANSETRON 4 MG/1
4 TABLET, ORALLY DISINTEGRATING ORAL EVERY 30 MIN PRN
Status: DISCONTINUED | OUTPATIENT
Start: 2024-06-27 | End: 2024-06-27 | Stop reason: HOSPADM

## 2024-06-27 RX ORDER — PROPOFOL 10 MG/ML
INJECTION, EMULSION INTRAVENOUS CONTINUOUS PRN
Status: DISCONTINUED | OUTPATIENT
Start: 2024-06-27 | End: 2024-06-27

## 2024-06-27 RX ORDER — HYDROMORPHONE HCL IN WATER/PF 6 MG/30 ML
0.4 PATIENT CONTROLLED ANALGESIA SYRINGE INTRAVENOUS EVERY 5 MIN PRN
Status: DISCONTINUED | OUTPATIENT
Start: 2024-06-27 | End: 2024-06-27 | Stop reason: HOSPADM

## 2024-06-27 RX ORDER — LIDOCAINE HYDROCHLORIDE 20 MG/ML
INJECTION, SOLUTION INFILTRATION; PERINEURAL PRN
Status: DISCONTINUED | OUTPATIENT
Start: 2024-06-27 | End: 2024-06-27

## 2024-06-27 RX ORDER — INDOCYANINE GREEN AND WATER 25 MG
2.5 KIT INJECTION ONCE
Status: COMPLETED | OUTPATIENT
Start: 2024-06-27 | End: 2024-06-27

## 2024-06-27 RX ORDER — NALOXONE HYDROCHLORIDE 0.4 MG/ML
0.1 INJECTION, SOLUTION INTRAMUSCULAR; INTRAVENOUS; SUBCUTANEOUS
Status: DISCONTINUED | OUTPATIENT
Start: 2024-06-27 | End: 2024-06-27 | Stop reason: HOSPADM

## 2024-06-27 RX ORDER — CEFAZOLIN SODIUM/WATER 2 G/20 ML
2 SYRINGE (ML) INTRAVENOUS SEE ADMIN INSTRUCTIONS
Status: DISCONTINUED | OUTPATIENT
Start: 2024-06-27 | End: 2024-06-27 | Stop reason: HOSPADM

## 2024-06-27 RX ORDER — MAGNESIUM HYDROXIDE 1200 MG/15ML
LIQUID ORAL PRN
Status: DISCONTINUED | OUTPATIENT
Start: 2024-06-27 | End: 2024-06-27 | Stop reason: HOSPADM

## 2024-06-27 RX ORDER — BUPIVACAINE HYDROCHLORIDE AND EPINEPHRINE 2.5; 5 MG/ML; UG/ML
INJECTION, SOLUTION INFILTRATION; PERINEURAL PRN
Status: DISCONTINUED | OUTPATIENT
Start: 2024-06-27 | End: 2024-06-27 | Stop reason: HOSPADM

## 2024-06-27 RX ORDER — ONDANSETRON 2 MG/ML
4 INJECTION INTRAMUSCULAR; INTRAVENOUS EVERY 30 MIN PRN
Status: DISCONTINUED | OUTPATIENT
Start: 2024-06-27 | End: 2024-06-27 | Stop reason: HOSPADM

## 2024-06-27 RX ADMIN — SENNOSIDES AND DOCUSATE SODIUM 1 TABLET: 50; 8.6 TABLET ORAL at 21:13

## 2024-06-27 RX ADMIN — FENTANYL CITRATE 50 MCG: 50 INJECTION, SOLUTION INTRAMUSCULAR; INTRAVENOUS at 12:58

## 2024-06-27 RX ADMIN — AMLODIPINE BESYLATE 10 MG: 10 TABLET ORAL at 08:13

## 2024-06-27 RX ADMIN — Medication 2 G: at 10:09

## 2024-06-27 RX ADMIN — INDOCYANINE GREEN AND WATER 2.5 MG: KIT at 09:23

## 2024-06-27 RX ADMIN — ONDANSETRON 4 MG: 2 INJECTION INTRAMUSCULAR; INTRAVENOUS at 10:36

## 2024-06-27 RX ADMIN — ACETAMINOPHEN 650 MG: 325 TABLET, FILM COATED ORAL at 14:32

## 2024-06-27 RX ADMIN — ROCURONIUM BROMIDE 10 MG: 50 INJECTION, SOLUTION INTRAVENOUS at 10:56

## 2024-06-27 RX ADMIN — HYDROMORPHONE HYDROCHLORIDE 0.4 MG: 0.2 INJECTION, SOLUTION INTRAMUSCULAR; INTRAVENOUS; SUBCUTANEOUS at 13:11

## 2024-06-27 RX ADMIN — INSULIN ASPART 1 UNITS: 100 INJECTION, SOLUTION INTRAVENOUS; SUBCUTANEOUS at 03:42

## 2024-06-27 RX ADMIN — SODIUM CHLORIDE, POTASSIUM CHLORIDE, SODIUM LACTATE AND CALCIUM CHLORIDE: 600; 310; 30; 20 INJECTION, SOLUTION INTRAVENOUS at 11:23

## 2024-06-27 RX ADMIN — ROCURONIUM BROMIDE 30 MG: 50 INJECTION, SOLUTION INTRAVENOUS at 10:10

## 2024-06-27 RX ADMIN — FENTANYL CITRATE 50 MCG: 50 INJECTION INTRAMUSCULAR; INTRAVENOUS at 10:05

## 2024-06-27 RX ADMIN — CEFEPIME 2 G: 2 INJECTION, POWDER, FOR SOLUTION INTRAVENOUS at 06:49

## 2024-06-27 RX ADMIN — FENTANYL CITRATE 50 MCG: 50 INJECTION, SOLUTION INTRAMUSCULAR; INTRAVENOUS at 12:45

## 2024-06-27 RX ADMIN — METRONIDAZOLE 500 MG: 500 TABLET ORAL at 21:12

## 2024-06-27 RX ADMIN — PHENYLEPHRINE HYDROCHLORIDE 200 MCG: 10 INJECTION INTRAVENOUS at 11:23

## 2024-06-27 RX ADMIN — PANTOPRAZOLE SODIUM 40 MG: 40 INJECTION, POWDER, FOR SOLUTION INTRAVENOUS at 08:13

## 2024-06-27 RX ADMIN — METRONIDAZOLE 500 MG: 500 TABLET ORAL at 08:13

## 2024-06-27 RX ADMIN — PROPOFOL 20 MCG/KG/MIN: 10 INJECTION, EMULSION INTRAVENOUS at 10:20

## 2024-06-27 RX ADMIN — FENTANYL CITRATE 50 MCG: 50 INJECTION INTRAMUSCULAR; INTRAVENOUS at 12:02

## 2024-06-27 RX ADMIN — DEXMEDETOMIDINE HYDROCHLORIDE 12 MCG: 200 INJECTION INTRAVENOUS at 11:59

## 2024-06-27 RX ADMIN — PHENYLEPHRINE HYDROCHLORIDE 100 MCG: 10 INJECTION INTRAVENOUS at 11:37

## 2024-06-27 RX ADMIN — SODIUM CHLORIDE, POTASSIUM CHLORIDE, SODIUM LACTATE AND CALCIUM CHLORIDE: 600; 310; 30; 20 INJECTION, SOLUTION INTRAVENOUS at 09:13

## 2024-06-27 RX ADMIN — CARVEDILOL 12.5 MG: 12.5 TABLET, FILM COATED ORAL at 18:21

## 2024-06-27 RX ADMIN — HEPARIN SODIUM 5000 UNITS: 5000 INJECTION, SOLUTION INTRAVENOUS; SUBCUTANEOUS at 09:14

## 2024-06-27 RX ADMIN — SUGAMMADEX 200 MG: 100 INJECTION, SOLUTION INTRAVENOUS at 11:57

## 2024-06-27 RX ADMIN — LISINOPRIL 20 MG: 20 TABLET ORAL at 21:13

## 2024-06-27 RX ADMIN — CARVEDILOL 12.5 MG: 12.5 TABLET, FILM COATED ORAL at 08:14

## 2024-06-27 RX ADMIN — PHENYLEPHRINE HYDROCHLORIDE 200 MCG: 10 INJECTION INTRAVENOUS at 11:17

## 2024-06-27 RX ADMIN — DEXMEDETOMIDINE HYDROCHLORIDE 8 MCG: 200 INJECTION INTRAVENOUS at 12:05

## 2024-06-27 RX ADMIN — LIDOCAINE HYDROCHLORIDE 80 MG: 20 INJECTION, SOLUTION INFILTRATION; PERINEURAL at 10:05

## 2024-06-27 RX ADMIN — CEFEPIME 2 G: 2 INJECTION, POWDER, FOR SOLUTION INTRAVENOUS at 14:33

## 2024-06-27 RX ADMIN — HYDROMORPHONE HYDROCHLORIDE 0.3 MG: 1 INJECTION, SOLUTION INTRAMUSCULAR; INTRAVENOUS; SUBCUTANEOUS at 19:36

## 2024-06-27 RX ADMIN — HYDROMORPHONE HYDROCHLORIDE 4 MG: 2 TABLET ORAL at 16:24

## 2024-06-27 RX ADMIN — FENTANYL CITRATE 50 MCG: 50 INJECTION INTRAMUSCULAR; INTRAVENOUS at 10:29

## 2024-06-27 RX ADMIN — ROCURONIUM BROMIDE 10 MG: 50 INJECTION, SOLUTION INTRAVENOUS at 10:27

## 2024-06-27 RX ADMIN — METRONIDAZOLE 500 MG: 500 TABLET ORAL at 16:25

## 2024-06-27 RX ADMIN — SUCCINYLCHOLINE CHLORIDE 100 MG: 20 INJECTION, SOLUTION INTRAMUSCULAR; INTRAVENOUS; PARENTERAL at 10:05

## 2024-06-27 RX ADMIN — ATORVASTATIN CALCIUM 20 MG: 10 TABLET, FILM COATED ORAL at 08:13

## 2024-06-27 RX ADMIN — PROPOFOL 200 MG: 10 INJECTION, EMULSION INTRAVENOUS at 10:05

## 2024-06-27 RX ADMIN — ROCURONIUM BROMIDE 10 MG: 50 INJECTION, SOLUTION INTRAVENOUS at 11:30

## 2024-06-27 RX ADMIN — PHENYLEPHRINE HYDROCHLORIDE 200 MCG: 10 INJECTION INTRAVENOUS at 11:20

## 2024-06-27 RX ADMIN — LISINOPRIL 20 MG: 20 TABLET ORAL at 08:14

## 2024-06-27 ASSESSMENT — ACTIVITIES OF DAILY LIVING (ADL)
ADLS_ACUITY_SCORE: 27
ADLS_ACUITY_SCORE: 33
ADLS_ACUITY_SCORE: 27
ADLS_ACUITY_SCORE: 28
ADLS_ACUITY_SCORE: 27
ADLS_ACUITY_SCORE: 27
ADLS_ACUITY_SCORE: 28
ADLS_ACUITY_SCORE: 27
ADLS_ACUITY_SCORE: 27
ADLS_ACUITY_SCORE: 28
ADLS_ACUITY_SCORE: 27
ADLS_ACUITY_SCORE: 28
ADLS_ACUITY_SCORE: 33
ADLS_ACUITY_SCORE: 33
ADLS_ACUITY_SCORE: 28
ADLS_ACUITY_SCORE: 27
ADLS_ACUITY_SCORE: 27

## 2024-06-27 NOTE — PROGRESS NOTES
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   He was seen at urgent care in [place] on 6/25.  Labs showed leukocytosis of 14, elevated CRP of 16, elevated LFTs with alkaline phosphatase 212, , , total bilirubin 2.8.   CT A/P was showed acute cholecystitis with distal choledocholithiasis.   There were no beds available at [facility], thus patient drove and presented to [facility] ER.       Acute cholecystitis with cholelithiasis and choledocholithiasis  Enterobacter and Klebsiella pneumonia bacteremia: 2 out of 2 bottles positive  Status post ERCP and EUS on 06/26/24:   S/p biliary sphincterotomy and balloon extraction on 06/26/2024 with temporary stent placement:    -Has had abdominal pain for 4 days.  LFTs abnormal-alkaline phosphatase 205, , AST 84, total bilirubin 2.5  -CT A/P obtained at urgent care showed cholelithiasis with acute cholecystitis and choledocholithiasis  -Currently afebrile but has mild leukocytosis  -Blood cultures from admission 2 out of 2 growing Enterobacter and Klebsiella pneumonia      -- Patient has been admitted for further evaluation and management.  -- IV Zosyn was started on admission , considering patient is growing Enterobacter and Klebsiella pneumonia, changed him to IV cefepime 2 g every 8 hours and metronidazole 500 mg p.o. 3 times daily  -- Monitoring patient closely, patient thinks that he might have developed rash  with Flagyl in the past, discussed with patient   -- status post ERCP and EUS on 06/26/24 , was found to have choledocholithiasis, complete removal was accomplished by biliary sphincterotomy and balloon extraction  --Temporary stent was placed in the CBD, repeat ERCP in 3 months to remove the stent  --General surgery has been consulted, patient is planned to undergo laparoscopic cholecystectomy versus robotic cholecystectomy later today on 06/27/2024  -- Repeat set of blood cultures today 06/27/2024  -- Patient will not be ready tomorrow postsurgery for discharge due to need for monitoring blood cultures.  -- Patient has been receiving IV fluids, will decrease the rate to 50 mL/h.  --Appreciate [facility] and general surgery help.  --Diet advancement postsurgery per surgery team.     Hypovolemic hyponatremia with Sodium 130 on presentation     -- Continue NS +20 mEq KCl , will decrease rate to 50 ml   -- recheck CMP tomorrow morning      Hypokalemia  --Replace potassium per protocol.  Also add 20 mg KCl to IV fluids  --Improved , continue to monitor     Dehydration:  --Secondary to poor oral intake for last 4-5 days due to acute cholecystitis  --Administered NS +20 mg KCl at 100 mL/h, decreasing rate but will continue fluids as patient has been NPO for ERCP and then again will be NPO after midnight for possible surgery     Benign essential hypertension  PTA on amlodipine 10 mg, carvedilol 12.5 mg, chlorthalidone 25 mg, lisinopril 20 mg twice daily    -- Continue PTA amlodipine and carvedilol  -- Continue to Hold lisinopril and chlorthalidone for now      Hyperlipidemia    --Continue atorvastatin 20 mg     Diabetes mellitus type 2, diet controlled    --Not currently on medication  --Continue sliding scale insulin  -- HgbA1c came back at 5.9    Severe malnutrition in context of acute illness or injury: Been evaluated by RD on 06/26    -- Currently NPO. for procedure.  -- Appreciate RD following      Recent mechanical  "fall  Right scalp hematoma  Right periorbital hematoma  Patient fell 5 days before admission while cutting a tree branch.  He has right scalp hematoma and right periorbital hematoma.  He did not seek medical care at that time    -Since his injuries are improving and he has no new symptoms, continue to monitor, CT head without contrast completed , no signs of intracranial bleed      CARMITA  -- continue CPAP as able     H/o asbestosis exposure  -- noted     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, 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  Stage II GIST of jejunum, s/p exploratory laparotomy with jejunal resection 2/2022    -- follows with [facility] oncology  -- no recurrence or mets      Diet: NPO per Anesthesia Guidelines for Procedure/Surgery Except for: Meds, Ice Chips    DVT Prophylaxis: Pneumatic Compression Devices and Ambulate every shift, holding chemical DVT prophylaxis till patient has completed his CT scan of the head.  Kelly Catheter: Not present  Lines: None     Cardiac Monitoring: None  Code Status: Full Code      Clinically Significant Risk Factors        # Hypokalemia: Lowest K = 3 mmol/L in last 2 days, will replace as needed    # Hypercalcemia: corrected calcium is >10.1, will monitor as appropriate    # Hypoalbuminemia: Lowest albumin = 3.1 g/dL at 6/27/2024  5:31 AM, will monitor as appropriate     # Hypertension: Noted on problem list            # Overweight: Estimated body mass index is 28.37 kg/m  as calculated from the following:    Height as of this encounter: 1.803 m (5' 11\").    Weight as of this encounter: 92.3 kg (203 lb 6.4 oz)., PRESENT ON ADMISSION  # Severe Malnutrition: based on nutrition assessment, PRESENT ON ADMISSION        Disposition Plan     Medically Ready for Discharge: Anticipated in 2-4 Days  Will ask physical and Occupational Therapy to evaluate patient.  Patient currently lives at home with wife.  Will consult SW/CC for safe disposition planning , might be ready over the weekend       Kaila " MD Isela  Hospitalist Service  [facility]  Securely message with [employer] (more info)  Text page via [employer] Paging/Directory   ______________________________________________________________________    Interval History     Patient was seen an examined , medically n.p.o., aware about plan for surgery later today.  Denying any pain.  Discussed with patient regarding positive bacteremia plan for repeating blood cultures and possibly staying on IV antibiotics for next few days till repeat blood cultures remain negative.  Patient showed understanding.  Discussed with patient regarding ERCP results patient does have 1 temporary stent placement which will need removal in 3 months.    Physical Exam   Vital Signs: Temp: 98.6  F (37  C) Temp src: Oral BP: (!) 154/79 Pulse: 77   Resp: 18 SpO2: 94 % O2 Device: None (Room air) Oxygen Delivery: 6 LPM  Weight: 203 lbs 6.4 oz    Physical Exam  Vitals and nursing note reviewed.   Constitutional:       Appearance: He is well-developed.   HENT:      Head: Normocephalic and atraumatic.   Eyes:      Pupils: Pupils are equal, round, and reactive to light.   Neck:      Thyroid: No thyromegaly.   Cardiovascular:      Rate and Rhythm: Normal rate and regular rhythm.      Heart sounds: Normal heart sounds.   Pulmonary:      Effort: Pulmonary effort is normal. No respiratory distress.      Breath sounds: Normal breath sounds.   Abdominal:      General: Bowel sounds are normal. There is no distension.      Palpations: Abdomen is soft.   Musculoskeletal:         General: No tenderness. Normal range of motion.      Cervical back: Normal range of motion and neck supple.   Skin:     General: Skin is warm and dry.      Comments: Periorbital ecchymosis, right-sided skull laceration   Neurological:      Mental Status: He is alert and oriented to person, place, and time.   Psychiatric:         Behavior: Behavior normal.       Medical Decision Making       55 MINUTES SPENT BY ME  on the date of service doing chart review, history, exam, documentation & further activities per the note.      Data     I have personally reviewed the following data over the past 24 hrs:    17.1 (H)  \   13.1 (L)   / 178     136 98 14.8 /  148 (H)   3.8 26 0.75 \     ALT: 129 (H) AST: 38 AP: 174 (H) TBILI: 1.5 (H)   ALB: 3.1 (L) TOT PROTEIN: 6.8 LIPASE: N/A     TSH: N/A T4: N/A A1C: 5.9 (H)       Imaging results reviewed over the past 24 hrs:   Recent Results (from the past 24 hour(s))   XR ERCP    Narrative    This exam was marked as non-reportable because it will not be read by a   radiologist or a [facility] non-radiologist provider.         CT Head w/o Contrast    Narrative    EXAM: CT HEAD W/O CONTRAST  LOCATION: [facility]  DATE: 6/27/2024    INDICATION: Fall, right eye eccymosis  COMPARISON: None.  TECHNIQUE: Routine CT Head without IV contrast. Multiplanar reformats. Dose reduction techniques were used.    FINDINGS:  INTRACRANIAL CONTENTS: No intracranial hemorrhage, extraaxial collection, or mass effect.  No CT evidence of acute infarct. Mild presumed chronic small vessel ischemic changes. Mild generalized volume loss. No hydrocephalus.     VISUALIZED ORBITS/SINUSES/MASTOIDS: Prior bilateral cataract surgery. Visualized portions of the orbits are otherwise unremarkable. No paranasal sinus mucosal disease. No middle ear or mastoid effusion.    BONES/SOFT TISSUES: Large right frontal scalp hematoma. No calvarial fracture.      Impression    IMPRESSION:  1.  No acute intracranial abnormality.     Recent Labs   Lab 06/27/24  0531 06/27/24  0337 06/26/24  2249 06/26/24  1123 06/26/24  0904 06/26/24  0302 06/25/24  2103   WBC 17.1*  --   --   --  12.1*  --  12.6*   HGB 13.1*  --   --   --  12.3*  --  15.1   MCV 96  --   --   --  95  --  95     --   --   --  156  --  181   INR  --   --   --   --   --   --  1.05     --   --   --  135  --  130*   POTASSIUM 3.8  --   --   --   4.1  4.1  --  3.0*   CHLORIDE 98  --   --   --  98  --  88*   CO2 26  --   --   --  28  --  30*   BUN 14.8  --   --   --  16.3  --  15.1   CR 0.75  --   --   --  1.08  --  0.95   ANIONGAP 12  --   --   --  9  --  12   PAMELA 9.1  --   --   --  8.8  --  9.8   * 159* 158*   < > 109*   < > 115*   ALBUMIN 3.1*  --   --   --  3.2*  --  4.1   PROTTOTAL 6.8  --   --   --  6.3*  --  7.8   BILITOTAL 1.5*  --   --   --  1.8*  --  2.5*   ALKPHOS 174*  --   --   --  162*  --  205*   *  --   --   --  159*  --  252*   AST 38  --   --   --  46*  --  84*   LIPASE  --   --   --   --   --   --  42    < > = values in this interval not displayed.

## 2024-06-27 NOTE — ANESTHESIA PREPROCEDURE EVALUATION
Anesthesia Pre-Procedure Evaluation    Patient: Jairo Austin   MRN: [id number] : 1945        Procedure : Procedure(s):  Robotic-assisted laparoscopic cholecystectomy without cholangiogram          Past Medical History:   Diagnosis Date    Asbestos exposure 2007    chest x ray 2007, 2009. 11, 2013, 2014    Diabetes (H)     Hearing loss 2007    hearing aid on left. 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[place])    Hypertension     Melanoma (H)     Obese     Osteoarthritis of hip 2011    Right hip replacement 11 Dr Jj Reyes, will have left hip replacement 13 Dr Reyes    Sleep apnea 2007    wears CPAP device at night      Past Surgical History:   Procedure Laterality Date    AS PARTIAL HIP REPLACEMENT      cyst removal back      INSERT PLAQUE RADIOACTIVE Left 2019    Procedure: 1) I-125 plaque placement  16 mm LEFT eye 2) intraoperative ultrasound 3) disinsertion of lateral rectus muscle;  Surgeon: Charisma Rodriguez MD;  Location: [facility]    LAPAROSCOPIC RESECTION SMALL BOWEL N/A 2022    Procedure: Diagnostic laparoscopy, laparoscopic lysis of adhesions, open small bowel resection;  Surgeon: Sachin Laura MD;  Location: [facility]    ORTHOPEDIC SURGERY Bilateral     hip replacements    REMOVE PLAQUE RADIOACTIVE Left 2019    Procedure: 1) I-125 plaque removal, left eye 2) reinsertion lateral rectus muscle, left eye;  Surgeon: Charisma Rodriguez MD;  Location: [facility] OR      Allergies   Allergen Reactions    Metronidazole Itching and Rash      Social History     Tobacco Use    Smoking status: Former     Current packs/day: 0.00     Types: Cigarettes     Quit date:      Years since quitting: 15.4    Smokeless tobacco: Never   Substance Use Topics    Alcohol use: Yes     Comment: 4 beers a weekend      Wt Readings from Last 1 Encounters:   24 92.3 kg (203 lb 6.4 oz)        Anesthesia Evaluation   Pt has had  prior anesthetic.     No history of anesthetic complications       ROS/MED HX  ENT/Pulmonary: Comment: R eye bruised from fall 1 week ago    (+) sleep apnea, uses CPAP,                                      Neurologic:       Cardiovascular:     (+)  hypertension- -   -  - -                                      METS/Exercise Tolerance:     Hematologic:       Musculoskeletal:       GI/Hepatic:     (+)          cholecystitis/cholelithiasis,       (-) GERD   Renal/Genitourinary:       Endo:     (+)  type II DM (diet controlled),                    Psychiatric/Substance Use:       Infectious Disease:       Malignancy: Comment: 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  Ho melanoma eye  (+) Malignancy,     Other:            Physical Exam    Airway        Mallampati: III   TM distance: > 3 FB   Neck ROM: full   Mouth opening: > 3 cm    Respiratory Devices and Support         Dental       (+) Minor Abnormalities - some fillings, tiny chips      Cardiovascular   cardiovascular exam normal          Pulmonary   pulmonary exam normal                OUTSIDE LABS:  CBC:   Lab Results   Component Value Date    WBC 17.1 (H) 06/27/2024    WBC 12.1 (H) 06/26/2024    HGB 13.1 (L) 06/27/2024    HGB 12.3 (L) 06/26/2024    HCT 38.7 (L) 06/27/2024    HCT 35.5 (L) 06/26/2024     06/27/2024     06/26/2024     BMP:   Lab Results   Component Value Date     06/27/2024     06/26/2024    POTASSIUM 3.8 06/27/2024    POTASSIUM 4.1 06/26/2024    POTASSIUM 4.1 06/26/2024    CHLORIDE 98 06/27/2024    CHLORIDE 98 06/26/2024    CO2 26 06/27/2024    CO2 28 06/26/2024    BUN 14.8 06/27/2024    BUN 16.3 06/26/2024    CR 0.75 06/27/2024    CR 1.08 06/26/2024     (H) 06/27/2024     (H) 06/27/2024     COAGS:   Lab Results   Component Value Date    PTT 31 06/25/2024    INR 1.05 06/25/2024     POC:   Lab Results   Component Value Date    BGM 93 12/20/2019     HEPATIC:   Lab Results   Component Value Date    ALBUMIN 3.1  (L) 06/27/2024    PROTTOTAL 6.8 06/27/2024     (H) 06/27/2024    AST 38 06/27/2024    ALKPHOS 174 (H) 06/27/2024    BILITOTAL 1.5 (H) 06/27/2024     OTHER:   Lab Results   Component Value Date    LACT 1.4 06/25/2024    A1C 5.9 (H) 06/26/2024    PAMELA 9.1 06/27/2024    MAG 1.7 06/25/2024    LIPASE 42 06/25/2024       Anesthesia Plan    ASA Status:  2    NPO Status:  NPO Appropriate    Anesthesia Type: General.     - Airway: ETT   Induction: Intravenous, RSI.   Maintenance: Balanced.   Techniques and Equipment:     - Airway: Video-Laryngoscope       Consents    Anesthesia Plan(s) and associated risks, benefits, and realistic alternatives discussed. Questions answered and patient/representative(s) expressed understanding.     - Discussed:     - Discussed with:  Patient            Postoperative Care    Pain management: IV analgesics, Multi-modal analgesia.   PONV prophylaxis: Ondansetron (or other 5HT-3), Background Propofol Infusion     Comments:               Dmitry Villarreal MD    I have reviewed the pertinent notes and labs in the chart from the past 30 days and (re)examined the patient.  Any updates or changes from those notes are reflected in this note.

## 2024-06-27 NOTE — PROGRESS NOTES
1500 - 2330    Orientation: A&Ox4  Activity: SBA   Diet/BS Checks: CL - NPO at midnight ex meds. BG checks q4hrs  Tele: N/A  IV Access/Drains: R PIV infusing NS+KCL 20mEq/L @ 50ml/hr wit int abx  Pain Management: Denies pain post-ERCP  Abnormal VS/Results: VSS on RA. Blood cultures positive for enterobacter and klebsiella pneumoniae  Bowel/Bladder: Continent B/B.  No BM this shift.   Skin/Wounds: Scattered bruising in scalp, R eye and abdomen d/t fall prior to admission. Scattered scabs  Consults: GI, surgery  D/C Disposition:  TBD  Other Info: CT in the morning and Lap Rebecca scheduled for tomorrow

## 2024-06-27 NOTE — PLAN OF CARE
Goal Outcome Evaluation:    6/27/2024 7851-4310  POD 0 LAP cholecystectomy AND LYSIS OF ADHESIONS  Pt arrived on the unit at 1400  Orientation: A/Ox4  Activity: SBA  Diet/BS Checks:  clear liquids.  IV Access/Drains: PIV inf NS + KCL20 @ 50 ml/hr w/ int abx.  RLQ FELIPA drain  Pain Management: prn tylenol x1, ice  Abnormal VS/Results: VSS on 1L, ex tachypnea; CPAP at night  Bowel/Bladder: Cont B/B  Skin/Wounds: Scattered bruising/scabs; L periorbital bruising; x4 lap sites w/ 1 FELIPA drain- CDI  Consults: GI, Surgery  D/C Disposition: Discharge plan pending  Other Info:

## 2024-06-27 NOTE — PROGRESS NOTES
MD Notification    Notified Person: MD    Notified Person Name: Dr. Brooks    Notification Date/Time: 06/27/24  2:00 AM    Notification Interaction: Vocisa    Purpose of Notification: Microlab called w/ positive blood culture collected on the 25th, gram negative bacilli    Orders Received: MD aware    Comments:

## 2024-06-27 NOTE — BRIEF OP NOTE
[facility]    Brief Operative Note    Pre-operative diagnosis: Choledocholithiasis [K80.50]  Post-operative diagnosis Choledocholithiasis, Gangrenous Cholecystitis, Intra abdominal adhesions    Procedure: Robotic-assisted laparoscopic cholecystectomy without cholangiogram AND LYSIS OF ADHESIONS, N/A - Abdomen    Surgeon: Surgeons and Role:     * Tate De León MD - Primary     * Emiliano Mcfarlane PA-C - Assisting    Anesthesia: General   Estimated Blood Loss: 100 mL from 6/27/2024  9:58 AM to 6/27/2024 12:16 PM      Drains: Paul-Vergara  Specimens:   ID Type Source Tests Collected by Time Destination   1 : GALLBLADDER AND CONTENTS Tissue Gallbladder SURGICAL PATHOLOGY EXAM Tate De León MD 6/27/2024 11:42 AM      Findings:   None.  See Operative Report for full details.  Areas of gangrene on gallbladder wall and acutely inflamed.  Pus in gallbladder.  Drain left in gallbladder fossa.    Complications: None.  Implants: * No implants in log *      Jeramy Mcfarlane PA-C  [phone]    
normal

## 2024-06-27 NOTE — PLAN OF CARE
6/26/2024-6/27/2024 0617-2404  Orientation: A/Ox4  Activity: SBA  Diet/BS Checks:  NPO since 0000 lap makayla tomorrow. B.G. checks q4hrs w/ sliding scale insulin.  Tele:  -  IV Access/Drains: PIV inf NS + KCL20 @ 50 ml/hr w/ int abx.  Pain Management: Denies pain and nausea. Endorsed abdominal discomfort.  Abnormal VS/Results: VSS on RA  Bowel/Bladder: Cont B/B  Skin/Wounds: Scattered bruising to scalp, R eye, and abd. Scattered scabs throughout.  Consults: GI, Surgery  D/C Disposition: Discharge plan pending  Other Info:   - BC positive for gram negative bacilli, MD aware.  - CPAP worn overnight  - Head CT completed  - Sebastian wipes done in preparation of 0940 procedure. Report will be called for at 0800.

## 2024-06-27 NOTE — ANESTHESIA POSTPROCEDURE EVALUATION
Patient: Jairo Austin    Procedure: Procedure(s):  Robotic-assisted laparoscopic cholecystectomy without cholangiogram AND LYSIS OF ADHESIONS       Anesthesia Type:  General    Note:     Postop Pain Control: Uneventful            Sign Out: Well controlled pain   PONV: No   Neuro/Psych: Uneventful            Sign Out: Acceptable/Baseline neuro status   Airway/Respiratory: Uneventful            Sign Out: Acceptable/Baseline resp. status   CV/Hemodynamics: Uneventful            Sign Out: Acceptable CV status; No obvious hypovolemia; No obvious fluid overload   Other NRE: NONE   DID A NON-ROUTINE EVENT OCCUR? No           Last vitals:  Vitals Value Taken Time   /83 06/27/24 1220   Temp 36.2  C (97.2  F) 06/27/24 1220   Pulse 67 06/27/24 1228   Resp 39 06/27/24 1228   SpO2 100 % 06/27/24 1228   Vitals shown include unfiled device data.    Electronically Signed By: Dmitry Villarreal MD  June 27, 2024  12:30 PM

## 2024-06-27 NOTE — OP NOTE
General Surgery Operative Note    PREOPERATIVE DIAGNOSIS:  cholecystitis and choledocholithiasis .    POSTOPERATIVE DIAGNOSIS:  gangrenous cholecystitis, choledocholithiasis    PROCEDURE:  1)  Robotic cholecystectomy       2) Laparoscopic lysis of adhesions (20 minutes)    SURGEON:  Tate De León MD    ASSISTANT:  Emiliano Mcfarlane PA-C  The physician s assistant was medically necessary for their expertise in camera management, suctioning and retraction.      ANESTHESIA:  General.    BLOOD LOSS: 100 ml    FINDINGS: Gangrenous gallbladder with significant pus along the back wall.  Stone impacted in the neck of the gallbladder    INDICATIONS:   Mr. Austin presented with cholecystitis. Patient is presenting for a robotic cholecystectomy. I explained the risks, benefits, complications including but not limited to bleeding, infection, possible need to open, possible postop hematoma, seroma, bowel, bladder or bile duct injury, MI, PE, and if any of these occurred the patient would require additional procedures. The patient agreed and did sign consent.    DETAILS OF PROCEDURE:   The patient was taken to the operating room and general anesthesia induced. They were positioned supine on the OR table with their left arm tucked at their side. An orogastric tube was placed. Perioperative antibiotics were administered. The patient received ICG florescence prior to going back to the operating room.  The abdomen was prepped and draped in standard fashion. A time out was performed.     The abdomen was entered from a left upper quadrant incision.  Using a 5 mm scope and camera and an 8 mm reusable robotic port the abdomen was entered under direct vision. The patient had his omentum adherent to his mesh.  Three more 8 mm ports were placed across the abdomen.  Each was placed by first injecting 0.25% marcaine, making an incision and placing the port under direct vision from inside the abdomen. The omentum was then taken down  laparoscopically with a hot scissors.  This took 20 minutes.  The robot was docked.  Two prograsps and a hook cautery were placed.    The gallbladder was retracted superiorly and laterally. The gallbladder was covered by omentum and when I removed the omentum there were multiple areas of necrosis seen in the gallbladder.  Cautery was used to take down the medial and lateral peritoneal attachments in the infundibulum.  A large critical view was created and Firefly was helpful in seeing the common duct and confirming that no accessory ducts were up on the gallbladder.  I was able to delineate the cystic artery and cystic duct and got under the undersurface of the gallbladder to help further declinate the duct and artery.     The cystic duct was quite large and I placed two 0 vicryl ties on the proximal side of the duct and a clip on the distal/gallbladder side. The duct was completely transected with laparoscopic scissors. One tie fell off and then I placed another 0 vicryl this time as a stick tie securely.  The artery was clipped proximally with two clips and distally with one clip and transected.     The gallbladder was taken off the liver bed with cautery.  There was extensive infection and pus encountered.  The back wall of the gallbladder was essentially dead.   There was no bile spillage or significant bleeding. The gallbladder was then placed in an Endocatch bag and removed through the left lateral port site. A suture was introduced down the lateral port site which was replaced through the fascial defect.  The facial defect was then closed with an inside to out, out to inside technique.  The suture was then pulled out the port, the port was removed and the suture was tied from the outside, closing the transversalis fascia.  The wound bed was inspected multiple times showing that it was completely dry and that the clips were in place. I put arm 3 out arm 1 and Jeramy handed that hand the drain, which was then  intercorpialized.  The drain was placed in the gallbladder fossa due to the infection encountered.  The omentum was packed into the perihepatic fossa. All gas was expelled and the trocars were taken out under direct visualization. Marcaine 0.25% were injected to all the wounds. The skin was closed with a 4-0 Monocryl in a subcuticular fashion. Steri-strips and sterile dressings were applied. The patient tolerated the procedure well. There were no complications. They were awoken from anesthesia and transferred to PACU in stable condition. All sponge, instrument and needle counts were correct.       Tate De León MD    Please route or send letter to:  Primary Care Provider (PCP) and Referring Provider

## 2024-06-27 NOTE — ANESTHESIA CARE TRANSFER NOTE
Patient: Jairo Austin    Procedure: Procedure(s):  Robotic-assisted laparoscopic cholecystectomy without cholangiogram AND LYSIS OF ADHESIONS       Diagnosis: Choledocholithiasis [K80.50]  Diagnosis Additional Information: No value filed.    Anesthesia Type:   General     Note:    Oropharynx: oropharynx clear of all foreign objects  Level of Consciousness: awake and drowsy  Oxygen Supplementation: face mask  Level of Supplemental Oxygen (L/min / FiO2): 6  Independent Airway: airway patency satisfactory and stable  Dentition: dentition unchanged  Vital Signs Stable: post-procedure vital signs reviewed and stable  Report to RN Given: handoff report given  Patient transferred to: PACU  Comments: To PACU: Arouses easily, good airway, 02 face mask, VSS  Report to RN  Handoff Report: Identifed the Patient, Identified the Reponsible Provider, Reviewed the pertinent medical history, Discussed the surgical course, Reviewed Intra-OP anesthesia mangement and issues during anesthesia, Set expectations for post-procedure period and Allowed opportunity for questions and acknowledgement of understanding      Vitals:  Vitals Value Taken Time   /83 06/27/24 1220   Temp 36.2  C (97.2  F) 06/27/24 1220   Pulse 65 06/27/24 1224   Resp 42 06/27/24 1224   SpO2 98 % 06/27/24 1224   Vitals shown include unfiled device data.    Electronically Signed By: SASHA Liang CRNA  June 27, 2024  12:25 PM

## 2024-06-27 NOTE — OR NURSING
Transfer criteria met.  Order received to transfer back to Oncology Nursing care for continued recovery.  Hand-off report given to RN.  To 8828-1 per cart; will transport with capnography monitoring.  Will notify daughter of transfer.

## 2024-06-27 NOTE — ANESTHESIA PROCEDURE NOTES
Airway       Patient location during procedure: OR       Procedure Start/Stop Times: 6/27/2024 10:07 AM  Staff -        Performed By: CRNA  Consent for Airway        Urgency: elective  Indications and Patient Condition       Indications for airway management: liang-procedural       Induction type:RSI       Mask difficulty assessment: 0 - not attempted    Final Airway Details       Final airway type: endotracheal airway       Successful airway: ETT - single and Oral  Endotracheal Airway Details        ETT size (mm): 8.0       Cuffed: yes       Successful intubation technique: video laryngoscopy       VL Blade Size: Glidescope 4       Grade View of Cords: 1       Adjucts: stylet       Position: Right       Measured from: lips       Secured at (cm): 24       Bite block used: Soft    Post intubation assessment        Placement verified by: capnometry, equal breath sounds and chest rise        Number of attempts at approach: 1       Secured with: tape       Ease of procedure: easy       Dentition: Intact and Unchanged    Medication(s) Administered   Medication Administration Time: 6/27/2024 10:07 AM

## 2024-06-28 ENCOUNTER — APPOINTMENT (OUTPATIENT)
Dept: PHYSICAL THERAPY | Facility: CLINIC | Age: 79
DRG: 417 | End: 2024-06-28
Attending: INTERNAL MEDICINE
Payer: COMMERCIAL

## 2024-06-28 LAB
ALBUMIN SERPL BCG-MCNC: 3.1 G/DL (ref 3.5–5.2)
ALP SERPL-CCNC: 141 U/L (ref 40–150)
ALT SERPL W P-5'-P-CCNC: 92 U/L (ref 0–70)
ANION GAP SERPL CALCULATED.3IONS-SCNC: 11 MMOL/L (ref 7–15)
AST SERPL W P-5'-P-CCNC: 49 U/L (ref 0–45)
BILIRUB DIRECT SERPL-MCNC: 0.55 MG/DL (ref 0–0.3)
BILIRUB SERPL-MCNC: 1.2 MG/DL
BUN SERPL-MCNC: 20.6 MG/DL (ref 8–23)
CALCIUM SERPL-MCNC: 8.9 MG/DL (ref 8.8–10.2)
CHLORIDE SERPL-SCNC: 101 MMOL/L (ref 98–107)
CREAT SERPL-MCNC: 0.89 MG/DL (ref 0.67–1.17)
DEPRECATED HCO3 PLAS-SCNC: 26 MMOL/L (ref 22–29)
EGFRCR SERPLBLD CKD-EPI 2021: 89 ML/MIN/1.73M2
ERYTHROCYTE [DISTWIDTH] IN BLOOD BY AUTOMATED COUNT: 12.8 % (ref 10–15)
GLUCOSE BLDC GLUCOMTR-MCNC: 127 MG/DL (ref 70–99)
GLUCOSE BLDC GLUCOMTR-MCNC: 132 MG/DL (ref 70–99)
GLUCOSE BLDC GLUCOMTR-MCNC: 148 MG/DL (ref 70–99)
GLUCOSE BLDC GLUCOMTR-MCNC: 148 MG/DL (ref 70–99)
GLUCOSE BLDC GLUCOMTR-MCNC: 155 MG/DL (ref 70–99)
GLUCOSE BLDC GLUCOMTR-MCNC: 170 MG/DL (ref 70–99)
GLUCOSE SERPL-MCNC: 119 MG/DL (ref 70–99)
HCT VFR BLD AUTO: 39.7 % (ref 40–53)
HGB BLD-MCNC: 13.5 G/DL (ref 13.3–17.7)
MCH RBC QN AUTO: 33.5 PG (ref 26.5–33)
MCHC RBC AUTO-ENTMCNC: 34 G/DL (ref 31.5–36.5)
MCV RBC AUTO: 99 FL (ref 78–100)
PATH REPORT.COMMENTS IMP SPEC: NORMAL
PATH REPORT.COMMENTS IMP SPEC: NORMAL
PATH REPORT.FINAL DX SPEC: NORMAL
PATH REPORT.GROSS SPEC: NORMAL
PATH REPORT.MICROSCOPIC SPEC OTHER STN: NORMAL
PATH REPORT.RELEVANT HX SPEC: NORMAL
PHOTO IMAGE: NORMAL
PLATELET # BLD AUTO: 243 10E3/UL (ref 150–450)
POTASSIUM SERPL-SCNC: 3.7 MMOL/L (ref 3.4–5.3)
PROT SERPL-MCNC: 6.7 G/DL (ref 6.4–8.3)
RBC # BLD AUTO: 4.03 10E6/UL (ref 4.4–5.9)
SODIUM SERPL-SCNC: 138 MMOL/L (ref 135–145)
WBC # BLD AUTO: 11.6 10E3/UL (ref 4–11)

## 2024-06-28 PROCEDURE — 97116 GAIT TRAINING THERAPY: CPT | Mod: GP

## 2024-06-28 PROCEDURE — 36415 COLL VENOUS BLD VENIPUNCTURE: CPT | Performed by: INTERNAL MEDICINE

## 2024-06-28 PROCEDURE — 250N000011 HC RX IP 250 OP 636: Performed by: INTERNAL MEDICINE

## 2024-06-28 PROCEDURE — 120N000001 HC R&B MED SURG/OB

## 2024-06-28 PROCEDURE — 85027 COMPLETE CBC AUTOMATED: CPT | Performed by: INTERNAL MEDICINE

## 2024-06-28 PROCEDURE — 82248 BILIRUBIN DIRECT: CPT | Performed by: INTERNAL MEDICINE

## 2024-06-28 PROCEDURE — 250N000011 HC RX IP 250 OP 636: Performed by: PHYSICIAN ASSISTANT

## 2024-06-28 PROCEDURE — 82565 ASSAY OF CREATININE: CPT | Performed by: INTERNAL MEDICINE

## 2024-06-28 PROCEDURE — 99233 SBSQ HOSP IP/OBS HIGH 50: CPT | Performed by: INTERNAL MEDICINE

## 2024-06-28 PROCEDURE — C9113 INJ PANTOPRAZOLE SODIUM, VIA: HCPCS | Performed by: PHYSICIAN ASSISTANT

## 2024-06-28 PROCEDURE — 250N000013 HC RX MED GY IP 250 OP 250 PS 637: Performed by: PHYSICIAN ASSISTANT

## 2024-06-28 PROCEDURE — 87040 BLOOD CULTURE FOR BACTERIA: CPT | Performed by: INTERNAL MEDICINE

## 2024-06-28 PROCEDURE — 97161 PT EVAL LOW COMPLEX 20 MIN: CPT | Mod: GP

## 2024-06-28 PROCEDURE — 999N000111 HC STATISTIC OT IP EVAL DEFER

## 2024-06-28 RX ORDER — ENOXAPARIN SODIUM 100 MG/ML
40 INJECTION SUBCUTANEOUS EVERY 24 HOURS
Status: DISCONTINUED | OUTPATIENT
Start: 2024-06-28 | End: 2024-07-05 | Stop reason: HOSPADM

## 2024-06-28 RX ADMIN — ENOXAPARIN SODIUM 40 MG: 40 INJECTION SUBCUTANEOUS at 18:11

## 2024-06-28 RX ADMIN — HYDROMORPHONE HYDROCHLORIDE 0.3 MG: 1 INJECTION, SOLUTION INTRAMUSCULAR; INTRAVENOUS; SUBCUTANEOUS at 18:11

## 2024-06-28 RX ADMIN — POLYETHYLENE GLYCOL 3350 17 G: 17 POWDER, FOR SOLUTION ORAL at 11:52

## 2024-06-28 RX ADMIN — AMLODIPINE BESYLATE 10 MG: 10 TABLET ORAL at 09:12

## 2024-06-28 RX ADMIN — INSULIN ASPART 1 UNITS: 100 INJECTION, SOLUTION INTRAVENOUS; SUBCUTANEOUS at 12:00

## 2024-06-28 RX ADMIN — CEFEPIME 2 G: 2 INJECTION, POWDER, FOR SOLUTION INTRAVENOUS at 06:43

## 2024-06-28 RX ADMIN — CEFEPIME 2 G: 2 INJECTION, POWDER, FOR SOLUTION INTRAVENOUS at 15:55

## 2024-06-28 RX ADMIN — HYDROMORPHONE HYDROCHLORIDE 0.3 MG: 1 INJECTION, SOLUTION INTRAMUSCULAR; INTRAVENOUS; SUBCUTANEOUS at 11:52

## 2024-06-28 RX ADMIN — HYDROMORPHONE HYDROCHLORIDE 4 MG: 2 TABLET ORAL at 09:11

## 2024-06-28 RX ADMIN — PANTOPRAZOLE SODIUM 40 MG: 40 INJECTION, POWDER, FOR SOLUTION INTRAVENOUS at 09:11

## 2024-06-28 RX ADMIN — LISINOPRIL 20 MG: 20 TABLET ORAL at 20:23

## 2024-06-28 RX ADMIN — METRONIDAZOLE 500 MG: 500 TABLET ORAL at 15:55

## 2024-06-28 RX ADMIN — POTASSIUM CHLORIDE AND SODIUM CHLORIDE: 900; 150 INJECTION, SOLUTION INTRAVENOUS at 16:02

## 2024-06-28 RX ADMIN — CEFEPIME 2 G: 2 INJECTION, POWDER, FOR SOLUTION INTRAVENOUS at 00:38

## 2024-06-28 RX ADMIN — HYDROMORPHONE HYDROCHLORIDE 0.3 MG: 1 INJECTION, SOLUTION INTRAMUSCULAR; INTRAVENOUS; SUBCUTANEOUS at 01:14

## 2024-06-28 RX ADMIN — HYDROMORPHONE HYDROCHLORIDE 0.3 MG: 1 INJECTION, SOLUTION INTRAMUSCULAR; INTRAVENOUS; SUBCUTANEOUS at 16:00

## 2024-06-28 RX ADMIN — METRONIDAZOLE 500 MG: 500 TABLET ORAL at 09:12

## 2024-06-28 RX ADMIN — ATORVASTATIN CALCIUM 20 MG: 10 TABLET, FILM COATED ORAL at 09:11

## 2024-06-28 RX ADMIN — INSULIN ASPART 1 UNITS: 100 INJECTION, SOLUTION INTRAVENOUS; SUBCUTANEOUS at 22:23

## 2024-06-28 RX ADMIN — INSULIN ASPART 1 UNITS: 100 INJECTION, SOLUTION INTRAVENOUS; SUBCUTANEOUS at 04:09

## 2024-06-28 RX ADMIN — HYDROMORPHONE HYDROCHLORIDE 4 MG: 2 TABLET ORAL at 20:23

## 2024-06-28 RX ADMIN — SENNOSIDES AND DOCUSATE SODIUM 2 TABLET: 50; 8.6 TABLET ORAL at 20:23

## 2024-06-28 RX ADMIN — LISINOPRIL 20 MG: 20 TABLET ORAL at 09:12

## 2024-06-28 RX ADMIN — METRONIDAZOLE 500 MG: 500 TABLET ORAL at 22:23

## 2024-06-28 RX ADMIN — HYDROMORPHONE HYDROCHLORIDE 4 MG: 2 TABLET ORAL at 14:06

## 2024-06-28 RX ADMIN — HYDROMORPHONE HYDROCHLORIDE 0.3 MG: 1 INJECTION, SOLUTION INTRAMUSCULAR; INTRAVENOUS; SUBCUTANEOUS at 06:37

## 2024-06-28 RX ADMIN — CARVEDILOL 12.5 MG: 12.5 TABLET, FILM COATED ORAL at 09:12

## 2024-06-28 RX ADMIN — CARVEDILOL 12.5 MG: 12.5 TABLET, FILM COATED ORAL at 18:11

## 2024-06-28 RX ADMIN — CEFEPIME 2 G: 2 INJECTION, POWDER, FOR SOLUTION INTRAVENOUS at 23:50

## 2024-06-28 RX ADMIN — INSULIN ASPART 1 UNITS: 100 INJECTION, SOLUTION INTRAVENOUS; SUBCUTANEOUS at 00:52

## 2024-06-28 RX ADMIN — SENNOSIDES AND DOCUSATE SODIUM 2 TABLET: 50; 8.6 TABLET ORAL at 09:11

## 2024-06-28 ASSESSMENT — ACTIVITIES OF DAILY LIVING (ADL)
ADLS_ACUITY_SCORE: 34
ADLS_ACUITY_SCORE: 28
ADLS_ACUITY_SCORE: 33
ADLS_ACUITY_SCORE: 34
ADLS_ACUITY_SCORE: 34
ADLS_ACUITY_SCORE: 28
ADLS_ACUITY_SCORE: 28
ADLS_ACUITY_SCORE: 34
ADLS_ACUITY_SCORE: 28
ADLS_ACUITY_SCORE: 34
ADLS_ACUITY_SCORE: 34
ADLS_ACUITY_SCORE: 28
ADLS_ACUITY_SCORE: 34
ADLS_ACUITY_SCORE: 28
ADLS_ACUITY_SCORE: 28
ADLS_ACUITY_SCORE: 34
ADLS_ACUITY_SCORE: 28
ADLS_ACUITY_SCORE: 34
ADLS_ACUITY_SCORE: 34
ADLS_ACUITY_SCORE: 33
ADLS_ACUITY_SCORE: 28
ADLS_ACUITY_SCORE: 28
ADLS_ACUITY_SCORE: 34

## 2024-06-28 NOTE — PLAN OF CARE
Goal Outcome Evaluation:      Plan of Care Reviewed With: patient      6/27/2024 2092-4888  POD # 1 LAP cholecystectomy AND LYSIS OF ADHESIONS  Orientation: A/Ox4  Activity: SBA dangle at  bedside and up to standing scale  Diet/BS Checks:  clear liquids.BS checks q4hrs with insulin coverage  IV Access/Drains: PIV inf NS + KCL20 @ 50 ml/hr w/ int abx.  RLQ FELIPA drain  Pain Management: IV PRN dilaudid x 2   Abnormal VS/Results: VSS on 1L,  CPAP at night  Bowel/Bladder: Aleksandr B/B.voiding in urinal  Skin/Wounds: Scattered bruising/scabs; R periorbital bruising; and R lower abd bruising 4 lap sites w/ 1 EFLIPA drain- CDI  Consults: GI, Surgery  D/C Disposition: Discharge plan pending  Other Info:

## 2024-06-28 NOTE — PLAN OF CARE
Occupational Therapy: Orders received. Chart reviewed and discussed with care team, including IP PT. Per discussion, pt demonstrates no difficulty with ADLs and functional transfers. All therapy needs are being met with IP PT. ? Will complete orders.

## 2024-06-28 NOTE — PROGRESS NOTES
"General Surgery Progress Note    Admission Date: 6/25/2024  Today's Date: 6/28/2024         Assessment:      Jairo Ausitn is a 78 year old male with choledocholithiasis and acute cholecystitis. 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     S/P Robotic assisted laparoscopic cholecystectomy  POD #1     Diagnosis: Gangrenous Cholecystitis and Choledocholithiasis         Plan:   Advance diet today.  Work on mobility.  Discussed surgery details and recovery expectations.    Pain:  continue current regimen.  Wean narcotics as able.  Bowel: senokot  Antibiotics: cefepime and flagyl.  Diet: Fulls, ADAT.  Discussed with pt to go slow until more bowel function, as still some distended.  IVFs: 50mL/hr  DVT prophylaxis: PCDs.  Hgb stable.  Can start Lovenox.  Dispo: next 1-2 days likely.  Defer to Medicine.  Keep drain until discharge.        Interval History:   Doing well this morning.  Pain to incisions as expected.  Not up OOB much yet.  Tolerating clears and denies nausea.  Passing small amount of flatus this am.          Physical Exam:   /81 (BP Location: Right arm)   Pulse 83   Temp 98.9  F (37.2  C) (Oral)   Resp 19   Ht 1.803 m (5' 11\")   Wt 93.4 kg (206 lb)   SpO2 92%   BMI 28.73 kg/m    I/O last 3 completed shifts:  In: 1460 [P.O.:60; I.V.:1400]  Out: 930 [Urine:750; Drains:80; Blood:100]  General: NAD, pleasant, alert and oriented x3  Abdomen: soft, appropriately tender along incisions, Lt> rest, distended.  Incision: clean, dry, and intact  FELIPA: serosanguinous    LABS:  Results for orders placed or performed during the hospital encounter of 06/25/24 (from the past 24 hour(s))   Glucose by meter   Result Value Ref Range    GLUCOSE BY METER POCT 139 (H) 70 - 99 mg/dL   Glucose by meter   Result Value Ref Range    GLUCOSE BY METER POCT 127 (H) 70 - 99 mg/dL   Glucose by meter " "  Result Value Ref Range    GLUCOSE BY METER POCT 148 (H) 70 - 99 mg/dL   Glucose by meter   Result Value Ref Range    GLUCOSE BY METER POCT 148 (H) 70 - 99 mg/dL   Glucose by meter   Result Value Ref Range    GLUCOSE BY METER POCT 127 (H) 70 - 99 mg/dL   Comprehensive metabolic panel   Result Value Ref Range    Sodium 138 135 - 145 mmol/L    Potassium 3.7 3.4 - 5.3 mmol/L    Carbon Dioxide (CO2) 26 22 - 29 mmol/L    Anion Gap 11 7 - 15 mmol/L    Urea Nitrogen 20.6 8.0 - 23.0 mg/dL    Creatinine 0.89 0.67 - 1.17 mg/dL    GFR Estimate 89 >60 mL/min/1.73m2    Calcium 8.9 8.8 - 10.2 mg/dL    Chloride 101 98 - 107 mmol/L    Glucose 119 (H) 70 - 99 mg/dL    Alkaline Phosphatase 141 40 - 150 U/L    AST 49 (H) 0 - 45 U/L    ALT 92 (H) 0 - 70 U/L    Protein Total 6.7 6.4 - 8.3 g/dL    Albumin 3.1 (L) 3.5 - 5.2 g/dL    Bilirubin Total 1.2 <=1.2 mg/dL   CBC with platelets   Result Value Ref Range    WBC Count 11.6 (H) 4.0 - 11.0 10e3/uL    RBC Count 4.03 (L) 4.40 - 5.90 10e6/uL    Hemoglobin 13.5 13.3 - 17.7 g/dL    Hematocrit 39.7 (L) 40.0 - 53.0 %    MCV 99 78 - 100 fL    MCH 33.5 (H) 26.5 - 33.0 pg    MCHC 34.0 31.5 - 36.5 g/dL    RDW 12.8 10.0 - 15.0 %    Platelet Count 243 150 - 450 10e3/uL   Bilirubin direct   Result Value Ref Range    Bilirubin Direct 0.55 (H) 0.00 - 0.30 mg/dL           Emiliano Mcfarlane PA-C  Pager: [phone]  [facility]: [phone]      I saw and examined the patient independently of Emiliano Mcfarlane PA-C.    I reviewed all available labs, images, vitals.    Exam:  /81 (BP Location: Right arm)   Pulse 83   Temp 98.9  F (37.2  C) (Oral)   Resp 19   Ht 1.803 m (5' 11\")   Wt 93.4 kg (206 lb)   SpO2 92%   BMI 28.73 kg/m    Abd: Soft, incisions ok, serosangenous drainage in drain    Assessment and Plan: Jairo Austin is a 78 year old male s/p removal of a gangrenous gallbladder  1.  Given the amount of pus and infection seen another day of IV antibiotics is warranted.  2.  " He hasn't ambulated yet.  Need to mobilize.  3.  Advance diet as tolerated.  4.  Drain will come out day of discharge.      Tate De León M.D., F.A.C.S.  [facility]  [facility]  [place], [place]

## 2024-06-28 NOTE — CONSULTS
Care Management Initial Consult    General Information  Assessment completed with: Patient, patient  Type of CM/SW Visit: CM Role Introduction    Primary Care Provider verified and updated as needed: Yes   Readmission within the last 30 days: no previous admission in last 30 days      Reason for Consult: discharge planning  Advance Care Planning:            Communication Assessment  Patient's communication style: spoken language (English or Bilingual)    Hearing Difficulty or Deaf: yes   Wear Glasses or Blind: yes    Cognitive  Cognitive/Neuro/Behavioral: WDL  Level of Consciousness: alert  Arousal Level: opens eyes spontaneously  Orientation: oriented x 4  Mood/Behavior: calm, cooperative, behavior appropriate to situation     Speech: clear, spontaneous    Living Environment:   People in home: spouse  Karlee  Current living Arrangements: apartment (lives independently at [facility] no services)      Able to return to prior arrangements: yes       Family/Social Support:  Care provided by: self  Provides care for: no one  Marital Status:   Wife, Children  Karlee       Description of Support System: Supportive, Involved    Support Assessment: Adequate family and caregiver support, Adequate social supports    Current Resources:   Patient receiving home care services: No     Community Resources:    Equipment currently used at home: other (see comments) (CPAP at bedside)  Supplies currently used at home: Oxygen Tubing/Supplies, Other (CPAP)    Employment/Financial:  Employment Status: retired        Financial Concerns: none   Referral to Financial Worker: No       Does the patient's insurance plan have a 3 day qualifying hospital stay waiver?  No    Lifestyle & Psychosocial Needs:  Social Determinants of Health     Food Insecurity: No Food Insecurity (6/9/2023)    [facility]    Food Insecurity     Worried About Running  Out of Food in the Last Year: 1   Depression: Not at risk (12/21/2023)    Received from [facility] [email or web address] [facility]    PHQ-2     PHQ-2 TOTAL SCORE: 0   Housing Stability: Low Risk  (6/9/2023)    Received from [facility], [facility]    Housing Stability     Unable to Pay for Housing in the Last Year: 1   Tobacco Use: Medium Risk (6/27/2024)    Patient History     Smoking Tobacco Use: Former     Smokeless Tobacco Use: Never     Passive Exposure: Not on file   Financial Resource Strain: Low Risk  (6/9/2023)    Received from [facility] [employer] [facility], [facility]    Financial Resource Strain     Difficulty of Paying Living Expenses: 3     Difficulty of Paying Living Expenses: Not on file   Alcohol Use: Not on file   Transportation Needs: No Transportation Needs (6/9/2023)    Received from [facility], [facility]    Transportation Needs     Lack of Transportation (Medical): 1   Physical Activity: Not on file   Interpersonal Safety: Not At Risk (8/11/2023)    [facility] [employer]     Do you feel UNSAFE in any of your personal relationships with your family members or any other acquaintances?: No   Stress: Not on file   Social Connections: Unknown (6/11/2024)    Received from [facility] [employer] [facility] [employer] [facility]    Social Connections     Frequency of Communication with Friends and Family: Not on file   Health Literacy: Not on file       Functional Status:  Prior to admission patient needed assistance: independent without use of assistive equipment             Mental Health Status:          Chemical Dependency Status:                Values/Beliefs:  Spiritual, Cultural Beliefs, Restorationist Practices, Values that affect care:                 Additional  Information:  Writer met with the patient at the bedside. Patient is A+Ox4 up with SBA. Writer explained role and scope of discharge planning.  Prior to this admission patient was independent with all ADL's without the use of assistive equipment.  Patient [place], he was trimming branches and unfortunately had an accident and fell with a bump on his head and Right Eye bruising, he also developed abdominal pain and went to urgent care and found to need lap makayla [facility]  Patient is feeling better, he noted he just ambulated with PT and did a 5 minute walk.  Patient is hopeful to discharge on Sunday pending pain control.  Patient notes he resides independently with his Spouse at [facility] he gets no services.  Patient is hopeful to discharge to home with his Daughter Siri who is local and is an RN. He would like to return to his cabin when he is better.  Patient declined the need for discharge planning services or for this writer to call family with update and recommendations at this time.   Patient  is aware he will most likely require a PCP follow up and Surgery follow up (pending recommendations.) and will schedule his own as he does not have issues with getting into his provider.  Care Transitions will continue to follow for further discharge planning needs as identified.    Addendum: 06/28 15:00  Writer asked to come back as Daughter Siri was present and had questions.  Writer went over PT notes, Surgical notes etc. Daughter is aware of plan they are considering having the patient come to her home for a couple of days, however she has 12 steps in her home. She also asked about driving restrictions, writer noted discharge orders will have any restrictions and that if pain meds are on board there should be no driving.   Daughter asked about DME walker at discharge will place a note to the treatment team. Writer offered to send a referral to [facility] for homecare,  however patient declined at this time.      Aida Wood RN, BSN, ACM   Care Transitions Specialist  [facility]  Care Transitions Specialist  [id number] [id number]  [email or web address]  Office: [phone] Fax: [phone]  [facility]

## 2024-06-28 NOTE — DISCHARGE INSTRUCTIONS
[facility] - [facility]  Discharge Instructions: Post-Operative Cholecystectomy    ACTIVITY  Expect to feel tired after your surgery.  This will gradually resolve.    Take frequent, short walks and increase your activity gradually.    Avoid strenuous physical activity or heavy lifting greater than 15-20 lbs. for 2-3 weeks.  You may climb stairs.  You may drive without restrictions when you are not using any prescription pain medication and feel comfortable in a car.  You may return to work/school when you are comfortable without any prescription pain medication.    WOUND CARE  You may remove your outer dressing or Band-Aids and shower 48 hours after the surgery.  Pat your incisions dry and leave them open to air.  Re-apply dressing (Band-Aids or gauze/tape) as needed for comfort or drainage.  You may have steri-strips (looks like white tape) on your incision.  You may peel off the steri-strips 2 weeks after your surgery if they have not peeled off on their own.  Do not soak your incisions in a tub or pool for 2 weeks.   Do not apply any lotions, creams, or ointments to your incisions.  A ridge under your incisions is normal and will gradually resolve.    DIET  Start with liquids, then gradually resume your regular diet as tolerated.  Avoid heavy, spicy, and greasy meals for 2-3 days.  Drink plenty of fluids to stay hydrated.  It is not uncommon to experience some loose stools or diarrhea after surgery.  This is your body's way of adapting to the bile which will slowly drain into your intestine.  A low fat diet may help with this.  This should improve over 1-2 months.    PAIN  Expect some tenderness and discomfort at the incision sites.  Use the prescribed pain medication at your discretion.  Expect gradual resolution of your pain over several days.  You may take acetaminophen/Tylenol instead of or in addition to your prescribed pain medication.    Do not drink alcohol or drive while you are taking  pain medications.  You may apply ice to your incisions in 20 minute intervals as needed for the next 48 hours.  After that time, consider switching to heat if you prefer.    EXPECTATIONS  Pain medications can cause constipation.  Limit use when possible.  Take an over the counter or prescribed stool softener/stimulant, such as Senna-Docusate, 1-2 times a day with plenty of water.  You may take a mild over the counter laxative, such as Miralax or a Dulcolax suppository, as needed.  You may discontinue these medications once you are having regular bowel movements and/or are no longer taking your narcotic pain medication.    You may have shoulder or upper back discomfort due to the gas used in surgery.  This is temporary and should resolve in 48-72 hours.  Short, frequent walks may help with this.  If you are unable to urinate for 8 hours or feel as though you are not emptying your bladder adequately, we recommend you seek care at an ER or Urgent Care facility for possible catheter placement.     FOLLOW UP  Our office will contact you in approximately 2-3 weeks to check on your progress and answer any questions you may have.  If you are doing well, you will not need to return for a follow up appointment.  If any concerns are identified over the phone, we will help you make an appointment to see a provider.   If you have not received a phone call, have any questions or concerns, or would like to be seen, please call us at [phone] and ask to speak with our nurse.  We are located at [address] [place] [address].    CALL OUR OFFICE -[phone] IF YOU HAVE:   Chills or fever above 101 F.  Increased redness, warmth, or drainage at your incisions.  Significant bleeding.  Pain not relieved by your pain medication or rest.  Increasing pain after the first 48 hours.  Any other concerns or questions.

## 2024-06-28 NOTE — PROGRESS NOTES
1500 - 2330    POD 0 LAP cholecystectomy AND LYSIS OF ADHESIONS  Pt arrived on the unit at 1400  Orientation: A/Ox4  Activity: SBA  Diet/BS Checks:  clear liquids.  IV Access/Drains: PIV inf NS + KCL20 @ 50 ml/hr w/ int abx.  RLQ FELIPA drain  Pain Management: po dilaudid , followed by IV dilaudid  Abnormal VS/Results: VSS on 1L, ex tachypnea; CPAP at night  Bowel/Bladder: Cont B/B  Skin/Wounds: Scattered bruising/scabs; L periorbital bruising; x4 lap sites w/ 1 FELIPA drain- CDI  Consults: GI, Surgery  D/C Disposition: Discharge plan pending

## 2024-06-28 NOTE — PROGRESS NOTES
"   06/28/24 1145   Appointment Info   Signing Clinician's Name / Credentials (PT) Lucie Carlson, PT, DPT   Living Environment   People in Home spouse   Current Living Arrangements apartment   Home Accessibility no concerns   Transportation Anticipated family or friend will provide   Living Environment Comments Pt lives in an apartment with his spouse, reports no CHANTEL or withn the apartment.   Self-Care   Usual Activity Tolerance good   Current Activity Tolerance moderate   Regular Exercise No   Equipment Currently Used at Home none   Fall history within last six months yes   Number of times patient has fallen within last six months 1   Activity/Exercise/Self-Care Comment Pt is typically IND without an AD at baseline, does not own any assistive devices at this time   General Information   Onset of Illness/Injury or Date of Surgery 06/25/24   Referring Physician Kaila Weiss MD   Patient/Family Therapy Goals Statement (PT) \"To go back home\"   Pertinent History of Current Problem (include personal factors and/or comorbidities that impact the POC) Pt is a 78 year old male with choledocholithiasis and acute cholecystitis. 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      POD#1 S/P Robotic assisted laparoscopic cholecystectomy.   Existing Precautions/Restrictions fall;abdominal   Weight-Bearing Status - LUE full weight-bearing   Weight-Bearing Status - RUE full weight-bearing   Weight-Bearing Status - LLE full weight-bearing   Weight-Bearing Status - RLE full weight-bearing   Cognition   Affect/Mental Status (Cognition) WFL   Orientation Status (Cognition) oriented x 4   Follows Commands (Cognition) WFL   Pain Assessment   Patient Currently in Pain Yes, see Vital Sign flowsheet  (Abdominal pain)   Integumentary/Edema   Integumentary/Edema other (describe)   Integumentary/Edema Comments Pt has FELIPA " drain present   Posture    Posture Forward head position;Protracted shoulders   Range of Motion (ROM)   Range of Motion ROM deficits secondary to surgical procedure;ROM deficits secondary to pain;ROM deficits secondary to weakness   Strength (Manual Muscle Testing)   Strength (Manual Muscle Testing) Able to perform R SLR;Able to perform L SLR;Deficits observed during functional mobility   Bed Mobility   Comment, (Bed Mobility) Pt approached sitting up in recliner, unable to assess at this time   Transfers   Comment, (Transfers) Sit>stand completed w/ SBA   Gait/Stairs (Locomotion)   Comment, (Gait/Stairs) Pt ambulated w/ SBA and FWW   Balance   Balance other (describe)   Balance Comments Pt demonstrated good sitting balance, required FWW for all dynamic balance   Sensory Examination   Sensory Perception patient reports no sensory changes   Coordination   Coordination no deficits were identified   Muscle Tone   Muscle Tone no deficits were identified   Clinical Impression   Criteria for Skilled Therapeutic Intervention Yes, treatment indicated   PT Diagnosis (PT) Impaired functional mobility   Influenced by the following impairments Increased pain, impaired activity tolerance, impaired balance   Functional limitations due to impairments Impaired gait and inability to complete ADL's   Clinical Presentation (PT Evaluation Complexity) stable   Clinical Presentation Rationale Clinical judgment   Clinical Decision Making (Complexity) low complexity   Planned Therapy Interventions (PT) balance training;bed mobility training;gait training;home exercise program;motor coordination training;neuromuscular re-education;patient/family education;postural re-education;ROM (range of motion);strengthening;stretching;transfer training;progressive activity/exercise;risk factor education;home program guidelines   Risk & Benefits of therapy have been explained evaluation/treatment results reviewed;care plan/treatment goals  reviewed;risks/benefits reviewed;current/potential barriers reviewed;participants voiced agreement with care plan;participants included;patient   PT Total Evaluation Time   PT Eval, Low Complexity Minutes (21446) 10   Physical Therapy Goals   PT Frequency Daily   PT Predicted Duration/Target Date for Goal Attainment 07/02/24   PT Goals Bed Mobility;Transfers;Gait   PT: Bed Mobility Independent;Supine to/from sit;Bridging;Rolling;Within precautions   PT: Transfers Modified independent;Sit to/from stand;Assistive device;Bed to/from chair;Within precautions   PT: Gait Modified independent;Rolling walker;Greater than 200 feet;Within precautions   Interventions   Interventions Quick Adds Gait Training;Therapeutic Activity   Therapeutic Activity   Therapeutic Activities: dynamic activities to improve functional performance Minutes (03360) 5   Treatment Detail/Skilled Intervention Evaluation comlpeted and treatment indicated. Sit>stand completed w/ SBA. VS monitored throughout session, remained stable > 92% pre and post-ambulation. Pt educated on discharge planning, using FWW, and walking program in hospital at this time. Pt left sitting up in recliner with all needs in reach, RN notified..   Gait Training   Gait Training Minutes (70112) 10   Symptoms Noted During/After Treatment (Gait Training) increased pain   Treatment Detail/Skilled Intervention Pt ambulated w/ FWW and SBA for ~150 ft, cues given for upright posture and pacing throughout. Pt then ambulated without an AD and CGA, pt demonstrated shorter step length and slower gait speed, cued for increased step length. No over LOB occurred.   Distance in Feet ~150 ft   [place] Level (Gait Training) stand-by assist   PT Discharge Planning   PT Plan Progress ambulation distance w/ FWW vs. without FWW, repeated sit>stands, assess bed mobility   PT Discharge Recommendation (DC Rec) home with assist   PT Rationale for DC Rec Pt is moving slightly below baseline,  currently requiring FWW for all functional mobility at this time due to increased abdominal pain and impaired activity tolerance. Anticipate with further ambulation w/ nursing and continued IP PT that pt will be safe to return home with assist of spouse and usage of FWW for all functioanl mobility. Pt may benefit from HCPT upon discahrge pending progress, will update recommendations as needed.   PT Brief overview of current status SBA w/ FWW for all functional mobility   PT Equipment Needed at Discharge walker, rolling   Total Session Time   Timed Code Treatment Minutes 15   Total Session Time (sum of timed and untimed services) 25

## 2024-06-28 NOTE — PROGRESS NOTES
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   He was seen at urgent care in [place] on 6/25.  Labs showed leukocytosis of 14, elevated CRP of 16, elevated LFTs with alkaline phosphatase 212, , , total bilirubin 2.8.   CT A/P was showed acute cholecystitis with distal choledocholithiasis.   There were no beds available at [facility], thus patient drove and presented to [facility] ER.       Acute gangrene cholecystitis with cholelithiasis and choledocholithiasis  Enterobacter and Klebsiella pneumonia bacteremia: 2 out of 2 bottles positive  Status post ERCP and EUS on 06/26/24:   S/p biliary sphincterotomy and balloon extraction on 06/26/2024 with temporary stent placement:  S/p robotic assisted laparoscopic cholecystectomy without cholangiogram and lysis of adhesions.    Has had abdominal pain for 4 days.  LFTs abnormal-alkaline phosphatase 205, , AST 84, total bilirubin 2.5  CT A/P obtained at urgent care showed cholelithiasis with acute cholecystitis and choledocholithiasis  Currently afebrile but has mild leukocytosis  Blood cultures from admission 2 out of 2 growing Enterobacter and Klebsiella pneumonia    -- Patient has been admitted for further evaluation and management.  -- IV Zosyn was started on admission , considering patient is growing Enterobacter and Klebsiella pneumonia, changed him to IV cefepime 2 g every 8 hours and metronidazole 500 mg  p.o. 3 times daily  -- Monitoring patient closely, patient thinks that he might have developed rash with Flagyl in the past, discussed with patient   --Status post ERCP and EUS on 06/26/24 , was found to have choledocholithiasis, complete removal was accomplished by biliary sphincterotomy and balloon extraction  -- Temporary stent was placed in the CBD, repeat ERCP in 3 months to remove the stent  -- Surgery has been consulted, patient underwent robotic assisted laparoscopic cholecystectomy on 06/27/2024 with adhesion lysis.  -- Patient was found to have gangrene as cholecystitis and choledocholithiasis.  -- Repeat blood cultures on 06/28/2024, so far blood cultures are negative from 06/27.  -- Will discontinue IV fluids  -- Has hypoactive bowel sounds on examination.  -- Surgery is advancing diet to full liquid and then regular diet  -- Surgery is okay with placing patient on DVT prophylaxis will start patient on Lovenox 40 mg subcu daily.  --ALT is improved to 92, AST is 49, bilirubin is improved to 1.2.  WBC is improved to 11.6.     Hypovolemic hyponatremia with Sodium 130 on presentation     -- BMP reviewed from this morning, sodium is 138, potassium is 3.7.      Hypokalemia  --Replace potassium per protocol.  Also add 20 mg KCl to IV fluids  --Improved , continue to monitor     Dehydration:  --Secondary to poor oral intake for last 4-5 days due to acute cholecystitis  -- Patient has received IV fluid resuscitation since admission as patient has been a started on diet, will discontinue IV fluids  --.  To hold IV diuretics today.     Benign essential hypertension  PTA on amlodipine 10 mg, carvedilol 12.5 mg, chlorthalidone 25 mg, lisinopril 20 mg twice daily    -- Continue PTA amlodipine and carvedilol  -- Continue to Hold lisinopril and chlorthalidone for now      Hyperlipidemia    --Continue atorvastatin 20 mg     Diabetes mellitus type 2, diet controlled    --Not currently on medication  --Continue sliding  "scale insulin  -- HgbA1c came back at 5.9    Severe malnutrition in context of acute illness or injury: Been evaluated by RD on 06/26    -- Currently NPO. for procedure.  -- Appreciate RD following      Recent mechanical fall  Right scalp hematoma  Right periorbital hematoma  Patient fell 5 days before admission while cutting a tree branch.  He has right scalp hematoma and right periorbital hematoma.  He did not seek medical care at that time    -Since his injuries are improving and he has no new symptoms, continue to monitor, CT head without contrast completed , no signs of intracranial bleed      CARMITA  -- continue CPAP as able     H/o asbestosis exposure  -- noted     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, 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  Stage II GIST of jejunum, s/p exploratory laparotomy with jejunal resection 2/2022    -- follows with [facility] oncology  -- no recurrence or mets      Diet: Clear Liquid Diet    DVT Prophylaxis: Pneumatic Compression Devices and Ambulate every shift, holding chemical DVT prophylaxis till patient has completed his CT scan of the head.  Kelly Catheter: Not present  Lines: None     Cardiac Monitoring: None  Code Status: Full Code      Clinically Significant Risk Factors              # Hypoalbuminemia: Lowest albumin = 3.1 g/dL at 6/27/2024  5:31 AM, will monitor as appropriate     # Hypertension: Noted on problem list            # Overweight: Estimated body mass index is 28.73 kg/m  as calculated from the following:    Height as of this encounter: 1.803 m (5' 11\").    Weight as of this encounter: 93.4 kg (206 lb)., PRESENT ON ADMISSION  # Severe Malnutrition: based on nutrition assessment, PRESENT ON ADMISSION        Disposition Plan     Medically Ready for Discharge: Anticipated in 2-4 Days  And has been evaluated by physical therapy has been recommended to be discharged home.  The earliest patient might be ready for discharge is 06/30/2024.      Kaila Weiss MD  Hospitalist Service  M " [facility]  Securely message with [employer] (more info)  Text page via [facility] Paging/Directory   ______________________________________________________________________    Interval History     Patient care was assumed this morning, patient was seen and examined.  Patient is aware that he was found to have gangrenous cholecystitis.  His repeat blood culture from 06/27/2024 remains negative.  He is tolerating clears, still has quite some discomfort in abdomen, encouraged him to take pain medications but then also increase his mobilization.  Discussed with him that diet will be advanced by surgery.    Physical Exam   Vital Signs: Temp: 98.4  F (36.9  C) Temp src: Oral BP: 133/82 Pulse: 73   Resp: 19 SpO2: 95 % O2 Device: Nasal cannula Oxygen Delivery: 1 LPM  Weight: 206 lbs 0 oz    Physical Exam  Vitals and nursing note reviewed.   Constitutional:       Appearance: He is well-developed.   HENT:      Head: Normocephalic and atraumatic.   Eyes:      Pupils: Pupils are equal, round, and reactive to light.   Neck:      Thyroid: No thyromegaly.   Cardiovascular:      Rate and Rhythm: Normal rate and regular rhythm.      Heart sounds: Normal heart sounds.   Pulmonary:      Effort: Pulmonary effort is normal. No respiratory distress.      Breath sounds: Normal breath sounds.   Abdominal:      General: Bowel sounds are normal. There is no distension.      Palpations: Abdomen is soft.      Comments: Surgical site looks, Hypoactive bowel sounds   Musculoskeletal:         General: No tenderness. Normal range of motion.      Cervical back: Normal range of motion and neck supple.   Skin:     General: Skin is warm and dry.      Comments: Periorbital ecchymosis, right-sided skull laceration   Neurological:      Mental Status: He is alert and oriented to person, place, and time.   Psychiatric:         Behavior: Behavior normal.       Medical Decision Making       52 MINUTES SPENT BY ME on the date of service doing  chart review, history, exam, documentation & further activities per the note.      Data     I have personally reviewed the following data over the past 24 hrs:    Procal: N/A CRP: N/A Lactic Acid: N/A         Imaging results reviewed over the past 24 hrs:   No results found for this or any previous visit (from the past 24 hour(s)).    Recent Labs   Lab 06/28/24  0359 06/27/24  2355 06/27/24  1951 06/27/24  0832 06/27/24  0531 06/26/24  1123 06/26/24  0904 06/26/24  0302 06/25/24  2103   WBC  --   --   --   --  17.1*  --  12.1*  --  12.6*   HGB  --   --   --   --  13.1*  --  12.3*  --  15.1   MCV  --   --   --   --  96  --  95  --  95   PLT  --   --   --   --  178  --  156  --  181   INR  --   --   --   --   --   --   --   --  1.05   NA  --   --   --   --  136  --  135  --  130*   POTASSIUM  --   --   --   --  3.8  --  4.1  4.1  --  3.0*   CHLORIDE  --   --   --   --  98  --  98  --  88*   CO2  --   --   --   --  26  --  28  --  30*   BUN  --   --   --   --  14.8  --  16.3  --  15.1   CR  --   --   --   --  0.75  --  1.08  --  0.95   ANIONGAP  --   --   --   --  12  --  9  --  12   PAMELA  --   --   --   --  9.1  --  8.8  --  9.8   * 148* 127*   < > 148*   < > 109*   < > 115*   ALBUMIN  --   --   --   --  3.1*  --  3.2*  --  4.1   PROTTOTAL  --   --   --   --  6.8  --  6.3*  --  7.8   BILITOTAL  --   --   --   --  1.5*  --  1.8*  --  2.5*   ALKPHOS  --   --   --   --  174*  --  162*  --  205*   ALT  --   --   --   --  129*  --  159*  --  252*   AST  --   --   --   --  38  --  46*  --  84*   LIPASE  --   --   --   --   --   --   --   --  42    < > = values in this interval not displayed.

## 2024-06-28 NOTE — PLAN OF CARE
6/28/2024 -0700-1930    Summary: presented to ER on 6/25/2024 with 4-day history of upper abdominal pain and nausea.     Primary Diagnosis: Acute cholecystitis with cholelithiasis and choledocholithiasis  Enterobacter and Klebsiella pneumonia bacteremia: Status post ERCP and EUS on 06/26/24: S/p biliary sphincterotomy and balloon extraction on 06/26/2024 with temporary stent placement      Orientation: A/Ox4, [place]    Aggression Stop Light: Green    Activity: AX1 GB/W    Diet/BS Checks: Full liquids, BG-ACHS,     Tele:  N/A    IV Access/Drains: R PIV SL w/ int abx.  RLQ FELIPA drain    Pain Management: PRN Dilaudid    Abnormal VS/Results: VSS on RA,  CPAP at night, Temp-100.6    Bowel/Bladder: Cont B/B.voiding in urinal    Skin/Wounds: Scattered bruising/scabs on R face and scalp w/ localized edema; R periorbital bruising; and R lower abd bruising, 4 lap sites w/ RLQ FELIPA drain- CDI    Consults: GI, Surgery, PT, OT, SW    D/C Disposition: TBD- to home    Other Info: On K replacement protocol-AM draw.  Pt wants to stay on full liquid today and advance to reg tomorrow. PRN Miralax given for constipation. Using CPAP whi;e asleep.

## 2024-06-29 ENCOUNTER — APPOINTMENT (OUTPATIENT)
Dept: PHYSICAL THERAPY | Facility: CLINIC | Age: 79
DRG: 417 | End: 2024-06-29
Payer: COMMERCIAL

## 2024-06-29 LAB
ALBUMIN SERPL BCG-MCNC: 2.7 G/DL (ref 3.5–5.2)
ALP SERPL-CCNC: 125 U/L (ref 40–150)
ALT SERPL W P-5'-P-CCNC: 71 U/L (ref 0–70)
AST SERPL W P-5'-P-CCNC: 41 U/L (ref 0–45)
BACTERIA BLD CULT: ABNORMAL
BILIRUB DIRECT SERPL-MCNC: 0.51 MG/DL (ref 0–0.3)
BILIRUB SERPL-MCNC: 1.1 MG/DL
ERYTHROCYTE [DISTWIDTH] IN BLOOD BY AUTOMATED COUNT: 13 % (ref 10–15)
GLUCOSE BLDC GLUCOMTR-MCNC: 114 MG/DL (ref 70–99)
GLUCOSE BLDC GLUCOMTR-MCNC: 122 MG/DL (ref 70–99)
GLUCOSE BLDC GLUCOMTR-MCNC: 126 MG/DL (ref 70–99)
GLUCOSE BLDC GLUCOMTR-MCNC: 127 MG/DL (ref 70–99)
GLUCOSE BLDC GLUCOMTR-MCNC: 128 MG/DL (ref 70–99)
GLUCOSE BLDC GLUCOMTR-MCNC: 165 MG/DL (ref 70–99)
HCT VFR BLD AUTO: 37.1 % (ref 40–53)
HGB BLD-MCNC: 12.4 G/DL (ref 13.3–17.7)
LACTATE SERPL-SCNC: 1.1 MMOL/L (ref 0.7–2)
MCH RBC QN AUTO: 32.6 PG (ref 26.5–33)
MCHC RBC AUTO-ENTMCNC: 33.4 G/DL (ref 31.5–36.5)
MCV RBC AUTO: 98 FL (ref 78–100)
PLATELET # BLD AUTO: 275 10E3/UL (ref 150–450)
POTASSIUM SERPL-SCNC: 3.7 MMOL/L (ref 3.4–5.3)
PROT SERPL-MCNC: 6.3 G/DL (ref 6.4–8.3)
RBC # BLD AUTO: 3.8 10E6/UL (ref 4.4–5.9)
WBC # BLD AUTO: 14.7 10E3/UL (ref 4–11)

## 2024-06-29 PROCEDURE — 36415 COLL VENOUS BLD VENIPUNCTURE: CPT | Performed by: INTERNAL MEDICINE

## 2024-06-29 PROCEDURE — 97530 THERAPEUTIC ACTIVITIES: CPT | Mod: GP

## 2024-06-29 PROCEDURE — 250N000013 HC RX MED GY IP 250 OP 250 PS 637: Performed by: PHYSICIAN ASSISTANT

## 2024-06-29 PROCEDURE — 99233 SBSQ HOSP IP/OBS HIGH 50: CPT | Performed by: INTERNAL MEDICINE

## 2024-06-29 PROCEDURE — 84132 ASSAY OF SERUM POTASSIUM: CPT | Performed by: INTERNAL MEDICINE

## 2024-06-29 PROCEDURE — 80076 HEPATIC FUNCTION PANEL: CPT | Performed by: INTERNAL MEDICINE

## 2024-06-29 PROCEDURE — 85027 COMPLETE CBC AUTOMATED: CPT | Performed by: INTERNAL MEDICINE

## 2024-06-29 PROCEDURE — 250N000011 HC RX IP 250 OP 636: Performed by: PHYSICIAN ASSISTANT

## 2024-06-29 PROCEDURE — 120N000001 HC R&B MED SURG/OB

## 2024-06-29 PROCEDURE — 250N000011 HC RX IP 250 OP 636: Performed by: INTERNAL MEDICINE

## 2024-06-29 PROCEDURE — 83605 ASSAY OF LACTIC ACID: CPT | Performed by: INTERNAL MEDICINE

## 2024-06-29 PROCEDURE — 97116 GAIT TRAINING THERAPY: CPT | Mod: GP

## 2024-06-29 RX ORDER — BISACODYL 10 MG
10 SUPPOSITORY, RECTAL RECTAL ONCE
Status: COMPLETED | OUTPATIENT
Start: 2024-06-29 | End: 2024-06-29

## 2024-06-29 RX ORDER — NICOTINE POLACRILEX 4 MG
15-30 LOZENGE BUCCAL
Status: DISCONTINUED | OUTPATIENT
Start: 2024-06-29 | End: 2024-06-30

## 2024-06-29 RX ORDER — DEXTROSE MONOHYDRATE 25 G/50ML
25-50 INJECTION, SOLUTION INTRAVENOUS
Status: DISCONTINUED | OUTPATIENT
Start: 2024-06-29 | End: 2024-06-30

## 2024-06-29 RX ADMIN — SENNOSIDES AND DOCUSATE SODIUM 2 TABLET: 50; 8.6 TABLET ORAL at 20:12

## 2024-06-29 RX ADMIN — LISINOPRIL 20 MG: 20 TABLET ORAL at 20:12

## 2024-06-29 RX ADMIN — ACETAMINOPHEN 650 MG: 325 TABLET, FILM COATED ORAL at 03:16

## 2024-06-29 RX ADMIN — SENNOSIDES AND DOCUSATE SODIUM 2 TABLET: 50; 8.6 TABLET ORAL at 08:57

## 2024-06-29 RX ADMIN — HYDROMORPHONE HYDROCHLORIDE 4 MG: 2 TABLET ORAL at 05:08

## 2024-06-29 RX ADMIN — LISINOPRIL 20 MG: 20 TABLET ORAL at 08:58

## 2024-06-29 RX ADMIN — CARVEDILOL 12.5 MG: 12.5 TABLET, FILM COATED ORAL at 18:23

## 2024-06-29 RX ADMIN — ENOXAPARIN SODIUM 40 MG: 40 INJECTION SUBCUTANEOUS at 18:23

## 2024-06-29 RX ADMIN — CEFEPIME 2 G: 2 INJECTION, POWDER, FOR SOLUTION INTRAVENOUS at 07:01

## 2024-06-29 RX ADMIN — CARVEDILOL 12.5 MG: 12.5 TABLET, FILM COATED ORAL at 08:58

## 2024-06-29 RX ADMIN — HYDROMORPHONE HYDROCHLORIDE 4 MG: 2 TABLET ORAL at 16:18

## 2024-06-29 RX ADMIN — POLYETHYLENE GLYCOL 3350 17 G: 17 POWDER, FOR SOLUTION ORAL at 09:08

## 2024-06-29 RX ADMIN — METRONIDAZOLE 500 MG: 500 TABLET ORAL at 16:18

## 2024-06-29 RX ADMIN — HYDROMORPHONE HYDROCHLORIDE 4 MG: 2 TABLET ORAL at 00:30

## 2024-06-29 RX ADMIN — PANTOPRAZOLE SODIUM 40 MG: 40 INJECTION, POWDER, FOR SOLUTION INTRAVENOUS at 08:57

## 2024-06-29 RX ADMIN — HYDROMORPHONE HYDROCHLORIDE 0.3 MG: 1 INJECTION, SOLUTION INTRAMUSCULAR; INTRAVENOUS; SUBCUTANEOUS at 20:12

## 2024-06-29 RX ADMIN — METRONIDAZOLE 500 MG: 500 TABLET ORAL at 08:58

## 2024-06-29 RX ADMIN — CEFEPIME 2 G: 2 INJECTION, POWDER, FOR SOLUTION INTRAVENOUS at 16:20

## 2024-06-29 RX ADMIN — HYDROMORPHONE HYDROCHLORIDE 4 MG: 2 TABLET ORAL at 08:58

## 2024-06-29 RX ADMIN — ATORVASTATIN CALCIUM 20 MG: 10 TABLET, FILM COATED ORAL at 08:57

## 2024-06-29 RX ADMIN — AMLODIPINE BESYLATE 10 MG: 10 TABLET ORAL at 08:57

## 2024-06-29 RX ADMIN — BISACODYL 10 MG: 10 SUPPOSITORY RECTAL at 14:10

## 2024-06-29 ASSESSMENT — ACTIVITIES OF DAILY LIVING (ADL)
ADLS_ACUITY_SCORE: 34
ADLS_ACUITY_SCORE: 34
ADLS_ACUITY_SCORE: 30
ADLS_ACUITY_SCORE: 30
ADLS_ACUITY_SCORE: 34
ADLS_ACUITY_SCORE: 30
ADLS_ACUITY_SCORE: 34
ADLS_ACUITY_SCORE: 30
ADLS_ACUITY_SCORE: 34
ADLS_ACUITY_SCORE: 34
ADLS_ACUITY_SCORE: 30

## 2024-06-29 NOTE — PLAN OF CARE
6/29/2024 -0700-1930    Summary: presented to ER on 6/25/2024 with 4-day history of upper abdominal pain and nausea.     Primary Diagnosis: Acute cholecystitis with cholelithiasis and choledocholithiasis  Enterobacter and Klebsiella pneumonia bacteremia: Status post ERCP and EUS on 06/26/24: S/p biliary sphincterotomy and balloon extraction on 06/26/2024 with temporary stent placement    Orientation: A/Ox4, [place]    Aggression Stop Light: Green    Activity: AX1 GB/W    Diet/BS Checks: Reg, BG-ACHS,     Tele:  N/A    IV Access/Drains: R PIV SL w/ int abx.  RLQ FELIPA drain    Pain Management: PRN Dilaudid    Abnormal VS/Results: VSS on RA,  CPAP at night,     Bowel/Bladder: Cont B/B.voiding in urinal, had BM today    Skin/Wounds: Scattered bruising/scabs on R face and scalp w/ localized edema; R periorbital bruising; and R lower abd bruising, 4 lap sites w/ RLQ FELIPA drain- CDI    Consults: GI, Surgery, PT, OT, SW    D/C Disposition: TBD- to home    Other Info: On K replacement protocol-AM draw. Dulcolax suppository and PRN Miralax given for constipation-effective. Pt ambulated 4 times today, encouraging IS. Using CPAP at night or while asleep. Pt requested PRN Dilaudid at 2020, NOC nurse updated. Abdomen still rigid and distended.

## 2024-06-29 NOTE — PROGRESS NOTES
"Surgery    No complaints.  Feeling well today and much better after bm this morning  Pain much less today.  Tolerating reg diet. Diminished appetite but feels he should eat more.  Walking - 3 times this am.  Denies CP, dyspnea    Gen:  Awake, Alert, NAD  Tm 100.6  /80 (BP Location: Right arm)   Pulse 76   Temp 98  F (36.7  C) (Oral)   Resp 18   Ht 1.803 m (5' 11\")   Wt 94.8 kg (208 lb 14.4 oz)   SpO2 92%   BMI 29.14 kg/m    Resp - Non-labored, clear to ascultation.    Cardiac - Regular rate & rhythm without murmur  Abdomen - distended, fairly firm. minimally tender. Incisions healing appropriately without erythema or drainage.  Extremities - no lower extremity edema or tenderness with palpation    Wbc 14.7 (11.6<-17)  Hgb 12.4    Drain 80cc output serosanguinous thin.    A/P   Jairo Austin is a 78 year old male with choledocholithiasis and acute cholecystitis. 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      S/P Robotic assisted laparoscopic cholecystectomy on 6/27/24.    Diagnosis: Gangrenous Cholecystitis and Choledocholithiasis    - continue drain for now but plan to remove prior to discharge   - increase liquids, limit solid food until abdomen is softer.  - bowel regimen: dulcolax ordered. pericolace and miralax given. Be aggressive to decompress abdomen.  - dilaudid and apap given for pain, wean narcotics   - continue cefipime and flagyl  - lovenox, ambulate for dvt ppx  - encourage incentive spirometer use  - continue current cares and monitoring.    Cristopher Gonzalez PA-C  Office: [phone]  Pager: [phone]    "

## 2024-06-29 NOTE — PROGRESS NOTES
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   He was seen at urgent care in [place] on 6/25.  Labs showed leukocytosis of 14, elevated CRP of 16, elevated LFTs with alkaline phosphatase 212, , , total bilirubin 2.8.   CT A/P was showed acute cholecystitis with distal choledocholithiasis.   There were no beds available at [facility], thus patient drove and presented to [facility] ER.     Acute gangrene cholecystitis with cholelithiasis and choledocholithiasis  Enterobacter and Klebsiella pneumonia bacteremia: 2 out of 2 bottles positive  Status post ERCP and EUS on 06/26/24:   S/p biliary sphincterotomy and balloon extraction on 06/26/2024 with temporary stent placement:  S/p robotic assisted laparoscopic cholecystectomy without cholangiogram and lysis of adhesions.    Has had abdominal pain for 4 days.  LFTs abnormal-alkaline phosphatase 205, , AST 84, total bilirubin 2.5  CT A/P obtained at urgent care showed cholelithiasis with acute cholecystitis and choledocholithiasis  Currently afebrile but has mild leukocytosis  Blood cultures from admission 2 out of 2 growing Enterobacter and Klebsiella pneumonia  Status post ERCP and EUS on 06/26/24 , was found to have choledocholithiasis, complete removal was accomplished by biliary sphincterotomy and balloon extraction  Patient underwent robotic assisted laparoscopic cholecystectomy without cholangiogram and  lysis of adhesion and was found to have acute gangrenous cholecystitis.  Follow-up blood cultures so far remain negative  Patient has been receiving IV cefepime with oral metronidazole for coverage of Enterobacter and Klebsiella pneumonia.  Abdomen remains slightly distended, no bowel movement so far postoperatively.      -- Continue to monitor patient closely.  -- Diet is advanced per surgery, so far patient has been tolerating full liquid diet.  -- Abdomen remains distended, patient thinks that he is passing some gas, no bowel movement.  -- Patient has walked multiple times yesterday, encouraged him to continue with increasing his mobilization.  -- Allergy history was reviewed with development of rash with Flagyl in past, so far patient is tolerating IV cefepime and oral metronidazole well.  --Continue IV cefepime 2 g every 8 hours and metronidazole 500 mg p.o. 3 times daily  -- Got Temporary stent was placed in the CBD, repeat ERCP in 3 months to remove the stent  -- Follow-up on blood cultures result.  --Continue senna and MiraLAX, okay to add Dulcolax suppository if okay with surgery team.  -- Continue patient on Lovenox 40 mg subcu daily  -- Labs reviewed from this morning, bilirubin is improved  -- Plan to remove drain before discharge, either tomorrow or Monday morning.     Hypovolemic hyponatremia with Sodium 130 on presentation   -- BMP reviewed from this morning, sodium is 138, potassium is 3.7.      Hypokalemia  --Replace potassium per protocol. Also add 20 mg KCl to IV fluids  --Improved , continue to monitor     Dehydration:  -- Secondary to poor oral intake for last 4-5 days due to acute cholecystitis  -- Patient has received IV fluid resuscitation on admission as patient has been started on diet, IV fluids are discontinued on 06/28/2024  -- To hold IV diuretics today.     Benign essential hypertension  PTA on amlodipine 10 mg, carvedilol 12.5 mg, chlorthalidone 25 mg, lisinopril 20 mg twice  daily    -- Continue PTA amlodipine and carvedilol  -- Continue to Hold lisinopril and chlorthalidone for now   --Will evaluate if patient can be started back on PTA lisinopril tomorrow     Hyperlipidemia    --Continue atorvastatin 20 mg     Diabetes mellitus type 2, diet controlled    --Not currently on medication  --Continue sliding scale insulin  -- HgbA1c came back at 5.9  -- Will change Accu-Cheks to before every meal and at bedtime, will switch insulin from every 4 hours to medium dose sliding scale  -- If patient is not requiring significant amount of insulin, will discontinue insulin check tomorrow 06/30/2024.    Severe malnutrition in context of acute illness or injury: Been evaluated by RD on 06/26  -- Appreciate RD following  --Patient has been advanced to regular diet      Recent mechanical fall  Right scalp hematoma  Right periorbital hematoma  Patient fell 5 days before admission while cutting a tree branch.  He has right scalp hematoma and right periorbital hematoma.  He did not seek medical care at that time    -Since his injuries are improving and he has no new symptoms, continue to monitor, CT head without contrast completed , no signs of intracranial bleed      CARMITA  -- continue CPAP as able     H/o asbestosis exposure  -- noted     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, 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  Stage II GIST of jejunum, s/p exploratory laparotomy with jejunal resection 2/2022    -- follows with [facility] oncology  -- no recurrence or mets      Diet: Regular Diet Adult    DVT Prophylaxis: Pneumatic Compression Devices and Ambulate every shift, holding chemical DVT prophylaxis till patient has completed his CT scan of the head.  Kelly Catheter: Not present  Lines: None     Cardiac Monitoring: None  Code Status: Full Code      Clinically Significant Risk Factors              # Hypoalbuminemia: Lowest albumin = 3.1 g/dL at 6/28/2024  8:48 AM, will monitor as appropriate     # Hypertension: Noted on  "problem list            # Overweight: Estimated body mass index is 29.14 kg/m  as calculated from the following:    Height as of this encounter: 1.803 m (5' 11\").    Weight as of this encounter: 94.8 kg (208 lb 14.4 oz)., PRESENT ON ADMISSION  # Severe Malnutrition: based on nutrition assessment, PRESENT ON ADMISSION   # Financial/Environmental Concerns: none       Disposition Plan     Medically Ready for Discharge: Anticipated in 2-4 Days  And has been evaluated by physical therapy has been recommended to be discharged home.  The earliest patient might be ready for discharge is 06/30/2024.  Discussed with patient regarding consideration for home health care on discharge, patient seems agreeable this morning.      Kaila Weiss MD  Hospitalist Service  [facility]  Securely message with [employer] info)  Text page via "[employer] Paging/Directory   ______________________________________________________________________    Interval History     Patient was seen and examined, sitting in chair, looks clinically better as compared to yesterday.  His abdomen is slightly distended, he thinks that he might be passing some gas no bowel movement yet he has increased his mobilization and was able to walk couple of times yesterday.  I also encouraged him to consider him home health care on discharge    Physical Exam   Vital Signs: Temp: 98.4  F (36.9  C) Temp src: Oral BP: 123/76 Pulse: 78   Resp: 18 SpO2: 92 % O2 Device: BiPAP/CPAP Oxygen Delivery: 1 LPM  Weight: 208 lbs 14.4 oz    Physical Exam  Vitals and nursing note reviewed.   Constitutional:       Appearance: He is well-developed.   HENT:      Head: Normocephalic and atraumatic.   Eyes:      Pupils: Pupils are equal, round, and reactive to light.   Neck:      Thyroid: No thyromegaly.   Cardiovascular:      Rate and Rhythm: Normal rate and regular rhythm.      Heart sounds: Normal heart sounds.   Pulmonary:      Effort: Pulmonary effort is normal. No " respiratory distress.      Breath sounds: Normal breath sounds.   Abdominal:      General: Bowel sounds are normal. There is distension.      Palpations: Abdomen is soft.      Comments: Surgical site looks, Hypoactive bowel sounds, surgical drain present.   Musculoskeletal:         General: No tenderness. Normal range of motion.      Cervical back: Normal range of motion and neck supple.   Skin:     General: Skin is warm and dry.      Comments: Periorbital ecchymosis, right-sided skull laceration   Neurological:      Mental Status: He is alert and oriented to person, place, and time.   Psychiatric:         Behavior: Behavior normal.       Medical Decision Making       54 MINUTES SPENT BY ME on the date of service doing chart review, history, exam, documentation & further activities per the note.      Data     I have personally reviewed the following data over the past 24 hrs:    11.6 (H)  \   13.5   / 243     138 101 20.6 /  126 (H)   3.7 26 0.89 \     ALT: 92 (H) AST: 49 (H) AP: 141 TBILI: 1.2   ALB: 3.1 (L) TOT PROTEIN: 6.7 LIPASE: N/A       Imaging results reviewed over the past 24 hrs:   No results found for this or any previous visit (from the past 24 hour(s)).    Recent Labs   Lab 06/29/24  0547 06/29/24  0200 06/28/24  2121 06/28/24  1159 06/28/24  0848 06/27/24  0832 06/27/24  0531 06/26/24  1123 06/26/24  0904 06/26/24  0302 06/25/24  2103   WBC  --   --   --   --  11.6*  --  17.1*  --  12.1*  --  12.6*   HGB  --   --   --   --  13.5  --  13.1*  --  12.3*  --  15.1   MCV  --   --   --   --  99  --  96  --  95  --  95   PLT  --   --   --   --  243  --  178  --  156  --  181   INR  --   --   --   --   --   --   --   --   --   --  1.05   NA  --   --   --   --  138  --  136  --  135  --  130*   POTASSIUM  --   --   --   --  3.7  --  3.8  --  4.1  4.1  --  3.0*   CHLORIDE  --   --   --   --  101  --  98  --  98  --  88*   CO2  --   --   --   --  26  --  26  --  28  --  30*   BUN  --   --   --   --  20.6  --   14.8  --  16.3  --  15.1   CR  --   --   --   --  0.89  --  0.75  --  1.08  --  0.95   ANIONGAP  --   --   --   --  11  --  12  --  9  --  12   PAMELA  --   --   --   --  8.9  --  9.1  --  8.8  --  9.8   * 128* 170*   < > 119*   < > 148*   < > 109*   < > 115*   ALBUMIN  --   --   --   --  3.1*  --  3.1*  --  3.2*  --  4.1   PROTTOTAL  --   --   --   --  6.7  --  6.8  --  6.3*  --  7.8   BILITOTAL  --   --   --   --  1.2  --  1.5*  --  1.8*  --  2.5*   ALKPHOS  --   --   --   --  141  --  174*  --  162*  --  205*   ALT  --   --   --   --  92*  --  129*  --  159*  --  252*   AST  --   --   --   --  49*  --  38  --  46*  --  84*   LIPASE  --   --   --   --   --   --   --   --   --   --  42    < > = values in this interval not displayed.

## 2024-06-29 NOTE — PLAN OF CARE
Goal Outcome Evaluation:  6/28/24  [id number]      Orientation: A/Ox4  Aggression Stop Light: green  Activity: A1GBW  Diet/BS Checks: Adv as tolerated on Full liq, BGq4 with sliding scale-170/128  Tele:  none  IV Access/Drains: R PIV SL int Abx  Pain Management: Managed with Dilaudid given 3x, Tylenol 1x  Abnormal VS/Results: VSS, on cpap at night  Bowel/Bladder: Cont both, uses urinal at bedsdie  Skin/Wounds: R FELIPA drain with minimal serosanguineous output  Consults: Sx/PT/OT/GI/SW  D/C Disposition: pending  Other Info:     On K protocol- awaiting am draws  Ambulated in the hallway 1x

## 2024-06-30 LAB
ANION GAP SERPL CALCULATED.3IONS-SCNC: 11 MMOL/L (ref 7–15)
BUN SERPL-MCNC: 26.3 MG/DL (ref 8–23)
CALCIUM SERPL-MCNC: 9.2 MG/DL (ref 8.8–10.2)
CHLORIDE SERPL-SCNC: 98 MMOL/L (ref 98–107)
CREAT SERPL-MCNC: 0.89 MG/DL (ref 0.67–1.17)
CRP SERPL-MCNC: 204.2 MG/L
DEPRECATED HCO3 PLAS-SCNC: 26 MMOL/L (ref 22–29)
EGFRCR SERPLBLD CKD-EPI 2021: 89 ML/MIN/1.73M2
ERYTHROCYTE [DISTWIDTH] IN BLOOD BY AUTOMATED COUNT: 12.9 % (ref 10–15)
GLUCOSE BLDC GLUCOMTR-MCNC: 128 MG/DL (ref 70–99)
GLUCOSE BLDC GLUCOMTR-MCNC: 129 MG/DL (ref 70–99)
GLUCOSE BLDC GLUCOMTR-MCNC: 134 MG/DL (ref 70–99)
GLUCOSE BLDC GLUCOMTR-MCNC: 148 MG/DL (ref 70–99)
GLUCOSE SERPL-MCNC: 114 MG/DL (ref 70–99)
HCT VFR BLD AUTO: 37.2 % (ref 40–53)
HGB BLD-MCNC: 12.6 G/DL (ref 13.3–17.7)
MCH RBC QN AUTO: 32.9 PG (ref 26.5–33)
MCHC RBC AUTO-ENTMCNC: 33.9 G/DL (ref 31.5–36.5)
MCV RBC AUTO: 97 FL (ref 78–100)
PLATELET # BLD AUTO: 362 10E3/UL (ref 150–450)
POTASSIUM SERPL-SCNC: 3.8 MMOL/L (ref 3.4–5.3)
POTASSIUM SERPL-SCNC: 3.8 MMOL/L (ref 3.4–5.3)
RBC # BLD AUTO: 3.83 10E6/UL (ref 4.4–5.9)
SODIUM SERPL-SCNC: 135 MMOL/L (ref 135–145)
WBC # BLD AUTO: 16.2 10E3/UL (ref 4–11)

## 2024-06-30 PROCEDURE — 250N000011 HC RX IP 250 OP 636: Performed by: INTERNAL MEDICINE

## 2024-06-30 PROCEDURE — 250N000013 HC RX MED GY IP 250 OP 250 PS 637: Performed by: INTERNAL MEDICINE

## 2024-06-30 PROCEDURE — 250N000013 HC RX MED GY IP 250 OP 250 PS 637: Performed by: PHYSICIAN ASSISTANT

## 2024-06-30 PROCEDURE — 99233 SBSQ HOSP IP/OBS HIGH 50: CPT | Performed by: INTERNAL MEDICINE

## 2024-06-30 PROCEDURE — 85027 COMPLETE CBC AUTOMATED: CPT | Performed by: INTERNAL MEDICINE

## 2024-06-30 PROCEDURE — 86140 C-REACTIVE PROTEIN: CPT | Performed by: INTERNAL MEDICINE

## 2024-06-30 PROCEDURE — 36415 COLL VENOUS BLD VENIPUNCTURE: CPT | Performed by: INTERNAL MEDICINE

## 2024-06-30 PROCEDURE — 80048 BASIC METABOLIC PNL TOTAL CA: CPT | Performed by: INTERNAL MEDICINE

## 2024-06-30 PROCEDURE — 120N000001 HC R&B MED SURG/OB

## 2024-06-30 PROCEDURE — 250N000011 HC RX IP 250 OP 636: Performed by: PHYSICIAN ASSISTANT

## 2024-06-30 RX ORDER — AMOXICILLIN 250 MG
2 CAPSULE ORAL 2 TIMES DAILY PRN
Qty: 30 TABLET | Refills: 0 | Status: SHIPPED | OUTPATIENT
Start: 2024-06-30

## 2024-06-30 RX ORDER — POLYETHYLENE GLYCOL 3350 17 G/17G
17 POWDER, FOR SOLUTION ORAL DAILY
Qty: 510 G | Refills: 0 | Status: SHIPPED | OUTPATIENT
Start: 2024-06-30

## 2024-06-30 RX ORDER — CIPROFLOXACIN 500 MG/1
500 TABLET, FILM COATED ORAL 2 TIMES DAILY
Qty: 20 TABLET | Refills: 0 | Status: SHIPPED | OUTPATIENT
Start: 2024-06-30 | End: 2024-07-05

## 2024-06-30 RX ORDER — METRONIDAZOLE 500 MG/1
500 TABLET ORAL 3 TIMES DAILY
Qty: 30 TABLET | Refills: 0 | Status: SHIPPED | OUTPATIENT
Start: 2024-06-30 | End: 2024-07-05

## 2024-06-30 RX ORDER — KETOROLAC TROMETHAMINE 15 MG/ML
15 INJECTION, SOLUTION INTRAMUSCULAR; INTRAVENOUS EVERY 6 HOURS
Status: DISCONTINUED | OUTPATIENT
Start: 2024-06-30 | End: 2024-07-01

## 2024-06-30 RX ORDER — POLYETHYLENE GLYCOL 3350 17 G/17G
17 POWDER, FOR SOLUTION ORAL 2 TIMES DAILY
Status: DISCONTINUED | OUTPATIENT
Start: 2024-06-30 | End: 2024-07-05 | Stop reason: HOSPADM

## 2024-06-30 RX ORDER — PANTOPRAZOLE SODIUM 20 MG/1
20 TABLET, DELAYED RELEASE ORAL
Status: DISCONTINUED | OUTPATIENT
Start: 2024-07-01 | End: 2024-07-02

## 2024-06-30 RX ADMIN — HYDROMORPHONE HYDROCHLORIDE 4 MG: 2 TABLET ORAL at 05:56

## 2024-06-30 RX ADMIN — HYDROMORPHONE HYDROCHLORIDE 4 MG: 2 TABLET ORAL at 01:30

## 2024-06-30 RX ADMIN — METRONIDAZOLE 500 MG: 500 TABLET ORAL at 00:49

## 2024-06-30 RX ADMIN — PANTOPRAZOLE SODIUM 40 MG: 40 INJECTION, POWDER, FOR SOLUTION INTRAVENOUS at 08:13

## 2024-06-30 RX ADMIN — HYDROMORPHONE HYDROCHLORIDE 0.3 MG: 1 INJECTION, SOLUTION INTRAMUSCULAR; INTRAVENOUS; SUBCUTANEOUS at 00:49

## 2024-06-30 RX ADMIN — CARVEDILOL 12.5 MG: 12.5 TABLET, FILM COATED ORAL at 08:14

## 2024-06-30 RX ADMIN — AMLODIPINE BESYLATE 10 MG: 10 TABLET ORAL at 08:13

## 2024-06-30 RX ADMIN — CEFEPIME 2 G: 2 INJECTION, POWDER, FOR SOLUTION INTRAVENOUS at 08:13

## 2024-06-30 RX ADMIN — CEFEPIME 2 G: 2 INJECTION, POWDER, FOR SOLUTION INTRAVENOUS at 15:46

## 2024-06-30 RX ADMIN — ASPIRIN 81 MG: 81 TABLET, COATED ORAL at 08:13

## 2024-06-30 RX ADMIN — HYDROMORPHONE HYDROCHLORIDE 4 MG: 2 TABLET ORAL at 15:44

## 2024-06-30 RX ADMIN — SENNOSIDES AND DOCUSATE SODIUM 2 TABLET: 50; 8.6 TABLET ORAL at 08:14

## 2024-06-30 RX ADMIN — CARVEDILOL 12.5 MG: 12.5 TABLET, FILM COATED ORAL at 18:02

## 2024-06-30 RX ADMIN — SENNOSIDES AND DOCUSATE SODIUM 2 TABLET: 50; 8.6 TABLET ORAL at 20:38

## 2024-06-30 RX ADMIN — ENOXAPARIN SODIUM 40 MG: 40 INJECTION SUBCUTANEOUS at 18:02

## 2024-06-30 RX ADMIN — HYDROMORPHONE HYDROCHLORIDE 4 MG: 2 TABLET ORAL at 11:00

## 2024-06-30 RX ADMIN — ATORVASTATIN CALCIUM 20 MG: 10 TABLET, FILM COATED ORAL at 08:13

## 2024-06-30 RX ADMIN — LISINOPRIL 20 MG: 20 TABLET ORAL at 08:13

## 2024-06-30 RX ADMIN — METRONIDAZOLE 500 MG: 500 TABLET ORAL at 08:14

## 2024-06-30 RX ADMIN — KETOROLAC TROMETHAMINE 15 MG: 15 INJECTION, SOLUTION INTRAMUSCULAR; INTRAVENOUS at 19:20

## 2024-06-30 RX ADMIN — METRONIDAZOLE 500 MG: 500 TABLET ORAL at 15:44

## 2024-06-30 RX ADMIN — POLYETHYLENE GLYCOL 3350 17 G: 17 POWDER, FOR SOLUTION ORAL at 20:38

## 2024-06-30 RX ADMIN — METRONIDAZOLE 500 MG: 500 TABLET ORAL at 22:42

## 2024-06-30 RX ADMIN — CEFEPIME 2 G: 2 INJECTION, POWDER, FOR SOLUTION INTRAVENOUS at 22:42

## 2024-06-30 RX ADMIN — Medication 1 TABLET: at 08:14

## 2024-06-30 RX ADMIN — POLYETHYLENE GLYCOL 3350 17 G: 17 POWDER, FOR SOLUTION ORAL at 11:00

## 2024-06-30 RX ADMIN — HYDROMORPHONE HYDROCHLORIDE 0.3 MG: 1 INJECTION, SOLUTION INTRAMUSCULAR; INTRAVENOUS; SUBCUTANEOUS at 08:13

## 2024-06-30 RX ADMIN — CEFEPIME 2 G: 2 INJECTION, POWDER, FOR SOLUTION INTRAVENOUS at 00:49

## 2024-06-30 RX ADMIN — LISINOPRIL 20 MG: 20 TABLET ORAL at 20:37

## 2024-06-30 ASSESSMENT — ACTIVITIES OF DAILY LIVING (ADL)
ADLS_ACUITY_SCORE: 28
ADLS_ACUITY_SCORE: 28
ADLS_ACUITY_SCORE: 29
ADLS_ACUITY_SCORE: 29
ADLS_ACUITY_SCORE: 28
ADLS_ACUITY_SCORE: 29
ADLS_ACUITY_SCORE: 28
ADLS_ACUITY_SCORE: 30
ADLS_ACUITY_SCORE: 28
ADLS_ACUITY_SCORE: 28
ADLS_ACUITY_SCORE: 29
ADLS_ACUITY_SCORE: 28
ADLS_ACUITY_SCORE: 30
ADLS_ACUITY_SCORE: 29
ADLS_ACUITY_SCORE: 30
ADLS_ACUITY_SCORE: 28

## 2024-06-30 NOTE — PLAN OF CARE
Goal Outcome Evaluation:       Alert and oriented. Vitals are with in base line. Reports abdominal pain. Administer 0.3 mg of IV dilaudid times two. Also, administered 4 mg of oral dilaudid times two. Continue to offer oral dilaudid until patient ahead recuperates. One bowel movement reported yesterday. Will continue with bowel meds. RLQ FELIPA drain with about 5 ml of pink tinged output. Will continue on Cefepime. Up in the hallways with assist of one and gait belt.

## 2024-06-30 NOTE — PROGRESS NOTES
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   He was seen at urgent care in [place] on 6/25.  Labs showed leukocytosis of 14, elevated CRP of 16, elevated LFTs with alkaline phosphatase 212, , , total bilirubin 2.8.   CT A/P was showed acute cholecystitis with distal choledocholithiasis.   There were no beds available at [facility], thus patient drove and presented to [facility] ER.     Acute gangrene cholecystitis with cholelithiasis and choledocholithiasis  Enterobacter and Klebsiella pneumonia bacteremia: 2 out of 2 bottles positive  Status post ERCP and EUS on 06/26/24:   S/p biliary sphincterotomy and balloon extraction on 06/26/2024 with temporary stent placement:  S/p robotic assisted laparoscopic cholecystectomy without cholangiogram and lysis of adhesions.    Has had abdominal pain for 4 days.  LFTs abnormal-alkaline phosphatase 205, , AST 84, total bilirubin 2.5  CT A/P obtained at urgent care showed cholelithiasis with acute cholecystitis and choledocholithiasis  Currently afebrile but has mild leukocytosis  Blood cultures from admission 2 out of 2 growing Enterobacter and Klebsiella pneumonia  Status post ERCP and EUS on 06/26/24 , was found to have choledocholithiasis, complete removal was accomplished by biliary sphincterotomy and balloon extraction  Got Temporary stent was placed in the CBD, repeat ERCP in 3 months to remove the  stent  Patient underwent robotic assisted laparoscopic cholecystectomy without cholangiogram and lysis of adhesion and was found to have acute gangrenous cholecystitis.  Follow-up blood cultures so far remain negative  Patient has been receiving IV cefepime with oral metronidazole for coverage of Enterobacter and Klebsiella pneumonia.  Abdomen remains distended, no bowel movement so far postoperatively, passing minimally some gas.      -- Continue to monitor patient closely  -- Diet is managed by general surgery team, will let them decide if they would like diet to be changed back from regular to clears.  -- Abdomen remains distended, patient thinks he is passing some gas but not significant amount, no bowel movement  -- Postoperative management per general surgery, Will let surgery decide if patient will benefit from repeating abdominal imaging, if patient needs enema or NG tube placement.  -- Encourage patient to mobilize multiple times.  -- Discussed with patient regarding decreasing use of narcotic pain medications as much as possible.  -- Continue patient on IV antibiotics with IV cefepime 2 g every 8 hours and metronidazole 500 mg p.o. 3 times a day.  -- Considering patient ongoing GI symptoms, will switch IV Protonix to oral Protonix 20 mg p.o. daily for 1 week.  -- Follow-up on blood cultures result, so far repeat cultures remain negative  -- Continue senna and MiraLAX, okay to add Dulcolax suppository if okay with surgery team.  -- Will let surgery team order enema as patient abdomen remains distended, discussed with bedside nursing  -- Continue patient on Lovenox 40 mg subcu daily  -- Labs reviewed from this morning, discussed with patient regarding staying in hospital as his postoperative ADLs has not improved.  --Surgery following, highly appreciate their help.  -- WBC are slightly elevated this morning.  -- CRP has slightly downward trend but is still remains significantly elevated    Hypovolemic  hyponatremia with Sodium 130 on presentation   -- Will add BMP to morning labs.     Hypokalemia  --Replace potassium per protocol. Also add 20 mg KCl to IV fluids  --Improved , continue to monitor     Dehydration: Improved    -- Secondary to poor oral intake for last 4-5 days due to acute cholecystitis  -- Patient has received IV fluid resuscitation on admission as patient has been started on diet, IV fluids are discontinued on 06/28/2024  -- Continue to hold PTA diuretics, while patient further clinically improves.     Benign essential hypertension  PTA on amlodipine 10 mg, carvedilol 12.5 mg, chlorthalidone 25 mg, lisinopril 20 mg twice daily    -- Continue PTA amlodipine and carvedilol  -- Held lisinopril and chlorthalidone initially.  --Patient has been a started back on lisinopril 20 mg twice daily.  -- Will continue to hold chlorthalidone for now.     Hyperlipidemia:  --Continue atorvastatin 20 mg     Diabetes mellitus type 2, diet controlled    -- Not currently on medication  -- Initially was started on sliding scale insulin  -- HgbA1c came back at 5.9  -- Will discontinue Accu-Cheks and sliding scale insulin.    Severe malnutrition in context of acute illness or injury: Been evaluated by RD on 06/26    -- Appreciate RD following  -- Patient has been advanced to regular diet      Recent mechanical fall  Right scalp hematoma  Right periorbital hematoma  Patient fell 5 days before admission while cutting a tree branch.  He has right scalp hematoma and right periorbital hematoma.  He did not seek medical care at that time    -Since his injuries are improving and he has no new symptoms, continue to monitor, CT head without contrast completed , no signs of intracranial bleed      CARMITA  -- continue CPAP as able     H/o asbestosis exposure  -- noted     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, 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  Stage II GIST of jejunum, s/p exploratory laparotomy with jejunal resection 2/2022    --  "follows with [facility] oncology  -- no recurrence or mets      Diet: Clear Liquid Diet    DVT Prophylaxis: Pneumatic compression devices, continue Lovenox 40 mg subcu daily.  Kelly Catheter: Not present  Lines: None     Cardiac Monitoring: None  Code Status: Full Code      Clinically Significant Risk Factors              # Hypoalbuminemia: Lowest albumin = 2.7 g/dL at 6/29/2024  7:22 AM, will monitor as appropriate     # Hypertension: Noted on problem list            # Overweight: Estimated body mass index is 29.14 kg/m  as calculated from the following:    Height as of this encounter: 1.803 m (5' 11\").    Weight as of this encounter: 94.8 kg (208 lb 14.4 oz).   # Severe Malnutrition: based on nutrition assessment    # Financial/Environmental Concerns: none       Disposition Plan     Medically Ready for Discharge: Anticipated in 2-4 Days  Patient has been evaluated by physical therapy has been recommended to be discharged home.  Patient is not ready for discharge medically, might be ready tomorrow on 07/02/2024 depending upon clinical improvement and once okay from surgery perspective to be discharged.  I have discussed with patient regarding going home with home health care      Kaila Weiss MD  Hospitalist Service  [facility]  Securely message with [employer] (more info)  Text page via [employer] Paging/Directory   ______________________________________________________________________    Interval History     Patient was seen and examined, sitting in bed, told me that he had rough night yesterday as his abdominal pain got significantly worse as he was trying to avoid the pain medication but later eventually pain got better once he took the pain meds.  Patient thinks that he might have passed very small amount of gas once or twice but overall not passing any gas otherwise he does not have any bowel movement he does not have any significant appetite, denying fever or chills, thinks that his abdomen is " still distended.  We discussed about asking surgery if he would benefit from enema or repeat imaging.  Discussed with patient that he is not ready for discharge today.    Physical Exam   Vital Signs: Temp: 98.1  F (36.7  C) Temp src: Oral BP: (!) 141/82 Pulse: 79   Resp: 18 SpO2: 93 % O2 Device: None (Room air)    Weight: 208 lbs 14.4 oz    Physical Exam  Vitals and nursing note reviewed.   Constitutional:       Appearance: He is well-developed.   HENT:      Head: Normocephalic and atraumatic.   Eyes:      Pupils: Pupils are equal, round, and reactive to light.   Neck:      Thyroid: No thyromegaly.   Cardiovascular:      Rate and Rhythm: Normal rate and regular rhythm.      Heart sounds: Normal heart sounds.   Pulmonary:      Effort: Pulmonary effort is normal. No respiratory distress.      Breath sounds: Normal breath sounds.   Abdominal:      General: Bowel sounds are normal. There is distension.      Palpations: Abdomen is soft.      Comments: Surgical site looks, Hypoactive bowel sounds, surgical drain present.   Musculoskeletal:         General: No tenderness. Normal range of motion.      Cervical back: Normal range of motion and neck supple.   Skin:     General: Skin is warm and dry.      Comments: Periorbital ecchymosis, right-sided skull laceration   Neurological:      Mental Status: He is alert and oriented to person, place, and time.   Psychiatric:         Behavior: Behavior normal.       Medical Decision Making       52 MINUTES SPENT BY ME on the date of service doing chart review, history, exam, documentation & further activities per the note.      Data     I have personally reviewed the following data over the past 24 hrs:    16.2 (H)  \   12.6 (L)   / 362     N/A N/A N/A /  129 (H)   3.8 N/A N/A \     Procal: N/A CRP: 204.20 (H) Lactic Acid: N/A         Imaging results reviewed over the past 24 hrs:   No results found for this or any previous visit (from the past 24 hour(s)).    Recent Labs   Lab  06/30/24  1251 06/30/24  0827 06/30/24  0817 06/30/24  0255 06/29/24  0802 06/29/24  0722 06/28/24  1159 06/28/24  0848 06/27/24  0832 06/27/24  0531 06/26/24  1123 06/26/24  0904 06/26/24  0302 06/25/24  2103   WBC  --   --  16.2*  --   --  14.7*  --  11.6*  --  17.1*  --  12.1*  --  12.6*   HGB  --   --  12.6*  --   --  12.4*  --  13.5  --  13.1*  --  12.3*  --  15.1   MCV  --   --  97  --   --  98  --  99  --  96  --  95  --  95   PLT  --   --  362  --   --  275  --  243  --  178  --  156  --  181   INR  --   --   --   --   --   --   --   --   --   --   --   --   --  1.05   NA  --   --   --   --   --   --   --  138  --  136  --  135  --  130*   POTASSIUM  --   --  3.8  --   --  3.7  --  3.7  --  3.8  --  4.1  4.1  --  3.0*   CHLORIDE  --   --   --   --   --   --   --  101  --  98  --  98  --  88*   CO2  --   --   --   --   --   --   --  26  --  26  --  28  --  30*   BUN  --   --   --   --   --   --   --  20.6  --  14.8  --  16.3  --  15.1   CR  --   --   --   --   --   --   --  0.89  --  0.75  --  1.08  --  0.95   ANIONGAP  --   --   --   --   --   --   --  11  --  12  --  9  --  12   PAMELA  --   --   --   --   --   --   --  8.9  --  9.1  --  8.8  --  9.8   * 128*  --  134*   < >  --    < > 119*   < > 148*   < > 109*   < > 115*   ALBUMIN  --   --   --   --   --  2.7*  --  3.1*  --  3.1*  --  3.2*  --  4.1   PROTTOTAL  --   --   --   --   --  6.3*  --  6.7  --  6.8  --  6.3*  --  7.8   BILITOTAL  --   --   --   --   --  1.1  --  1.2  --  1.5*  --  1.8*  --  2.5*   ALKPHOS  --   --   --   --   --  125  --  141  --  174*  --  162*  --  205*   ALT  --   --   --   --   --  71*  --  92*  --  129*  --  159*  --  252*   AST  --   --   --   --   --  41  --  49*  --  38  --  46*  --  84*   LIPASE  --   --   --   --   --   --   --   --   --   --   --   --   --  42    < > = values in this interval not displayed.

## 2024-06-30 NOTE — PROGRESS NOTES
"Surgery    Increased pain overnight - needing alternating Dilaudid PO and IV q 2h  Reports a couple very small bm's over the last 2 days despite suppository yesterday  Denies nausea  Walking in halls  Denies CP, dyspnea    Gen:  Awake, Alert, NAD  BP (!) 141/82 (BP Location: Right arm)   Pulse 79   Temp 98.1  F (36.7  C) (Oral)   Resp 18   Ht 1.803 m (5' 11\")   Wt 94.8 kg (208 lb 14.4 oz)   SpO2 93%   BMI 29.14 kg/m    Resp - Non-labored  Abdomen - firm and distended. No appreciable tenderness. Incisions healing appropriately without erythema or drainage.  Drain site ok. Output 30cc. Serosanguinous.  Extremities - no lower extremity edema or tenderness with palpation    Wbc 16.2 (14.7)  Hgb 12.6 (12.4)   (270)    A/P Jairo Austin is a 78 year old male with choledocholithiasis and acute cholecystitis. 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      S/P Robotic assisted laparoscopic cholecystectomy on 6/27/24.    Diagnosis: Gangrenous Cholecystitis and Choledocholithiasis    - clear liquids  - aggressive on bowel regimen. Add enema today.  - encourage incentive spirometer use  - ambulate  - dilaudid and apap given for pain, limit narcotics as able - decompressing abdomen should help.   - continue cefipime and flagyl  - lovenox, ambulate for dvt ppx  - encourage incentive spirometer use  - continue current cares and monitoring.    Cristopher Gonzalez PA-C  Office: [phone]  Pager: [phone]    Addendum:  Spoke with Patient's dtr, Siri, later this afternoon.  She is a Primary care NP.  We talked about patient's current clinical condition: distended abdomen, probable ileus, narcotic use to treat the pain from his distended abdomen perpetuating the cycle.  Discussed role of an NG tube for bowel decompression but Patient isn't nauseated and at this point the discomfort of an NG " tube outweighed the benefit. I recommended an NG if he does become nauseated or vomit.  He has + flatus but has not had much else NH. For today, continue walking, bowel regimen, limit narcotics.  Added toradol and robaxin. May give apap rectally.  May consider imaging if no improvement tomorrow.  Siri appreciated the call and had no further questions.

## 2024-07-01 ENCOUNTER — APPOINTMENT (OUTPATIENT)
Dept: PHYSICAL THERAPY | Facility: CLINIC | Age: 79
DRG: 417 | End: 2024-07-01
Payer: COMMERCIAL

## 2024-07-01 LAB
ANION GAP SERPL CALCULATED.3IONS-SCNC: 12 MMOL/L (ref 7–15)
BUN SERPL-MCNC: 32.9 MG/DL (ref 8–23)
CALCIUM SERPL-MCNC: 9.4 MG/DL (ref 8.8–10.2)
CHLORIDE SERPL-SCNC: 98 MMOL/L (ref 98–107)
CREAT SERPL-MCNC: 0.88 MG/DL (ref 0.67–1.17)
DEPRECATED HCO3 PLAS-SCNC: 24 MMOL/L (ref 22–29)
EGFRCR SERPLBLD CKD-EPI 2021: 89 ML/MIN/1.73M2
GLUCOSE BLDC GLUCOMTR-MCNC: 106 MG/DL (ref 70–99)
GLUCOSE SERPL-MCNC: 127 MG/DL (ref 70–99)
HGB BLD-MCNC: 12.9 G/DL (ref 13.3–17.7)
POTASSIUM SERPL-SCNC: 3.9 MMOL/L (ref 3.4–5.3)
SODIUM SERPL-SCNC: 134 MMOL/L (ref 135–145)
WBC # BLD AUTO: 15.7 10E3/UL (ref 4–11)

## 2024-07-01 PROCEDURE — 250N000013 HC RX MED GY IP 250 OP 250 PS 637: Performed by: PHYSICIAN ASSISTANT

## 2024-07-01 PROCEDURE — 250N000011 HC RX IP 250 OP 636: Performed by: PHYSICIAN ASSISTANT

## 2024-07-01 PROCEDURE — 250N000013 HC RX MED GY IP 250 OP 250 PS 637: Performed by: INTERNAL MEDICINE

## 2024-07-01 PROCEDURE — 99233 SBSQ HOSP IP/OBS HIGH 50: CPT | Performed by: INTERNAL MEDICINE

## 2024-07-01 PROCEDURE — 85018 HEMOGLOBIN: CPT | Performed by: INTERNAL MEDICINE

## 2024-07-01 PROCEDURE — 85048 AUTOMATED LEUKOCYTE COUNT: CPT | Performed by: INTERNAL MEDICINE

## 2024-07-01 PROCEDURE — 84295 ASSAY OF SERUM SODIUM: CPT | Performed by: INTERNAL MEDICINE

## 2024-07-01 PROCEDURE — 97116 GAIT TRAINING THERAPY: CPT | Mod: GP

## 2024-07-01 PROCEDURE — 250N000011 HC RX IP 250 OP 636: Performed by: INTERNAL MEDICINE

## 2024-07-01 PROCEDURE — 120N000001 HC R&B MED SURG/OB

## 2024-07-01 PROCEDURE — 36415 COLL VENOUS BLD VENIPUNCTURE: CPT | Performed by: INTERNAL MEDICINE

## 2024-07-01 RX ADMIN — ASPIRIN 81 MG: 81 TABLET, COATED ORAL at 08:17

## 2024-07-01 RX ADMIN — METRONIDAZOLE 500 MG: 500 TABLET ORAL at 22:40

## 2024-07-01 RX ADMIN — AMLODIPINE BESYLATE 10 MG: 10 TABLET ORAL at 08:17

## 2024-07-01 RX ADMIN — PANTOPRAZOLE SODIUM 20 MG: 20 TABLET, DELAYED RELEASE ORAL at 08:17

## 2024-07-01 RX ADMIN — CARVEDILOL 12.5 MG: 12.5 TABLET, FILM COATED ORAL at 08:17

## 2024-07-01 RX ADMIN — CEFEPIME 2 G: 2 INJECTION, POWDER, FOR SOLUTION INTRAVENOUS at 22:40

## 2024-07-01 RX ADMIN — Medication 1 TABLET: at 08:17

## 2024-07-01 RX ADMIN — KETOROLAC TROMETHAMINE 15 MG: 15 INJECTION, SOLUTION INTRAMUSCULAR; INTRAVENOUS at 08:16

## 2024-07-01 RX ADMIN — LISINOPRIL 20 MG: 20 TABLET ORAL at 08:17

## 2024-07-01 RX ADMIN — CEFEPIME 2 G: 2 INJECTION, POWDER, FOR SOLUTION INTRAVENOUS at 08:16

## 2024-07-01 RX ADMIN — ACETAMINOPHEN 650 MG: 325 TABLET, FILM COATED ORAL at 20:07

## 2024-07-01 RX ADMIN — POLYETHYLENE GLYCOL 3350 17 G: 17 POWDER, FOR SOLUTION ORAL at 20:07

## 2024-07-01 RX ADMIN — KETOROLAC TROMETHAMINE 15 MG: 15 INJECTION, SOLUTION INTRAMUSCULAR; INTRAVENOUS at 01:03

## 2024-07-01 RX ADMIN — CARVEDILOL 12.5 MG: 12.5 TABLET, FILM COATED ORAL at 18:03

## 2024-07-01 RX ADMIN — CEFEPIME 2 G: 2 INJECTION, POWDER, FOR SOLUTION INTRAVENOUS at 15:40

## 2024-07-01 RX ADMIN — ACETAMINOPHEN 650 MG: 325 TABLET, FILM COATED ORAL at 15:39

## 2024-07-01 RX ADMIN — METRONIDAZOLE 500 MG: 500 TABLET ORAL at 08:17

## 2024-07-01 RX ADMIN — SENNOSIDES AND DOCUSATE SODIUM 2 TABLET: 50; 8.6 TABLET ORAL at 20:07

## 2024-07-01 RX ADMIN — METRONIDAZOLE 500 MG: 500 TABLET ORAL at 15:39

## 2024-07-01 RX ADMIN — SENNOSIDES AND DOCUSATE SODIUM 2 TABLET: 50; 8.6 TABLET ORAL at 08:17

## 2024-07-01 RX ADMIN — LISINOPRIL 20 MG: 20 TABLET ORAL at 20:07

## 2024-07-01 RX ADMIN — ENOXAPARIN SODIUM 40 MG: 40 INJECTION SUBCUTANEOUS at 18:03

## 2024-07-01 RX ADMIN — ATORVASTATIN CALCIUM 20 MG: 10 TABLET, FILM COATED ORAL at 08:17

## 2024-07-01 ASSESSMENT — ACTIVITIES OF DAILY LIVING (ADL)
ADLS_ACUITY_SCORE: 29
ADLS_ACUITY_SCORE: 28
ADLS_ACUITY_SCORE: 27
ADLS_ACUITY_SCORE: 27
ADLS_ACUITY_SCORE: 28
ADLS_ACUITY_SCORE: 29
ADLS_ACUITY_SCORE: 27
ADLS_ACUITY_SCORE: 27
ADLS_ACUITY_SCORE: 28
ADLS_ACUITY_SCORE: 29
ADLS_ACUITY_SCORE: 28
ADLS_ACUITY_SCORE: 27
ADLS_ACUITY_SCORE: 29
ADLS_ACUITY_SCORE: 28
ADLS_ACUITY_SCORE: 29
ADLS_ACUITY_SCORE: 29
ADLS_ACUITY_SCORE: 27
ADLS_ACUITY_SCORE: 29
ADLS_ACUITY_SCORE: 27
ADLS_ACUITY_SCORE: 27
ADLS_ACUITY_SCORE: 29

## 2024-07-01 NOTE — PLAN OF CARE
6/30/2024 -0700-1930     Summary: presented to ER on 6/25/2024 with 4-day history of upper abdominal pain and nausea.      Primary Diagnosis: Acute cholecystitis with cholelithiasis and choledocholithiasis  Enterobacter and Klebsiella pneumonia bacteremia: Status post ERCP and EUS on 06/26/24: S/p biliary sphincterotomy and balloon extraction on 06/26/2024 with temporary stent placement, Robotic assisted laparoscopic cholecystectomy     Orientation: A/Ox4, [place]     Aggression Stop Light: Green     Activity: AX1 GB/W     Diet/BS Checks: Clear liquids, BG-ACHS,      Tele:  N/A     IV Access/Drains: R PIV SL w/ int abx.  RLQ FELIPA drain-strip once in this shift     Pain Management: PRN Dilaudid, Toradol     Abnormal VS/Results: VSS on RA,  CPAP at night, CRP-204.20, WBC-16.2     Bowel/Bladder: Cont B/B.voiding in urinal,     Skin/Wounds: Scattered bruising/scabs on R face and scalp w/ localized edema; R periorbital bruising; and R lower abd bruising, 4 lap sites w/ RLQ FELIPA drain- CDI     Consults: GI, Surgery, PT, OT, SW     D/C Disposition: TBD- to home w/ home care     Other Info: POD#3, On K replacement protocol-AM draw. Pt ambulated 3 times today, encouraging IS. Using CPAP at night or while asleep.  Tap water enema given- not effective. MD aware.(called Carlos Nicholas PA-C, surgery ) Dressing changed at  drain site-CDI. Abdomen still rigid and distended.

## 2024-07-01 NOTE — PLAN OF CARE
Physical Therapy Discharge Summary    Reason for therapy discharge:    All goals and outcomes met, no further needs identified.    Progress towards therapy goal(s). See goals on Care Plan in Epic electronic health record for goal details.  Goals met    Therapy recommendation(s):    No further therapy is recommended. Pt is moving slightly below baseline, currently requiring FWW for all functional mobility at this time due to increased abdominal pain and impaired activity tolerance. SBA to Mod I with amb with FWW, currently recommend FWW for safety with balance initially at d/c. Pt plans to stay with family at d/c.  PT Brief overview of current status: Mod I w/ FWW  PT Equipment Needed at Discharge: walker, rolling    Recommendation above provided by last treating therapist.

## 2024-07-01 NOTE — PROGRESS NOTES
"General SUrgery Note    Stable S/P Rebecac  ROBF with 3 BM's and passing flatus.  Ok to advance to full liquids now.  And ADAT.  Continue increase activity as able.  Likely discharge tomorrow if continued improvement.  Drain out tomorrow.  Pt in agreement with plan.      NAD, very pleasant, up in chair, alert  BP (!) 145/85 (BP Location: Right arm)   Pulse 83   Temp 98.1  F (36.7  C) (Oral)   Resp 18   Ht 1.803 m (5' 11\")   Wt 94.8 kg (208 lb 14.4 oz)   SpO2 96%   BMI 29.14 kg/m      Intake/Output Summary (Last 24 hours) at 7/1/2024 1033  Last data filed at 7/1/2024 0000  Gross per 24 hour   Intake 926 ml   Output 600 ml   Net 326 ml     Abd: still mildly distended (? Baseline).  NT  Inc: CDI bandaids  FELIPA: min output.  [place]    Jeramy Mcfarlane PA-C  "

## 2024-07-01 NOTE — PROGRESS NOTES
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  Additionally during his stay, he was found to have bacteremia secondary to Enterobacter and Klebsiella pneumonia.     Acute gangrene cholecystitis with cholelithiasis and choledocholithiasis  S/p ERCP and EUS on 6/26/24 with biliary sphincterotomy, and temporary stent placement  S/p robotic assisted laparoscopic cholecystectomy and BRENDA on 6/27/24  Postop ileus: Improved  Enterobacter and Klebsiella pneumonia bacteremia dt above  *Presented to an urgent care clinic in [place], [place] on 6/25 for evaluation of s 4d hx of upper abdominal pain/nausea. Labs notable for WBC 14, lactate nl, CRP 16, AST//301, alk phos 212 and total bili 2.8. CT abd/pelvis obtained and showed acute cholecystitis with distal choledocholithiasis.  Transferred to [facility] for ongoing care.  *Remained afebrile and hemodynamically stable. 2/2 blood cultures on admission grew enterobacter and Klebsiella pneumoniae.  *GI and general surgery consulted on admission.  *Seen by [facility].  Underwent ERCP/EUS on 6/26 with findings of choledocholithiasis.  Complex removal was accomplished with biliary sphincterotomy, balloon extraction and temporary stent placement to CBD. LFTs improved.   *Subsequently underwent robotic assisted laparoscopic cholecystectomy with lysis of adhesions on 6/27/24 per Dr. De León.   Intraoperative findings were notable for acute gangrenous cholecystitis.  *Postoperative course was complicated by worsening abdominal distention with minimal flatus and no BM; suspected due to a postop ileus.  Symptoms improved with supportive cares and ultimately did not require NG tube placement.  -- routine postop cares per general surgery  -- ADAT  -- conts on IV cefepime/po flagyl (6/26-present), transition to oral abx at discharge  -- cont bowel regimen, PPI  -- follow up with [facility] in 3 mths for repeat ERCP to remove biliary stent    1315 Addendum:  Notified that second blood pelvic culture drawn on admission (6/25/24) now growing Bacteroides thetaiotaomicron and Bacteroides rore/vulgatus.  No antimicrobial susceptibility patterns available at this time however anticipate patient is covered on current antibiotics and subsequent blood cultures drawn 6/27-6/28 remain negative.     Hypovolemic hyponatremia: Resolved  Hypokalemia: Resolved  Dehydration: Improved  *Initial BMP notable for Na 130, K 3.0, Cl 88, BUN 30.  Findings suspected secondary to poor appetite and reduced oral intake in the days preceding admission due to above.    *IVFs and K replacement started on admission.      Hypertension  Hyperlipidemia  *PTA meds: amlodipine, Coreg, chlorthalidone, lisinopril and statin.  *Continues on Coreg, amlodipine and statin.  *Lisinopril and chlorthalidone held on admission due to above. Lisinopril resumed  -- cont holding chlorthalidone for now     DM II, diet controlled  *A1c 5.9 this stay. Not on treatment at baseline.  *Used sliding scale insulin initially but needs were low so accuchecks/insulin were dc'd.     Severe malnutrition in context of acute illness or injury  *Nutritionist following. Postop diet as above.     Recent mechanical fall  Right scalp hematoma  Right periorbital hematoma  *Reportedly fell 5d prior to admission while cutting a tree branch and sustained a R scalp hematoma and R  "periorbital hematoma. Did not seek medical care at that time.   *Head CT this stay neg for acute pathology.   -- cont supportive cares     CARMITA  *Chronic and stable on CPAP     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, 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  Stage II GIST of jejunum, s/p exploratory laparotomy with jejunal resection 2/2022  Hx of asbestosis exposure  *Follows with [facility] oncology. No concerns at this time.     FEN:  DVT Prophylaxis: Lovenox  Code Status: Full Code    Clinically Significant Risk Factors              # Hypoalbuminemia: Lowest albumin = 2.7 g/dL at 6/29/2024  7:22 AM, will monitor as appropriate     # Hypertension: Noted on problem list              # Overweight: Estimated body mass index is 29.14 kg/m  as calculated from the following:    Height as of this encounter: 1.803 m (5' 11\").    Weight as of this encounter: 94.8 kg (208 lb 14.4 oz).   # Severe Malnutrition: based on nutrition assessment    # Financial/Environmental Concerns: none           Disposition: Anticipate discharge home tomorrow pending continued tolerance of oral intake and return of bowel function.    Medically Ready for Discharge: Anticipated Tomorrow      Nicci Olvera, DO    Medical Decision Making       Please see A&P for additional details of medical decision making.       Interval History   Chart reviewed.  Seen this morning.  Resting comfortably.  Alert and conversing appropriately.  Has had at least 3 BMs this morning and passing gas.  Tolerating oral intake.  No abdominal pain, nausea or vomiting.  No chest pain, shortness of breath or cough.  In good spirits.  Hoping for discharge home tomorrow    -Data reviewed today: I reviewed all new labs and imaging results over the last 24 hours. I personally reviewed no images or EKG's today.    Physical Exam   Temp: 98.1  F (36.7  C) Temp src: Oral BP: (!) 145/85 Pulse: 83   Resp: 18 SpO2: 96 % O2 Device: None (Room air)    Vitals:    06/27/24 0558 06/28/24 0407 " 06/29/24 0616   Weight: 92.3 kg (203 lb 6.4 oz) 93.4 kg (206 lb) 94.8 kg (208 lb 14.4 oz)     Vital Signs with Ranges  Temp:  [97.8  F (36.6  C)-98.6  F (37  C)] 98.1  F (36.7  C)  Pulse:  [73-86] 83  Resp:  [18-21] 18  BP: (117-145)/(80-89) 145/85  SpO2:  [94 %-96 %] 96 %  I/O last 3 completed shifts:  In: 926 [P.O.:920; I.V.:6]  Out: 780 [Urine:780]    Constitutional: Resting comfortably, alert and conversing appropriately, NAD  Respiratory: CTAB, no wheeze, no increased work of breathing  Cardiovascular: HRRR, no MGR, no LE edema  GI: Protuberant but soft and generally nontender, +BS, +FELIPA drain  Skin/Integumen: Warm/dry  Other:      Medications   Current Facility-Administered Medications   Medication Dose Route Frequency Provider Last Rate Last Admin     Current Facility-Administered Medications   Medication Dose Route Frequency Provider Last Rate Last Admin    amLODIPine (NORVASC) tablet 10 mg  10 mg Oral YENI Emiliano Mcfarlane PA-C   10 mg at 07/01/24 0817    aspirin EC tablet 81 mg  81 mg Oral Kaila Medina MD   81 mg at 07/01/24 0817    atorvastatin (LIPITOR) tablet 20 mg  20 mg Oral QAM Emiliano Mcfarlane PA-C   20 mg at 07/01/24 0817    carvedilol (COREG) tablet 12.5 mg  12.5 mg Oral BID w/meals Emiliano Mcfarlane PA-C   12.5 mg at 07/01/24 0817    ceFEPIme (MAXIPIME) 2 g vial to attach to  mL bag for ADULTS or 50 mL bag for PEDS  2 g Intravenous Q8H Emiliano Mcfarlane PA-C   2 g at 07/01/24 0816    [Held by provider] chlorthalidone (HYGROTON) tablet 25 mg  25 mg Oral QPM Miri Flores MD        enoxaparin ANTICOAGULANT (LOVENOX) injection 40 mg  40 mg Subcutaneous Q24H Kaila Weiss MD   40 mg at 06/30/24 1802    ketorolac (TORADOL) injection 15 mg  15 mg Intravenous Q6H Cristopher Gonzalez PA-C   15 mg at 07/01/24 0816    lisinopril (ZESTRIL) tablet 20 mg  20 mg Oral BID Emiliano Mcfarlane PA-C   20 mg at 07/01/24 0817    metroNIDAZOLE (FLAGYL) tablet 500 mg  500 mg Oral TID Denys  Emiliano Bañuelos PA-C   500 mg at 07/01/24 0817    multivitamin w/minerals (THERA-VIT-M) tablet 1 tablet  1 tablet Oral QAM Kaila Weiss MD   1 tablet at 07/01/24 0817    pantoprazole (PROTONIX) EC tablet 20 mg  20 mg Oral QAM AC Kaila Weiss MD   20 mg at 07/01/24 0817    polyethylene glycol (MIRALAX) Packet 17 g  17 g Oral BID Cristopher Gonzalez PA-C   17 g at 06/30/24 2038    senna-docusate (SENOKOT-S/PERICOLACE) 8.6-50 MG per tablet 1 tablet  1 tablet Oral BID Emiliano Mcfarlane PA-C   1 tablet at 06/27/24 2113    Or    senna-docusate (SENOKOT-S/PERICOLACE) 8.6-50 MG per tablet 2 tablet  2 tablet Oral BID Emiliano Mcfarlane PA-C   2 tablet at 07/01/24 0817    sodium chloride (PF) 0.9% PF flush 3 mL  3 mL Intracatheter Q8H Emiliano Mcfarlane PA-C   3 mL at 06/30/24 1802       Data   Recent Labs   Lab 07/01/24  0939 06/30/24  1701 06/30/24  1251 06/30/24  0827 06/30/24  0817 06/29/24  0802 06/29/24  0722 06/28/24  1159 06/28/24  0848 06/27/24  0832 06/27/24  0531 06/26/24  0302 06/25/24  2103   WBC 15.7*  --   --   --  16.2*  --  14.7*  --  11.6*  --  17.1*   < > 12.6*   HGB 12.9*  --   --   --  12.6*  --  12.4*  --  13.5  --  13.1*   < > 15.1   MCV  --   --   --   --  97  --  98  --  99  --  96   < > 95   PLT  --   --   --   --  362  --  275  --  243  --  178   < > 181   INR  --   --   --   --   --   --   --   --   --   --   --   --  1.05   NA  --   --   --   --  135  --   --   --  138  --  136   < > 130*   POTASSIUM  --   --   --   --  3.8  3.8  --  3.7  --  3.7  --  3.8   < > 3.0*   CHLORIDE  --   --   --   --  98  --   --   --  101  --  98   < > 88*   CO2  --   --   --   --  26  --   --   --  26  --  26   < > 30*   BUN  --   --   --   --  26.3*  --   --   --  20.6  --  14.8   < > 15.1   CR  --   --   --   --  0.89  --   --   --  0.89  --  0.75   < > 0.95   ANIONGAP  --   --   --   --  11  --   --   --  11  --  12   < > 12   PAMELA  --   --   --   --  9.2  --   --   --  8.9  --  9.1   < > 9.8   GLC  --   148* 129* 128* 114*   < >  --    < > 119*   < > 148*   < > 115*   ALBUMIN  --   --   --   --   --   --  2.7*  --  3.1*  --  3.1*   < > 4.1   PROTTOTAL  --   --   --   --   --   --  6.3*  --  6.7  --  6.8   < > 7.8   BILITOTAL  --   --   --   --   --   --  1.1  --  1.2  --  1.5*   < > 2.5*   ALKPHOS  --   --   --   --   --   --  125  --  141  --  174*   < > 205*   ALT  --   --   --   --   --   --  71*  --  92*  --  129*   < > 252*   AST  --   --   --   --   --   --  41  --  49*  --  38   < > 84*   LIPASE  --   --   --   --   --   --   --   --   --   --   --   --  42    < > = values in this interval not displayed.       No results found for this or any previous visit (from the past 24 hour(s)).

## 2024-07-01 NOTE — PLAN OF CARE
Goal Outcome Evaluation:         7/1 [id number]    Orientation: A&Ox4  Aggression Stop Light: Green  Activity: Independent in room w/walker  Diet/BS Checks: Advanced to Low Fiber, tolerating  Tele: N/A  IV Access/Drains: R PIV SL with intermittent antibiotics; R FELIPA drain with minimal output  Pain Management: IV Toradol given this AM, PRN Tylenol this afternoon for abdominal discomfort  Abnormal VS/Results: VSS on RA; WBC 15.7  Bowel/Bladder: Continent of B/B, several BMs this shift  Skin/Wounds: Bruising to L eye/forehead with abrasion, distended abdomen  Consults: Surgery, GI, PT  D/C Disposition: Possibly home to [facility] house tomorrow  Other Info: Pt is requesting his SO not be allowed to visit him in the hospital. She came this shift and pt asked her to leave.

## 2024-07-01 NOTE — PLAN OF CARE
Goal Outcome Evaluation:       Alert and oriented.vitals are with in normal limits. Reported better pain control. Patient is on Q6H IV Toradol. Refrained from narcotics to promote bowel motility. Administered senna and jamse lax. Reports passing flatus. RLQ FELIPA with 10 ml of ping tinged output. Up out of bed with assist of one with gait belt.

## 2024-07-01 NOTE — PROGRESS NOTES
"CLINICAL NUTRITION SERVICES - REASSESSMENT NOTE    Recommendations Ordered by Registered Dietitian (RD):   Encouraged po intake, emphasizing the importance of protein to preserve lean muscle mass  Strawberry Ensure BID @ 10-2   Future/Additional Recommendations: monitor diet advancement, supp preference   Malnutrition: 6/26   % Weight Loss:  > 2% in 1 week (severe malnutrition) - wt down ~3% in the past week  % Intake:  </= 50% for >/= 5 days (severe malnutrition)  Subcutaneous Fat Loss:  None observed  Muscle Loss:  Clavicle bone region - mild depletion  Fluid Retention:  Trace R ankle (7/1)     Malnutrition Diagnosis: Severe malnutrition  In Context of:  Acute illness or injury     EVALUATION OF PROGRESS TOWARD GOALS   Diet: ADAT: Clear Liquid vs Low Fiber     Intake/Tolerance:  Brent was resting in bed while reporting his po intake to be going \"OK\" as his diet advances. He reported good tolerance of his full liquid lunch today - he had cream of wheat, OJ, pudding and milk. He was eager to have strawberry Ensure set up BID @ 10-2 and declined any magic cups for now.   - Flowsheets show a fair appetite and 1x intakes/day of %, except yesterday had 3 intakes of 50%    ASSESSED NUTRITION NEEDS:  Dosing Weight 93 kg  Estimated Energy Needs: 7693-4611 kcals (20-25 Kcal/Kg)  Justification: maintenance  Estimated Protein Needs:  grams protein (1-1.2 g pro/Kg)  Justification: preservation of lean body mass    NEW FINDINGS:   Weight:   06/29/24 0616 94.8 kg (208 lb 14.4 oz) Standing scale   06/28/24 0407 93.4 kg (206 lb) Standing scale   06/27/24 0558 92.3 kg (203 lb 6.4 oz) Standing scale   06/26/24 0305 93 kg (205 lb) Standing scale     GI: LBM 2x on this AM    Skin: Trace edema R ankle    Meds: multivitamin with minerals    Labs:     Previous Goals:   Pt to receive nutrition in the next 2-3 days  Evaluation: Met    Previous Nutrition Diagnosis:   Inadequate protein-energy intake related to NPO status as " evidenced by pt not meeting nutrition needs  Evaluation: Completed    CURRENT NUTRITION DIAGNOSIS  Inadequate protein-energy intake related to restricted diet as evidenced by pt diet advancing as tolerated (clear liquids this morning, currently on full liquids) and need for supplement to help pt meet needs    INTERVENTIONS  Recommendations / Nutrition Prescription  Encouraged po intake, emphasizing the importance of protein to preserve lean muscle mass  Strawberry Ensure BID @ 10-2    Implementation  Medical food supplement therapy  General/Healthful diet     Goals  PO - >75% of meal trays and/or supplements TID     MONITORING AND EVALUATION:  Progress towards goals will be monitored and evaluated per protocol and Practice Guideline    Adi Casey RD, LD

## 2024-07-01 NOTE — PROGRESS NOTES
----- Message from Louise Welsh MD sent at 9/22/2017  8:38 AM CDT -----  Please advise negative GBS results.   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   He was seen at urgent care in [place] on 6/25.  Labs showed leukocytosis of 14, elevated CRP of 16, elevated LFTs with alkaline phosphatase 212, , , total bilirubin 2.8.   CT A/P was showed acute cholecystitis with distal choledocholithiasis.      Acute gangrene cholecystitis with cholelithiasis and choledocholithiasis  Enterobacter and Klebsiella pneumonia bacteremia  Status post ERCP and EUS on 06/26/24:   S/p biliary sphincterotomy and balloon extraction on 06/26/2024 with temporary stent placement:  S/p robotic assisted laparoscopic cholecystectomy without cholangiogram and lysis of adhesions.    Presented with 4 days of abdominal pain  CT A/P obtained at urgent care showed cholelithiasis with acute cholecystitis and choledocholithiasis  Blood cultures from admission 2 out of 2 growing Enterobacter and Klebsiella pneumonia  Status post ERCP and EUS on 06/26/24 , was found to have choledocholithiasis, complete removal was accomplished by biliary sphincterotomy and balloon extraction  Temporary stent was placed in the CBD, repeat ERCP in 3 months to remove the stent  Patient underwent robotic assisted laparoscopic cholecystectomy without cholangiogram and lysis of adhesion and was found to have acute gangrenous cholecystitis.    --Post-op management deferred to general surgery  --  Continue patient on IV antibiotics with IV cefepime 2 g every 8 hours and metronidazole 500 mg p.o. 3 times a day.  -- Continue Protonix 20 mg p.o. daily for 1 week.  -- Continue senna and MiraLAX BID.  Ok from GI standpoint to trial dulcolax suppository or enema.  He reports now having BMs.  --Repeat ERCP and stent removal in 3 months.  --Follow up with [facility] in 2 weeks. Our clinical staff will call the patient to schedule. Office phone: [phone]    [facility]  Office: [phone]  Cell: [phone] (Dr. Neil)  Cell: [phone] (Sylvester Pradhan PA-C)        Interval History:     doing well; no cp, sob, n/v/d, or abd pain.              Review of Systems:     C: NEGATIVE for fever, chills, change in weight  E/M: NEGATIVE for ear, mouth and throat problems  R: NEGATIVE for significant cough or SOB  CV: NEGATIVE for chest pain, palpitations or peripheral edema             Medications:   I have reviewed this patient's current medications  Current Facility-Administered Medications   Medication Dose Route Frequency Provider Last Rate Last Admin    amLODIPine (NORVASC) tablet 10 mg  10 mg Oral YENI Emiliano Mcfarlane PA-C   10 mg at 07/01/24 0817    aspirin EC tablet 81 mg  81 mg Oral Kaila Medina MD   81 mg at 07/01/24 0817    atorvastatin (LIPITOR) tablet 20 mg  20 mg Oral YENIM Emiliano Mcfarlane PA-C   20 mg at 07/01/24 0817    carvedilol (COREG) tablet 12.5 mg  12.5 mg Oral BID w/meals Emiliano Mcfarlane PA-C   12.5 mg at 07/01/24 0817    ceFEPIme (MAXIPIME) 2 g vial to attach to  mL bag for ADULTS or 50 mL bag for PEDS  2 g Intravenous Q8H Emiliano Mcfarlane PA-C   2 g at 07/01/24 0816    [Held by provider] chlorthalidone (HYGROTON) tablet 25 mg  25 mg Oral QPM Miri Flores MD        enoxaparin ANTICOAGULANT (LOVENOX) injection 40 mg  40 mg Subcutaneous Q24H Kaila Weiss MD   40 mg at 06/30/24 1802    ketorolac (TORADOL) injection 15  mg  15 mg Intravenous Q6H Cristopher Gonzalez PA-C   15 mg at 07/01/24 0816    lisinopril (ZESTRIL) tablet 20 mg  20 mg Oral BID Emiliano Mcfarlane PA-C   20 mg at 07/01/24 0817    metroNIDAZOLE (FLAGYL) tablet 500 mg  500 mg Oral TID Emiliano Mcfarlane PA-C   500 mg at 07/01/24 0817    multivitamin w/minerals (THERA-VIT-M) tablet 1 tablet  1 tablet Oral QAM Kaila Weiss MD   1 tablet at 07/01/24 0817    pantoprazole (PROTONIX) EC tablet 20 mg  20 mg Oral QAM AC Kaila Weiss MD   20 mg at 07/01/24 0817    polyethylene glycol (MIRALAX) Packet 17 g  17 g Oral BID Cristopher Gonzalez PA-C   17 g at 06/30/24 2038    senna-docusate (SENOKOT-S/PERICOLACE) 8.6-50 MG per tablet 1 tablet  1 tablet Oral BID Emiliano Mcfarlane PA-C   1 tablet at 06/27/24 2113    Or    senna-docusate (SENOKOT-S/PERICOLACE) 8.6-50 MG per tablet 2 tablet  2 tablet Oral BID Emiliano Mcfarlane PA-C   2 tablet at 07/01/24 0817    sodium chloride (PF) 0.9% PF flush 3 mL  3 mL Intracatheter Q8H Emiliano Mcfarlane PA-C   3 mL at 06/30/24 1802                  Physical Exam:   Vitals were reviewed  Vital Signs with Ranges  Temp:  [97.8  F (36.6  C)-98.6  F (37  C)] 98.1  F (36.7  C)  Pulse:  [73-86] 83  Resp:  [18-21] 18  BP: (117-145)/(80-89) 145/85  SpO2:  [94 %-96 %] 96 %  I/O last 3 completed shifts:  In: 926 [P.O.:920; I.V.:6]  Out: 780 [Urine:780]  Constitutional: Alert, oriented and cooperative.  Appropriate, sitting up in the chair in NAD.   Respiratory:  Lungs CTAB.  No crackles, wheezes, or rhonchi, no labored breathing.  Cardiovascular:  Heart RRR, no edema.  GI:  Abdomen soft, NT/ND and with normoactive BS.  FELIPA drain with scant output.  Skin/Integumen:  Warm, dry, non-diaphoretic.  MSK: CMS x4 intact.             Data:   I reviewed the patient's new clinical lab test results.   Recent Labs   Lab Test 06/30/24  0817 06/29/24  0722 06/28/24  0848 06/26/24  0904 06/25/24  2103   WBC 16.2* 14.7* 11.6*   < > 12.6*   HGB 12.6* 12.4*  13.5   < > 15.1   MCV 97 98 99   < > 95    275 243   < > 181   INR  --   --   --   --  1.05    < > = values in this interval not displayed.     Recent Labs   Lab Test 06/30/24  0817 06/29/24  0722 06/28/24  0848 06/27/24  0531   POTASSIUM 3.8  3.8 3.7 3.7 3.8   CHLORIDE 98  --  101 98   CO2 26  --  26 26   BUN 26.3*  --  20.6 14.8   ANIONGAP 11  --  11 12     Recent Labs   Lab Test 06/29/24  0722 06/28/24  0848 06/27/24  0531 06/26/24  0904 06/25/24  2103   ALBUMIN 2.7* 3.1* 3.1*   < > 4.1   BILITOTAL 1.1 1.2 1.5*   < > 2.5*   ALT 71* 92* 129*   < > 252*   AST 41 49* 38   < > 84*   LIPASE  --   --   --   --  42    < > = values in this interval not displayed.       I reviewed the patient's new imaging results.    All laboratory data reviewed  All imaging studies reviewed by me.    SHEYLA Jane,  7/1/2024  [facility]  Office : [phone]  Cell: [phone] (Dr. Neil)

## 2024-07-01 NOTE — PROVIDER NOTIFICATION
MD Notification    Notified Person: MD    Notified Person Name: Dr. Yanez, Gen Surg    Notification Date/Time: 07/01/24 8:36 AM     Notification Interaction: Vocera    Purpose of Notification:   SBAR Situation: Pt wanting to advance diet this morning   Background: Pt POD#4 of lap makayla, hx gangrenous cholecystitis and choledocholithiasis   Assessment: Pt with 2/10 pain, has had 2 BMs, no nausea/pain with clears   Recommendation: Advance diet to low fiber     Orders Received:    Comments:

## 2024-07-01 NOTE — PROGRESS NOTES
Patient's daughter, Siri ( [phone]) called the units On 6/30/24 about 8 pm and requested that the patient's wife is not to visit with patient at this time. Writer checked with the patient and confirmed that is in fact his wish. He doesn't want his wife Karlee Austin to visit him while he is inpatient here.

## 2024-07-02 ENCOUNTER — APPOINTMENT (OUTPATIENT)
Dept: CT IMAGING | Facility: CLINIC | Age: 79
DRG: 417 | End: 2024-07-02
Attending: SURGERY
Payer: COMMERCIAL

## 2024-07-02 LAB
ANION GAP SERPL CALCULATED.3IONS-SCNC: 9 MMOL/L (ref 7–15)
BACTERIA BLD CULT: NO GROWTH
BACTERIA BLD CULT: NO GROWTH
BUN SERPL-MCNC: 43.6 MG/DL (ref 8–23)
CALCIUM SERPL-MCNC: 9.4 MG/DL (ref 8.8–10.2)
CHLORIDE SERPL-SCNC: 103 MMOL/L (ref 98–107)
CREAT SERPL-MCNC: 0.83 MG/DL (ref 0.67–1.17)
DEPRECATED HCO3 PLAS-SCNC: 26 MMOL/L (ref 22–29)
EGFRCR SERPLBLD CKD-EPI 2021: >90 ML/MIN/1.73M2
ERYTHROCYTE [DISTWIDTH] IN BLOOD BY AUTOMATED COUNT: 12.5 % (ref 10–15)
GLUCOSE SERPL-MCNC: 141 MG/DL (ref 70–99)
HCT VFR BLD AUTO: 34.5 % (ref 40–53)
HGB BLD-MCNC: 11.6 G/DL (ref 13.3–17.7)
LACTATE SERPL-SCNC: 1.8 MMOL/L (ref 0.7–2)
LACTATE SERPL-SCNC: 2.1 MMOL/L (ref 0.7–2)
MCH RBC QN AUTO: 32.5 PG (ref 26.5–33)
MCHC RBC AUTO-ENTMCNC: 33.6 G/DL (ref 31.5–36.5)
MCV RBC AUTO: 97 FL (ref 78–100)
PLATELET # BLD AUTO: 459 10E3/UL (ref 150–450)
POTASSIUM SERPL-SCNC: 3.9 MMOL/L (ref 3.4–5.3)
RBC # BLD AUTO: 3.57 10E6/UL (ref 4.4–5.9)
SODIUM SERPL-SCNC: 138 MMOL/L (ref 135–145)
WBC # BLD AUTO: 16 10E3/UL (ref 4–11)

## 2024-07-02 PROCEDURE — 99232 SBSQ HOSP IP/OBS MODERATE 35: CPT | Performed by: INTERNAL MEDICINE

## 2024-07-02 PROCEDURE — 36415 COLL VENOUS BLD VENIPUNCTURE: CPT | Performed by: INTERNAL MEDICINE

## 2024-07-02 PROCEDURE — 250N000011 HC RX IP 250 OP 636: Performed by: PHYSICIAN ASSISTANT

## 2024-07-02 PROCEDURE — 250N000011 HC RX IP 250 OP 636: Performed by: INTERNAL MEDICINE

## 2024-07-02 PROCEDURE — 85018 HEMOGLOBIN: CPT | Performed by: INTERNAL MEDICINE

## 2024-07-02 PROCEDURE — 74177 CT ABD & PELVIS W/CONTRAST: CPT

## 2024-07-02 PROCEDURE — 250N000013 HC RX MED GY IP 250 OP 250 PS 637: Performed by: PHYSICIAN ASSISTANT

## 2024-07-02 PROCEDURE — 250N000013 HC RX MED GY IP 250 OP 250 PS 637: Performed by: INTERNAL MEDICINE

## 2024-07-02 PROCEDURE — 120N000001 HC R&B MED SURG/OB

## 2024-07-02 PROCEDURE — 250N000011 HC RX IP 250 OP 636: Performed by: HOSPITALIST

## 2024-07-02 PROCEDURE — 83605 ASSAY OF LACTIC ACID: CPT | Performed by: INTERNAL MEDICINE

## 2024-07-02 PROCEDURE — 258N000003 HC RX IP 258 OP 636: Performed by: INTERNAL MEDICINE

## 2024-07-02 PROCEDURE — 250N000009 HC RX 250: Performed by: INTERNAL MEDICINE

## 2024-07-02 PROCEDURE — 80048 BASIC METABOLIC PNL TOTAL CA: CPT | Performed by: INTERNAL MEDICINE

## 2024-07-02 RX ORDER — PROCHLORPERAZINE MALEATE 5 MG
5 TABLET ORAL EVERY 6 HOURS PRN
Status: DISCONTINUED | OUTPATIENT
Start: 2024-07-02 | End: 2024-07-05 | Stop reason: HOSPADM

## 2024-07-02 RX ORDER — IOPAMIDOL 755 MG/ML
107 INJECTION, SOLUTION INTRAVASCULAR ONCE
Status: COMPLETED | OUTPATIENT
Start: 2024-07-02 | End: 2024-07-02

## 2024-07-02 RX ORDER — SODIUM CHLORIDE 9 MG/ML
INJECTION, SOLUTION INTRAVENOUS CONTINUOUS
Status: DISCONTINUED | OUTPATIENT
Start: 2024-07-02 | End: 2024-07-05

## 2024-07-02 RX ORDER — PROCHLORPERAZINE 25 MG
12.5 SUPPOSITORY, RECTAL RECTAL EVERY 12 HOURS PRN
Status: DISCONTINUED | OUTPATIENT
Start: 2024-07-02 | End: 2024-07-05 | Stop reason: HOSPADM

## 2024-07-02 RX ADMIN — METRONIDAZOLE 500 MG: 500 TABLET ORAL at 09:01

## 2024-07-02 RX ADMIN — SODIUM CHLORIDE 71 ML: 9 INJECTION, SOLUTION INTRAVENOUS at 13:28

## 2024-07-02 RX ADMIN — METRONIDAZOLE 500 MG: 500 TABLET ORAL at 22:53

## 2024-07-02 RX ADMIN — Medication 1 TABLET: at 09:01

## 2024-07-02 RX ADMIN — CEFEPIME 2 G: 2 INJECTION, POWDER, FOR SOLUTION INTRAVENOUS at 06:47

## 2024-07-02 RX ADMIN — CEFEPIME 2 G: 2 INJECTION, POWDER, FOR SOLUTION INTRAVENOUS at 15:40

## 2024-07-02 RX ADMIN — ATORVASTATIN CALCIUM 20 MG: 10 TABLET, FILM COATED ORAL at 09:01

## 2024-07-02 RX ADMIN — METRONIDAZOLE 500 MG: 500 TABLET ORAL at 15:40

## 2024-07-02 RX ADMIN — PANTOPRAZOLE SODIUM 40 MG: 40 INJECTION, POWDER, FOR SOLUTION INTRAVENOUS at 20:35

## 2024-07-02 RX ADMIN — CARVEDILOL 12.5 MG: 12.5 TABLET, FILM COATED ORAL at 09:01

## 2024-07-02 RX ADMIN — SENNOSIDES AND DOCUSATE SODIUM 1 TABLET: 50; 8.6 TABLET ORAL at 09:01

## 2024-07-02 RX ADMIN — AMLODIPINE BESYLATE 10 MG: 10 TABLET ORAL at 09:01

## 2024-07-02 RX ADMIN — PANTOPRAZOLE SODIUM 20 MG: 20 TABLET, DELAYED RELEASE ORAL at 06:47

## 2024-07-02 RX ADMIN — ONDANSETRON 4 MG: 2 INJECTION INTRAMUSCULAR; INTRAVENOUS at 21:53

## 2024-07-02 RX ADMIN — LISINOPRIL 20 MG: 20 TABLET ORAL at 20:36

## 2024-07-02 RX ADMIN — ACETAMINOPHEN 650 MG: 325 TABLET, FILM COATED ORAL at 20:35

## 2024-07-02 RX ADMIN — SODIUM CHLORIDE: 9 INJECTION, SOLUTION INTRAVENOUS at 15:40

## 2024-07-02 RX ADMIN — CEFEPIME 2 G: 2 INJECTION, POWDER, FOR SOLUTION INTRAVENOUS at 22:53

## 2024-07-02 RX ADMIN — SODIUM CHLORIDE: 9 INJECTION, SOLUTION INTRAVENOUS at 22:53

## 2024-07-02 RX ADMIN — POLYETHYLENE GLYCOL 3350 17 G: 17 POWDER, FOR SOLUTION ORAL at 20:35

## 2024-07-02 RX ADMIN — IOPAMIDOL 107 ML: 755 INJECTION, SOLUTION INTRAVENOUS at 13:28

## 2024-07-02 RX ADMIN — ASPIRIN 81 MG: 81 TABLET, COATED ORAL at 09:01

## 2024-07-02 RX ADMIN — PROCHLORPERAZINE EDISYLATE 5 MG: 5 INJECTION INTRAMUSCULAR; INTRAVENOUS at 18:12

## 2024-07-02 RX ADMIN — ONDANSETRON 4 MG: 4 TABLET, ORALLY DISINTEGRATING ORAL at 14:07

## 2024-07-02 RX ADMIN — LISINOPRIL 20 MG: 20 TABLET ORAL at 09:01

## 2024-07-02 RX ADMIN — SENNOSIDES AND DOCUSATE SODIUM 2 TABLET: 50; 8.6 TABLET ORAL at 20:36

## 2024-07-02 RX ADMIN — ACETAMINOPHEN 650 MG: 325 TABLET, FILM COATED ORAL at 15:50

## 2024-07-02 RX ADMIN — CARVEDILOL 12.5 MG: 12.5 TABLET, FILM COATED ORAL at 17:00

## 2024-07-02 RX ADMIN — SODIUM CHLORIDE 1000 ML: 9 INJECTION, SOLUTION INTRAVENOUS at 16:58

## 2024-07-02 ASSESSMENT — ACTIVITIES OF DAILY LIVING (ADL)
ADLS_ACUITY_SCORE: 26
ADLS_ACUITY_SCORE: 27
ADLS_ACUITY_SCORE: 27
ADLS_ACUITY_SCORE: 28
ADLS_ACUITY_SCORE: 27
ADLS_ACUITY_SCORE: 26
ADLS_ACUITY_SCORE: 28
ADLS_ACUITY_SCORE: 28
ADLS_ACUITY_SCORE: 27
ADLS_ACUITY_SCORE: 28
ADLS_ACUITY_SCORE: 27
ADLS_ACUITY_SCORE: 28
ADLS_ACUITY_SCORE: 26
ADLS_ACUITY_SCORE: 28
ADLS_ACUITY_SCORE: 28
ADLS_ACUITY_SCORE: 27
ADLS_ACUITY_SCORE: 28
ADLS_ACUITY_SCORE: 27
ADLS_ACUITY_SCORE: 28

## 2024-07-02 NOTE — PROGRESS NOTES
[facility]    Hospitalist Progress Note    Date of Admission: 6/25/2024    Assessment & Plan   Jairo Austin is a 78 year old male with PMHx of hypertension, hyperlipidemia, diet-controlled type 2 diabetes, CARMITA, asbestos exposure, stage IIb uveal melanoma of the left eye s/p plaque brachytherapy in 2019, stage II GIST of the small intestine s/p ex lap with open jejunal resection in 2022, ventral hernia repair in 2023 and remote appendectomy among other medical problems who was admitted on 6/25/2024 for management of acute gangrenous cholecystitis with cholelithiasis and choledocholithiasis.  Additionally during his stay, he was found to have bacteremia secondary to Enterobacter and Klebsiella pneumonia.     Acute gangrene cholecystitis with cholelithiasis and choledocholithiasis  S/p ERCP and EUS on 6/26/24 with biliary sphincterotomy, and temporary stent placement  S/p robotic assisted laparoscopic cholecystectomy and BRENDA on 6/27/24  Postop ileus  Enterobacter and Klebsiella pneumonia bacteremia dt above  *Presented to an urgent care clinic in [place], [place] on 6/25 for evaluation of s 4d hx of upper abdominal pain/nausea. Labs notable for WBC 14, lactate nl, CRP 16, AST//301, alk phos 212 and total bili 2.8. CT abd/pelvis obtained and showed acute cholecystitis with distal choledocholithiasis.  Transferred to [facility] for ongoing care.  *Remained afebrile and hemodynamically stable. 2/2 blood cultures on admission grew enterobacter and Klebsiella pneumoniae.  *GI and general surgery consulted on admission.  *Seen by [facility].  Underwent ERCP/EUS on 6/26 with findings of choledocholithiasis.  Complex removal was accomplished with biliary sphincterotomy, balloon extraction and temporary stent placement to CBD. LFTs improved.   *Subsequently underwent robotic assisted laparoscopic cholecystectomy with lysis of adhesions on 6/27/24 per Dr. De León.   Intraoperative findings were notable for acute gangrenous cholecystitis.  *Postoperative course was complicated by worsening abdominal distention with minimal flatus and no BM; suspected due to a postop ileus.  Symptoms improved with supportive cares and ultimately did not require NG tube placement.  *On 7/1, notified that second blood culture drawn on admission (6/25) was growing Bacteroides thetaiotaomicron and Bacteroides dorei/vulgatus.  No antimicrobial susceptibility patterns available at this time however anticipate patient is covered on current antibiotics and subsequent blood cultures drawn 6/27-6/28 remain negative.  *Had ongoing abdominal bloating, n/v and persistent leukocytosis postop. CT abd/pelvis obtained on 7/2 and showed a newly dilated stomach and small bowel with gradual transition to decompressed distal small bowel, findings were consistent with a postoperative adynamic ileus.  No significant fluid collections in the gallbladder fossa.  -- monitor fever curve, WBC; blood cultures drawn 6/27-6/28 remain neg  -- conts on IV cefepime/po flagyl (6/26-present), transition to oral abx at discharge; consider ID consult if blood cultures remain positive or worsening leukocytosis/fever  -- given development of postop ileus -- backed down to clear liquids on 7/2, resumes IVFs with NS @100ml/h  -- cont IV PPI BID  -- follow up with [facility] in 3 mths for repeat ERCP to remove biliary stent  -- noted to have black-appearing stools on 7/2, monitor hgb trend, held subQ Lovenox on 7/2, if ongoing concerns for occult GI bleed will ask [facility] to reevaluate    Hypovolemic hyponatremia: Resolved  Hypokalemia: Resolved  Dehydration: Improved  *Initial BMP notable for Na 130, K 3.0, Cl 88, BUN 30.  Findings suspected secondary to poor appetite and reduced oral intake in the days preceding admission due to above.    *IVFs and K replacement started on admission.      Hypertension  Hyperlipidemia  *PTA meds:  "amlodipine, Coreg, chlorthalidone, lisinopril and statin.  *Continues on amlodipine, Coreg, lisinopril and statin.  *Chlorthalidone held on admission due to above -- cont holding while needing IVFs     DM II, diet controlled  *A1c 5.9 this stay. Not on treatment at baseline.  *Used sliding scale insulin initially but needs were low so accuchecks/insulin were dc'd.     Severe malnutrition in context of acute illness or injury  *Nutritionist following. Postop diet as above.     Recent mechanical fall  Right scalp hematoma  Right periorbital hematoma  *Reportedly fell 5d prior to admission while cutting a tree branch and sustained a R scalp hematoma and R periorbital hematoma. Did not seek medical care at that time.   *Head CT this stay neg for acute pathology.   -- cont supportive cares     CARMITA  *Chronic and stable on CPAP     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, 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  Stage II GIST of jejunum, s/p exploratory laparotomy with jejunal resection 2/2022  Hx of asbestosis exposure  *Follows with [facility] oncology. No concerns at this time.     FEN: NS @100ml/h resumed 7/2 as above, lytes stable, clears as above  DVT Prophylaxis: Lovenox held on 7/2 dt reports of dark stools, cont PCDs  Code Status: Full Code    Clinically Significant Risk Factors              # Hypoalbuminemia: Lowest albumin = 2.7 g/dL at 6/29/2024  7:22 AM, will monitor as appropriate     # Hypertension: Noted on problem list           #Precipitous drop in Hgb/Hct: Lowest Hgb this hospitalization: 11.6 g/dL. Will continue to monitor and treat/transfuse as appropriate.     # Overweight: Estimated body mass index is 29.79 kg/m  as calculated from the following:    Height as of this encounter: 1.803 m (5' 11\").    Weight as of this encounter: 96.9 kg (213 lb 10 oz).   # Severe Malnutrition: based on nutrition assessment      # Financial/Environmental Concerns: none           Disposition: Postop course now complicated by development " of ileus.  Anticipate discharge home in 2 to 3 days pending resolution of ileus and ability to take p.o.    Medically Ready for Discharge: Anticipated in 2-4 Days      Nicci Olvera, DO    Medical Decision Making       Please see A&P for additional details of medical decision making.       Interval History   Chart reviewed.  FELIPA drain was removed earlier today per general surgery.  Seen this afternoon.  Had worsening abdominal distention noted earlier today.  CT abd/pelvis obtained per general surgery and showed findings of an ileus, no abnormal findings in the gallbladder fossa.  When I saw patient this afternoon, he reported 1 episode of black appearing emesis around the time of his CT.  No recurrent bouts of emesis since then.  Has had several loose stools today, most recent stool was black appearing.  Appetite marginal. Has been ambulating hallway some.  No chest pain or shortness of breath.    -Data reviewed today: I reviewed all new labs and imaging results over the last 24 hours. I personally reviewed no images or EKG's today.    Physical Exam   Temp: 97.7  F (36.5  C) Temp src: Oral BP: (!) 141/87 Pulse: 88   Resp: 20 SpO2: 98 % O2 Device: None (Room air)    Vitals:    06/28/24 0407 06/29/24 0616 07/02/24 0633   Weight: 93.4 kg (206 lb) 94.8 kg (208 lb 14.4 oz) 96.9 kg (213 lb 10 oz)     Vital Signs with Ranges  Temp:  [97.7  F (36.5  C)-98.1  F (36.7  C)] 97.7  F (36.5  C)  Pulse:  [88-89] 88  Resp:  [18-20] 20  BP: (131-152)/(87-93) 141/87  SpO2:  [94 %-98 %] 98 %  I/O last 3 completed shifts:  In: 880 [P.O.:880]  Out: 200 [Urine:200]    Constitutional: Resting comfortably, alert and conversing appropriately, NAD  Respiratory: CTAB, no wheeze, no increased work of breathing  Cardiovascular: HRRR, no MGR, no LE edema  GI: Abdomen distended, hypoactive bowel sounds, FELIPA drain removed earlier today  Skin/Integumen: Warm/dry, +R periorbital hematoma  Other:      Medications   Current  Facility-Administered Medications   Medication Dose Route Frequency Provider Last Rate Last Admin     Current Facility-Administered Medications   Medication Dose Route Frequency Provider Last Rate Last Admin    amLODIPine (NORVASC) tablet 10 mg  10 mg Oral QAM Emiliano Mcfarlane PA-C   10 mg at 07/02/24 0901    aspirin EC tablet 81 mg  81 mg Oral YENIM Kaila Weiss MD   81 mg at 07/02/24 0901    atorvastatin (LIPITOR) tablet 20 mg  20 mg Oral QAM Emiliano Mcfarlane PA-C   20 mg at 07/02/24 0901    carvedilol (COREG) tablet 12.5 mg  12.5 mg Oral BID w/meals Emiliano Mcfarlane PA-C   12.5 mg at 07/02/24 0901    ceFEPIme (MAXIPIME) 2 g vial to attach to  mL bag for ADULTS or 50 mL bag for PEDS  2 g Intravenous Q8H Emiliano Mcfarlane PA-C   2 g at 07/02/24 0647    enoxaparin ANTICOAGULANT (LOVENOX) injection 40 mg  40 mg Subcutaneous Q24H Kaila Weiss MD   40 mg at 07/01/24 1803    iopamidol (ISOVUE-370) solution 107 mL  107 mL Intravenous Once Nicci Olvera DO        lisinopril (ZESTRIL) tablet 20 mg  20 mg Oral BID Emiliano Mcfarlane PA-C   20 mg at 07/02/24 0901    metroNIDAZOLE (FLAGYL) tablet 500 mg  500 mg Oral TID Emiliano Mcfarlane PA-C   500 mg at 07/02/24 0901    multivitamin w/minerals (THERA-VIT-M) tablet 1 tablet  1 tablet Oral QAM Kaila Weiss MD   1 tablet at 07/02/24 0901    pantoprazole (PROTONIX) EC tablet 20 mg  20 mg Oral QAM AC Kaila Weiss MD   20 mg at 07/02/24 0647    polyethylene glycol (MIRALAX) Packet 17 g  17 g Oral BID Cristopher Gonzalez PA-C   17 g at 07/01/24 2007    Saline  71 mL Intravenous Once Nicci Olvera DO        senna-docusate (SENOKOT-S/PERICOLACE) 8.6-50 MG per tablet 1 tablet  1 tablet Oral BID Emiliano Mcfarlane PA-C   1 tablet at 07/02/24 0901    Or    senna-docusate (SENOKOT-S/PERICOLACE) 8.6-50 MG per tablet 2 tablet  2 tablet Oral BID Emiliano Mcfarlane PA-C   2 tablet at 07/01/24 2007    sodium chloride (PF) 0.9% PF  flush 3 mL  3 mL Intracatheter Q8H Emiliano Mcfarlane PA-C   3 mL at 07/02/24 1129       Data   Recent Labs   Lab 07/02/24  0834 07/01/24  0939 06/30/24  1701 06/30/24  0827 06/30/24  0817 06/29/24  0802 06/29/24  0722 06/28/24  1159 06/28/24  0848 06/26/24  0302 06/25/24  2103   WBC 16.0* 15.7*  --   --  16.2*  --  14.7*  --  11.6*   < > 12.6*   HGB 11.6* 12.9*  --   --  12.6*  --  12.4*  --  13.5   < > 15.1   MCV 97  --   --   --  97  --  98  --  99   < > 95   *  --   --   --  362  --  275  --  243   < > 181   INR  --   --   --   --   --   --   --   --   --   --  1.05    134*  --   --  135  --   --   --  138   < > 130*   POTASSIUM 3.9 3.9  --   --  3.8  3.8  --  3.7  --  3.7   < > 3.0*   CHLORIDE 103 98  --   --  98  --   --   --  101   < > 88*   CO2 26 24  --   --  26  --   --   --  26   < > 30*   BUN 43.6* 32.9*  --   --  26.3*  --   --   --  20.6   < > 15.1   CR 0.83 0.88  --   --  0.89  --   --   --  0.89   < > 0.95   ANIONGAP 9 12  --   --  11  --   --   --  11   < > 12   PAMELA 9.4 9.4  --   --  9.2  --   --   --  8.9   < > 9.8   * 127* 148*   < > 114*   < >  --    < > 119*   < > 115*   ALBUMIN  --   --   --   --   --   --  2.7*  --  3.1*   < > 4.1   PROTTOTAL  --   --   --   --   --   --  6.3*  --  6.7   < > 7.8   BILITOTAL  --   --   --   --   --   --  1.1  --  1.2   < > 2.5*   ALKPHOS  --   --   --   --   --   --  125  --  141   < > 205*   ALT  --   --   --   --   --   --  71*  --  92*   < > 252*   AST  --   --   --   --   --   --  41  --  49*   < > 84*   LIPASE  --   --   --   --   --   --   --   --   --   --  42    < > = values in this interval not displayed.       Recent Results (from the past 24 hour(s))   CT Abdomen Pelvis w Contrast    Narrative    CT ABDOMEN AND PELVIS WITH CONTRAST 7/2/2024 1:44 PM    CLINICAL HISTORY: Bloating, leukocytosis after gangrenous gallbladder  removal.    TECHNIQUE: CT scan of the abdomen and pelvis was performed following  injection of IV contrast.  Multiplanar reformats were obtained. Dose  reduction techniques were used.    CONTRAST: 107 mL Isovue-370    COMPARISON: 4/13/2023    FINDINGS:   LOWER CHEST: Normal.    HEPATOBILIARY: Interval cholecystectomy. Plastic biliary stent with  pneumobilia. No significant fluid in the gallbladder fossa.    PANCREAS: Normal.    SPLEEN: Calcified granulomas.    ADRENAL GLANDS: Normal.    KIDNEYS/BLADDER: Renal cysts. No hydronephrosis.    BOWEL: Dilated fluid-filled loops of small bowel and stomach. There is  a gradual transition to decompressed distal small bowel and colon.    PELVIC ORGANS: Small fluid collection in the pelvis is new measuring  2.5 cm in diameter.    ADDITIONAL FINDINGS: Scant ascites.    MUSCULOSKELETAL: Bilateral hip arthroplasties.      Impression    IMPRESSION:   1.  New dilated stomach and small bowel with gradual transition to  decompressed distal small bowel. Findings most likely represent a  postoperative adynamic ileus.  2.  Postop recent cholecystectomy. No significant fluid in the  gallbladder fossa. Scant ascites and small fluid collection in the  pelvis. No drainable abscess.

## 2024-07-02 NOTE — PLAN OF CARE
[id number]  Orientation: A&Ox4  Aggression Stop Light: Green  Activity: Ind  Diet: ileus- back to clears today  IV Access/Drains: R PIV SL; R abdomen FELIPA removed this AM.  Pain Management: PRN tylenol avail  Abnormal VS/Results: VSS. Lactic 2.1- bolus given, re-draw at 1800.  Bowel/Bladder: Abdomen distended. Emesis x1 this AM, Zofran given. Another emesis at end of shift- gave Compazine. Multiple loose black BMs today- MD aware, holding lovenox, Hgb stable  Skin/Wounds: Bruising/swelling to L eye/forehead. Abdomen distended  Consults: Nutrition, Surgery, PT  D/C Disposition: TBD pending ileus resolution  Other Info: Pt's chart made confidential to prevent wife from visiting. Son & daughter ok to get info.

## 2024-07-02 NOTE — PLAN OF CARE
[id number]  Orientation: A&Ox4  Aggression Stop Light: Green  Activity: Ind  Diet/BS Checks: Low fiber  Tele: N/A  IV Access/Drains: R PIV  Pain Management: PRN tylenol given   Abnormal VS/Results: VSS on RA  Bowel/Bladder: Continent  Skin/Wounds: Bruising/swelling to L eye/forehead. Abdomen distended  Consults: Nutrition, Surgery, PT  D/C Disposition: Possible discharge today or tomorrow

## 2024-07-02 NOTE — PROGRESS NOTES
"General Surgery Note    Stable S/P difficult robotic assisted lap makayla for gangrenous cholecystitis, POD 5    -Remove drain this am.  -He still feels like he is distended despite ROBF and No N/V.  Discussed he can stay on liquids for another day or two if that feels better.  Should listen to his body and attempt solids when he feels up to it.  -Getting closer to discharge but still with Leukocytosis but afebrile.  ID consult?  Pt with cough during exam today.      NAD, pleasant, A&O  BP (!) 141/87 (BP Location: Right arm)   Pulse 88   Temp 97.7  F (36.5  C) (Oral)   Resp 20   Ht 1.803 m (5' 11\")   Wt 96.9 kg (213 lb 10 oz)   SpO2 98%   BMI 29.79 kg/m      Intake/Output Summary (Last 24 hours) at 7/2/2024 1008  Last data filed at 7/2/2024 0635  Gross per 24 hour   Intake 880 ml   Output 200 ml   Net 680 ml     Abd: soft, mildly distended similar to yesterday, NT  Inc: CDI.  Steris on.  FELIPA: stripped some clot in tubing this am.  Will see if any more drainage after he walks, but otherwise this appears to be clogged or draining minimal output at this time.  Can remove.      Jeramy Mcfarlane PA-C  [phone]  "

## 2024-07-02 NOTE — PROVIDER NOTIFICATION
Dr. Olvera notified of lactic acid level 2.1. Per provider, give NS 1L Bolus over an hour and re-draw lactic acid at 1800.

## 2024-07-02 NOTE — PROGRESS NOTES
No fever today. Pulse 88. Mildly hypertensive. Bloated. Some BM. Not very hungry.   WBC up a bit.   He had a serious infection at the time of operation. We will check a CT abd to be certain that things are healing well. Not ready for discharge.

## 2024-07-03 LAB
ANION GAP SERPL CALCULATED.3IONS-SCNC: 7 MMOL/L (ref 7–15)
BACTERIA BLD CULT: ABNORMAL
BACTERIA BLD CULT: NO GROWTH
BACTERIA BLD CULT: NO GROWTH
BUN SERPL-MCNC: 33.4 MG/DL (ref 8–23)
CALCIUM SERPL-MCNC: 8.4 MG/DL (ref 8.8–10.2)
CHLORIDE SERPL-SCNC: 103 MMOL/L (ref 98–107)
CREAT SERPL-MCNC: 0.76 MG/DL (ref 0.67–1.17)
DEPRECATED HCO3 PLAS-SCNC: 25 MMOL/L (ref 22–29)
EGFRCR SERPLBLD CKD-EPI 2021: >90 ML/MIN/1.73M2
ERYTHROCYTE [DISTWIDTH] IN BLOOD BY AUTOMATED COUNT: 12.5 % (ref 10–15)
GLUCOSE SERPL-MCNC: 159 MG/DL (ref 70–99)
HCT VFR BLD AUTO: 29.2 % (ref 40–53)
HGB BLD-MCNC: 9.8 G/DL (ref 13.3–17.7)
LACTATE SERPL-SCNC: 0.8 MMOL/L (ref 0.7–2)
MCH RBC QN AUTO: 32.9 PG (ref 26.5–33)
MCHC RBC AUTO-ENTMCNC: 33.6 G/DL (ref 31.5–36.5)
MCV RBC AUTO: 98 FL (ref 78–100)
PLATELET # BLD AUTO: 452 10E3/UL (ref 150–450)
POTASSIUM SERPL-SCNC: 4.4 MMOL/L (ref 3.4–5.3)
RBC # BLD AUTO: 2.98 10E6/UL (ref 4.4–5.9)
SODIUM SERPL-SCNC: 135 MMOL/L (ref 135–145)
WBC # BLD AUTO: 18.2 10E3/UL (ref 4–11)

## 2024-07-03 PROCEDURE — 250N000011 HC RX IP 250 OP 636: Performed by: INTERNAL MEDICINE

## 2024-07-03 PROCEDURE — 250N000011 HC RX IP 250 OP 636: Performed by: PHYSICIAN ASSISTANT

## 2024-07-03 PROCEDURE — 120N000001 HC R&B MED SURG/OB

## 2024-07-03 PROCEDURE — 84295 ASSAY OF SERUM SODIUM: CPT | Performed by: INTERNAL MEDICINE

## 2024-07-03 PROCEDURE — 99232 SBSQ HOSP IP/OBS MODERATE 35: CPT | Performed by: INTERNAL MEDICINE

## 2024-07-03 PROCEDURE — 250N000013 HC RX MED GY IP 250 OP 250 PS 637: Performed by: PHYSICIAN ASSISTANT

## 2024-07-03 PROCEDURE — 250N000009 HC RX 250: Performed by: INTERNAL MEDICINE

## 2024-07-03 PROCEDURE — 999N000099 HC STATISTIC MODERATE SEDATION < 10 MIN: Performed by: INTERNAL MEDICINE

## 2024-07-03 PROCEDURE — 0DJ08ZZ INSPECTION OF UPPER INTESTINAL TRACT, VIA NATURAL OR ARTIFICIAL OPENING ENDOSCOPIC: ICD-10-PCS | Performed by: INTERNAL MEDICINE

## 2024-07-03 PROCEDURE — 250N000013 HC RX MED GY IP 250 OP 250 PS 637: Performed by: INTERNAL MEDICINE

## 2024-07-03 PROCEDURE — 43235 EGD DIAGNOSTIC BRUSH WASH: CPT | Performed by: INTERNAL MEDICINE

## 2024-07-03 PROCEDURE — 85027 COMPLETE CBC AUTOMATED: CPT | Performed by: INTERNAL MEDICINE

## 2024-07-03 PROCEDURE — 250N000011 HC RX IP 250 OP 636: Performed by: HOSPITALIST

## 2024-07-03 PROCEDURE — 83605 ASSAY OF LACTIC ACID: CPT | Performed by: INTERNAL MEDICINE

## 2024-07-03 PROCEDURE — 36415 COLL VENOUS BLD VENIPUNCTURE: CPT | Performed by: INTERNAL MEDICINE

## 2024-07-03 PROCEDURE — 258N000003 HC RX IP 258 OP 636: Performed by: INTERNAL MEDICINE

## 2024-07-03 RX ORDER — BISACODYL 10 MG
10 SUPPOSITORY, RECTAL RECTAL DAILY PRN
Status: DISCONTINUED | OUTPATIENT
Start: 2024-07-03 | End: 2024-07-05 | Stop reason: HOSPADM

## 2024-07-03 RX ORDER — FENTANYL CITRATE 50 UG/ML
INJECTION, SOLUTION INTRAMUSCULAR; INTRAVENOUS PRN
Status: DISCONTINUED | OUTPATIENT
Start: 2024-07-03 | End: 2024-07-03 | Stop reason: HOSPADM

## 2024-07-03 RX ORDER — LIDOCAINE 40 MG/G
CREAM TOPICAL
Status: DISCONTINUED | OUTPATIENT
Start: 2024-07-03 | End: 2024-07-03 | Stop reason: HOSPADM

## 2024-07-03 RX ADMIN — PANTOPRAZOLE SODIUM 40 MG: 40 INJECTION, POWDER, FOR SOLUTION INTRAVENOUS at 21:27

## 2024-07-03 RX ADMIN — PROCHLORPERAZINE EDISYLATE 5 MG: 5 INJECTION INTRAMUSCULAR; INTRAVENOUS at 00:13

## 2024-07-03 RX ADMIN — ACETAMINOPHEN 650 MG: 325 TABLET, FILM COATED ORAL at 07:08

## 2024-07-03 RX ADMIN — METRONIDAZOLE 500 MG: 500 TABLET ORAL at 22:52

## 2024-07-03 RX ADMIN — METRONIDAZOLE 500 MG: 500 TABLET ORAL at 08:53

## 2024-07-03 RX ADMIN — CEFEPIME 2 G: 2 INJECTION, POWDER, FOR SOLUTION INTRAVENOUS at 23:10

## 2024-07-03 RX ADMIN — CARVEDILOL 12.5 MG: 12.5 TABLET, FILM COATED ORAL at 18:29

## 2024-07-03 RX ADMIN — METRONIDAZOLE 500 MG: 500 TABLET ORAL at 16:22

## 2024-07-03 RX ADMIN — SENNOSIDES AND DOCUSATE SODIUM 1 TABLET: 50; 8.6 TABLET ORAL at 08:53

## 2024-07-03 RX ADMIN — LISINOPRIL 20 MG: 20 TABLET ORAL at 08:53

## 2024-07-03 RX ADMIN — ASPIRIN 81 MG: 81 TABLET, COATED ORAL at 08:52

## 2024-07-03 RX ADMIN — SODIUM CHLORIDE: 9 INJECTION, SOLUTION INTRAVENOUS at 10:01

## 2024-07-03 RX ADMIN — PANTOPRAZOLE SODIUM 40 MG: 40 INJECTION, POWDER, FOR SOLUTION INTRAVENOUS at 08:53

## 2024-07-03 RX ADMIN — ACETAMINOPHEN 650 MG: 325 TABLET, FILM COATED ORAL at 16:26

## 2024-07-03 RX ADMIN — CARVEDILOL 12.5 MG: 12.5 TABLET, FILM COATED ORAL at 08:52

## 2024-07-03 RX ADMIN — CEFEPIME 2 G: 2 INJECTION, POWDER, FOR SOLUTION INTRAVENOUS at 07:08

## 2024-07-03 RX ADMIN — POLYETHYLENE GLYCOL 3350 17 G: 17 POWDER, FOR SOLUTION ORAL at 08:52

## 2024-07-03 RX ADMIN — POLYETHYLENE GLYCOL 3350 17 G: 17 POWDER, FOR SOLUTION ORAL at 21:30

## 2024-07-03 RX ADMIN — SENNOSIDES AND DOCUSATE SODIUM 1 TABLET: 50; 8.6 TABLET ORAL at 21:27

## 2024-07-03 RX ADMIN — AMLODIPINE BESYLATE 10 MG: 10 TABLET ORAL at 08:53

## 2024-07-03 RX ADMIN — CEFEPIME 2 G: 2 INJECTION, POWDER, FOR SOLUTION INTRAVENOUS at 16:22

## 2024-07-03 RX ADMIN — ACETAMINOPHEN 650 MG: 325 TABLET, FILM COATED ORAL at 12:24

## 2024-07-03 RX ADMIN — LISINOPRIL 20 MG: 20 TABLET ORAL at 21:27

## 2024-07-03 ASSESSMENT — ACTIVITIES OF DAILY LIVING (ADL)
ADLS_ACUITY_SCORE: 29
ADLS_ACUITY_SCORE: 29
ADLS_ACUITY_SCORE: 28
ADLS_ACUITY_SCORE: 30
ADLS_ACUITY_SCORE: 28
ADLS_ACUITY_SCORE: 30
ADLS_ACUITY_SCORE: 29
ADLS_ACUITY_SCORE: 28
ADLS_ACUITY_SCORE: 28
ADLS_ACUITY_SCORE: 30
ADLS_ACUITY_SCORE: 28
ADLS_ACUITY_SCORE: 30
ADLS_ACUITY_SCORE: 29
ADLS_ACUITY_SCORE: 31

## 2024-07-03 NOTE — PROGRESS NOTES
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 Admitted for:  Choledocholithiasis, s/p ERCP with temporary CBD stent by Dr. Neil on 6/26  Acute gangrenous cholecystitis s/p difficult robotic assisted lap makayla by Dr. De León on 6/27  CT abdomen and pelvis 7/3 for abdominal distention/dark stools/hematemesis. This visualized ileus. Now also with acute blood loss anemia and leukocytosis. No significant fluid in gallbladder fossa or abscesses seen.    PLAN:  - Hgb drop 11.6 -> 9.8. Continue to monitor Hgb and vitals. Agree with Dr. Neil plans for EGD and possible ERCP today due to concern of bleeding. Lovenox on hold.  - WBC up 16.0 -> 18.2. Continue IV cefepime and metronidazole. Trend leukocytosis. No concern of abscess on CT scan. Monitor cough and for other etiologies of infection.  - Keep NPO until ileus improved. Continue IV fluids and protonix for GI prophylaxis.  - Analgesia prn. Senokot BID and miralax daily prn.  - Ambulate QID to assist with bowel function, PCDs for DVT prophylaxis.   - Encourage deep breathe and IS.   - Medical management per primary team.        Interval History:   Jairo Austin is seen on surgical rounds. He has not had a BM since yesterday, last BM's were dark black and loose. He last vomited earlier this morning, no bloody emeses since yesterday. He endorses bloating pain throughout entire abdomen, no localized sharp pain. He has been up walking. Voiding well. He has an occasional unproductive cough. Vitals wnl.         Physical Exam:   Temp: 97.8  F (36.6  C) Temp src: Oral BP: 134/75 Pulse: 84   Resp: 24 SpO2: 96 % O2 Device: None (Room air)      I/O last 3 completed shifts:  In: 360 [P.O.:360]  Out: 2100 [Urine:2100]    GENERAL: VS reviewed, alert, oriented, no acute  distress  LUNGS: Normal respiratory effort, no wheezing  ABDOMEN:  Distended, mildly tender to palpation throughout, no rebound or guarding  INCISION: Steris in place. Incisions are clean, dry, and intact. No surrounding erythema.  EXTREMITIES: Moving all extremities, no gross deformities  NEUROLOGICAL: Grossly non-focal, mood & affect appropriate    Data   Recent Labs   Lab 07/03/24  0855 07/02/24  0834 07/01/24  0939 06/30/24  0827 06/30/24  0817 06/29/24  0802 06/29/24  0722 06/28/24  1159 06/28/24  0848   WBC 18.2* 16.0* 15.7*  --  16.2*  --  14.7*  --  11.6*   HGB 9.8* 11.6* 12.9*  --  12.6*  --  12.4*  --  13.5   MCV 98 97  --   --  97  --  98  --  99   * 459*  --   --  362  --  275  --  243    138 134*  --  135  --   --   --  138   POTASSIUM 4.4 3.9 3.9  --  3.8  3.8  --  3.7  --  3.7   CHLORIDE 103 103 98  --  98  --   --   --  101   CO2 25 26 24  --  26  --   --   --  26   BUN 33.4* 43.6* 32.9*  --  26.3*  --   --   --  20.6   CR 0.76 0.83 0.88  --  0.89  --   --   --  0.89   ANIONGAP 7 9 12  --  11  --   --   --  11   PAMELA 8.4* 9.4 9.4  --  9.2  --   --   --  8.9   * 141* 127*   < > 114*   < >  --    < > 119*   ALBUMIN  --   --   --   --   --   --  2.7*  --  3.1*   PROTTOTAL  --   --   --   --   --   --  6.3*  --  6.7   BILITOTAL  --   --   --   --   --   --  1.1  --  1.2   ALKPHOS  --   --   --   --   --   --  125  --  141   ALT  --   --   --   --   --   --  71*  --  92*   AST  --   --   --   --   --   --  41  --  49*    < > = values in this interval not displayed.       Asia Hi PA-C    Please use [employer] to page 7:30am-4pm, page on-call surgeon after 4pm  Office number: [phone]

## 2024-07-03 NOTE — PROGRESS NOTES
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  Additionally during his stay, he was found to have bacteremia secondary to Enterobacter and Klebsiella pneumonia.     Acute gangrene cholecystitis with cholelithiasis and choledocholithiasis  S/p ERCP and EUS on 6/26/24 with biliary sphincterotomy, and temporary stent placement  S/p robotic assisted laparoscopic cholecystectomy and BRENDA on 6/27/24  Postop ileus  Enterobacter and Klebsiella pneumonia bacteremia dt above  *Presented to an urgent care clinic in [place], [place] on 6/25 for evaluation of s 4d hx of upper abdominal pain/nausea. Labs notable for WBC 14, lactate nl, CRP 16, AST//301, alk phos 212 and total bili 2.8. CT abd/pelvis obtained and showed acute cholecystitis with distal choledocholithiasis.  Transferred to [facility] for ongoing care.  *Remained afebrile and hemodynamically stable. 2/2 blood cultures on admission grew enterobacter and Klebsiella pneumoniae.  *GI and general surgery consulted on admission.  *Seen by [facility].  Underwent ERCP/EUS on 6/26 with findings of choledocholithiasis.  Complex removal was accomplished with biliary sphincterotomy, balloon extraction and temporary stent placement to CBD. LFTs improved.   *Subsequently underwent robotic assisted laparoscopic cholecystectomy with lysis of adhesions on 6/27/24 per Dr. De León.   Intraoperative findings were notable for acute gangrenous cholecystitis.  *Postoperative course was complicated by worsening abdominal distention with minimal flatus and no BM; suspected due to a postop ileus.  Symptoms improved with supportive cares and ultimately did not require NG tube placement.  *On 7/1, notified that second blood culture drawn on admission (6/25) was growing Bacteroides thetaiotaomicron and Bacteroides dorei/vulgatus.  No antimicrobial susceptibility patterns available at this time however anticipate patient is covered on current antibiotics and subsequent blood cultures drawn 6/27-6/28 remain negative.  *Had ongoing abdominal bloating, n/v and persistent leukocytosis postop. CT abd/pelvis obtained on 7/2 and showed a newly dilated stomach and small bowel with gradual transition to decompressed distal small bowel, findings were consistent with a postoperative adynamic ileus.  No significant fluid collections in the gallbladder fossa.  -- ongoing abd bloating/pain, episodes of nausea/vomiting and minimal flatus on 7/2-7/3 dt ongoing postop ileus -- changed to NPO on 7/3  -- hold nonessential meds to reduce pill burden  -- monitor fever curve, WBC; blood cultures drawn 6/27-6/28 remain neg  -- conts on IV cefepime/po flagyl (6/26-present), transition to oral abx at discharge; consider ID consult if blood cultures remain positive or worsening leukocytosis/fever  -- cont IV PPI BID  -- cont supportive cares with IVFs, prns for pain/nausea  -- follow up with [facility] in 3 mths for repeat ERCP to remove biliary stent    Acute blood loss anemia  *Hgb trended down this stay. Noted to have black-appearing stools on 7/2. SubQ Lovenox held.   -- [facility] following    0945 Addendum:  AM labs reviewed -- hgb down from 11.6 yesterday --> 9.8 today with worsening leukocytosis. GI planning for further evaluation with EGD today. Conts on PPI IV BID. Remains NPO    Hypovolemic hyponatremia:  Resolved  Hypokalemia: Resolved  Dehydration: Improved  *Initial BMP notable for Na 130, K 3.0, Cl 88, BUN 30.  Findings suspected secondary to poor appetite and reduced oral intake in the days preceding admission due to above.    *IVFs and K replacement started on admission.      Hypertension  Hyperlipidemia  *PTA meds: amlodipine, Coreg, chlorthalidone, lisinopril and statin.  *Continues on amlodipine, Coreg, lisinopril and statin.  *Chlorthalidone held on admission due to above -- cont holding while needing IVFs     DM II, diet controlled  *A1c 5.9 this stay. Not on treatment at baseline.  *Used sliding scale insulin initially but needs were low so accuchecks/insulin were dc'd.     Severe malnutrition in context of acute illness or injury  *Nutritionist following. Postop diet as above.     Recent mechanical fall  Right scalp hematoma  Right periorbital hematoma  *Reportedly fell 5d prior to admission while cutting a tree branch and sustained a R scalp hematoma and R periorbital hematoma. Did not seek medical care at that time.   *Head CT this stay neg for acute pathology.   -- cont supportive cares     CARMITA  *Chronic and stable on CPAP     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, 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  Stage II GIST of jejunum, s/p exploratory laparotomy with jejunal resection 2/2022  Hx of asbestosis exposure  *Follows with [facility] oncology. No concerns at this time.     FEN: NS @100ml/h resumed 7/2 as above, lytes stable, changed to NPO on 7/3 AM  DVT Prophylaxis: Lovenox held on 7/2 dt reports of dark stools, cont PCDs  Code Status: Full Code    Clinically Significant Risk Factors              # Hypoalbuminemia: Lowest albumin = 2.7 g/dL at 6/29/2024  7:22 AM, will monitor as appropriate     # Hypertension: Noted on problem list           #Precipitous drop in Hgb/Hct: Lowest Hgb this hospitalization: 11.6 g/dL. Will continue to monitor and treat/transfuse as appropriate.     # Overweight: Estimated body  "mass index is 29.92 kg/m  as calculated from the following:    Height as of this encounter: 1.803 m (5' 11\").    Weight as of this encounter: 97.3 kg (214 lb 8.1 oz).   # Severe Malnutrition: based on nutrition assessment      # Financial/Environmental Concerns: none         Disposition: Postop course now complicated by development of ileus.  Discharge date unclear, pending resolution of ileus and ability to tolerate po intake, suspect 2-4d still. Anticipate discharge home.     Medically Ready for Discharge: Anticipated in 2-4 Days      Nicci Olvera, DO    Medical Decision Making       Please see A&P for additional details of medical decision making.       Interval History   Chart reviewed.  Lactate elevated last evening to 2.1 and given 1L fluid bolus with noted improvement. Worsening abd distension overnight, had an additional 3 episodes of vomiting darkish material. Minimal flatus around 0200 today but none since. Has been ambulating. Generally looking more uncomfortable this AM. Denies cp/sob/cough.     -Data reviewed today: I reviewed all new labs and imaging results over the last 24 hours. I personally reviewed no images or EKG's today.    Physical Exam   Temp: 98.7  F (37.1  C) Temp src: Oral BP: (!) 153/78 Pulse: 85   Resp: 18 SpO2: 95 % O2 Device: BiPAP/CPAP    Vitals:    06/29/24 0616 07/02/24 0633 07/03/24 0500   Weight: 94.8 kg (208 lb 14.4 oz) 96.9 kg (213 lb 10 oz) 97.3 kg (214 lb 8.1 oz)     Vital Signs with Ranges  Temp:  [98.3  F (36.8  C)-98.7  F (37.1  C)] 98.7  F (37.1  C)  Pulse:  [] 85  Resp:  [18] 18  BP: (140-162)/(78-96) 153/78  SpO2:  [95 %-96 %] 95 %  I/O last 3 completed shifts:  In: 360 [P.O.:360]  Out: 2100 [Urine:2100]    Constitutional: WNWD male, appears uncomfortable but answering questions appropriately and NAD  Respiratory: CTAB, no wheeze, no increased work of breathing  Cardiovascular: HRRR, no MGR, no LE edema  GI: Abdomen distended, hypoactive bowel " sounds,  Skin/Integumen: Warm/dry, +R periorbital hematoma  Other:      Medications   Current Facility-Administered Medications   Medication Dose Route Frequency Provider Last Rate Last Admin    sodium chloride 0.9 % infusion   Intravenous Continuous Nicci Olvera  mL/hr at 07/02/24 2253 New Bag at 07/02/24 2253     Current Facility-Administered Medications   Medication Dose Route Frequency Provider Last Rate Last Admin    amLODIPine (NORVASC) tablet 10 mg  10 mg Oral YENIM Emiliano Mcfarlane PA-C   10 mg at 07/02/24 0901    aspirin EC tablet 81 mg  81 mg Oral Kaila Medina MD   81 mg at 07/02/24 0901    atorvastatin (LIPITOR) tablet 20 mg  20 mg Oral YENIM Emiliano Mcfarlane PA-C   20 mg at 07/02/24 0901    carvedilol (COREG) tablet 12.5 mg  12.5 mg Oral BID w/meals Emiliano Mcfarlane PA-C   12.5 mg at 07/02/24 1700    ceFEPIme (MAXIPIME) 2 g vial to attach to  mL bag for ADULTS or 50 mL bag for PEDS  2 g Intravenous Q8H Emiliano Mcfarlane PA-C   2 g at 07/03/24 0708    [Held by provider] enoxaparin ANTICOAGULANT (LOVENOX) injection 40 mg  40 mg Subcutaneous Q24H Kaila Weiss MD   40 mg at 07/01/24 1803    lisinopril (ZESTRIL) tablet 20 mg  20 mg Oral BID Emiliano Mcfarlane PA-C   20 mg at 07/02/24 2036    metroNIDAZOLE (FLAGYL) tablet 500 mg  500 mg Oral TID Emiliano Mcfarlane PA-C   500 mg at 07/02/24 2253    pantoprazole (PROTONIX) IV push injection 40 mg  40 mg Intravenous BID Nicci Olvera DO   40 mg at 07/02/24 2035    polyethylene glycol (MIRALAX) Packet 17 g  17 g Oral BID Cristopher Gonzalez PA-C   17 g at 07/02/24 2035    senna-docusate (SENOKOT-S/PERICOLACE) 8.6-50 MG per tablet 1 tablet  1 tablet Oral BID Emiliano Mcfarlane PA-C   1 tablet at 07/02/24 0901    Or    senna-docusate (SENOKOT-S/PERICOLACE) 8.6-50 MG per tablet 2 tablet  2 tablet Oral BID Emiliano Mcfarlane PA-C   2 tablet at 07/02/24 2036    sodium chloride (PF) 0.9% PF flush 3 mL  3  mL Intracatheter Q8H Emiliano Mcfarlane PA-C   3 mL at 07/02/24 1129       Data   Recent Labs   Lab 07/03/24  0855 07/02/24  0834 07/01/24  0939 06/30/24  0827 06/30/24  0817 06/29/24  0802 06/29/24  0722 06/28/24  1159 06/28/24  0848   WBC 18.2* 16.0* 15.7*  --  16.2*  --  14.7*  --  11.6*   HGB 9.8* 11.6* 12.9*  --  12.6*  --  12.4*  --  13.5   MCV 98 97  --   --  97  --  98  --  99   * 459*  --   --  362  --  275  --  243    138 134*  --  135  --   --   --  138   POTASSIUM 4.4 3.9 3.9  --  3.8  3.8  --  3.7  --  3.7   CHLORIDE 103 103 98  --  98  --   --   --  101   CO2 25 26 24  --  26  --   --   --  26   BUN 33.4* 43.6* 32.9*  --  26.3*  --   --   --  20.6   CR 0.76 0.83 0.88  --  0.89  --   --   --  0.89   ANIONGAP 7 9 12  --  11  --   --   --  11   PAMELA 8.4* 9.4 9.4  --  9.2  --   --   --  8.9   * 141* 127*   < > 114*   < >  --    < > 119*   ALBUMIN  --   --   --   --   --   --  2.7*  --  3.1*   PROTTOTAL  --   --   --   --   --   --  6.3*  --  6.7   BILITOTAL  --   --   --   --   --   --  1.1  --  1.2   ALKPHOS  --   --   --   --   --   --  125  --  141   ALT  --   --   --   --   --   --  71*  --  92*   AST  --   --   --   --   --   --  41  --  49*    < > = values in this interval not displayed.       Recent Results (from the past 24 hour(s))   CT Abdomen Pelvis w Contrast    Narrative    CT ABDOMEN AND PELVIS WITH CONTRAST 7/2/2024 1:44 PM    CLINICAL HISTORY: Bloating, leukocytosis after gangrenous gallbladder  removal.    TECHNIQUE: CT scan of the abdomen and pelvis was performed following  injection of IV contrast. Multiplanar reformats were obtained. Dose  reduction techniques were used.    CONTRAST: 107 mL [id number]    COMPARISON: 4/13/2023    FINDINGS:   LOWER CHEST: Normal.    HEPATOBILIARY: Interval cholecystectomy. Plastic biliary stent with  pneumobilia. No significant fluid in the gallbladder fossa.    PANCREAS: Normal.    SPLEEN: Calcified granulomas.    ADRENAL GLANDS:  Normal.    KIDNEYS/BLADDER: Renal cysts. No hydronephrosis.    BOWEL: Dilated fluid-filled loops of small bowel and stomach. There is  a gradual transition to decompressed distal small bowel and colon.    PELVIC ORGANS: Small fluid collection in the pelvis is new measuring  2.5 cm in diameter.    ADDITIONAL FINDINGS: Scant ascites.    MUSCULOSKELETAL: Bilateral hip arthroplasties.      Impression    IMPRESSION:   1.  New dilated stomach and small bowel with gradual transition to  decompressed distal small bowel. Findings most likely represent a  postoperative adynamic ileus.  2.  Postop recent cholecystectomy. No significant fluid in the  gallbladder fossa. Scant ascites and small fluid collection in the  pelvis. No drainable abscess.    DALTON VIRGEN MD         SYSTEM ID:  [id number]

## 2024-07-03 NOTE — PLAN OF CARE
7/2/2024 1900 - 7/3/2024 0730    Orientation: A&Ox4  Aggression Stop Light: Green  Activity: SBA d/t increased weakness  Diet: Clear liquid diet ordered; very poor intake.  Emesis x 2 this shift (small amount each time) PRN Zofran and compazine given  IV Access/Drains: R PIV infusing NS @ 100mL/hr; intermittent antibiotics  Pain Management: Complained of abdominal pain; PRN tylenol x 2  Abnormal VS/Results: VSS except HTN; RA  Bowel/Bladder: Continent of bowel/bladder.  Voiding without problems.  No BM this shift, very small amount of flatus per patient report  Skin/Wounds: Bruising/swelling to L eye/forehead. Abdomen distended.  Dressing over previous FELIPA drain site  Consults: Nutrition, Surgery, PT  D/C Disposition: pending resolution of ileus  Other Info:   - Patient is confidential to prevent wife from visiting. Patient's children okay to visit and get information

## 2024-07-03 NOTE — PLAN OF CARE
07/03/2024 -[phone]     Summary: presented to ER on 6/25/2024 with 4-day history of upper abdominal pain and nausea.      Primary Diagnosis: Acute cholecystitis with cholelithiasis and choledocholithiasis  Enterobacter and Klebsiella pneumonia bacteremia: Status post ERCP and EUS on 06/26/24: S/p biliary sphincterotomy and balloon extraction on 06/26/2024 with temporary stent placement, Robotic assisted laparoscopic cholecystectomy     Orientation: A/Ox4, [place]     Aggression Stop Light: Green     Activity: AX1 GB/W     Diet/BS Checks: NPO exp ice chips and meds     Tele:  N/A     IV Access/Drains: R PIV infusing NS @ 100 ml/hr w/ int abx.       Pain Management: PRN Dilaudid, Tylenol     Abnormal VS/Results: VSS on RA,  CPAP at night, Hgb-9.8, WBC-18.2    Bowel/Bladder: Cont B/B.voiding in urinal,     Skin/Wounds: Scattered bruising/scabs on R face and scalp w/ localized edema; R periorbital bruising; and R lower abd bruising, 4 lap sites      Consults: GI, Surgery, PT, OT, SW     D/C Disposition: TBD- to home w/ home care     Other Info:  On K replacement protocol-AM draw. Pt refused to walk today due to increased tiredness.encouraging IS. Using CPAP at night or while asleep. Abdomen still rigid, shiny and distended. For EGD today. Blood transfusion if Hgb drops down to 7 or less. No vomiting or BM in this shift. Noted mild tachypnea, denied SOB or any other breathing difficulties. Patient is confidential to prevent wife from visiting. Patient's children okay to visit and get information.

## 2024-07-03 NOTE — PROGRESS NOTES
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   He was seen at urgent care in [place] on 6/25.  Labs showed leukocytosis of 14, elevated CRP of 16, elevated LFTs with alkaline phosphatase 212, , , total bilirubin 2.8.   CT A/P was showed acute cholecystitis with distal choledocholithiasis.      Acute gangrene cholecystitis with cholelithiasis and choledocholithiasis  Enterobacter and Klebsiella pneumonia bacteremia  Status post ERCP and EUS on 06/26/24:   S/p biliary sphincterotomy and balloon extraction on 06/26/2024 with temporary stent placement:  S/p robotic assisted laparoscopic cholecystectomy without cholangiogram and lysis of adhesions.     Presented with 4 days of abdominal pain  CT A/P obtained at urgent care showed cholelithiasis with acute cholecystitis and choledocholithiasis  Blood cultures from admission 2 out of 2 growing Enterobacter and Klebsiella pneumonia  Status post ERCP and EUS on 06/26/24 , was found to have choledocholithiasis, complete removal was accomplished by biliary sphincterotomy and balloon extraction  Temporary stent was placed in the CBD, repeat ERCP in 3 months to remove the stent  Patient underwent robotic assisted laparoscopic cholecystectomy without cholangiogram and lysis of adhesion and was found to have acute gangrenous cholecystitis.     --Post-op management deferred to general  surgery  -- Continue patient on IV antibiotics with IV cefepime 2 g every 8 hours and metronidazole 500 mg p.o. 3 times a day.  -- Continue Protonix 20 mg p.o. daily for 1 week.  -- Continue senna and MiraLAX BID.  Ok from GI standpoint to trial dulcolax suppository or enema.  He reports now having BMs.  --Repeat ERCP and stent removal in 3 months.    Dark stools  Hematemesis  Hemoglobin down 2 grams 11.6- 9.8  Concern for gastritis vs bleed from sphincterotomy  - IV ppi BID  - npo  - EGD today  - serial hemoglobin and transfuse for 7 or less    Ileus  Developed post operative ileus as noted on CT  Surgery following and managing  Currently NPO                Interval History:     Denies chest pain, denies shortness of breath, and c/o abdominal distention and nausea. Has had hematemesis and melena              Review of Systems:     C: NEGATIVE for fever, chills, change in weight  E/M: NEGATIVE for ear, mouth and throat problems  R: NEGATIVE for significant cough or SOB  CV: NEGATIVE for chest pain, palpitations or peripheral edema             Medications:   I have reviewed this patient's current medications  Current Facility-Administered Medications   Medication Dose Route Frequency Provider Last Rate Last Admin    amLODIPine (NORVASC) tablet 10 mg  10 mg Oral YENI Emiliano Mcfarlane PA-C   10 mg at 07/03/24 0853    aspirin EC tablet 81 mg  81 mg Oral Kaila Medina MD   81 mg at 07/03/24 0852    [Held by provider] atorvastatin (LIPITOR) tablet 20 mg  20 mg Oral QA Emiliano Mcfarlane PA-C   20 mg at 07/02/24 0901    carvedilol (COREG) tablet 12.5 mg  12.5 mg Oral BID w/meals Emiliano Mcfarlane PA-C   12.5 mg at 07/03/24 0852    ceFEPIme (MAXIPIME) 2 g vial to attach to  mL bag for ADULTS or 50 mL bag for PEDS  2 g Intravenous Q8H Emiliano Mcfarlane PA-C   2 g at 07/03/24 0708    [Held by provider] enoxaparin ANTICOAGULANT (LOVENOX) injection 40 mg  40 mg Subcutaneous Q24H Kaila Weiss MD   40  mg at 07/01/24 1803    lisinopril (ZESTRIL) tablet 20 mg  20 mg Oral BID Emiliano Mcfarlane PA-C   20 mg at 07/03/24 0853    metroNIDAZOLE (FLAGYL) tablet 500 mg  500 mg Oral TID Emiliano Mcfarlane PA-C   500 mg at 07/03/24 0853    pantoprazole (PROTONIX) IV push injection 40 mg  40 mg Intravenous BID Nicci Olvera DO   40 mg at 07/03/24 0853    polyethylene glycol (MIRALAX) Packet 17 g  17 g Oral BID Cristopher Gonzalez PA-C   17 g at 07/03/24 0852    senna-docusate (SENOKOT-S/PERICOLACE) 8.6-50 MG per tablet 1 tablet  1 tablet Oral BID Emiliano Mcfarlane PA-C   1 tablet at 07/03/24 0853    Or    senna-docusate (SENOKOT-S/PERICOLACE) 8.6-50 MG per tablet 2 tablet  2 tablet Oral BID Emiliano Mcfarlane PA-C   2 tablet at 07/02/24 2036    sodium chloride (PF) 0.9% PF flush 3 mL  3 mL Intracatheter Q8H Emiliano Mcfarlane PA-C   3 mL at 07/02/24 1129                  Physical Exam:   Vitals were reviewed  Vital Signs with Ranges  Temp:  [97.8  F (36.6  C)-98.7  F (37.1  C)] 97.8  F (36.6  C)  Pulse:  [] 84  Resp:  [18-24] 24  BP: (134-162)/() 134/75  SpO2:  [95 %-96 %] 96 %  I/O last 3 completed shifts:  In: 360 [P.O.:360]  Out: 2100 [Urine:2100]  Constitutional: healthy, alert, and no distress   Cardiovascular: negative, PMI normal. No lifts, heaves, or thrills. RRR. No murmurs, clicks gallops or rub  Respiratory: negative, Percussion normal. Good diaphragmatic excursion. Lungs clear  Abdomen: Abdomen firm, distended. BS normal. No masses, organomegaly           Data:   I reviewed the patient's new clinical lab test results.   Recent Labs   Lab Test 07/03/24  0855 07/02/24  0834 07/01/24  0939 06/30/24  0817 06/26/24  0904 06/25/24  2103   WBC 18.2* 16.0* 15.7* 16.2*   < > 12.6*   HGB 9.8* 11.6* 12.9* 12.6*   < > 15.1   MCV 98 97  --  97   < > 95   * 459*  --  362   < > 181   INR  --   --   --   --   --  1.05    < > = values in this interval not displayed.     Recent Labs    Lab Test 07/02/24  0834 07/01/24  0939 06/30/24  0817   POTASSIUM 3.9 3.9 3.8  3.8   CHLORIDE 103 98 98   CO2 26 24 26   BUN 43.6* 32.9* 26.3*   ANIONGAP 9 12 11     Recent Labs   Lab Test 06/29/24  0722 06/28/24  0848 06/27/24  0531 06/26/24  0904 06/25/24  2103   ALBUMIN 2.7* 3.1* 3.1*   < > 4.1   BILITOTAL 1.1 1.2 1.5*   < > 2.5*   ALT 71* 92* 129*   < > 252*   AST 41 49* 38   < > 84*   LIPASE  --   --   --   --  42    < > = values in this interval not displayed.       I reviewed the patient's new imaging results.    All laboratory data reviewed  All imaging studies reviewed by me.    Josephine Ozuna PA-C,  7/3/2024  [facility]  Office : [phone]  Cell: [phone] (Dr. Neil)  Cell: [phone] (Josephine Ozuna PA-C)

## 2024-07-04 LAB
ANION GAP SERPL CALCULATED.3IONS-SCNC: 7 MMOL/L (ref 7–15)
BUN SERPL-MCNC: 19.4 MG/DL (ref 8–23)
CALCIUM SERPL-MCNC: 8.1 MG/DL (ref 8.8–10.2)
CHLORIDE SERPL-SCNC: 105 MMOL/L (ref 98–107)
CREAT SERPL-MCNC: 0.73 MG/DL (ref 0.67–1.17)
DEPRECATED HCO3 PLAS-SCNC: 26 MMOL/L (ref 22–29)
EGFRCR SERPLBLD CKD-EPI 2021: >90 ML/MIN/1.73M2
ERYTHROCYTE [DISTWIDTH] IN BLOOD BY AUTOMATED COUNT: 12.6 % (ref 10–15)
GLUCOSE SERPL-MCNC: 94 MG/DL (ref 70–99)
HCT VFR BLD AUTO: 26.5 % (ref 40–53)
HGB BLD-MCNC: 8.8 G/DL (ref 13.3–17.7)
HGB BLD-MCNC: 9.6 G/DL (ref 13.3–17.7)
MCH RBC QN AUTO: 33.3 PG (ref 26.5–33)
MCHC RBC AUTO-ENTMCNC: 33.2 G/DL (ref 31.5–36.5)
MCV RBC AUTO: 100 FL (ref 78–100)
PLATELET # BLD AUTO: 447 10E3/UL (ref 150–450)
POTASSIUM SERPL-SCNC: 4 MMOL/L (ref 3.4–5.3)
RBC # BLD AUTO: 2.64 10E6/UL (ref 4.4–5.9)
SODIUM SERPL-SCNC: 138 MMOL/L (ref 135–145)
UPPER GI ENDOSCOPY: NORMAL
WBC # BLD AUTO: 15.4 10E3/UL (ref 4–11)

## 2024-07-04 PROCEDURE — 99232 SBSQ HOSP IP/OBS MODERATE 35: CPT | Performed by: STUDENT IN AN ORGANIZED HEALTH CARE EDUCATION/TRAINING PROGRAM

## 2024-07-04 PROCEDURE — 250N000013 HC RX MED GY IP 250 OP 250 PS 637: Performed by: PHYSICIAN ASSISTANT

## 2024-07-04 PROCEDURE — 36415 COLL VENOUS BLD VENIPUNCTURE: CPT | Performed by: STUDENT IN AN ORGANIZED HEALTH CARE EDUCATION/TRAINING PROGRAM

## 2024-07-04 PROCEDURE — 80048 BASIC METABOLIC PNL TOTAL CA: CPT | Performed by: INTERNAL MEDICINE

## 2024-07-04 PROCEDURE — 250N000013 HC RX MED GY IP 250 OP 250 PS 637: Performed by: INTERNAL MEDICINE

## 2024-07-04 PROCEDURE — 250N000011 HC RX IP 250 OP 636: Performed by: INTERNAL MEDICINE

## 2024-07-04 PROCEDURE — 36415 COLL VENOUS BLD VENIPUNCTURE: CPT | Performed by: INTERNAL MEDICINE

## 2024-07-04 PROCEDURE — 250N000011 HC RX IP 250 OP 636: Performed by: PHYSICIAN ASSISTANT

## 2024-07-04 PROCEDURE — 85027 COMPLETE CBC AUTOMATED: CPT | Performed by: INTERNAL MEDICINE

## 2024-07-04 PROCEDURE — 85018 HEMOGLOBIN: CPT | Performed by: STUDENT IN AN ORGANIZED HEALTH CARE EDUCATION/TRAINING PROGRAM

## 2024-07-04 PROCEDURE — 120N000001 HC R&B MED SURG/OB

## 2024-07-04 PROCEDURE — 258N000003 HC RX IP 258 OP 636: Performed by: INTERNAL MEDICINE

## 2024-07-04 RX ADMIN — AMLODIPINE BESYLATE 10 MG: 10 TABLET ORAL at 09:35

## 2024-07-04 RX ADMIN — SENNOSIDES AND DOCUSATE SODIUM 2 TABLET: 50; 8.6 TABLET ORAL at 09:34

## 2024-07-04 RX ADMIN — PANTOPRAZOLE SODIUM 40 MG: 40 INJECTION, POWDER, FOR SOLUTION INTRAVENOUS at 21:17

## 2024-07-04 RX ADMIN — CEFEPIME 2 G: 2 INJECTION, POWDER, FOR SOLUTION INTRAVENOUS at 23:33

## 2024-07-04 RX ADMIN — LISINOPRIL 20 MG: 20 TABLET ORAL at 09:35

## 2024-07-04 RX ADMIN — CARVEDILOL 12.5 MG: 12.5 TABLET, FILM COATED ORAL at 09:35

## 2024-07-04 RX ADMIN — SODIUM CHLORIDE: 9 INJECTION, SOLUTION INTRAVENOUS at 07:09

## 2024-07-04 RX ADMIN — METRONIDAZOLE 500 MG: 500 TABLET ORAL at 09:35

## 2024-07-04 RX ADMIN — SENNOSIDES AND DOCUSATE SODIUM 1 TABLET: 50; 8.6 TABLET ORAL at 21:17

## 2024-07-04 RX ADMIN — METRONIDAZOLE 500 MG: 500 TABLET ORAL at 17:54

## 2024-07-04 RX ADMIN — CEFEPIME 2 G: 2 INJECTION, POWDER, FOR SOLUTION INTRAVENOUS at 16:25

## 2024-07-04 RX ADMIN — LISINOPRIL 20 MG: 20 TABLET ORAL at 21:17

## 2024-07-04 RX ADMIN — CEFEPIME 2 G: 2 INJECTION, POWDER, FOR SOLUTION INTRAVENOUS at 07:09

## 2024-07-04 RX ADMIN — PANTOPRAZOLE SODIUM 40 MG: 40 INJECTION, POWDER, FOR SOLUTION INTRAVENOUS at 09:46

## 2024-07-04 RX ADMIN — METRONIDAZOLE 500 MG: 500 TABLET ORAL at 21:17

## 2024-07-04 RX ADMIN — CARVEDILOL 12.5 MG: 12.5 TABLET, FILM COATED ORAL at 17:54

## 2024-07-04 RX ADMIN — ASPIRIN 81 MG: 81 TABLET, COATED ORAL at 09:35

## 2024-07-04 ASSESSMENT — ACTIVITIES OF DAILY LIVING (ADL)
ADLS_ACUITY_SCORE: 27
ADLS_ACUITY_SCORE: 27
ADLS_ACUITY_SCORE: 29
ADLS_ACUITY_SCORE: 27

## 2024-07-04 NOTE — PLAN OF CARE
[id number]  Orientation: A&Ox4  Aggression Stop Light: Green  Activity: SBA  Diet/BS Checks: NPO  Tele: N/A  IV Access/Drains: R PIV infusing NS @ 100mL/hr  Pain Management: Denied pain  Abnormal VS/Results: VSS on RA  Bowel/Bladder: Reported having 4 BM's yesterday  Skin/Wounds: Scattered bruising, 4 lap sites  Consults: Surgery, GI, PT, SW  D/C Disposition: Pending

## 2024-07-04 NOTE — PLAN OF CARE
Goal Outcome Evaluation:  7/4/2024 [id number]  Orientation: A&Ox4  Aggression Stop Light: Green  Activity: IND in Rm, uses walker w/ ambulation, refuses bed alarm, will call appropriately  Diet/BS Checks: full liquid diet, tolerating well  Tele: N/A  IV Access/Drains: R PIV infusing NS @ 75 ml/hr  Pain Management: Denied pain  Abnormal VS/Results: VSS on Rm Air  Bowel/Bladder: Passing flatus, voiding adequately in BR & urinal  Skin/Wounds: Scattered bruising, 4 lap sites, old FELIPA drain site to RLQ  Consults: Surgery, GI, PT, SW  D/C Disposition: possibly tomorrow, patient had a shower today, abd sites WNL, trace edema to BLE

## 2024-07-04 NOTE — PLAN OF CARE
Orientation: A&Ox4  Aggression Stop Light: Green  Activity: SBA GBW  Diet/BS Checks: NPO  Tele:  n/a  IV Access/Drains: RPIV CDI infusing  mL/hr  Pain Management: Prn tylenol given x1  Abnormal VS/Results: VSS on RA. Sepsis protocol fired this shift, lactic came back 0.8.  Bowel/Bladder: Incontinent of bowel/bladder. Uses bedside urinal   Skin/Wounds: x4 lap sites CDI and old FELIPA site CDI. Bruising to left eye  Consults: Nutrition, Surgery, PT, GI  D/C Disposition: pending  Other Info: EGD done today, Gangrenous gallbladder with significant pus along the back wall. Stone impacted in the neck of the gallbladder per note.     Pt does not want wife to visit or have any knowledge about his condition/care.

## 2024-07-04 NOTE — PROGRESS NOTES
Afebrile, pulse ok. He feels much better. Passed much flatus, had several BM  Exam:  abd less distended. Not tender  Imp: improving. Labs pending   Plan: Start diet, probably home tomorrow

## 2024-07-04 NOTE — PROGRESS NOTES
[facility]  Hospitalist Progress Note    Assessment & Plan   Jairo Austin is a 78 year old male with PMHx of hypertension, hyperlipidemia, diet-controlled type 2 diabetes, CARMITA, asbestos exposure, stage IIb uveal melanoma of the left eye s/p plaque brachytherapy in 2019, stage II GIST of the small intestine s/p ex lap with open jejunal resection in 2022, ventral hernia repair in 2023 and remote appendectomy among other medical problems who was admitted on 6/25/2024 for management of acute gangrenous cholecystitis with cholelithiasis and choledocholithiasis.  Additionally during his stay, he was found to have bacteremia secondary to Enterobacter and Klebsiella pneumonia.      Acute gangrene cholecystitis with cholelithiasis and choledocholithiasis  S/p ERCP and EUS on 6/26/24 with biliary sphincterotomy, and temporary stent placement  S/p robotic assisted laparoscopic cholecystectomy and BRENDA on 6/27/24  Postop ileus  Enterobacter and Klebsiella pneumonia bacteremia dt above  Presented to an urgent care clinic in [place], [place] on 6/25 for evaluation of s 4d hx of upper abdominal pain/nausea. Labs notable for WBC 14, lactate nl, CRP 16, AST//301, alk phos 212 and total bili 2.8. CT abd/pelvis obtained and showed acute cholecystitis with distal choledocholithiasis.  Transferred to [facility] for ongoing care. [facility] GI and general surgery consulted on admission. Underwent ERCP/EUS on 6/26 with findings of choledocholithiasis.  Complex removal was accomplished with biliary sphincterotomy, balloon extraction and temporary stent placement to CBD. LFTs improved. ubsequently underwent robotic assisted laparoscopic cholecystectomy with lysis of adhesions on 6/27/24 per Dr. De León.  Intraoperative findings were notable for acute gangrenous cholecystitis. Postoperative course was complicated by worsening abdominal distention with minimal flatus and no BM; suspected due to  a postop ileus.  Symptoms had initially improved with supportive cares and ultimately did not require NG tube placement. Patient had reoccurrence of abdominal bloating, n/v and persistent leukocytosis postop. CT abd/pelvis obtained on 7/2 and showed a newly dilated stomach and small bowel with gradual transition to decompressed distal small bowel, findings were consistent with a postoperative adynamic ileus.  No significant fluid collections in the gallbladder fossa. Due to ongoing abd bloating/pain, episodes of nausea/vomiting and minimal flatus on 7/2-7/3 he was made NPO.     - 6/25/24 1st set Blood Cx: 2/2 bottles grew enterobacter and Klebsiella pneumoniae  - 6/25/24 2nd set Blood Cx: 1/2 bottles grew Bacteroides thetaiotaomicron and Bacteroides dorei/vulgatus   - No antimicrobial susceptibility patterns available at this time however anticipate patient is covered on current antibiotics    - 6/27/24 Blood Cx: Negative   - 6/28/24  Blood Cx: Negative     - WBC: 16.2 > 15.7 > 16 > 18.2 > 15.4     - Continue IV cefepime/po flagyl (6/26-present)  - Transition to oral abx at discharge  - Consider ID consult if blood cultures remain positive or worsening leukocytosis/fever    - Continue  IV PPI BID  - Continue supportive cares with IVFs  - Prns for pain/nausea    - Follow up with [facility] in 3 mths for repeat ERCP to remove biliary stent     Acute Blood Loss Anemia  *Hgb trended down this stay. Noted to have black-appearing stools on 7/2. SubQ Lovenox held.   - Hgb: 12.4 > 11.6 > 9.8 > 8.8     - Repeat hgb at 7pm and in the AM    - Transfuse less than 7     - [facility]   - S/p EGD on 7/3/24: No signs of bleeding, had a lot of biliary secretions, stent in place  - Will hold on Colonoscopy for now given recent ileus     - Continue to monitor      Hypovolemic Hyponatremia: Resolved  Hypokalemia: Resolved  Dehydration: Improved  Initial BMP notable for Na 130, K 3.0, Cl 88, BUN 30.  Findings suspected secondary to poor  "appetite and reduced oral intake in the days preceding admission due to above.    - IVFs and K replacement started on admission.       Hypertension  Hyperlipidemia  *PTA meds: amlodipine, Coreg, chlorthalidone, lisinopril and statin.  - Continues on amlodipine, Coreg, lisinopril and statin.  - Chlorthalidone held on admission due to above -- cont holding while needing IVFs     DM II, diet controlled  A1c 5.9 this stay. Not on treatment at baseline.  - Used sliding scale insulin initially but needs were low so accuchecks/insulin were dc'd.      Severe malnutrition in context of acute illness or injury  - Nutritionist following     Recent mechanical fall  Right scalp hematoma  Right periorbital hematoma  Reportedly fell 5d prior to admission while cutting a tree branch and sustained a R scalp hematoma and R periorbital hematoma. Did not seek medical care at that time.   - Head CT this stay neg for acute pathology.   - Continue supportive cares     CARMITA  - Chronic and stable on CPAP     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, 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  Stage II GIST of jejunum, s/p exploratory laparotomy with jejunal resection 2/2022  Hx of asbestosis exposure  - Follows with [facility] oncology. No concerns at this time.     Clinically Significant Risk Factors              # Hypoalbuminemia: Lowest albumin = 2.7 g/dL at 6/29/2024  7:22 AM, will monitor as appropriate     # Hypertension: Noted on problem list           #Precipitous drop in Hgb/Hct: Lowest Hgb this hospitalization: 8.8 g/dL. Will continue to monitor and treat/transfuse as appropriate.     # Overweight: Estimated body mass index is 28.61 kg/m  as calculated from the following:    Height as of this encounter: 1.803 m (5' 11\").    Weight as of this encounter: 93 kg (205 lb 1.6 oz).   # Severe Malnutrition: based on nutrition assessment    # Financial/Environmental Concerns: none           Diet: Diet  Snacks/Supplements Adult: Ensure Enlive; Between " Meals  Full Liquid Diet     DVT Prophylaxis: Pneumatic Compression Devices   Kelly Catheter: Not present  Lines: None     Cardiac Monitoring: None  Code Status: Full Code      Disposition Plan       Expected Discharge Date: 07/05/2024      Destination: home with family (patient anticipating staying with Daughter Siri (RN) and then going up to his cabin when he is better)        Entered: Kassandra Washington MD 07/04/2024, 12:04 PM     Family Updated: Yes, spoke to jennifer Horner at patients request via phone on 7/4/4    Care Team Updated: Yes    Disposition: Potentially tomorrow pending tolerance of diet and stable hgb      Medically Ready for Discharge: Anticipated Tomorrow        Kassandra Washington MD  Hospitalist Service   [facility]  Securely message with the Vocera Web Console (learn more here)         Medical Decision Making       45 MINUTES SPENT BY ME on the date of service doing chart review, history, exam, documentation & further activities per the note.           Interval History     No acute overnight events.     This morning the patient states that he is doing well. States that he is feeling improved. Did have a black stool this morning. Asking to eat.     -Data reviewed today: I reviewed all new labs and imaging results over the last 24 hours.    Physical Exam   Temp: 97.8  F (36.6  C) Temp src: Oral BP: (!) 155/87 Pulse: 71   Resp: 23 SpO2: 96 % O2 Device: None (Room air)    Vitals:    07/02/24 0633 07/03/24 0500 07/04/24 0533   Weight: 96.9 kg (213 lb 10 oz) 97.3 kg (214 lb 8.1 oz) 93 kg (205 lb 1.6 oz)     Vital Signs with Ranges  Temp:  [97.6  F (36.4  C)-97.9  F (36.6  C)] 97.8  F (36.6  C)  Pulse:  [] 71  Resp:  [18-36] 23  BP: (128-173)/(63-99) 155/87  SpO2:  [94 %-99 %] 96 %  I/O last 3 completed shifts:  In: 120 [P.O.:120]  Out: 880 [Urine:880]      Constitutional: Awake, alert, cooperative, no apparent distress.    HEENT: PERRL, Normocephalic, without obvious  abnormality, atraumatic, oral pharynx with moist mucus membranes, bruising noted around right eye   Pulmonary: Clear to auscultation bilaterally, no crackles or wheezing.  Cardiovascular: Regular rate and rhythm, normal S1 and S2  GI: Normal bowel sounds, soft, non-distended, non-tender.  Skin/Integumen: Visualized skin appeared clear.  Neuro: CN II-XII grossly intact.  Psych:  Alert and oriented x 3. Normal affect.  Extremities: No lower extremity edema noted    Medications   Current Facility-Administered Medications   Medication Dose Route Frequency Provider Last Rate Last Admin    sodium chloride 0.9 % infusion   Intravenous Continuous Nicci Olvera  mL/hr at 07/04/24 0709 New Bag at 07/04/24 0709     Current Facility-Administered Medications   Medication Dose Route Frequency Provider Last Rate Last Admin    amLODIPine (NORVASC) tablet 10 mg  10 mg Oral QAM Emiliano Mcfarlane PA-C   10 mg at 07/04/24 0935    aspirin EC tablet 81 mg  81 mg Oral Kaila Medina MD   81 mg at 07/04/24 0935    [Held by provider] atorvastatin (LIPITOR) tablet 20 mg  20 mg Oral QAM Emiliano Mcfarlane PA-C   20 mg at 07/02/24 0901    carvedilol (COREG) tablet 12.5 mg  12.5 mg Oral BID w/meals Emiliano Mcfarlane PA-C   12.5 mg at 07/04/24 0935    ceFEPIme (MAXIPIME) 2 g vial to attach to  mL bag for ADULTS or 50 mL bag for PEDS  2 g Intravenous Q8H Emiliano Mcfarlane PA-C   2 g at 07/04/24 0709    [Held by provider] enoxaparin ANTICOAGULANT (LOVENOX) injection 40 mg  40 mg Subcutaneous Q24H Kaila Weiss MD   40 mg at 07/01/24 1803    lisinopril (ZESTRIL) tablet 20 mg  20 mg Oral BID Emiliano Mcfarlane PA-C   20 mg at 07/04/24 0935    metroNIDAZOLE (FLAGYL) tablet 500 mg  500 mg Oral TID Emiliano Mcfarlane PA-C   500 mg at 07/04/24 0935    pantoprazole (PROTONIX) IV push injection 40 mg  40 mg Intravenous BID Nicci Olvera DO   40 mg at 07/04/24 0511    polyethylene glycol (MIRALAX)  Packet 17 g  17 g Oral BID Cristopher Gonzalez PA-C   17 g at 07/03/24 2130    senna-docusate (SENOKOT-S/PERICOLACE) 8.6-50 MG per tablet 1 tablet  1 tablet Oral BID Emiliano Mcfarlane PA-C   1 tablet at 07/03/24 2127    Or    senna-docusate (SENOKOT-S/PERICOLACE) 8.6-50 MG per tablet 2 tablet  2 tablet Oral BID Emiliano Mcfarlane PA-C   2 tablet at 07/04/24 0934    sodium chloride (PF) 0.9% PF flush 3 mL  3 mL Intracatheter Q8H Emiliano Mcfarlane PA-C   3 mL at 07/03/24 2127       Data   Recent Labs   Lab 07/04/24  0834 07/03/24  0855 07/02/24  0834 06/29/24  0802 06/29/24  0722 06/28/24  1159 06/28/24  0848   WBC 15.4* 18.2* 16.0*   < > 14.7*  --  11.6*   HGB 8.8* 9.8* 11.6*   < > 12.4*  --  13.5    98 97   < > 98  --  99    452* 459*   < > 275  --  243    135 138   < >  --   --  138   POTASSIUM 4.0 4.4 3.9   < > 3.7  --  3.7   CHLORIDE 105 103 103   < >  --   --  101   CO2 26 25 26   < >  --   --  26   BUN 19.4 33.4* 43.6*   < >  --   --  20.6   CR 0.73 0.76 0.83   < >  --   --  0.89   ANIONGAP 7 7 9   < >  --   --  11   PAMELA 8.1* 8.4* 9.4   < >  --   --  8.9   GLC 94 159* 141*   < >  --    < > 119*   ALBUMIN  --   --   --   --  2.7*  --  3.1*   PROTTOTAL  --   --   --   --  6.3*  --  6.7   BILITOTAL  --   --   --   --  1.1  --  1.2   ALKPHOS  --   --   --   --  125  --  141   ALT  --   --   --   --  71*  --  92*   AST  --   --   --   --  41  --  49*    < > = values in this interval not displayed.       No results found for this or any previous visit (from the past 24 hour(s)).

## 2024-07-05 VITALS
SYSTOLIC BLOOD PRESSURE: 147 MMHG | TEMPERATURE: 98 F | OXYGEN SATURATION: 96 % | HEIGHT: 71 IN | HEART RATE: 69 BPM | BODY MASS INDEX: 28.49 KG/M2 | DIASTOLIC BLOOD PRESSURE: 73 MMHG | RESPIRATION RATE: 18 BRPM | WEIGHT: 203.48 LBS

## 2024-07-05 LAB
ERYTHROCYTE [DISTWIDTH] IN BLOOD BY AUTOMATED COUNT: 12.7 % (ref 10–15)
HCT VFR BLD AUTO: 25.9 % (ref 40–53)
HGB BLD-MCNC: 8.6 G/DL (ref 13.3–17.7)
MCH RBC QN AUTO: 33.5 PG (ref 26.5–33)
MCHC RBC AUTO-ENTMCNC: 33.2 G/DL (ref 31.5–36.5)
MCV RBC AUTO: 101 FL (ref 78–100)
PLATELET # BLD AUTO: 432 10E3/UL (ref 150–450)
POTASSIUM SERPL-SCNC: 4.1 MMOL/L (ref 3.4–5.3)
RBC # BLD AUTO: 2.57 10E6/UL (ref 4.4–5.9)
WBC # BLD AUTO: 11.1 10E3/UL (ref 4–11)

## 2024-07-05 PROCEDURE — 250N000011 HC RX IP 250 OP 636: Performed by: PHYSICIAN ASSISTANT

## 2024-07-05 PROCEDURE — 250N000013 HC RX MED GY IP 250 OP 250 PS 637: Performed by: PHYSICIAN ASSISTANT

## 2024-07-05 PROCEDURE — 99239 HOSP IP/OBS DSCHRG MGMT >30: CPT | Performed by: STUDENT IN AN ORGANIZED HEALTH CARE EDUCATION/TRAINING PROGRAM

## 2024-07-05 PROCEDURE — 85027 COMPLETE CBC AUTOMATED: CPT | Performed by: STUDENT IN AN ORGANIZED HEALTH CARE EDUCATION/TRAINING PROGRAM

## 2024-07-05 PROCEDURE — 250N000011 HC RX IP 250 OP 636: Performed by: INTERNAL MEDICINE

## 2024-07-05 PROCEDURE — 250N000013 HC RX MED GY IP 250 OP 250 PS 637: Performed by: STUDENT IN AN ORGANIZED HEALTH CARE EDUCATION/TRAINING PROGRAM

## 2024-07-05 PROCEDURE — 250N000013 HC RX MED GY IP 250 OP 250 PS 637: Performed by: INTERNAL MEDICINE

## 2024-07-05 PROCEDURE — 36415 COLL VENOUS BLD VENIPUNCTURE: CPT | Performed by: INTERNAL MEDICINE

## 2024-07-05 PROCEDURE — 84132 ASSAY OF SERUM POTASSIUM: CPT | Performed by: INTERNAL MEDICINE

## 2024-07-05 RX ORDER — SIMETHICONE 80 MG
80 TABLET,CHEWABLE ORAL EVERY 6 HOURS PRN
Status: DISCONTINUED | OUTPATIENT
Start: 2024-07-05 | End: 2024-07-05 | Stop reason: HOSPADM

## 2024-07-05 RX ORDER — METHOCARBAMOL 500 MG/1
500 TABLET, FILM COATED ORAL 4 TIMES DAILY PRN
Qty: 12 TABLET | Refills: 0 | Status: SHIPPED | OUTPATIENT
Start: 2024-07-05

## 2024-07-05 RX ORDER — SIMETHICONE 80 MG
80 TABLET,CHEWABLE ORAL EVERY 6 HOURS PRN
Qty: 12 TABLET | Refills: 0 | Status: SHIPPED | OUTPATIENT
Start: 2024-07-05

## 2024-07-05 RX ORDER — CIPROFLOXACIN 500 MG/1
500 TABLET, FILM COATED ORAL 2 TIMES DAILY
Qty: 10 TABLET | Refills: 0 | Status: SHIPPED | OUTPATIENT
Start: 2024-07-05 | End: 2024-07-10

## 2024-07-05 RX ORDER — METRONIDAZOLE 500 MG/1
500 TABLET ORAL 3 TIMES DAILY
Qty: 15 TABLET | Refills: 0 | Status: SHIPPED | OUTPATIENT
Start: 2024-07-05 | End: 2024-07-10

## 2024-07-05 RX ORDER — METHOCARBAMOL 500 MG/1
500 TABLET, FILM COATED ORAL 4 TIMES DAILY PRN
Status: DISCONTINUED | OUTPATIENT
Start: 2024-07-05 | End: 2024-07-05 | Stop reason: HOSPADM

## 2024-07-05 RX ORDER — PANTOPRAZOLE SODIUM 40 MG/1
40 TABLET, DELAYED RELEASE ORAL 2 TIMES DAILY
Qty: 60 TABLET | Refills: 0 | Status: SHIPPED | OUTPATIENT
Start: 2024-07-05

## 2024-07-05 RX ORDER — METOCLOPRAMIDE 5 MG/1
5 TABLET ORAL ONCE
Status: COMPLETED | OUTPATIENT
Start: 2024-07-05 | End: 2024-07-05

## 2024-07-05 RX ADMIN — METOCLOPRAMIDE 5 MG: 5 TABLET ORAL at 08:09

## 2024-07-05 RX ADMIN — CEFEPIME 2 G: 2 INJECTION, POWDER, FOR SOLUTION INTRAVENOUS at 06:35

## 2024-07-05 RX ADMIN — AMLODIPINE BESYLATE 10 MG: 10 TABLET ORAL at 08:09

## 2024-07-05 RX ADMIN — METHOCARBAMOL 500 MG: 500 TABLET ORAL at 14:29

## 2024-07-05 RX ADMIN — LISINOPRIL 20 MG: 20 TABLET ORAL at 08:09

## 2024-07-05 RX ADMIN — PANTOPRAZOLE SODIUM 40 MG: 40 INJECTION, POWDER, FOR SOLUTION INTRAVENOUS at 08:09

## 2024-07-05 RX ADMIN — SIMETHICONE 80 MG: 80 TABLET, CHEWABLE ORAL at 14:29

## 2024-07-05 RX ADMIN — METRONIDAZOLE 500 MG: 500 TABLET ORAL at 15:05

## 2024-07-05 RX ADMIN — CEFEPIME 2 G: 2 INJECTION, POWDER, FOR SOLUTION INTRAVENOUS at 14:30

## 2024-07-05 RX ADMIN — ASPIRIN 81 MG: 81 TABLET, COATED ORAL at 08:09

## 2024-07-05 RX ADMIN — SENNOSIDES AND DOCUSATE SODIUM 1 TABLET: 50; 8.6 TABLET ORAL at 08:09

## 2024-07-05 RX ADMIN — METRONIDAZOLE 500 MG: 500 TABLET ORAL at 08:09

## 2024-07-05 RX ADMIN — CARVEDILOL 12.5 MG: 12.5 TABLET, FILM COATED ORAL at 08:09

## 2024-07-05 ASSESSMENT — ACTIVITIES OF DAILY LIVING (ADL)
ADLS_ACUITY_SCORE: 29
ADLS_ACUITY_SCORE: 27
ADLS_ACUITY_SCORE: 29
ADLS_ACUITY_SCORE: 27
ADLS_ACUITY_SCORE: 29
ADLS_ACUITY_SCORE: 27
ADLS_ACUITY_SCORE: 29

## 2024-07-05 NOTE — PLAN OF CARE
7/4/2024-7/5/2024 [id number]  Orientation: A/Ox4  Aggression Stop Light: Green  Activity: SBA  Diet/BS Checks: Full liquid diet  Tele:  -  IV Access/Drains: PIV inf NS @ 75ml/hr w/ int abx  Pain Management: Denies pain and nausea  Abnormal VS/Results: VSS on RA  Bowel/Bladder: Cont B/B  Skin/Wounds: Scattered bruising on head and face, swelling. 4 lap sites and one old FELIPA drain site  Consults: Surgery, GI, PT, SW  D/C Disposition: Discharge probable 7/5 if tolerating diet

## 2024-07-05 NOTE — PROGRESS NOTES
GI chart check     Jairo Austin is a 78 year old male with PMHx of hypertension, hyperlipidemia, diet-controlled type 2 diabetes, CARMITA, asbestos exposure, stage IIb uveal melanoma of the left eye s/p plaque brachytherapy in 2019, stage II GIST of the small intestine s/p ex lap with open jejunal resection in 2022, ventral hernia repair in 2023 and remote appendectomy among other medical problems who was admitted on 6/25/2024 for management of acute gangrenous cholecystitis with cholelithiasis and choledocholithiasis.       Acute gangrene cholecystitis with cholelithiasis and choledocholithiasis  S/p ERCP and EUS on 6/26/24 with biliary sphincterotomy, and temporary stent placement  S/p robotic assisted laparoscopic cholecystectomy and BRENDA on 6/27/24  Postop ileus  Patient is feeling significantly better, abdominal pain has improved.  Pt had multiple bowel movements and is much less distended.  --Hgb is stable patient   --Tolerating full liquids liquid today.  --Continue on supportive care   --advance diet as per general surgery.    -Recommend follow-up visit in GI clinic in 1 to 2 weeks after discharge   --Patient will need repeat ERCP and stent removal in about 3 months.

## 2024-07-05 NOTE — PLAN OF CARE
Goal Outcome Evaluation:       Patient is A&O x4. Up with SBA to bathroom, voiding adequately, had few loose stool this shift. Denies chest pain or SOB or nausea or pain in general. Lap sites dressing/ incision on abdomen- CDI. IV access discontinued. Having frequent hiccups since yesterday afternoon- managed with robaxin and simethicone. Reviewed AVS with patient. All questions answered. Discharge home with medications and belonging. Son providing transportation around 1630.     Patient refused to take walker. Did provide information regarding medical equipment.

## 2024-07-05 NOTE — PROVIDER NOTIFICATION
MD Notification    Notified Person: MD    Notified Person Name: Dr Fox    Notification Date/Time: 07/05/2024 @ 1341    Notification Interaction: vocera    Purpose of Notification: Patient ate 50% of meal (regular diet)- tolerated well, denies nausea/ pain. hiccups is still on. Thanks    Orders Received: MD  acknowledged.    Comments:

## 2024-07-05 NOTE — CONSULTS
Care Management Discharge Note    Discharge Date: 07/05/2024       Discharge Disposition: Home    Discharge Services: None    Discharge DME: Other (see comment) (to be determined patient feels comfortable ambulating without assitive devices)    Discharge Transportation: family or friend will provide    Private pay costs discussed: Not applicable    Does the patient's insurance plan have a 3 day qualifying hospital stay waiver?  No    PAS Confirmation Code:  n/a  Patient/family educated on Medicare website which has current facility and service quality ratings:  no    Education Provided on the Discharge Plan: Yes  Persons Notified of Discharge Plans: Patient, Discharging Provider, Bedside RN, Daughter Siri by phone  Patient/Family in Agreement with the Plan: yes    Handoff Referral Completed: No    Additional Information:  Writer met with the patient at the bedside. Patient is hopeful to discharge to his cabin with family today.  Writer notified patient that once he is medically cleared bedside RN will go over ETA on discharge as his Son will be picking him up.  Patient hopeful to advance his diet and has been ambulating and has had frequent bowel movements.  Patient aware that he will most likely require PCP follow up and continued follow up recommendations of Surgical consultants and [facility].  Patient agreeable to have this writer connect by phone with Daughter Siri. Writer went over the above with Siri by phone. She asked that two copies be printed for the patient at discharge so that she has a copy will ask bedside RN to do this.  No further needs identified.        Aida Wood RN, BSN, ACM   Care Transitions Specialist  [facility]  Care Transitions Specialist  [id number] [id number]  [email or web address]  Office: [phone] Fax: [phone]  [facility]

## 2024-07-05 NOTE — PROGRESS NOTES
[facility]  Gastroenterology Progress Note     Jairo Austin MRN# [id number]   YOB: 1945 Age: 78 year old          Assessment and Plan:       Choledocholithiasis    Cholecystitis  Patient is feeling significantly better patient's abdominal pain has improved patient had multiple bowel movements since yesterday patient has much less distended abdomen hemoglobin is stable patient is tolerating clear liquid today.  Continue on supportive care advance diet as per general surgery.  I will recommend follow-up visit in GI clinic in 1 to 2 weeks after discharge patient will need repeat ERCP and stent removal in about 3 months.            Choledocholithiasis  Cholecystitis      Interval History:     doing well; no cp, sob, n/v/d, or abd pain.              Review of Systems:     C: NEGATIVE for fever, chills, change in weight  E/M: NEGATIVE for ear, mouth and throat problems  R: NEGATIVE for significant cough or SOB  CV: NEGATIVE for chest pain, palpitations or peripheral edema             Medications:   I have reviewed this patient's current medications  Current Facility-Administered Medications   Medication Dose Route Frequency Provider Last Rate Last Admin    amLODIPine (NORVASC) tablet 10 mg  10 mg Oral Emiliano Lynch PA-C   10 mg at 07/04/24 0935    aspirin EC tablet 81 mg  81 mg Oral Kaila Medina MD   81 mg at 07/04/24 0935    [Held by provider] atorvastatin (LIPITOR) tablet 20 mg  20 mg Oral QAM Emiliano Mcfarlane PA-C   20 mg at 07/02/24 0901    carvedilol (COREG) tablet 12.5 mg  12.5 mg Oral BID w/meals Emiliano Mcfarlane PA-C   12.5 mg at 07/04/24 1754    ceFEPIme (MAXIPIME) 2 g vial to attach to  mL bag for ADULTS or 50 mL bag for PEDS  2 g Intravenous Q8H Emiliano Mcfarlane PA-C   2 g at 07/04/24 1625    [Held by provider] enoxaparin ANTICOAGULANT (LOVENOX) injection 40 mg  40 mg Subcutaneous Q24H Kaila Weiss MD   40 mg at 07/01/24  1803    lisinopril (ZESTRIL) tablet 20 mg  20 mg Oral BID Emiliano Mcfarlane PA-C   20 mg at 07/04/24 0935    metroNIDAZOLE (FLAGYL) tablet 500 mg  500 mg Oral TID Emiliano Mcfarlane PA-C   500 mg at 07/04/24 1754    pantoprazole (PROTONIX) IV push injection 40 mg  40 mg Intravenous BID Nicci Olvera DO   40 mg at 07/04/24 0946    polyethylene glycol (MIRALAX) Packet 17 g  17 g Oral BID Cristopher Gonzalez PA-C   17 g at 07/03/24 2130    senna-docusate (SENOKOT-S/PERICOLACE) 8.6-50 MG per tablet 1 tablet  1 tablet Oral BID Emiliano Mcfarlane PA-C   1 tablet at 07/03/24 2127    Or    senna-docusate (SENOKOT-S/PERICOLACE) 8.6-50 MG per tablet 2 tablet  2 tablet Oral BID Emiliano Mcfarlane PA-C   2 tablet at 07/04/24 0934    sodium chloride (PF) 0.9% PF flush 3 mL  3 mL Intracatheter Q8H Emiliano Mcfarlane PA-C   3 mL at 07/03/24 2127                  Physical Exam:   Vitals were reviewed  Vital Signs with Ranges  Temp:  [97.6  F (36.4  C)-98.3  F (36.8  C)] 98.3  F (36.8  C)  Pulse:  [71-72] 72  Resp:  [18-23] 18  BP: (128-155)/(63-87) 143/80  SpO2:  [94 %-96 %] 95 %  I/O last 3 completed shifts:  In: 240 [P.O.:240]  Out: 450 [Urine:450]  Cardiovascular: negative, PMI normal. No lifts, heaves, or thrills. RRR. No murmurs, clicks gallops or rub  Respiratory: negative, Percussion normal. Good diaphragmatic excursion. Lungs clear  Head: Normocephalic. No masses, lesions, tenderness or abnormalities  Neck: Neck supple. No adenopathy. Thyroid symmetric, normal size,, Carotids without bruits.  Abdomen: Abdomen soft, non-tender. BS normal. No masses, organomegaly  SKIN: no suspicious lesions or rashes           Data:   I reviewed the patient's new clinical lab test results.   Recent Labs   Lab Test 07/04/24  1850 07/04/24  0834 07/03/24  0855 07/02/24  0834 06/26/24  0904 06/25/24  2103   WBC  --  15.4* 18.2* 16.0*   < > 12.6*   HGB 9.6* 8.8* 9.8* 11.6*   < > 15.1   MCV  --  100 98 97   < > 95   PLT   --  447 452* 459*   < > 181   INR  --   --   --   --   --  1.05    < > = values in this interval not displayed.     Recent Labs   Lab Test 07/04/24  0834 07/03/24  0855 07/02/24  0834   POTASSIUM 4.0 4.4 3.9   CHLORIDE 105 103 103   CO2 26 25 26   BUN 19.4 33.4* 43.6*   ANIONGAP 7 7 9     Recent Labs   Lab Test 06/29/24  0722 06/28/24  0848 06/27/24  0531 06/26/24  0904 06/25/24  2103   ALBUMIN 2.7* 3.1* 3.1*   < > 4.1   BILITOTAL 1.1 1.2 1.5*   < > 2.5*   ALT 71* 92* 129*   < > 252*   AST 41 49* 38   < > 84*   LIPASE  --   --   --   --  42    < > = values in this interval not displayed.       I reviewed the patient's new imaging results.    All laboratory data reviewed  All imaging studies reviewed by me.    Cr Neil MD,  7/4/2024  [facility]  Office : [phone]  Cell: [phone]      [facility]  Ph: [phone] Fax: [phone]

## 2024-07-05 NOTE — DISCHARGE SUMMARY
[facility]  Hospitalist Discharge Summary     Admit Date:  6/25/2024  Discharge Date:     7/5/2024  Discharging Provider: Kassandra Washington MD    PRIMARY CARE PROVIDER:    Nisa Alaniz     DISCHARGE DIAGNOSES:     Acute gangrene cholecystitis with cholelithiasis and choledocholithiasis  S/p ERCP and EUS on 6/26/24 with biliary sphincterotomy, and temporary stent placement  S/p robotic assisted laparoscopic cholecystectomy and BRENDA on 6/27/24  Postop ileus: Resolved   Enterobacter and Klebsiella pneumonia bacteremia dt above  Acute Blood Loss Anemia  Hiccups  Hypovolemic Hyponatremia: Resolved  Hypokalemia: Resolved  Dehydration: Improved  Hypertension  Hyperlipidemia  DM II, diet controlled   Severe malnutrition in context of acute illness or injury   Recent mechanical fall  Right scalp hematoma  Right periorbital hematoma  CARMITA   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, 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  Stage II GIST of jejunum, s/p exploratory laparotomy with 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  Additionally during his stay, he was found to have bacteremia secondary to Enterobacter and Klebsiella pneumonia.      Acute gangrene cholecystitis with cholelithiasis and choledocholithiasis  S/p ERCP and EUS on 6/26/24 with biliary sphincterotomy, and temporary stent placement  S/p robotic assisted laparoscopic  cholecystectomy and BRENDA on 6/27/24  Postop ileus: Resolved   Enterobacter and Klebsiella pneumonia bacteremia dt above  Presented to an urgent care clinic in [place], [place] on 6/25 for evaluation of s 4d hx of upper abdominal pain/nausea. Labs notable for WBC 14, lactate nl, CRP 16, AST//301, alk phos 212 and total bili 2.8. CT abd/pelvis obtained and showed acute cholecystitis with distal choledocholithiasis.  Transferred to [facility] consulted on admission. Underwent ERCP/EUS on 6/26 with findings of choledocholithiasis.  Complex removal was accomplished with biliary sphincterotomy, balloon extraction and temporary stent placement to CBD. LFTs improved. ubsequently underwent robotic assisted laparoscopic cholecystectomy with lysis of adhesions on 6/27/24 per Dr. De León.  Intraoperative findings were notable for acute gangrenous cholecystitis. Postoperative course was complicated by worsening abdominal distention with minimal flatus and no BM; suspected due to a postop ileus.  Symptoms had initially improved with supportive cares and ultimately did not require NG tube placement. Patient had reoccurrence of abdominal bloating, n/v and persistent leukocytosis postop. CT abd/pelvis obtained on 7/2 and showed a newly dilated stomach and small bowel with gradual transition to decompressed distal small bowel, findings were consistent with a postoperative adynamic ileus.  No significant fluid collections in the gallbladder fossa. Due to ongoing abd bloating/pain, episodes of nausea/vomiting and minimal flatus on 7/2-7/3 he was made NPO. Diet was advanced to full liquids on 7/4/24 and to a regular diet on 7/5/24 with good tolerance.      - 6/25/24 1st set Blood Cx: 2/2 bottles grew enterobacter and Klebsiella pneumoniae  - 6/25/24 2nd set Blood Cx: 1/2 bottles grew Bacteroides thetaiotaomicron and Bacteroides dorei/vulgatus               - No  antimicrobial susceptibility patterns available at this time however anticipate patient is covered on current antibiotics     - 6/27/24 Blood Cx: Negative   - 6/28/24  Blood Cx: Negative      - WBC: 16.2 > 15.7 > 16 > 18.2 > 15.4 > 11.1     - Continue IV cefepime/po flagyl (6/26-present)  - Transition to oral abx at discharge for another 5 days to complete a 2 weeks course      - Transition to PO PPI BID   - Prns for pain/nausea     - Follow up with [facility] in 3 mths for repeat ERCP to remove biliary stent     Acute Blood Loss Anemia  *Hgb trended down this stay. Noted to have black-appearing stools on 7/2. SubQ Lovenox held.   - Hgb: 12.4 > 11.6 > 9.8 > 8.8 > 9.6 > 8.8                - Repeat hgb at 7pm and in the AM                - Transfuse less than 7      - [facility]              - S/p EGD on 7/3/24: No signs of bleeding, had a lot of biliary secretions, stent in place  - Will hold on Colonoscopy for now given recent ileus                - Recommend follow-up visit in GI clinic in 1 to 2 weeks after discharge     Hiccups  Patient developed hiccups on 7/4/24 afternoon. Hiccups are likely due to diaphragm irritation from recent procedures. They did not stop with conservative measures. A dose of Reglan was tried with no improvement. A dose of Robaxin and Simethicone were given with some improvement. Patient did not want to stay in the hospital for this. Explained that smaller meals can also improve this .    - Continue PRN Robaxin and Simethicone     Hypovolemic Hyponatremia: Resolved  Hypokalemia: Resolved  Dehydration: Resolved  Initial BMP notable for Na 130, K 3.0, Cl 88, BUN 30.  Findings suspected secondary to poor appetite and reduced oral intake in the days preceding admission due to above.    - IVFs and K replacement started on admission.       Hypertension  Hyperlipidemia  *PTA meds: amlodipine, Coreg, chlorthalidone, lisinopril and statin.  - Continues on amlodipine, Coreg, lisinopril and statin.  -  "Chlorthalidone held on admission, plan to restart on admission     DM II, diet controlled  A1c 5.9 this stay. Not on treatment at baseline.  - Used sliding scale insulin initially but needs were low so accuchecks/insulin were dc'd.      Severe malnutrition in context of acute illness or injury  - Nutritionist following     Recent mechanical fall  Right scalp hematoma  Right periorbital hematoma  Reportedly fell 5d prior to admission while cutting a tree branch and sustained a R scalp hematoma and R periorbital hematoma. Did not seek medical care at that time.   - Head CT this stay neg for acute pathology.   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 No concerns at this time.        Clinically Significant Risk Factors              # Hypoalbuminemia: Lowest albumin = 2.7 g/dL at 6/29/2024  7:22 AM, will monitor as appropriate     # Hypertension: Noted on problem list           #Precipitous drop in Hgb/Hct: Lowest Hgb this hospitalization: 8.6 g/dL. Will continue to monitor and treat/transfuse as appropriate.     # Overweight: Estimated body mass index is 28.38 kg/m  as calculated from the following:    Height as of this encounter: 1.803 m (5' 11\").    Weight as of this encounter: 92.3 kg (203 lb 7.8 oz).   # Severe Malnutrition: based on nutrition assessment    # Financial/Environmental Concerns: none              TOTAL DISCHARGE TIME:  IKassandra MD, personally saw the patient today and spent greater than 30 minutes discharging this patient.    Kassandra Washington MD  [facility]  Hospitalist Service   Securely message with the Vocera Web Console (learn more here)  Text page via [employer] Paging/Directory        Significant Results and Procedures "   S/p ERCP and EUS on 6/26/24 with biliary sphincterotomy, and temporary stent placement  S/p robotic assisted laparoscopic cholecystectomy and BRENDA on 6/27/24    Pending Results   These results will be followed up by n/a  Unresulted Labs Ordered in the Past 30 Days of this Admission       No orders found from 5/26/2024 to 6/26/2024.            Code Status   Full Code       Primary Care Physician   Nisa Alaniz    Physical Exam   Temp: 98  F (36.7  C) Temp src: Oral BP: (!) 147/73 Pulse: 69   Resp: 18 SpO2: 96 % O2 Device: None (Room air)    Vitals:    07/03/24 0500 07/04/24 0533 07/05/24 0604   Weight: 97.3 kg (214 lb 8.1 oz) 93 kg (205 lb 1.6 oz) 92.3 kg (203 lb 7.8 oz)     Vital Signs with Ranges  Temp:  [98  F (36.7  C)-98.3  F (36.8  C)] 98  F (36.7  C)  Pulse:  [65-72] 69  Resp:  [18] 18  BP: (132-147)/(73-80) 147/73  SpO2:  [95 %-97 %] 96 %  I/O last 3 completed shifts:  In: 540 [P.O.:540]  Out: 2375 [Urine:2375]    Constitutional: Awake, alert, cooperative, no apparent distress.    HEENT: PERRL, Normocephalic, without obvious abnormality, atraumatic, oral pharynx with moist mucus membranes, bruising noted around right eye   Pulmonary: Clear to auscultation bilaterally, no crackles or wheezing.  Cardiovascular: Regular rate and rhythm, normal S1 and S2  GI: Normal bowel sounds, soft, non-distended, non-tender. Hiccups present   Skin/Integumen: Visualized skin appeared clear.  Neuro: CN II-XII grossly intact.  Psych:  Alert and oriented x 3. Normal affect.  Extremities: No lower extremity edema noted    Discharge Disposition   Discharged to home  Condition at discharge: Stable    Consultations This Hospital Stay   GASTROENTEROLOGY IP CONSULT  SURGERY GENERAL IP CONSULT  PHYSICAL THERAPY ADULT IP CONSULT  OCCUPATIONAL THERAPY ADULT IP CONSULT  CARE MANAGEMENT / SOCIAL WORK IP CONSULT  VASCULAR ACCESS ADULT IP CONSULT      Discharge Orders      Activity    Your activity upon discharge: activity as  tolerated and as advised per your surgical team     Follow-up and recommended labs and tests     1.  [facility] office will contact you in approximately 2-3 weeks to check on your progress and answer any questions you may have. Number for [facility]:  [phone]   2.  Follow-up with your PCP in the next 1 to 2 weeks as needed Please call your primary care office to schedule a follow up.   3.  Follow-up visit in GI clinic in 1 to 2 weeks after discharge with repeat ERCP and stent removal in three months. Number for [facility]: [phone] They should call you with follow up appointment/date/time/location.     Reason for your hospital stay    You were hospitalized for management of an infection in your gallbladder which subsequently led to an infection in your bloodstream due to the bacteria Enterobacter and Klebsiella pneumoniae.  It was thought that your gallbladder infection occurred because of some gallstones obstructing her bile duct.  During this hospital stay, you were treated with IV antibiotics, you underwent ERCP to remove the stones from your bile ducts and a temporary stent was placed.  You then underwent surgery to remove your gallbladder.  Your condition improved this stay.  You will continue taking oral antibiotics at discharge and follow-up with your surgeon and primary care doctor as advised.     Walker Order for DME - ONLY FOR DME     Diet    Follow this diet upon discharge: Regular     Discharge Medications   Current Discharge Medication List        START taking these medications    Details   ciprofloxacin (CIPRO) 500 MG tablet Take 1 tablet (500 mg) by mouth 2 times daily for 5 days  Qty: 10 tablet, Refills: 0    Associated Diagnoses: Choledocholithiasis; Cholecystitis      methocarbamol (ROBAXIN) 500 MG tablet Take 1 tablet (500 mg) by mouth 4 times daily as needed for other (Hiccups)  Qty: 12 tablet, Refills: 0    Associated Diagnoses: Hiccups      metroNIDAZOLE (FLAGYL) 500  MG tablet Take 1 tablet (500 mg) by mouth 3 times daily for 5 days  Qty: 15 tablet, Refills: 0    Associated Diagnoses: Choledocholithiasis; Cholecystitis      pantoprazole (PROTONIX) 40 MG EC tablet Take 1 tablet (40 mg) by mouth 2 times daily  Qty: 60 tablet, Refills: 0    Associated Diagnoses: Gastroesophageal reflux disease without esophagitis      polyethylene glycol (MIRALAX) 17 GM/Dose powder Take 17 g by mouth daily  Qty: 510 g, Refills: 0    Associated Diagnoses: Choledocholithiasis; Cholecystitis      senna-docusate (SENOKOT-S/PERICOLACE) 8.6-50 MG tablet Take 2 tablets by mouth 2 times daily as needed for constipation  Qty: 30 tablet, Refills: 0    Associated Diagnoses: Choledocholithiasis; Cholecystitis      simethicone (MYLICON) 80 MG chewable tablet Take 1 tablet (80 mg) by mouth every 6 hours as needed for other, cramping or flatulence (Hiccups)  Qty: 12 tablet, Refills: 0    Associated Diagnoses: Hiccups           CONTINUE these medications which have NOT CHANGED    Details   acetaminophen (TYLENOL) 500 MG tablet Take 1,000 mg by mouth daily      amLODIPine (NORVASC) 10 MG tablet Take 10 mg by mouth every morning       aspirin 81 MG EC tablet Take 81 mg by mouth every morning       atorvastatin (LIPITOR) 20 MG tablet Take 20 mg by mouth every morning       carvedilol (COREG) 12.5 MG tablet Take 12.5 mg by mouth 2 times daily (with meals)       chlorthalidone (HYGROTON) 25 MG tablet Take 25 mg by mouth every evening      lisinopril (PRINIVIL/ZESTRIL) 20 MG tablet Take 20 mg by mouth 2 times daily   Refills: 3      multivitamin w/minerals (THERA-VIT-M) tablet Take 1 tablet by mouth every morning           Allergies   Allergies   Allergen Reactions    Metronidazole Itching and Rash     Data   Most Recent 3 CBC's:  Recent Labs   Lab Test 07/05/24  0756 07/04/24  1850 07/04/24  0834 07/03/24  0855   WBC 11.1*  --  15.4* 18.2*   HGB 8.6* 9.6* 8.8* 9.8*   *  --  100 98     --  447 452*       Most Recent 3 BMP's:  Recent Labs   Lab Test 07/05/24  0756 07/04/24  0834 07/03/24  0855 07/02/24  0834   NA  --  138 135 138   POTASSIUM 4.1 4.0 4.4 3.9   CHLORIDE  --  105 103 103   CO2  --  26 25 26   BUN  --  19.4 33.4* 43.6*   CR  --  0.73 0.76 0.83   ANIONGAP  --  7 7 9   PAMELA  --  8.1* 8.4* 9.4   GLC  --  94 159* 141*     Most Recent 2 LFT's:  Recent Labs   Lab Test 06/29/24  0722 06/28/24  0848   AST 41 49*   ALT 71* 92*   ALKPHOS 125 141   BILITOTAL 1.1 1.2     Most Recent INR's and Anticoagulation Dosing History:  Anticoagulation Dose History          Latest Ref Rng & Units 6/25/2024   Recent Dosing and Labs   INR 0.85 - 1.15 1.05       Details                 Most Recent 3 Troponin's:No lab results found.  Most Recent Cholesterol Panel:No lab results found.  Most Recent 6 Bacteria Isolates From Any Culture (See EPIC Reports for Culture Details):No lab results found.  Most Recent TSH, T4 and A1c Labs:  Recent Labs   Lab Test 06/26/24  0904   A1C 5.9*     Results for orders placed or performed during the hospital encounter of 06/25/24   CT Head w/o Contrast    Narrative    EXAM: CT HEAD W/O CONTRAST  LOCATION: [facility]  DATE: 6/27/2024    INDICATION: Fall, right eye eccymosis  COMPARISON: None.  TECHNIQUE: Routine CT Head without IV contrast. Multiplanar reformats. Dose reduction techniques were used.    FINDINGS:  INTRACRANIAL CONTENTS: No intracranial hemorrhage, extraaxial collection, or mass effect.  No CT evidence of acute infarct. Mild presumed chronic small vessel ischemic changes. Mild generalized volume loss. No hydrocephalus.     VISUALIZED ORBITS/SINUSES/MASTOIDS: Prior bilateral cataract surgery. Visualized portions of the orbits are otherwise unremarkable. No paranasal sinus mucosal disease. No middle ear or mastoid effusion.    BONES/SOFT TISSUES: Large right frontal scalp hematoma. No calvarial fracture.      Impression    IMPRESSION:  1.  No acute intracranial  abnormality.   XR ERCP    Narrative    This exam was marked as non-reportable because it will not be read by a   radiologist or a [place] non-radiologist provider.         CT Abdomen Pelvis w Contrast    Narrative    CT ABDOMEN AND PELVIS WITH CONTRAST 7/2/2024 1:44 PM    CLINICAL HISTORY: Bloating, leukocytosis after gangrenous gallbladder  removal.    TECHNIQUE: CT scan of the abdomen and pelvis was performed following  injection of IV contrast. Multiplanar reformats were obtained. Dose  reduction techniques were used.    CONTRAST: 107 mL [id number]    COMPARISON: 4/13/2023    FINDINGS:   LOWER CHEST: Normal.    HEPATOBILIARY: Interval cholecystectomy. Plastic biliary stent with  pneumobilia. No significant fluid in the gallbladder fossa.    PANCREAS: Normal.    SPLEEN: Calcified granulomas.    ADRENAL GLANDS: Normal.    KIDNEYS/BLADDER: Renal cysts. No hydronephrosis.    BOWEL: Dilated fluid-filled loops of small bowel and stomach. There is  a gradual transition to decompressed distal small bowel and colon.    PELVIC ORGANS: Small fluid collection in the pelvis is new measuring  2.5 cm in diameter.    ADDITIONAL FINDINGS: Scant ascites.    MUSCULOSKELETAL: Bilateral hip arthroplasties.      Impression    IMPRESSION:   1.  New dilated stomach and small bowel with gradual transition to  decompressed distal small bowel. Findings most likely represent a  postoperative adynamic ileus.  2.  Postop recent cholecystectomy. No significant fluid in the  gallbladder fossa. Scant ascites and small fluid collection in the  pelvis. No drainable abscess.    DALTON VIRGEN MD         SYSTEM ID:  [id number]

## 2024-07-05 NOTE — PLAN OF CARE
Goal Outcome Evaluation:      Plan of Care Reviewed With: patient    Overall Patient Progress: improvingOverall Patient Progress: improving         Summary Acute gangrenous cholecystitis with cholelithiasis and choledocholithiasis.  Additionally during his stay, he was found to have bacteremia secondary to Enterobacter and Klebsiella pneumonia.    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

## 2024-07-05 NOTE — PROGRESS NOTES
[facility]    General Surgery  Daily Progress Note       Assessment and Plan:   Jairo Austin is a 78 year old male with history of ex lap with small bowel resection for jejunal GIST 2022 and ventral hernia repair 2023. Admitted for Choledocholithiasis.  He is now s/p ERCP with temporary CBD stent by Dr. Neil on 6/26 and difficult robotic assisted lap makayla by Dr. De León on 6/27 during which a gangrenous gallbladder was found.     Patient began having dark stools, hematemesis, and abdominal distention, so CT abd/pelvis was obtained on 7/3 and showed ileus.  No fluid in gallbladder fossa, abscesses, or reason for melena or hematemesis and acute blood loss anemia. Patient underwent EGD on 7/3 which showed bilious gastric fluid, normal esophagus, and normal duodenum. Patient now tolerating a diet, having consistent bowel movements, and without abdominal pain.  WBC count continues to downtrend.     PLAN:  - OK to discharge from a surgical standpoint  - Analgesia prn. Senokot BID and miralax daily prn.  - Ambulate QID to assist with bowel function, PCDs for DVT prophylaxis.   - Encourage deep breathe and IS.   - Medical management per primary team.        Interval History:   Patient having multiple bowel movements and tolerating a diet without nausea and vomiting.  Feels much improved and ready to go home today if possible.         Physical Exam:   Temp: 98  F (36.7  C) Temp src: Oral BP: (!) 147/73 Pulse: 69   Resp: 18 SpO2: 96 % O2 Device: None (Room air)      I/O last 3 completed shifts:  In: 240 [P.O.:240]  Out: 1450 [Urine:1450]    GENERAL: VS reviewed, alert, oriented, no acute distress  LUNGS: Normal work of breathing on room air  ABDOMEN:  Nondistended, nontender  INCISION: Incisions are clean, dry, and intact.  EXTREMITIES: Moving all extremities, no gross deformities  NEUROLOGICAL: no obvious deficits    Data   Recent Labs   Lab 07/05/24  0756 07/04/24  1850 07/04/24  0834  07/03/24  0855 07/02/24  0834 06/29/24  0802 06/29/24  0722   WBC 11.1*  --  15.4* 18.2* 16.0*   < > 14.7*   HGB 8.6* 9.6* 8.8* 9.8* 11.6*   < > 12.4*   *  --  100 98 97   < > 98     --  447 452* 459*   < > 275   NA  --   --  138 135 138   < >  --    POTASSIUM 4.1  --  4.0 4.4 3.9   < > 3.7   CHLORIDE  --   --  105 103 103   < >  --    CO2  --   --  26 25 26   < >  --    BUN  --   --  19.4 33.4* 43.6*   < >  --    CR  --   --  0.73 0.76 0.83   < >  --    ANIONGAP  --   --  7 7 9   < >  --    PAMELA  --   --  8.1* 8.4* 9.4   < >  --    GLC  --   --  94 159* 141*   < >  --    ALBUMIN  --   --   --   --   --   --  2.7*   PROTTOTAL  --   --   --   --   --   --  6.3*   BILITOTAL  --   --   --   --   --   --  1.1   ALKPHOS  --   --   --   --   --   --  125   ALT  --   --   --   --   --   --  71*   AST  --   --   --   --   --   --  41    < > = values in this interval not displayed.     Aparna Bowden DO on 7/5/2024 at 2:17 PM

## 2024-07-16 ENCOUNTER — TELEPHONE (OUTPATIENT)
Dept: SURGERY | Facility: CLINIC | Age: 79
End: 2024-07-16
Payer: COMMERCIAL

## 2024-07-16 NOTE — TELEPHONE ENCOUNTER
[facility]  Post op call note     Jairohawa Austin was called for an update regarding his recovery.  He underwent a robotic lap makayla by Dr. De León on June / 27 / 2024 .  Today he tells me he is doing well and denies any complaints.  He is eating a normal diet and his bowels are regular.  He states his wounds are healing well and denies any erythema or drainage at his wounds.     The pathology revealed   -Acute cholecystitis with prominent mucosal ulceration (clinical choledocholithiasis)  -Negative for dysplasia or malignancy. .      This was discussed with the patient.     He was instructed to gradually resume all normal activities. The patient states all of his questions were answered and he agrees to follow up in clinic as needed.    Jeramy Mcfarlane PA-C        Please route or send letter to:  Primary Care Provider (PCP)

## 2024-08-24 ENCOUNTER — HEALTH MAINTENANCE LETTER (OUTPATIENT)
Age: 79
End: 2024-08-24

## 2024-09-20 ENCOUNTER — TELEPHONE (OUTPATIENT)
Dept: ONCOLOGY | Facility: CLINIC | Age: 79
End: 2024-09-20
Payer: COMMERCIAL

## 2024-10-24 DIAGNOSIS — C69.32 MALIGNANT MELANOMA OF CHOROID OF LEFT EYE (H): Primary | ICD-10-CM

## 2024-11-02 ENCOUNTER — HEALTH MAINTENANCE LETTER (OUTPATIENT)
Age: 79
End: 2024-11-02

## 2024-11-07 NOTE — PROGRESS NOTES
CC -   CMM OS  s/p I-125 in 2019    INTERVAL HISTORY - LV 5/2024, 6 month f/u, no changes in vision, no steroid use  Notes from primary care & cardiology reviewed, med list from those visits reviewed    [facility] -   Jairo NAQVI Norman is a 78 year old  patient with CMM OS, referred by the [facility] for evaluation and treat of a choroidal lesion left eye.  diagnosis 10/2019, been getting annual eye exams no prior notice per patient.  DM II since ~ 2010, good control, + HTn.  No steroid use      PAST OCULAR SURGERY  I-125 with LR reposition 12/16/19 & 12/20/19  CE/IOL OU 12/2023      RETINAL IMAGING:  OCT 11/07/2024  OD - Macula - ST worse SRF, small  serous PED SN macula, mild RPE irregular, mild SRF by IT disk margin, trace peripapillary SRF, PHF attached  OS -  Macula - retina normal, PHF attached, choroid ?thick   Lesion - (prior) elevated poor image quality today   Periphery - mild subretinal fluid Inferotemporal to macula - stable       UBM   OS - far IT lesion in CB size 3.02mm x 10.39T x 12.48L (10/2019) ->  2.59mm x 11.47T x 12.01L  (3/2020) ->  1.82mm x 11.5T x 10.47L (8/2020) -> 1.91 x 13.31T(12.44T) x 10.37L (11/2020) -> 1.88 x 13.31T x 10.38L (3/2021) -> 1.56mm x 12.00T x 10.47L (9/2021) -> 1.56mm x 12.40 T x 10.43L (3/2022)->1.60x12.43 (9/2022) ->1.38 x 11.42L x 12.44T (3/2023) -> 1.40 x 11.40L x 12/45T (11/6/23) -> 1.32 x 11.04L x 11.43T (05/06/24) -> 1.30 x 8.97 x 9.75 (11/8/24)    U/S OS 09/23/22  A-scan - (prior)  lesion low reflective  B-scan - peripheral lesion elevated    Optos 09/23/22  Consistent with exam OU    OCT-A 1-21-19  OD - no CNVM    FA 1-21-20  OD - (transit) mulitple hyperF spots, leakage SN macula, no likely CNVM  OS - multiple hyperF spots    ICG 1-21-20  OD (transit) mulitple hyperF spots no CNVM likely  OS - multiple hyperF spots        ASSESSMENT & PLAN    #  CMM OS   - s/p I-125 12/16/19 & 12/20/19   - U/S (requires UBM d/t anterior)    - stable  on U/S today    - recheck 12 months (~  11/2025)     - will change to U/S q12 months d/t stability   - will need q6 month monitoring d/t     #.  OD   - doubt CNVM   - no likely CNVM on FA/OCT/OCT-A on 1/21/20   - had STS 20mg 12/20/19, new activity noted 1/21/20 with SRF   - fluid resolved 3/23/20    - new SRF noted 9/2021 resolved afterward   - new SRF noted 9/23/22 extramacula mild improved 12/2022   - recurrent 3/2023 ST edge of macula/extramacular   - resolved 2023     - 11/2024 recurrent today far from fovea, VA stable, patient asymptomatic, macular PED stable   - observe at this time   - recheck 6 months     - steroid avoidance d/w patient 11/2024    #  OS   -new SRF noted 9/23/2022   - mild extramacular stable today   - observe at this time    # Syneresis OU   - advised S/Sx RD 5/2024    #. Pseudophakia OU   - Underwent CEIOL in Dec 2023   - Doing well    # Refractive error, and presbyopia, both eyes   - [facility]        Return in about 6 months (around 5/8/2025) for DFE OU, OCT OU, Optos Photo.     Layton Barrios MD  Resident Physician, PGY-3  Department of Ophthalmology   ATTESTATION     Attending Attestation:     Complete documentation of historical and exam elements from today's encounter can be found in the full encounter summary report (not reduplicated in this progress note).  I personally obtained the chief complaint(s) and history of present illness.  I confirmed and edited as necessary the review of systems, past medical/surgical history, family history, social history, and examination findings as documented by others; and I examined the patient myself.  I personally reviewed the relevant tests, images, and reports as documented above.  I formulated and edited as necessary the assessment and plan and discussed the findings and management plan with the patient and family    Charisma Rodriguez MD, PhD  , Vitreoretinal Surgery  Department of Ophthalmology  [facility]

## 2024-11-08 ENCOUNTER — OFFICE VISIT (OUTPATIENT)
Dept: OPHTHALMOLOGY | Facility: CLINIC | Age: 79
End: 2024-11-08
Attending: OPHTHALMOLOGY
Payer: COMMERCIAL

## 2024-11-08 DIAGNOSIS — H35.713 CENTRAL SEROUS CHORIORETINOPATHY OF BOTH EYES: Primary | ICD-10-CM

## 2024-11-08 DIAGNOSIS — C69.32 MALIGNANT MELANOMA OF CHOROID OF LEFT EYE (H): ICD-10-CM

## 2024-11-08 DIAGNOSIS — H43.393 VITREOUS SYNERESIS OF BOTH EYES: ICD-10-CM

## 2024-11-08 PROCEDURE — G0463 HOSPITAL OUTPT CLINIC VISIT: HCPCS | Performed by: OPHTHALMOLOGY

## 2024-11-08 PROCEDURE — 92134 CPTRZ OPH DX IMG PST SGM RTA: CPT | Performed by: OPHTHALMOLOGY

## 2024-11-08 PROCEDURE — 99212 OFFICE O/P EST SF 10 MIN: CPT | Performed by: OPHTHALMOLOGY

## 2024-11-08 PROCEDURE — 76513 OPH US DX ANT SGM US UNI/BI: CPT | Performed by: OPHTHALMOLOGY

## 2024-11-08 PROCEDURE — 92250 FUNDUS PHOTOGRAPHY W/I&R: CPT | Performed by: OPHTHALMOLOGY

## 2024-11-08 ASSESSMENT — REFRACTION_WEARINGRX
OD_ADD: +2.75
OS_AXIS: 017
SPECS_TYPE: PAL
OS_CYLINDER: +0.75
OS_ADD: +2.75
OD_CYLINDER: SPHERE
OD_SPHERE: PLANO
OS_SPHERE: PLANO

## 2024-11-08 ASSESSMENT — EXTERNAL EXAM - LEFT EYE: OS_EXAM: NORMAL

## 2024-11-08 ASSESSMENT — TONOMETRY
IOP_METHOD: TONOPEN
OD_IOP_MMHG: 15
OS_IOP_MMHG: 13

## 2024-11-08 ASSESSMENT — VISUAL ACUITY
OD_SC: 20/25
METHOD: SNELLEN - LINEAR
OS_SC: 20/25
OS_SC+: -1

## 2024-11-08 ASSESSMENT — SLIT LAMP EXAM - LIDS
COMMENTS: NORMAL
COMMENTS: NORMAL

## 2024-11-08 ASSESSMENT — CUP TO DISC RATIO
OS_RATIO: 0.25
OD_RATIO: 0.25

## 2024-11-08 ASSESSMENT — EXTERNAL EXAM - RIGHT EYE: OD_EXAM: NORMAL

## 2024-11-08 NOTE — NURSING NOTE
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

## 2024-12-13 ENCOUNTER — LAB (OUTPATIENT)
Dept: LAB | Facility: CLINIC | Age: 79
End: 2024-12-13
Attending: INTERNAL MEDICINE
Payer: COMMERCIAL

## 2024-12-13 ENCOUNTER — ANCILLARY PROCEDURE (OUTPATIENT)
Dept: MRI IMAGING | Facility: CLINIC | Age: 79
End: 2024-12-13
Attending: INTERNAL MEDICINE
Payer: COMMERCIAL

## 2024-12-13 ENCOUNTER — ANCILLARY PROCEDURE (OUTPATIENT)
Dept: CT IMAGING | Facility: CLINIC | Age: 79
End: 2024-12-13
Attending: INTERNAL MEDICINE
Payer: COMMERCIAL

## 2024-12-13 DIAGNOSIS — C69.42 MALIGNANT MELANOMA OF UVEA OF LEFT EYE (H): ICD-10-CM

## 2024-12-13 LAB
ALBUMIN SERPL BCG-MCNC: 4.2 G/DL (ref 3.5–5.2)
ALP SERPL-CCNC: 116 U/L (ref 40–150)
ALT SERPL W P-5'-P-CCNC: 23 U/L (ref 0–70)
ANION GAP SERPL CALCULATED.3IONS-SCNC: 10 MMOL/L (ref 7–15)
AST SERPL W P-5'-P-CCNC: 25 U/L (ref 0–45)
BASOPHILS # BLD AUTO: 0 10E3/UL (ref 0–0.2)
BASOPHILS NFR BLD AUTO: 0 %
BILIRUB SERPL-MCNC: 0.6 MG/DL
BUN SERPL-MCNC: 25.4 MG/DL (ref 8–23)
CALCIUM SERPL-MCNC: 9.4 MG/DL (ref 8.8–10.4)
CHLORIDE SERPL-SCNC: 100 MMOL/L (ref 98–107)
CREAT SERPL-MCNC: 1.04 MG/DL (ref 0.67–1.17)
EGFRCR SERPLBLD CKD-EPI 2021: 74 ML/MIN/1.73M2
EOSINOPHIL # BLD AUTO: 0.3 10E3/UL (ref 0–0.7)
EOSINOPHIL NFR BLD AUTO: 4 %
ERYTHROCYTE [DISTWIDTH] IN BLOOD BY AUTOMATED COUNT: 13.3 % (ref 10–15)
GLUCOSE SERPL-MCNC: 117 MG/DL (ref 70–99)
HCO3 SERPL-SCNC: 28 MMOL/L (ref 22–29)
HCT VFR BLD AUTO: 43.9 % (ref 40–53)
HGB BLD-MCNC: 14.7 G/DL (ref 13.3–17.7)
IMM GRANULOCYTES # BLD: 0 10E3/UL
IMM GRANULOCYTES NFR BLD: 0 %
LYMPHOCYTES # BLD AUTO: 1.4 10E3/UL (ref 0.8–5.3)
LYMPHOCYTES NFR BLD AUTO: 18 %
MCH RBC QN AUTO: 31.6 PG (ref 26.5–33)
MCHC RBC AUTO-ENTMCNC: 33.5 G/DL (ref 31.5–36.5)
MCV RBC AUTO: 94 FL (ref 78–100)
MONOCYTES # BLD AUTO: 0.8 10E3/UL (ref 0–1.3)
MONOCYTES NFR BLD AUTO: 11 %
NEUTROPHILS # BLD AUTO: 5.1 10E3/UL (ref 1.6–8.3)
NEUTROPHILS NFR BLD AUTO: 67 %
NRBC # BLD AUTO: 0 10E3/UL
NRBC BLD AUTO-RTO: 0 /100
PLATELET # BLD AUTO: 229 10E3/UL (ref 150–450)
POTASSIUM SERPL-SCNC: 4 MMOL/L (ref 3.4–5.3)
PROT SERPL-MCNC: 7.7 G/DL (ref 6.4–8.3)
RBC # BLD AUTO: 4.65 10E6/UL (ref 4.4–5.9)
SODIUM SERPL-SCNC: 138 MMOL/L (ref 135–145)
WBC # BLD AUTO: 7.7 10E3/UL (ref 4–11)

## 2024-12-13 PROCEDURE — 36415 COLL VENOUS BLD VENIPUNCTURE: CPT

## 2024-12-13 PROCEDURE — 85004 AUTOMATED DIFF WBC COUNT: CPT

## 2024-12-13 PROCEDURE — 82565 ASSAY OF CREATININE: CPT

## 2024-12-13 PROCEDURE — 84155 ASSAY OF PROTEIN SERUM: CPT

## 2024-12-13 PROCEDURE — 74183 MRI ABD W/O CNTR FLWD CNTR: CPT | Performed by: RADIOLOGY

## 2024-12-13 PROCEDURE — A9581 GADOXETATE DISODIUM INJ: HCPCS | Performed by: RADIOLOGY

## 2024-12-13 PROCEDURE — 71250 CT THORAX DX C-: CPT | Mod: GC | Performed by: RADIOLOGY

## 2024-12-13 NOTE — NURSING NOTE
Chief Complaint   Patient presents with    Labs Only     Blood draw, piv placed by MA. Pt sent to first floor for imaging appts.     Anabela Pierson MA

## 2025-03-01 ENCOUNTER — HEALTH MAINTENANCE LETTER (OUTPATIENT)
Age: 80
End: 2025-03-01

## 2025-04-30 DIAGNOSIS — H35.713 CENTRAL SEROUS CHORIORETINOPATHY OF BOTH EYES: Primary | ICD-10-CM

## 2025-06-15 ENCOUNTER — HEALTH MAINTENANCE LETTER (OUTPATIENT)
Age: 80
End: 2025-06-15

## (undated) DEVICE — STPL LINEAR CUT 100MM TLC10

## (undated) DEVICE — EYE NDL RETROBULBAR 25GA 1.5" 581275

## (undated) DEVICE — PAD CHUX UNDERPAD 23X24" 7136

## (undated) DEVICE — LINEN TOWEL PACK X5 5464

## (undated) DEVICE — SYR 01ML LL W/O NDL LATEX FREE 309628

## (undated) DEVICE — SU VICRYL 0 UR-6 27" J603H

## (undated) DEVICE — SU VICRYL 3-0 SH 27" UND J416H

## (undated) DEVICE — SU ETHILON 5-0 RD-1DA 18" 749G

## (undated) DEVICE — LINEN TOWEL PACK X30 5481

## (undated) DEVICE — SYR 03ML LL W/O NDL 309657

## (undated) DEVICE — SYR 05ML LL W/O NDL

## (undated) DEVICE — STPL RELOAD REG TISSUE ECHELON 45 X 3.6MM BLUE GST45B

## (undated) DEVICE — EYE PREP BETADINE 5% SOLUTION 30ML 0065-0411-30

## (undated) DEVICE — DRAPE IOBAN INCISE 23X17" 6650EZ

## (undated) DEVICE — SOL NACL 0.9% IRRIG 1000ML BOTTLE 2F7124

## (undated) DEVICE — SYR 10ML SLIP TIP W/O NDL 303134

## (undated) DEVICE — DAVINCI XI SUCTION IRRIGATOR ENDOWRIST 480299

## (undated) DEVICE — ESU ENDO SCISSORS 5MM CVD 5DCS

## (undated) DEVICE — ADH SKIN CLOSURE PREMIERPRO EXOFIN 1.0ML 3470

## (undated) DEVICE — DAVINCI XI DRAPE ARM 470015

## (undated) DEVICE — LIGHT HANDLE X2

## (undated) DEVICE — SU VICRYL 6-0 S-24DA 12" J552G

## (undated) DEVICE — SU VICRYL 2-0 CT-2 27" UND J269H

## (undated) DEVICE — GLOVE PROTEXIS BLUE W/NEU-THERA 7.5  2D73EB75

## (undated) DEVICE — APPLICATORS COTTON-TIPPED 3" PKG OF 2 C15050-003

## (undated) DEVICE — SU MONOCRYL 4-0 PS-2 18" UND Y496G

## (undated) DEVICE — STRAP KNEE/BODY 31143004

## (undated) DEVICE — NDL ANGIOCATH 14GA 1.25" 4048

## (undated) DEVICE — NDL 18GA 1.5" 305196

## (undated) DEVICE — PREP CHLORAPREP 26ML TINTED ORANGE  260815

## (undated) DEVICE — EYE DRSG PAD OVAL

## (undated) DEVICE — ANTIFOG SOLUTION W/FOAM PAD 31142527

## (undated) DEVICE — SOL WATER IRRIG 1000ML BOTTLE 2F7114

## (undated) DEVICE — DAVINCI XI DRAPE COLUMN 470341

## (undated) DEVICE — WIPES FOLEY CARE SURESTEP PROVON DFC100

## (undated) DEVICE — SU SILK 3-0 TIE 12X30" A304H

## (undated) DEVICE — SU PLAIN 6-0 TG140-8 18" 1735G

## (undated) DEVICE — EYE INSTRUMENT WIPE MEROCEL W/ADHESIVE 223625

## (undated) DEVICE — Device

## (undated) DEVICE — ENDO TROCAR SLEEVE KII Z-THREADED 05X100MM CTS02

## (undated) DEVICE — SU SILK 0 TIE 6X30" A306H

## (undated) DEVICE — ESU GROUND PAD UNIVERSAL W/O CORD

## (undated) DEVICE — ANTIFOG SOLUTION SEE SHARP 150M TROCAR SWABS 30978

## (undated) DEVICE — ESU LIGASURE MARYLAND LAPAROSCOPIC SLR/DVDR 5MMX37CM LF1937

## (undated) DEVICE — PACK CATARACT UMMC

## (undated) DEVICE — SU SILK 2-0 TIE 12X30" A305H

## (undated) DEVICE — DAVINCI XI SEAL UNIVERSAL 5-12MM 470500

## (undated) DEVICE — PEN MARKING SKIN VISIMARK 1424SR

## (undated) DEVICE — SU VICRYL 6-0 S-29 12" J556G

## (undated) DEVICE — SUCTION MANIFOLD NEPTUNE 2 SYS 4 PORT 0702-020-000

## (undated) DEVICE — DRSG GAUZE 4X4" 3033

## (undated) DEVICE — RAD RX ISOVUE 300 (50ML) 61% IOPAMIDOL CHARGE PER ML

## (undated) DEVICE — CLEANER INST PRE-KLENZ SOAK SHIELD TUBE 6 ML MEDIUM 2D66J4

## (undated) DEVICE — SU PDS II 0 TP-1 60" Z991G

## (undated) DEVICE — SU VICRYL 4-0 PS-2 18" UND J496H

## (undated) DEVICE — POSITIONER ARMBOARD FOAM 1PAIR LF FP-ARMB1

## (undated) DEVICE — DRAPE SHEET REV FOLD 3/4 9349

## (undated) DEVICE — SU ETHILON 3-0 FS-1 18" 669H

## (undated) DEVICE — DRAIN JACKSON PRATT 15FR ROUND SU130-1323

## (undated) DEVICE — EVAC SYSTEM CLEAR FLOW SC082500

## (undated) DEVICE — DRAPE MICROSCOPE OPMI ZEISS 48X118" 306071-0000-000

## (undated) DEVICE — BASIN SET SINGLE STERILE 13752-624

## (undated) DEVICE — ESU GROUND PAD ADULT W/CORD E7507

## (undated) DEVICE — ENDO CATH BALLOON EXTRACTOR PRO RX 09-12MM 4700

## (undated) DEVICE — ENDO TROCAR FIRST ENTRY KII FIOS Z-THRD 05X100MM CTF03

## (undated) DEVICE — STPL RELOAD LINEAR CUT 75MM TCR75

## (undated) DEVICE — SURGICEL ABSORBABLE HEMOSTAT SNOW 4"X4" 2083

## (undated) DEVICE — LUBRICANT INST ELECTROLUBE EL101

## (undated) DEVICE — GLOVE PROTEXIS MICRO 7.5  2D73PM75

## (undated) DEVICE — NDL INSUFFLATION 13GA 120MM C2201

## (undated) DEVICE — KIT PATIENT POSITIONING PIGAZZI LATEX FREE 40580

## (undated) DEVICE — PREP CHLORAPREP 26ML TINTED HI-LITE ORANGE 930815

## (undated) DEVICE — DRAIN JACKSON PRATT RESERVOIR 100ML SU130-1305

## (undated) DEVICE — NDL 30GA 0.5" 305106

## (undated) DEVICE — LINEN TOWEL PACK X6 WHITE 5487

## (undated) DEVICE — GLOVE PROTEXIS MICRO 7.0  2D73PM70

## (undated) DEVICE — COVER TRANSDUCER PROBE 7X24" 610-575

## (undated) DEVICE — STPL LINEAR CUT 75MM TLC75

## (undated) DEVICE — DRAPE BREAST/CHEST 29420

## (undated) DEVICE — CLIP ENDO HEMO-LOC GREEN MED/LG 544230

## (undated) DEVICE — SU SILK 3-0 SH CR 8X18" C013D

## (undated) DEVICE — CATH TRAY FOLEY SURESTEP 16FR W/URNE MTR STLK LATEX A303316A

## (undated) DEVICE — SPONGE LAP 18X18" X8435

## (undated) DEVICE — POUCH TISSUE RETRIEVAL ROBOTIC 8MM 5.1" INTRO TRS-ROBO-8

## (undated) DEVICE — STPL ENDO LINEAR CUT ECHELON FLEX GST 45MM LONG PLEE45A

## (undated) DEVICE — SU VICRYL 0 CT-2 27" J334H

## (undated) DEVICE — DEVICE SUTURE PASSER 14GA WECK EFX EFXSP2

## (undated) DEVICE — INTR ENDOSCOPIC STENT FUSION OASIS 09.0FRX200CM

## (undated) DEVICE — DAVINCI XI OBTURATOR BLADELESS 8MM 470359

## (undated) DEVICE — SYR 10ML FINGER CONTROL W/O NDL 309695

## (undated) DEVICE — DECANTER BAG 2002S

## (undated) DEVICE — PACK LAP CHOLE SLC15LCFSD

## (undated) DEVICE — TUBING SMOKE EVAC PNEUMOCLEAR HIGH FLOW 0620050250

## (undated) DEVICE — NDL CANNULA INTERLINK BLUNT 18GA

## (undated) RX ORDER — PROPOFOL 10 MG/ML
INJECTION, EMULSION INTRAVENOUS
Status: DISPENSED
Start: 2019-12-16

## (undated) RX ORDER — FENTANYL CITRATE 0.05 MG/ML
INJECTION, SOLUTION INTRAMUSCULAR; INTRAVENOUS
Status: DISPENSED
Start: 2024-06-27

## (undated) RX ORDER — LIDOCAINE HYDROCHLORIDE 20 MG/ML
INJECTION, SOLUTION EPIDURAL; INFILTRATION; INTRACAUDAL; PERINEURAL
Status: DISPENSED
Start: 2019-12-16

## (undated) RX ORDER — FENTANYL CITRATE 50 UG/ML
INJECTION, SOLUTION INTRAMUSCULAR; INTRAVENOUS
Status: DISPENSED
Start: 2024-06-26

## (undated) RX ORDER — CYCLOPENTOLAT/TROPIC/PHENYLEPH 1%-1%-2.5%
DROPS (EA) OPHTHALMIC (EYE)
Status: DISPENSED
Start: 2019-12-16

## (undated) RX ORDER — HYDROMORPHONE HCL IN WATER/PF 6 MG/30 ML
PATIENT CONTROLLED ANALGESIA SYRINGE INTRAVENOUS
Status: DISPENSED
Start: 2024-06-27

## (undated) RX ORDER — LIDOCAINE HYDROCHLORIDE 20 MG/ML
INJECTION, SOLUTION EPIDURAL; INFILTRATION; INTRACAUDAL; PERINEURAL
Status: DISPENSED
Start: 2022-02-22

## (undated) RX ORDER — INDOCYANINE GREEN AND WATER 25 MG
KIT INJECTION
Status: DISPENSED
Start: 2024-06-27

## (undated) RX ORDER — FENTANYL CITRATE 50 UG/ML
INJECTION, SOLUTION INTRAMUSCULAR; INTRAVENOUS
Status: DISPENSED
Start: 2019-12-20

## (undated) RX ORDER — ONDANSETRON 2 MG/ML
INJECTION INTRAMUSCULAR; INTRAVENOUS
Status: DISPENSED
Start: 2019-12-16

## (undated) RX ORDER — PROPOFOL 10 MG/ML
INJECTION, EMULSION INTRAVENOUS
Status: DISPENSED
Start: 2022-02-22

## (undated) RX ORDER — PROPOFOL 10 MG/ML
INJECTION, EMULSION INTRAVENOUS
Status: DISPENSED
Start: 2024-06-27

## (undated) RX ORDER — LIDOCAINE HYDROCHLORIDE 20 MG/ML
INJECTION, SOLUTION EPIDURAL; INFILTRATION; INTRACAUDAL; PERINEURAL
Status: DISPENSED
Start: 2019-12-20

## (undated) RX ORDER — ONDANSETRON 2 MG/ML
INJECTION INTRAMUSCULAR; INTRAVENOUS
Status: DISPENSED
Start: 2024-06-26

## (undated) RX ORDER — HYDROMORPHONE HYDROCHLORIDE 1 MG/ML
INJECTION, SOLUTION INTRAMUSCULAR; INTRAVENOUS; SUBCUTANEOUS
Status: DISPENSED
Start: 2022-02-22

## (undated) RX ORDER — EPINEPHRINE 1 MG/ML
INJECTION, SOLUTION INTRAMUSCULAR; SUBCUTANEOUS
Status: DISPENSED
Start: 2024-06-27

## (undated) RX ORDER — ONDANSETRON 2 MG/ML
INJECTION INTRAMUSCULAR; INTRAVENOUS
Status: DISPENSED
Start: 2022-02-22

## (undated) RX ORDER — DEXAMETHASONE SODIUM PHOSPHATE 4 MG/ML
INJECTION, SOLUTION INTRA-ARTICULAR; INTRALESIONAL; INTRAMUSCULAR; INTRAVENOUS; SOFT TISSUE
Status: DISPENSED
Start: 2022-02-22

## (undated) RX ORDER — FENTANYL CITRATE 50 UG/ML
INJECTION, SOLUTION INTRAMUSCULAR; INTRAVENOUS
Status: DISPENSED
Start: 2019-12-16

## (undated) RX ORDER — GLYCOPYRROLATE 0.2 MG/ML
INJECTION INTRAMUSCULAR; INTRAVENOUS
Status: DISPENSED
Start: 2019-12-16

## (undated) RX ORDER — FENTANYL CITRATE 50 UG/ML
INJECTION, SOLUTION INTRAMUSCULAR; INTRAVENOUS
Status: DISPENSED
Start: 2024-06-27

## (undated) RX ORDER — HYDROMORPHONE HYDROCHLORIDE 1 MG/ML
INJECTION, SOLUTION INTRAMUSCULAR; INTRAVENOUS; SUBCUTANEOUS
Status: DISPENSED
Start: 2019-12-16

## (undated) RX ORDER — EPHEDRINE SULFATE 50 MG/ML
INJECTION, SOLUTION INTRAMUSCULAR; INTRAVENOUS; SUBCUTANEOUS
Status: DISPENSED
Start: 2022-02-22

## (undated) RX ORDER — FENTANYL CITRATE 50 UG/ML
INJECTION, SOLUTION INTRAMUSCULAR; INTRAVENOUS
Status: DISPENSED
Start: 2022-02-22

## (undated) RX ORDER — EPHEDRINE SULFATE 50 MG/ML
INJECTION, SOLUTION INTRAMUSCULAR; INTRAVENOUS; SUBCUTANEOUS
Status: DISPENSED
Start: 2019-12-16

## (undated) RX ORDER — FENTANYL CITRATE-0.9 % NACL/PF 10 MCG/ML
PLASTIC BAG, INJECTION (ML) INTRAVENOUS
Status: DISPENSED
Start: 2022-02-22

## (undated) RX ORDER — HYDROMORPHONE HCL IN WATER/PF 6 MG/30 ML
PATIENT CONTROLLED ANALGESIA SYRINGE INTRAVENOUS
Status: DISPENSED
Start: 2022-02-22

## (undated) RX ORDER — PHENYLEPHRINE HCL IN 0.9% NACL 1 MG/10 ML
SYRINGE (ML) INTRAVENOUS
Status: DISPENSED
Start: 2019-12-16

## (undated) RX ORDER — BUPIVACAINE HYDROCHLORIDE 2.5 MG/ML
INJECTION, SOLUTION EPIDURAL; INFILTRATION; INTRACAUDAL
Status: DISPENSED
Start: 2024-06-27

## (undated) RX ORDER — DEXAMETHASONE SODIUM PHOSPHATE 4 MG/ML
INJECTION, SOLUTION INTRA-ARTICULAR; INTRALESIONAL; INTRAMUSCULAR; INTRAVENOUS; SOFT TISSUE
Status: DISPENSED
Start: 2024-06-26

## (undated) RX ORDER — TRIAMCINOLONE ACETONIDE 40 MG/ML
INJECTION, SUSPENSION INTRA-ARTICULAR; INTRAMUSCULAR
Status: DISPENSED
Start: 2019-12-16

## (undated) RX ORDER — PHENYLEPHRINE HYDROCHLORIDE 25 MG/ML
SOLUTION/ DROPS OPHTHALMIC
Status: DISPENSED
Start: 2019-12-20

## (undated) RX ORDER — BALANCED SALT SOLUTION 6.4; .75; .48; .3; 3.9; 1.7 MG/ML; MG/ML; MG/ML; MG/ML; MG/ML; MG/ML
SOLUTION OPHTHALMIC
Status: DISPENSED
Start: 2019-12-16

## (undated) RX ORDER — FENTANYL CITRATE 50 UG/ML
INJECTION, SOLUTION INTRAMUSCULAR; INTRAVENOUS
Status: DISPENSED
Start: 2024-07-03

## (undated) RX ORDER — ACETAMINOPHEN 500 MG
TABLET ORAL
Status: DISPENSED
Start: 2022-02-22

## (undated) RX ORDER — CEFAZOLIN SODIUM 1 G/50ML
SOLUTION INTRAVENOUS
Status: DISPENSED
Start: 2022-02-22

## (undated) RX ORDER — KETOROLAC TROMETHAMINE 30 MG/ML
INJECTION, SOLUTION INTRAMUSCULAR; INTRAVENOUS
Status: DISPENSED
Start: 2019-12-20

## (undated) RX ORDER — DEXAMETHASONE SODIUM PHOSPHATE 4 MG/ML
INJECTION, SOLUTION INTRA-ARTICULAR; INTRALESIONAL; INTRAMUSCULAR; INTRAVENOUS; SOFT TISSUE
Status: DISPENSED
Start: 2024-06-27

## (undated) RX ORDER — ONDANSETRON 2 MG/ML
INJECTION INTRAMUSCULAR; INTRAVENOUS
Status: DISPENSED
Start: 2019-12-20

## (undated) RX ORDER — GLYCOPYRROLATE 0.2 MG/ML
INJECTION, SOLUTION INTRAMUSCULAR; INTRAVENOUS
Status: DISPENSED
Start: 2022-02-22

## (undated) RX ORDER — ACETAMINOPHEN 325 MG/1
TABLET ORAL
Status: DISPENSED
Start: 2019-12-16

## (undated) RX ORDER — ONDANSETRON 2 MG/ML
INJECTION INTRAMUSCULAR; INTRAVENOUS
Status: DISPENSED
Start: 2024-06-27

## (undated) RX ORDER — PROPOFOL 10 MG/ML
INJECTION, EMULSION INTRAVENOUS
Status: DISPENSED
Start: 2024-06-26

## (undated) RX ORDER — OXYCODONE HYDROCHLORIDE 5 MG/1
TABLET ORAL
Status: DISPENSED
Start: 2019-12-16

## (undated) RX ORDER — HEPARIN SODIUM 5000 [USP'U]/.5ML
INJECTION, SOLUTION INTRAVENOUS; SUBCUTANEOUS
Status: DISPENSED
Start: 2024-06-27

## (undated) RX ORDER — CYCLOPENTOLATE HYDROCHLORIDE 10 MG/ML
SOLUTION/ DROPS OPHTHALMIC
Status: DISPENSED
Start: 2019-12-20